# Patient Record
Sex: FEMALE | Race: OTHER | HISPANIC OR LATINO | Employment: OTHER | URBAN - METROPOLITAN AREA
[De-identification: names, ages, dates, MRNs, and addresses within clinical notes are randomized per-mention and may not be internally consistent; named-entity substitution may affect disease eponyms.]

---

## 2017-02-07 ENCOUNTER — ALLSCRIPTS OFFICE VISIT (OUTPATIENT)
Dept: OTHER | Facility: OTHER | Age: 47
End: 2017-02-07

## 2017-04-17 ENCOUNTER — ALLSCRIPTS OFFICE VISIT (OUTPATIENT)
Dept: OTHER | Facility: OTHER | Age: 47
End: 2017-04-17

## 2017-04-18 ENCOUNTER — GENERIC CONVERSION - ENCOUNTER (OUTPATIENT)
Dept: OTHER | Facility: OTHER | Age: 47
End: 2017-04-18

## 2017-04-18 LAB
AMBIG ABBREV CMP14 DEFAULT (HISTORICAL): NORMAL
AMBIG ABBREV LP DEFAULT (HISTORICAL): NORMAL
DEPRECATED RDW RBC AUTO: 13.8 % (ref 12.3–15.4)
ERYTHROCYTE SEDIMENTATION RATE (HISTORICAL): 25 MM/HR (ref 0–32)
HCT VFR BLD AUTO: 36.8 % (ref 34–46.6)
HGB BLD-MCNC: 12.4 G/DL (ref 11.1–15.9)
MCH RBC QN AUTO: 30.8 PG (ref 26.6–33)
MCHC RBC AUTO-ENTMCNC: 33.7 G/DL (ref 31.5–35.7)
MCV RBC AUTO: 91 FL (ref 79–97)
PLATELET # BLD AUTO: 294 X10E3/UL (ref 150–379)
RBC (HISTORICAL): 4.03 X10E6/UL (ref 3.77–5.28)
WBC # BLD AUTO: 5.5 X10E3/UL (ref 3.4–10.8)

## 2017-04-19 LAB
A/G RATIO (HISTORICAL): 1.7 (ref 1.2–2.2)
ALBUMIN SERPL BCP-MCNC: 4.4 G/DL (ref 3.5–5.5)
ALP SERPL-CCNC: 63 IU/L (ref 39–117)
ALT SERPL W P-5'-P-CCNC: 10 IU/L (ref 0–32)
AST SERPL W P-5'-P-CCNC: 19 IU/L (ref 0–40)
BACTERIA UR QL AUTO: NORMAL
BILIRUB SERPL-MCNC: 0.5 MG/DL (ref 0–1.2)
BILIRUB UR QL STRIP: NEGATIVE
BUN SERPL-MCNC: 13 MG/DL (ref 6–24)
BUN/CREA RATIO (HISTORICAL): 18 (ref 9–23)
CALCIUM SERPL-MCNC: 9.4 MG/DL (ref 8.7–10.2)
CHLORIDE SERPL-SCNC: 101 MMOL/L (ref 96–106)
CHOLEST SERPL-MCNC: 171 MG/DL (ref 100–199)
CO2 SERPL-SCNC: 22 MMOL/L (ref 18–29)
COLOR UR: YELLOW
COMMENT (HISTORICAL): CLEAR
CREAT SERPL-MCNC: 0.73 MG/DL (ref 0.57–1)
EGFR AFRICAN AMERICAN (HISTORICAL): 115 ML/MIN/1.73
EGFR-AMERICAN CALC (HISTORICAL): 100 ML/MIN/1.73
FECAL OCCULT BLOOD DIAGNOSTIC (HISTORICAL): NEGATIVE
GLUCOSE (HISTORICAL): NEGATIVE
GLUCOSE SERPL-MCNC: 97 MG/DL (ref 65–99)
HBA1C MFR BLD HPLC: 5.7 % (ref 4.8–5.6)
HDLC SERPL-MCNC: 65 MG/DL
KETONES UR STRIP-MCNC: NEGATIVE MG/DL
LDLC SERPL CALC-MCNC: 89 MG/DL (ref 0–99)
LEUKOCYTE ESTERASE UR QL STRIP: NEGATIVE
MICROSCOPIC EXAMINATION (HISTORICAL): NORMAL
MICROSCOPIC EXAMINATION (HISTORICAL): NORMAL
MUCUS THREADS (HISTORICAL): PRESENT
NITRITE UR QL STRIP: NEGATIVE
NON-SQ EPI CELLS URNS QL MICRO: NORMAL /HPF
PH UR STRIP.AUTO: 6 [PH] (ref 5–7.5)
POTASSIUM SERPL-SCNC: 4.5 MMOL/L (ref 3.5–5.2)
PROT UR STRIP-MCNC: NEGATIVE MG/DL
RBC (HISTORICAL): NORMAL /HPF
SODIUM SERPL-SCNC: 140 MMOL/L (ref 134–144)
SP GR UR STRIP.AUTO: 1.02 (ref 1–1.03)
TOT. GLOBULIN, SERUM (HISTORICAL): 2.6 G/DL (ref 1.5–4.5)
TOTAL PROTEIN (HISTORICAL): 7 G/DL (ref 6–8.5)
TRIGL SERPL-MCNC: 87 MG/DL (ref 0–149)
TSH SERPL DL<=0.05 MIU/L-ACNC: 2.37 UIU/ML (ref 0.45–4.5)
URINALYSIS (UA) (HISTORICAL): NORMAL
UROBILINOGEN UR QL STRIP.AUTO: 0.2 EU/DL (ref 0.2–1)
VLDLC SERPL CALC-MCNC: 17 MG/DL (ref 5–40)
WBC # BLD AUTO: NORMAL /HPF

## 2017-04-24 ENCOUNTER — GENERIC CONVERSION - ENCOUNTER (OUTPATIENT)
Dept: OTHER | Facility: OTHER | Age: 47
End: 2017-04-24

## 2017-04-26 LAB
C DIFFICILE TOXIN A AND B (HISTORICAL): NEGATIVE
FECAL OCCULT BLOOD DIAGNOSTIC (HISTORICAL): NEGATIVE

## 2017-04-28 LAB
RESULT 1 (HISTORICAL): NORMAL
RESULT 1 (HISTORICAL): NORMAL
SHIGA TOXIN TEST (HISTORICAL): NEGATIVE
STOOL FOR CAMPYLOBACTER (HISTORICAL): NORMAL
STOOL FOR SALMONELLA OR SHIGELLA (HISTORICAL): NORMAL

## 2017-05-01 LAB
GIARDIA ANTIGEN STOOL (HISTORICAL): NEGATIVE
RESULT 1 (HISTORICAL): NORMAL
RESULT 1 (HISTORICAL): NORMAL
STOOL COMPLETE OVA AND PARASITES (HISTORICAL): NORMAL
WHITE BLOOD CELLS, STOOL (HISTORICAL): NORMAL

## 2017-07-21 ENCOUNTER — GENERIC CONVERSION - ENCOUNTER (OUTPATIENT)
Dept: OTHER | Facility: OTHER | Age: 47
End: 2017-07-21

## 2017-09-21 ENCOUNTER — TRANSCRIBE ORDERS (OUTPATIENT)
Dept: LAB | Facility: CLINIC | Age: 47
End: 2017-09-21

## 2017-09-21 ENCOUNTER — ALLSCRIPTS OFFICE VISIT (OUTPATIENT)
Dept: OTHER | Facility: OTHER | Age: 47
End: 2017-09-21

## 2017-09-21 DIAGNOSIS — R73.01 IMPAIRED FASTING GLUCOSE: ICD-10-CM

## 2017-09-21 DIAGNOSIS — M25.50 PAIN IN JOINT: ICD-10-CM

## 2017-09-21 DIAGNOSIS — E78.5 HYPERLIPIDEMIA: ICD-10-CM

## 2017-09-22 ENCOUNTER — GENERIC CONVERSION - ENCOUNTER (OUTPATIENT)
Dept: OTHER | Facility: OTHER | Age: 47
End: 2017-09-22

## 2017-09-22 LAB
AMBIG ABBREV LP DEFAULT (HISTORICAL): NORMAL
DEPRECATED RDW RBC AUTO: 13.5 % (ref 12.3–15.4)
ERYTHROCYTE SEDIMENTATION RATE (HISTORICAL): 7 MM/HR (ref 0–32)
HCT VFR BLD AUTO: 38.2 % (ref 34–46.6)
HGB BLD-MCNC: 12.8 G/DL (ref 11.1–15.9)
MCH RBC QN AUTO: 30.5 PG (ref 26.6–33)
MCHC RBC AUTO-ENTMCNC: 33.5 G/DL (ref 31.5–35.7)
MCV RBC AUTO: 91 FL (ref 79–97)
PLATELET # BLD AUTO: 310 X10E3/UL (ref 150–379)
RBC (HISTORICAL): 4.19 X10E6/UL (ref 3.77–5.28)
WBC # BLD AUTO: 5.6 X10E3/UL (ref 3.4–10.8)

## 2017-09-23 LAB
A/G RATIO (HISTORICAL): 1.8 (ref 1.2–2.2)
ALBUMIN SERPL BCP-MCNC: 4.8 G/DL (ref 3.5–5.5)
ALP SERPL-CCNC: 72 IU/L (ref 39–117)
ALT SERPL W P-5'-P-CCNC: 14 IU/L (ref 0–32)
ANTI DNA DOUBLE STRANDED (HISTORICAL): <1 IU/ML (ref 0–9)
ANTI JO-1 IGG (HISTORICAL): <0.2 AI (ref 0–0.9)
ANTI-CENTROMERE B ANTIBODIES (HISTORICAL): <0.2 AI (ref 0–0.9)
ANTI-NUCLEAR ANTIBODY (ANA) (HISTORICAL): POSITIVE
ANTICHROMATIN ABS (HISTORICAL): <0.2 AI (ref 0–0.9)
AST SERPL W P-5'-P-CCNC: 23 IU/L (ref 0–40)
BILIRUB SERPL-MCNC: 0.6 MG/DL (ref 0–1.2)
BUN SERPL-MCNC: 12 MG/DL (ref 6–24)
BUN/CREA RATIO (HISTORICAL): 14 (ref 9–23)
C-REACT.PROTEIN,QUANT (HISTORICAL): 2.7 MG/L (ref 0–4.9)
CALCIUM SERPL-MCNC: 10 MG/DL (ref 8.7–10.2)
CHLORIDE SERPL-SCNC: 99 MMOL/L (ref 96–106)
CHOLEST SERPL-MCNC: 178 MG/DL (ref 100–199)
CK SERPL-CCNC: 99 U/L (ref 24–173)
CO2 SERPL-SCNC: 23 MMOL/L (ref 18–29)
CREAT SERPL-MCNC: 0.85 MG/DL (ref 0.57–1)
EGFR AFRICAN AMERICAN (HISTORICAL): 96 ML/MIN/1.73
EGFR-AMERICAN CALC (HISTORICAL): 83 ML/MIN/1.73
ENA TO RNP ANTIBODY (HISTORICAL): 0.2 AI (ref 0–0.9)
ENA TO SMITH (SM) ANTIBODY (HISTORICAL): <0.2 AI (ref 0–0.9)
GLUCOSE SERPL-MCNC: 89 MG/DL (ref 65–99)
HBA1C MFR BLD HPLC: 5.7 % (ref 4.8–5.6)
HDLC SERPL-MCNC: 63 MG/DL
LDLC SERPL CALC-MCNC: 97 MG/DL (ref 0–99)
LYME IGG/IGM AB (HISTORICAL): <0.91 ISR (ref 0–0.9)
LYME IGM (HISTORICAL): <0.8 INDEX (ref 0–0.79)
POTASSIUM SERPL-SCNC: 4.6 MMOL/L (ref 3.5–5.2)
RA LATEX TURBID (HISTORICAL): <10 IU/ML (ref 0–13.9)
SCLERODERMA (SCL-70) AB (HISTORICAL): 1.2 AI (ref 0–0.9)
SEE BELOW/SEE TEXT (HISTORICAL): ABNORMAL
SODIUM SERPL-SCNC: 139 MMOL/L (ref 134–144)
SSA (RO) ANTIBODY (HISTORICAL): 1 AI (ref 0–0.9)
SSB (LA) ANTIBODY (HISTORICAL): <0.2 AI (ref 0–0.9)
TOT. GLOBULIN, SERUM (HISTORICAL): 2.7 G/DL (ref 1.5–4.5)
TOTAL PROTEIN (HISTORICAL): 7.5 G/DL (ref 6–8.5)
TRIGL SERPL-MCNC: 90 MG/DL (ref 0–149)
TSH SERPL DL<=0.05 MIU/L-ACNC: 2.55 UIU/ML (ref 0.45–4.5)
VLDLC SERPL CALC-MCNC: 18 MG/DL (ref 5–40)

## 2017-09-24 LAB — CYCLIC CITRULLINATED PEPTIDE ANTIBODY (HISTORICAL): 5 UNITS (ref 0–19)

## 2017-09-29 ENCOUNTER — GENERIC CONVERSION - ENCOUNTER (OUTPATIENT)
Dept: OTHER | Facility: OTHER | Age: 47
End: 2017-09-29

## 2017-10-06 ENCOUNTER — GENERIC CONVERSION - ENCOUNTER (OUTPATIENT)
Dept: OTHER | Facility: OTHER | Age: 47
End: 2017-10-06

## 2017-10-06 ENCOUNTER — GENERIC CONVERSION - ENCOUNTER (OUTPATIENT)
Dept: INTERNAL MEDICINE CLINIC | Facility: CLINIC | Age: 47
End: 2017-10-06

## 2017-11-30 ENCOUNTER — ALLSCRIPTS OFFICE VISIT (OUTPATIENT)
Dept: OTHER | Facility: OTHER | Age: 47
End: 2017-11-30

## 2017-12-05 NOTE — PROGRESS NOTES
Assessment    1  Other specified hypothyroidism (244 8) (E03 8)   2  Hashimoto's thyroiditis (245 2) (E06 3)   3  Sjogren's syndrome (710 2) (M35 00)   4  Hyperlipemia (272 4) (E78 5)   5  Depression with anxiety (300 4) (F41 8)    Plan  Depression with anxiety    · Follow-up visit in 2 months Evaluation and Treatment  Follow-up  Status: Hold For - Scheduling   Requested for: 12Ieu0051  Hashimoto's thyroiditis    · (1) TSH; Status:Active; Requested for:56Kdm7022; Hashimoto's thyroiditis, Other specified hypothyroidism    · Levothyroxine Sodium 25 MCG Oral Tablet; take 1 tablet by mouth every day    Discussion/Summary  Discussion Summary:   She will get an appointment to see her endocrinologist within the next couple of months meanwhile we will start her on Synthroid 25 follow-up TSH in 1 month  Follow up as her symptoms warrant  Medication SE Review and Pt Understands Tx: Possible side effects of new medications were reviewed with the patient/guardian today  The treatment plan was reviewed with the patient/guardian  The patient/guardian understands and agrees with the treatment plan      Chief Complaint  Chief Complaint Free Text Note Form: For follow-up      History of Present Illness  HPI: She has a long history of a thyroid nodule on the left it varies in size  Some hot and cold intolerance some changes in her skin some dry mouth and dry  The symptoms had been going on for 4 years on and  She has had thyroid scanning TSHs have been up and down she has been followed by Rheumatology and Endocrinology  Most recent testing by Rheumatology showed evidence of Hashimoto's S somewhat elevated TSH,4  No palpitations dizziness shortness of breath or weight gain  She has anxiety problems as well which seem to be stable and on psychotropics  No inflammatory joints or skin rashes   Anxiety Disorder (Follow-Up): The patient is being seen for follow-up of anxiety  The patient reports no change in the condition   She has no comorbid illnesses  She has had no significant interval events  The patient is currently asymptomatic  Disease management:  the patient is doing well with her goals  Depression (Follow-Up): The patient states her depression has been stable since the last visit  She has no comorbid illnesses  She has had no significant interval events  Interval Symptoms: The patient is currently asymptomatic  Associated symptoms include:  No associated symptoms are reported  Hyperlipidemia (Follow-Up): The patient states her hyperlipidemia has been under good control since the last visit  Comorbid Illnesses: hypertension  She has no significant interval events  Symptoms: The patient is currently asymptomatic  The patient is doing well with her hyperlipidemia goals  Review of Systems  Complete-Female:   Constitutional: feeling poorly and feeling tired, but as noted in HPI, no fever, no recent weight gain and no chills  Eyes: No complaints of eye pain, no red eyes, no eyesight problems, no discharge, no dry eyes, no itching of eyes  ENT: no complaints of earache, no loss of hearing, no nose bleeds, no nasal discharge, no sore throat, no hoarseness  Cardiovascular: No complaints of slow heart rate, no fast heart rate, no chest pain, no palpitations, no leg claudication, no lower extremity edema  Respiratory: No complaints of shortness of breath, no wheezing, no cough, no SOB on exertion, no orthopnea, no PND  Gastrointestinal: No complaints of abdominal pain, no constipation, no nausea or vomiting, no diarrhea, no bloody stools  Genitourinary: No complaints of dysuria, no incontinence, no pelvic pain, no dysmenorrhea, no vaginal discharge or bleeding  Musculoskeletal: No complaints of arthralgias, no myalgias, no joint swelling or stiffness, no limb pain or swelling  Integumentary: No complaints of skin rash or lesions, no itching, no skin wounds, no breast pain or lump     Neurological: No complaints of headache, no confusion, no convulsions, no numbness, no dizziness or fainting, no tingling, no limb weakness, no difficulty walking  Psychiatric: Not suicidal, no sleep disturbance, no anxiety or depression, no change in personality, no emotional problems  Endocrine: No complaints of proptosis, no hot flashes, no muscle weakness, no deepening of the voice, no feelings of weakness  Hematologic/Lymphatic: No complaints of swollen glands, no swollen glands in the neck, does not bleed easily, does not bruise easily  ROS Reviewed:   ROS reviewed  Active Problems    1  LALITA positive (795 79) (R76 8)   2  Anxiety (300 00) (F41 9)   3  Asthma (493 90) (J45 909)   4  Breast enlargement   5  Depression with anxiety (300 4) (F41 8)   6  Dermatitis (692 9) (L30 9)   7  Diarrhea (787 91) (R19 7)   8  Eczema (692 9) (L30 9)   9  Eustachian tube dysfunction (381 81) (H69 80)   10  Hashimoto's thyroiditis (245 2) (E06 3)   11  Headache (784 0) (R51)   12  Hyperlipemia (272 4) (E78 5)   13  Impaired fasting glucose (790 21) (R73 01)   14  Knee pain (719 46) (M25 569)   15  Migraine without aura, intractable (346 11) (G43 019)   16  Otalgia of both ears (388 70) (H92 03)   17  Other specified hypothyroidism (244 8) (E03 8)   18  Painful joint (719 40) (M25 50)   19  Sjogren's syndrome (710 2) (M35 00)   20  Swelling (782 3) (R60 9)   21  Thyroid disorder (246 9) (E07 9)   22  Venous insufficiency (459 81) (I87 2)   23  Yeast infection (112 9) (B37 9)    Past Medical History  Active Problems And Past Medical History Reviewed: The active problems and past medical history were reviewed and updated today  Surgical History    1  History of Hysterectomy   2  History of Sinus Surgery  Surgical History Reviewed: The surgical history was reviewed and updated today  Family History  Mother    1  Family history of discoid lupus erythematosus (V19 4) (Z84 0)   2   Family history of hyperlipidemia (V18 19) (Z83 49)  Father    3  Family history of depression (V17 0) (Z81 8)  Family History Reviewed: The family history was reviewed and updated today  Social History    · Alcohol use   · Never a smoker   · No drug use   · Tobacco non-user (V49 89) (Z78 9)  Social History Reviewed: The social history was reviewed and updated today  Current Meds   1  Atorvastatin Calcium 10 MG Oral Tablet; Take 1 tablet daily  Requested for: [id number]; Last   Rx:26Qsm2715 Ordered   2  ClonazePAM 0 5 MG Oral Tablet; TAKE 1 TABLET BY MOUTH 3 TIMES A DAY AS NEEDED; Therapy: 62SEI7760 to (Evaluate:14Xmm9024); Last Rx:09Rqa9835 Ordered   3  Desonide 0 05 % External Cream; APPLY SPARINGLY TO Left orbit 2 TO 3 TIMES DAILY as needed; Therapy: 14JMV8068 to (Last Rx:89Fmw0324)  Requested for: 14CDU6783 Ordered   4  Fluticasone Propionate 50 MCG/ACT Nasal Suspension; USE 2 SPRAYS IN EACH NOSTRIL EVERY   DAY  Requested for: 62DUP8696; Last Rx:17Xld1898 Ordered   5  Rizatriptan Benzoate 10 MG Oral Tablet Disintegrating; DISSOLVE 1 TABLET BY MOUTH AT ONSET   OF HEADACHE  MAY REPEAT EVERY 2 HOURS AS NEEDED  MAX 3 TABS/DAY; Therapy: 23Tyd1541 to (Evaluate:59Tra8164)  Requested for: 33THQ7969; Last Rx:50Hjc5291   Ordered   6  Triamcinolone Acetonide 0 1 % External Cream; Mix 1/4 part w/ 3/4 parts Eucerin and apply to arms   and chest 2-3 times daily as needed; Therapy: 85IVR3745 to (Last Rx:05Nov2015)  Requested for: 50JYH8410 Ordered   7  Venlafaxine HCl  MG Oral Tablet Extended Release 24 Hour; Take 1 tablet daily; Therapy: 68Mrk4500 to (Last Arletha Bernheim)  Requested for: 24Pek1008 Ordered   8  Ventolin  (90 Base) MCG/ACT Inhalation Aerosol Solution; INHALE 1 PUFF EVERY 4 HOURS   AS NEEDED; Therapy: 77JOR6216 to (Evaluate:87Vgl2359)  Requested for: 01EDH7475; Last Rx:53Vxc3643   Ordered  Medication List Reviewed: The medication list was reviewed and updated today  Allergies    1   No Known Drug Allergies    Vitals  Vital Signs    Recorded: 75YFT4717 11:42AM   Temperature 98 1 F, Rectal   Heart Rate 92   Systolic 529, LUE, Sitting   Diastolic 86, LUE, Sitting   Weight 149 lb 4 oz   BMI Calculated 26 95   BSA Calculated 1 7   O2 Saturation 98     Physical Exam    Constitutional   General appearance: Abnormal   obese  Eyes   Conjunctiva and lids: No swelling, erythema or discharge  Pupils and irises: Equal, round and reactive to light  Ears, Nose, Mouth, and Throat   External inspection of ears and nose: Normal     Otoscopic examination: Tympanic membranes translucent with normal light reflex  Canals patent without erythema  Nasal mucosa, septum, and turbinates: Normal without edema or erythema  Oropharynx: Abnormal   Visible thyroid nodule left neck about 2 centimeters  Pulmonary   Respiratory effort: No increased work of breathing or signs of respiratory distress  Auscultation of lungs: Clear to auscultation  Cardiovascular   Palpation of heart: Normal PMI, no thrills  Auscultation of heart: Normal rate and rhythm, normal S1 and S2, without murmurs  Examination of extremities for edema and/or varicosities: Normal     Carotid pulses: Normal     Abdomen   Abdomen: Non-tender, no masses  Liver and spleen: No hepatomegaly or splenomegaly  Lymphatic   Palpation of lymph nodes in neck: No lymphadenopathy  Musculoskeletal   Gait and station: Normal     Digits and nails: Normal without clubbing or cyanosis  Inspection/palpation of joints, bones, and muscles: Normal     Skin   Skin and subcutaneous tissue: Normal without rashes or lesions  Neurologic   Cranial nerves: Cranial nerves 2-12 intact  Reflexes: Abnormal   Bilateral delayed ankle jerk  Sensation: No sensory loss      Psychiatric   Orientation to person, place, and time: Normal     Mood and affect: Normal          Future Appointments    Date/Time Provider Specialty Site   01/17/2018 02:00 PM [facility] Sanford, JACOBY Fowler   Electronically signed by : Justina Mireles, [facility]; Nov 30 2017  5:55PM EST                       (Author)    Electronically signed by : JACOBY Sarah ; Nov 30 2017  6:34PM EST

## 2017-12-28 DIAGNOSIS — E06.3 AUTOIMMUNE THYROIDITIS: ICD-10-CM

## 2018-01-12 NOTE — MISCELLANEOUS
Dear Benita Barrera      We are writing to you because we have tried contacting you several times to set  up an appointment with Dr Kathie Pagan  It is important for you to schedule this appointment so that   Dr Kathie Pagan can better assess your health  Please give our office a call at [phone]  Thank you!        [facility] AT [facility]

## 2018-01-13 VITALS
HEIGHT: 63 IN | DIASTOLIC BLOOD PRESSURE: 90 MMHG | SYSTOLIC BLOOD PRESSURE: 120 MMHG | BODY MASS INDEX: 25.91 KG/M2 | WEIGHT: 146.25 LBS | RESPIRATION RATE: 14 BRPM | HEART RATE: 74 BPM

## 2018-01-13 VITALS
HEART RATE: 92 BPM | BODY MASS INDEX: 26.86 KG/M2 | TEMPERATURE: 98.1 F | WEIGHT: 149.25 LBS | OXYGEN SATURATION: 98 % | DIASTOLIC BLOOD PRESSURE: 86 MMHG | SYSTOLIC BLOOD PRESSURE: 116 MMHG

## 2018-01-14 VITALS
WEIGHT: 147.25 LBS | DIASTOLIC BLOOD PRESSURE: 84 MMHG | HEIGHT: 62 IN | BODY MASS INDEX: 27.1 KG/M2 | SYSTOLIC BLOOD PRESSURE: 118 MMHG | HEART RATE: 77 BPM

## 2018-01-15 VITALS
HEART RATE: 88 BPM | BODY MASS INDEX: 27.6 KG/M2 | DIASTOLIC BLOOD PRESSURE: 84 MMHG | WEIGHT: 150 LBS | OXYGEN SATURATION: 98 % | HEIGHT: 62 IN | SYSTOLIC BLOOD PRESSURE: 118 MMHG

## 2018-01-22 VITALS
HEIGHT: 62 IN | DIASTOLIC BLOOD PRESSURE: 92 MMHG | HEART RATE: 83 BPM | OXYGEN SATURATION: 98 % | WEIGHT: 149.13 LBS | BODY MASS INDEX: 27.44 KG/M2 | SYSTOLIC BLOOD PRESSURE: 132 MMHG

## 2018-01-22 VITALS — SYSTOLIC BLOOD PRESSURE: 118 MMHG | DIASTOLIC BLOOD PRESSURE: 78 MMHG

## 2018-02-22 DIAGNOSIS — E03.9 HYPOTHYROIDISM, UNSPECIFIED TYPE: Primary | ICD-10-CM

## 2018-02-22 RX ORDER — LEVOTHYROXINE SODIUM 0.03 MG/1
TABLET ORAL
Qty: 30 TABLET | Refills: 2 | Status: SHIPPED | OUTPATIENT
Start: 2018-02-22 | End: 2018-06-14 | Stop reason: SDUPTHER

## 2018-03-20 DIAGNOSIS — F32.A DEPRESSION, UNSPECIFIED DEPRESSION TYPE: Primary | ICD-10-CM

## 2018-03-20 RX ORDER — VENLAFAXINE HYDROCHLORIDE 225 MG/1
TABLET, EXTENDED RELEASE ORAL
Qty: 90 TABLET | Refills: 1 | Status: SHIPPED | OUTPATIENT
Start: 2018-03-20 | End: 2018-06-14 | Stop reason: SDUPTHER

## 2018-04-09 DIAGNOSIS — E78.5 HYPERLIPIDEMIA: ICD-10-CM

## 2018-04-09 DIAGNOSIS — R73.01 IMPAIRED FASTING GLUCOSE: ICD-10-CM

## 2018-05-08 ENCOUNTER — TELEPHONE (OUTPATIENT)
Dept: INTERNAL MEDICINE CLINIC | Facility: CLINIC | Age: 48
End: 2018-05-08

## 2018-05-08 ENCOUNTER — OFFICE VISIT (OUTPATIENT)
Dept: INTERNAL MEDICINE CLINIC | Facility: CLINIC | Age: 48
End: 2018-05-08
Payer: COMMERCIAL

## 2018-05-08 VITALS
SYSTOLIC BLOOD PRESSURE: 108 MMHG | WEIGHT: 157.6 LBS | OXYGEN SATURATION: 96 % | BODY MASS INDEX: 27.93 KG/M2 | HEART RATE: 97 BPM | DIASTOLIC BLOOD PRESSURE: 84 MMHG | HEIGHT: 63 IN

## 2018-05-08 DIAGNOSIS — M79.621 AXILLARY PAIN, RIGHT: ICD-10-CM

## 2018-05-08 DIAGNOSIS — R76.8 POSITIVE ANA (ANTINUCLEAR ANTIBODY): Primary | ICD-10-CM

## 2018-05-08 DIAGNOSIS — M79.622 AXILLARY PAIN, LEFT: Primary | ICD-10-CM

## 2018-05-08 DIAGNOSIS — N63.0 LUMP, BREAST: ICD-10-CM

## 2018-05-08 PROCEDURE — 99214 OFFICE O/P EST MOD 30 MIN: CPT | Performed by: PHYSICIAN ASSISTANT

## 2018-05-08 RX ORDER — FLUTICASONE PROPIONATE 50 MCG
2 SPRAY, SUSPENSION (ML) NASAL DAILY
COMMUNITY
End: 2018-09-26 | Stop reason: SDUPTHER

## 2018-05-08 RX ORDER — CLONAZEPAM 0.5 MG/1
1 TABLET ORAL 3 TIMES DAILY PRN
COMMUNITY
Start: 2016-02-11 | End: 2018-06-14 | Stop reason: SDUPTHER

## 2018-05-08 RX ORDER — ALBUTEROL SULFATE 90 UG/1
AEROSOL, METERED RESPIRATORY (INHALATION)
COMMUNITY
End: 2018-09-26 | Stop reason: SDUPTHER

## 2018-05-08 RX ORDER — ATORVASTATIN CALCIUM 10 MG/1
1 TABLET, FILM COATED ORAL DAILY
COMMUNITY
End: 2019-01-09 | Stop reason: SDUPTHER

## 2018-05-08 RX ORDER — RIZATRIPTAN BENZOATE 10 MG/1
TABLET, ORALLY DISINTEGRATING ORAL
COMMUNITY
Start: 2016-04-14 | End: 2018-09-14 | Stop reason: SDUPTHER

## 2018-05-08 RX ORDER — CYCLOSPORINE 0.5 MG/ML
EMULSION OPHTHALMIC
COMMUNITY
Start: 2017-11-10 | End: 2018-09-26

## 2018-05-08 NOTE — TELEPHONE ENCOUNTER
Pt was seen by [facility] today, says she was told to have dr Arnaldo Wyatt order blood work for her  She saw Nawaf Ruma today for a lump on the side of her breast but she also has a thyroid nodule that she has had for a while  unsure if the the lump on the side of her breast would be related to this? She was ordered a mammo and an ultrasound but would like to have the blood work done before she schedules these tests   Call pt when blood work ready     [phone]

## 2018-05-08 NOTE — TELEPHONE ENCOUNTER
I told the patient during the visit that her thyroid abnormality most likely has nothing to do with the lump and pain under her arms  She said there is blood work that Dave Torres or Dr Marly Vazquez was going to order to follow up her thyroid problem  She should contact them for this issue since they weren't here to ask

## 2018-05-08 NOTE — PROGRESS NOTES
Assessment/Plan:      Bilateral axillary pain, left axillary " lump" palpated by patient-   Left diagnostic mammogram, bilateral ultrasound of breasts and axilla    No problem-specific Assessment & Plan notes found for this encounter  Diagnoses and all orders for this visit:    Axillary pain, left  -     Mammo diagnostic left w 3d & cad; Future  -     US breast left limited (diagnostic); Future    Axillary pain, right  -     US breast right limited (diagnostic); Future    Lump, breast  -     Mammo diagnostic left w 3d & cad; Future  -     US breast left limited (diagnostic); Future    Other orders  -     atorvastatin (LIPITOR) 10 mg tablet; Take 1 tablet by mouth daily  -     albuterol (PROVENTIL HFA,VENTOLIN HFA) 90 mcg/act inhaler; 2 puff(s)  -     clonazePAM (KlonoPIN) 0 5 mg tablet; Take 1 tablet by mouth 3 (three) times a day as needed  -     fluticasone (FLONASE) 50 mcg/act nasal spray; 2 sprays into each nostril daily  -     cycloSPORINE (RESTASIS MULTIDOSE) 0 05 % ophthalmic emulsion; INSTILL 1 DROP INTO BOTH EYES TWICE A DAY  -     rizatriptan (MAXALT-MLT) 10 MG disintegrating tablet; Take by mouth          Subjective:      Patient ID: Benita Baker is a 56 y o  female  Patient comes in complaining of a pain sensation under both of her arms when she lowers her arms  She denies a rubbing or burning sensation such as an irritation  She says it has been going on for the last 3 weeks  Because of this discomfort she was feeling under both of her arms and thought she felt a raised area under the left arm  She describes this pain as a throbbing (1/10) pain under both of her arms but the left is worse than the right  Her last mammogram was in August of last year which was normal   She denies any discharge, redness, rashes  No fever, chills or sweats          The following portions of the patient's history were reviewed and updated as appropriate: allergies, current medications, past family history, past medical history, past social history, past surgical history and problem list     Review of Systems   Constitutional: Negative for chills, fatigue and fever  Respiratory: Negative for cough and shortness of breath  Cardiovascular: Negative for chest pain and palpitations  Gastrointestinal: Negative for abdominal pain, diarrhea and nausea  Skin:        Pain under both axilla, palpated "lump" under left axilla         Objective:      /84 (BP Location: Left arm, Patient Position: Sitting)   Pulse 97   Ht 5' 2 5" (1 588 m)   Wt 71 5 kg (157 lb 9 6 oz)   SpO2 96%   BMI 28 37 kg/m²          Physical Exam   Constitutional: She appears well-developed and well-nourished  HENT:   Head: Normocephalic and atraumatic  Mouth/Throat: Oropharynx is clear and moist    Cardiovascular: Normal rate, regular rhythm and normal heart sounds  Pulmonary/Chest: Effort normal and breath sounds normal  No respiratory distress  Abdominal: Soft  Bowel sounds are normal  There is no tenderness  Lymphadenopathy:     She has no cervical adenopathy  Skin:     Bilateral breast and axillary exam, no masses are palpated, no tenderness to palpation, no rashes  There are bilateral scars underneath both breasts secondary to breast reduction surgeries  Under the left axilla, in particular, there is no mass palpated, no sores or wounds are noted  No drainage or discharge, no rashes

## 2018-06-06 ENCOUNTER — TELEPHONE (OUTPATIENT)
Dept: INTERNAL MEDICINE CLINIC | Facility: CLINIC | Age: 48
End: 2018-06-06

## 2018-06-06 NOTE — TELEPHONE ENCOUNTER
Called [facility] in [place] [phone] and they said and alejandra was done and already sent out  They said I can call cusotomer service tomorrow to add tsh cmp and lipid on  Dr also ordered cbc and a1c and they said these are done in a different tube so pt would need to have blood redrawn    I spoke w/ pt and she will have it re done, I mailed slip home

## 2018-06-06 NOTE — TELEPHONE ENCOUNTER
Patient had labs done this morning at [facility] and needs more sent in   TSH, CMP and Lipid Panel    Patient said orders were not complete  Patient said to please call [facility] with these orders so she doesn't have to go back and get stuck again  [facility] told patient we would have there phone #  Please advise    Yony High

## 2018-06-07 LAB
CENTROMERE B AB SER-ACNC: <0.2 AI (ref 0–0.9)
CHROMATIN AB SERPL-ACNC: <0.2 AI (ref 0–0.9)
DSDNA AB SER-ACNC: <1 IU/ML (ref 0–9)
ENA JO1 AB SER-ACNC: <0.2 AI (ref 0–0.9)
ENA RNP AB SER-ACNC: <0.2 AI (ref 0–0.9)
ENA SCL70 AB SER-ACNC: 1.7 AI (ref 0–0.9)
ENA SM AB SER-ACNC: <0.2 AI (ref 0–0.9)
ENA SS-A AB SER-ACNC: 0.8 AI (ref 0–0.9)
ENA SS-B AB SER-ACNC: <0.2 AI (ref 0–0.9)
SL AMB SEE BELOW:: ABNORMAL

## 2018-06-08 ENCOUNTER — TELEPHONE (OUTPATIENT)
Dept: INTERNAL MEDICINE CLINIC | Facility: CLINIC | Age: 48
End: 2018-06-08

## 2018-06-08 NOTE — TELEPHONE ENCOUNTER
----- Message from Rosmery Rick, [address] sent at 6/8/2018 12:31 PM EDT -----  Pt should make follow up next week after we have all her labs

## 2018-06-12 LAB
ALBUMIN SERPL-MCNC: 4.5 G/DL (ref 3.5–5.5)
ALBUMIN/GLOB SERPL: 1.7 {RATIO} (ref 1.2–2.2)
ALP SERPL-CCNC: 60 IU/L (ref 39–117)
ALT SERPL-CCNC: 13 IU/L (ref 0–32)
AST SERPL-CCNC: 23 IU/L (ref 0–40)
BASOPHILS # BLD AUTO: 0 X10E3/UL (ref 0–0.2)
BASOPHILS NFR BLD AUTO: 0 %
BILIRUB SERPL-MCNC: 0.5 MG/DL (ref 0–1.2)
BUN SERPL-MCNC: 13 MG/DL (ref 6–24)
BUN/CREAT SERPL: 15 (ref 9–23)
CALCIUM SERPL-MCNC: 9.4 MG/DL (ref 8.7–10.2)
CHLORIDE SERPL-SCNC: 99 MMOL/L (ref 96–106)
CHOLEST SERPL-MCNC: 181 MG/DL (ref 100–199)
CO2 SERPL-SCNC: 23 MMOL/L (ref 20–29)
CREAT SERPL-MCNC: 0.86 MG/DL (ref 0.57–1)
EOSINOPHIL # BLD AUTO: 0.1 X10E3/UL (ref 0–0.4)
EOSINOPHIL NFR BLD AUTO: 2 %
ERYTHROCYTE [DISTWIDTH] IN BLOOD BY AUTOMATED COUNT: 13.6 % (ref 12.3–15.4)
GLOBULIN SER-MCNC: 2.6 G/DL (ref 1.5–4.5)
GLUCOSE SERPL-MCNC: 104 MG/DL (ref 65–99)
HBA1C MFR BLD: 5.6 % (ref 4.8–5.6)
HCT VFR BLD AUTO: 38.5 % (ref 34–46.6)
HDLC SERPL-MCNC: 74 MG/DL
HGB BLD-MCNC: 12.3 G/DL (ref 11.1–15.9)
IMM GRANULOCYTES # BLD: 0 X10E3/UL (ref 0–0.1)
IMM GRANULOCYTES NFR BLD: 0 %
LDLC SERPL CALC-MCNC: 94 MG/DL (ref 0–99)
LYMPHOCYTES # BLD AUTO: 1.8 X10E3/UL (ref 0.7–3.1)
LYMPHOCYTES NFR BLD AUTO: 30 %
MCH RBC QN AUTO: 29.9 PG (ref 26.6–33)
MCHC RBC AUTO-ENTMCNC: 31.9 G/DL (ref 31.5–35.7)
MCV RBC AUTO: 94 FL (ref 79–97)
MONOCYTES # BLD AUTO: 0.5 X10E3/UL (ref 0.1–0.9)
MONOCYTES NFR BLD AUTO: 8 %
NEUTROPHILS # BLD AUTO: 3.7 X10E3/UL (ref 1.4–7)
NEUTROPHILS NFR BLD AUTO: 60 %
PLATELET # BLD AUTO: 300 X10E3/UL (ref 150–379)
POTASSIUM SERPL-SCNC: 4.2 MMOL/L (ref 3.5–5.2)
PROT SERPL-MCNC: 7.1 G/DL (ref 6–8.5)
RBC # BLD AUTO: 4.11 X10E6/UL (ref 3.77–5.28)
SL AMB EGFR AFRICAN AMERICAN: 94 ML/MIN/1.73
SL AMB EGFR NON AFRICAN AMERICAN: 81 ML/MIN/1.73
SL AMB VLDL CHOLESTEROL CALC: 13 MG/DL (ref 5–40)
SODIUM SERPL-SCNC: 139 MMOL/L (ref 134–144)
TRIGL SERPL-MCNC: 67 MG/DL (ref 0–149)
TSH SERPL DL<=0.005 MIU/L-ACNC: 3.13 UIU/ML (ref 0.45–4.5)
WBC # BLD AUTO: 6.1 X10E3/UL (ref 3.4–10.8)

## 2018-06-14 ENCOUNTER — OFFICE VISIT (OUTPATIENT)
Dept: INTERNAL MEDICINE CLINIC | Facility: CLINIC | Age: 48
End: 2018-06-14
Payer: COMMERCIAL

## 2018-06-14 VITALS
DIASTOLIC BLOOD PRESSURE: 82 MMHG | BODY MASS INDEX: 28.07 KG/M2 | SYSTOLIC BLOOD PRESSURE: 140 MMHG | HEART RATE: 76 BPM | HEIGHT: 63 IN | WEIGHT: 158.4 LBS

## 2018-06-14 DIAGNOSIS — F41.8 DEPRESSION WITH ANXIETY: ICD-10-CM

## 2018-06-14 DIAGNOSIS — G47.00 INSOMNIA, UNSPECIFIED TYPE: ICD-10-CM

## 2018-06-14 DIAGNOSIS — R73.01 IMPAIRED FASTING BLOOD SUGAR: ICD-10-CM

## 2018-06-14 DIAGNOSIS — R76.8 ANA POSITIVE: ICD-10-CM

## 2018-06-14 DIAGNOSIS — E03.9 HYPOTHYROIDISM, UNSPECIFIED TYPE: ICD-10-CM

## 2018-06-14 DIAGNOSIS — E78.5 HYPERLIPIDEMIA, UNSPECIFIED HYPERLIPIDEMIA TYPE: Primary | ICD-10-CM

## 2018-06-14 DIAGNOSIS — J45.909 UNCOMPLICATED ASTHMA, UNSPECIFIED ASTHMA SEVERITY, UNSPECIFIED WHETHER PERSISTENT: ICD-10-CM

## 2018-06-14 DIAGNOSIS — E06.3 HASHIMOTO'S DISEASE: ICD-10-CM

## 2018-06-14 DIAGNOSIS — M34.9 SCLERODERMA (HCC): ICD-10-CM

## 2018-06-14 DIAGNOSIS — M35.00 SJOGREN'S SYNDROME WITHOUT EXTRAGLANDULAR INVOLVEMENT (HCC): ICD-10-CM

## 2018-06-14 DIAGNOSIS — F32.A DEPRESSION, UNSPECIFIED DEPRESSION TYPE: ICD-10-CM

## 2018-06-14 PROBLEM — R94.6 ABNORMAL THYROID FUNCTION TEST: Status: ACTIVE | Noted: 2017-12-05

## 2018-06-14 PROCEDURE — 99214 OFFICE O/P EST MOD 30 MIN: CPT | Performed by: NURSE PRACTITIONER

## 2018-06-14 RX ORDER — LEVOTHYROXINE SODIUM 0.03 MG/1
25 TABLET ORAL DAILY
Qty: 90 TABLET | Refills: 3 | Status: SHIPPED | OUTPATIENT
Start: 2018-06-14 | End: 2018-11-26 | Stop reason: SDUPTHER

## 2018-06-14 RX ORDER — CLONAZEPAM 0.5 MG/1
0.5 TABLET ORAL 3 TIMES DAILY PRN
Qty: 90 TABLET | Refills: 0 | Status: SHIPPED | OUTPATIENT
Start: 2018-06-14 | End: 2018-09-14 | Stop reason: SDUPTHER

## 2018-06-14 RX ORDER — VENLAFAXINE HYDROCHLORIDE 225 MG/1
225 TABLET, EXTENDED RELEASE ORAL DAILY
Qty: 90 TABLET | Refills: 0 | Status: SHIPPED | OUTPATIENT
Start: 2018-06-14 | End: 2018-09-14 | Stop reason: SDUPTHER

## 2018-06-14 NOTE — PATIENT INSTRUCTIONS
Hashimoto's thyroiditis/hypothyroidism TSH stable on current dose    Positive LALITA with positive scleroderma antibodies refer back to Rheumatology    Health maintenance encouraged mammogram other than that but up-to-date    Hyperlipidemia LDL at goal on statin    Depression anxiety stable on current medication    Asthma she will be establishing with the [facility] physician since her asthma occurred after her exposure

## 2018-06-14 NOTE — PROGRESS NOTES
Assessment/Plan:    Patient Instructions   Hashimoto's thyroiditis/hypothyroidism TSH stable on current dose    Positive LALITA with positive scleroderma antibodies refer back to Rheumatology    Health maintenance encouraged mammogram other than that but up-to-date    Hyperlipidemia LDL at goal on statin    Depression anxiety stable on current medication    Asthma she will be establishing with the [facility] since her asthma occurred after her exposure         Diagnoses and all orders for this visit:    Hyperlipidemia, unspecified hyperlipidemia type  -     Lipid panel; Future    Hypothyroidism, unspecified type  -     levothyroxine 25 mcg tablet; Take 1 tablet (25 mcg total) by mouth daily  -     TSH, 3rd generation with Free T4 reflex; Future    Depression, unspecified depression type  -     venlafaxine 225 MG TB24; Take 1 tablet (225 mg total) by mouth daily    Impaired fasting blood sugar  -     CBC and differential; Future  -     Comprehensive metabolic panel; Future  -     Hemoglobin A1C; Future    Insomnia, unspecified type  -     clonazePAM (KlonoPIN) 0 5 mg tablet;  Take 1 tablet (0 5 mg total) by mouth 3 (three) times a day as needed (as needed)         Subjective:      Patient ID: Porsche Flores is a 56 y o  female    Patient diagnosed with Hashimoto's and started on thyroid medication no significant improvement in her symptoms  Active but no formal exercise  Taking thyroid medication on an empty stomach  Still with generalized aches and pains feeling itchy all over dry eyes no difficulty swallowing no constipation  Taking cholesterol medicine daily and tolerating          Current Outpatient Prescriptions:     albuterol (PROVENTIL HFA,VENTOLIN HFA) 90 mcg/act inhaler, 2 puff(s), Disp: , Rfl:     atorvastatin (LIPITOR) 10 mg tablet, Take 1 tablet by mouth daily, Disp: , Rfl:     clonazePAM (KlonoPIN) 0 5 mg tablet, Take 1 tablet (0 5 mg total) by mouth 3 (three) times a day as needed (as needed), Disp: 90 tablet, Rfl: 0    cycloSPORINE (RESTASIS MULTIDOSE) 0 05 % ophthalmic emulsion, INSTILL 1 DROP INTO BOTH EYES TWICE A DAY, Disp: , Rfl:     fluticasone (FLONASE) 50 mcg/act nasal spray, 2 sprays into each nostril daily, Disp: , Rfl:     levothyroxine 25 mcg tablet, Take 1 tablet (25 mcg total) by mouth daily, Disp: 90 tablet, Rfl: 3    rizatriptan (MAXALT-MLT) 10 MG disintegrating tablet, Take by mouth, Disp: , Rfl:     venlafaxine 225 MG TB24, Take 1 tablet (225 mg total) by mouth daily, Disp: 90 tablet, Rfl: 0    Recent Results (from the past 1008 hour(s))   LALITA Comprehensive Panel    Collection Time: 06/06/18 10:02 AM   Result Value Ref Range    ds DNA Ab <1 0 - 9 IU/mL    DELORIS RNP Ab <0 2 0 0 - 0 9 AI    DELORIS Martinez (SM) Ab <0 2 0 0 - 0 9 AI    Scleroderma SCL-70 1 7 (H) 0 0 - 0 9 AI    SS-A (RO) Ab 0 8 0 0 - 0 9 AI    SS-B (LA) Ab <0 2 0 0 - 0 9 AI    Antichromatin Abs <0 2 0 0 - 0 9 AI    Anti BRYANNA-1 <0 2 0 0 - 0 9 AI    Anti-Centromere B Antibodies <0 2 0 0 - 0 9 AI    See below: Comment    CBC and differential    Collection Time: 06/11/18  9:24 AM   Result Value Ref Range     AMB LAB WHITE BLOOD CELL COUNT 6 1 3 4 - 10 8 x10E3/uL     AMB LAB RED BLOOD CELLS 4 11 3 77 - 5 28 x10E6/uL    Hemoglobin 12 3 11 1 - 15 9 g/dL    Hematocrit 38 5 34 0 - 46 6 %    MCV 94 79 - 97 fL    MCH 29 9 26 6 - 33 0 pg    MCHC 31 9 31 5 - 35 7 g/dL    RDW 13 6 12 3 - 15 4 %    Platelet Count 761 487 - 379 x10E3/uL    Neutrophils 60 Not Estab  %    Lymphocytes 30 Not Estab  %    Monocytes 8 Not Estab  %    Eosinophils 2 Not Estab  %    Basophils 0 Not Estab  %    Neutrophils (Absolute) 3 7 1 4 - 7 0 x10E3/uL    Lymphocytes (Absolute) 1 8 0 7 - 3 1 x10E3/uL    Monocytes (Absolute) 0 5 0 1 - 0 9 x10E3/uL    Eosinophils (Absolute) 0 1 0 0 - 0 4 x10E3/uL    Basophils (Absolute) 0 0 0 0 - 0 2 x10E3/uL    IMM  GRANULOCYTES (CD4/8) 0 Not Estab  %    IIMM  GRANS,ABS (CD4/8) 0 0 0 0 - 0 1 x10E3/uL   Comprehensive metabolic panel    Collection Time: 06/11/18  9:24 AM   Result Value Ref Range    SL AMB GLUCOSE 104 (H) 65 - 99 mg/dL    BUN 13 6 - 24 mg/dL    Creatinine, Serum 0 86 0 57 - 1 00 mg/dL    eGFR Non  81 >59 mL/min/1 73    SL AMB EGFR AFRICAN AMERICAN 94 >59 mL/min/1 73    SL AMB BUN/CREATININE RATIO 15 9 - 23    SL AMB SODIUM 139 134 - 144 mmol/L    SL AMB POTASSIUM 4 2 3 5 - 5 2 mmol/L    SL AMB CHLORIDE 99 96 - 106 mmol/L    SL AMB CARBON DIOXIDE 23 20 - 29 mmol/L    CALCIUM 9 4 8 7 - 10 2 mg/dL    SL AMB PROTEIN, TOTAL 7 1 6 0 - 8 5 g/dL    Serum Albumin 4 5 3 5 - 5 5 g/dL    Globulin, Total 2 6 1 5 - 4 5 g/dL    SL AMB ALBUMIN/GLOBULIN RATIO 1 7 1 2 - 2 2    SL AMB BILIRUBIN, TOTAL 0 5 0 0 - 1 2 mg/dL    Alk Phos Isoenzymes 60 39 - 117 IU/L    SL AMB AST 23 0 - 40 IU/L    SL AMB ALT 13 0 - 32 IU/L   Lipid panel    Collection Time: 06/11/18  9:24 AM   Result Value Ref Range    Cholesterol, Total 181 100 - 199 mg/dL    Triglycerides 67 0 - 149 mg/dL    SL AMB HDL CHOLESTEROL 74 >39 mg/dL    SL AMB VLDL CHOLESTEROL CALC 13 5 - 40 mg/dL    SL AMB LDL-CHOLESTEROL 94 0 - 99 mg/dL   Hemoglobin A1c (w/out EAG)    Collection Time: 06/11/18  9:24 AM   Result Value Ref Range    Hemoglobin A1C 5 6 4 8 - 5 6 %   TSH, 3rd generation    Collection Time: 06/11/18  9:24 AM   Result Value Ref Range    TSH 3 130 0 450 - 4 500 uIU/mL       The following portions of the patient's history were reviewed and updated as appropriate: allergies, current medications, past family history, past medical history, past social history, past surgical history and problem list      Review of Systems   Constitutional: Negative for appetite change, chills, diaphoresis, fatigue, fever and unexpected weight change  HENT: Negative for postnasal drip and sneezing  Eyes: Negative for visual disturbance  Respiratory: Negative for chest tightness and shortness of breath      Cardiovascular: Negative for chest pain, palpitations and leg swelling  Gastrointestinal: Negative for abdominal pain and blood in stool  Endocrine: Negative for cold intolerance, heat intolerance, polydipsia, polyphagia and polyuria  Genitourinary: Negative for difficulty urinating, dysuria, frequency and urgency  Musculoskeletal: Negative for arthralgias and myalgias  Skin: Negative for rash and wound  Neurological: Negative for dizziness, weakness, light-headedness and headaches  Hematological: Negative for adenopathy  Psychiatric/Behavioral: Negative for confusion, dysphoric mood and sleep disturbance  The patient is not nervous/anxious  Objective:      Vitals:    06/14/18 1402   BP: 140/82   Pulse: 76          Physical Exam   Constitutional: She is oriented to person, place, and time  She appears well-developed  No distress  HENT:   Head: Normocephalic and atraumatic  Nose: Nose normal    Mouth/Throat: Oropharynx is clear and moist    Eyes: Conjunctivae and EOM are normal  Pupils are equal, round, and reactive to light  Neck: Normal range of motion  Neck supple  No JVD present  No tracheal deviation present  No thyromegaly present  Cardiovascular: Normal rate, regular rhythm, normal heart sounds and intact distal pulses  Exam reveals no gallop and no friction rub  No murmur heard  Pulmonary/Chest: Effort normal and breath sounds normal  No respiratory distress  She has no wheezes  She has no rales  Abdominal: Soft  Bowel sounds are normal  She exhibits no distension  There is no tenderness  Musculoskeletal: Normal range of motion  She exhibits no edema  Lymphadenopathy:     She has no cervical adenopathy  Neurological: She is alert and oriented to person, place, and time  No cranial nerve deficit  Skin: Skin is warm and dry  No rash noted  She is not diaphoretic  Psychiatric: She has a normal mood and affect   Her behavior is normal  Judgment and thought content normal

## 2018-06-21 ENCOUNTER — TELEPHONE (OUTPATIENT)
Dept: INTERNAL MEDICINE CLINIC | Facility: CLINIC | Age: 48
End: 2018-06-21

## 2018-06-21 NOTE — TELEPHONE ENCOUNTER
This was sent on 6/14 for 90 day supply and received by pharm    I spoke w/ pt and she is going to check w/ pharm again

## 2018-09-14 DIAGNOSIS — G47.00 INSOMNIA, UNSPECIFIED TYPE: ICD-10-CM

## 2018-09-14 DIAGNOSIS — F32.A DEPRESSION, UNSPECIFIED DEPRESSION TYPE: ICD-10-CM

## 2018-09-14 RX ORDER — CLONAZEPAM 0.5 MG/1
0.5 TABLET ORAL 3 TIMES DAILY PRN
Qty: 90 TABLET | Refills: 0 | Status: SHIPPED | OUTPATIENT
Start: 2018-09-14 | End: 2018-12-19 | Stop reason: SDUPTHER

## 2018-09-14 RX ORDER — VENLAFAXINE HYDROCHLORIDE 225 MG/1
225 TABLET, EXTENDED RELEASE ORAL DAILY
Qty: 90 TABLET | Refills: 1 | Status: SHIPPED | OUTPATIENT
Start: 2018-09-14 | End: 2018-09-24

## 2018-09-14 RX ORDER — RIZATRIPTAN BENZOATE 10 MG/1
TABLET, ORALLY DISINTEGRATING ORAL
Qty: 9 TABLET | Refills: 1 | Status: SHIPPED | OUTPATIENT
Start: 2018-09-14 | End: 2018-09-26 | Stop reason: SDUPTHER

## 2018-09-18 ENCOUNTER — TELEPHONE (OUTPATIENT)
Dept: INTERNAL MEDICINE CLINIC | Facility: CLINIC | Age: 48
End: 2018-09-18

## 2018-09-18 NOTE — TELEPHONE ENCOUNTER
Rx for venlaflaxine 225mg tb24 was $1,000  Pt can't afford, would like a generic rx sent to [facility]     Please advise, call back upon completion  Pt is willing to schedule an appt to discuss if necessary

## 2018-09-20 NOTE — TELEPHONE ENCOUNTER
That is generic    Venlafaxine is generic for effexor   She can call insurance to see what they will cover as an alternative

## 2018-09-24 ENCOUNTER — TELEPHONE (OUTPATIENT)
Dept: INTERNAL MEDICINE CLINIC | Facility: CLINIC | Age: 48
End: 2018-09-24

## 2018-09-24 DIAGNOSIS — F32.A DEPRESSION, UNSPECIFIED DEPRESSION TYPE: Primary | ICD-10-CM

## 2018-09-24 RX ORDER — VENLAFAXINE HYDROCHLORIDE 150 MG/1
150 CAPSULE, EXTENDED RELEASE ORAL DAILY
Qty: 30 CAPSULE | Refills: 3 | Status: SHIPPED | OUTPATIENT
Start: 2018-09-24 | End: 2018-12-21 | Stop reason: SDUPTHER

## 2018-09-24 RX ORDER — VENLAFAXINE HYDROCHLORIDE 75 MG/1
75 CAPSULE, EXTENDED RELEASE ORAL DAILY
Qty: 30 CAPSULE | Refills: 3 | Status: SHIPPED | OUTPATIENT
Start: 2018-09-24 | End: 2018-12-21 | Stop reason: SDUPTHER

## 2018-09-24 NOTE — TELEPHONE ENCOUNTER
PATIENT RECENTLY SWITCHED INSURANCES NOW HAS MEDICAID ACCESS  SHE NEEDS A PRESCRIPTION REFILL OF VENALFAXINE 225MG TB24  SHE NEEDS A PRIOR AUTHORIZATION FOR IT  HER PHARMACY IS [facility] IN [place]  SHE WASN'T SURE IF SHE IS SUPPOSED TO CONTACT ACCESS OR IF WE DO THAT   CAN YOU PLEAESE CONTACT THE PATIENT TODAY IF POSSIBLE TO GET THIS SITUATION CLEARED UP [phone]

## 2018-09-24 NOTE — TELEPHONE ENCOUNTER
Spoke with the pharmacy: he med Venlafaxine needs to be 2 different rx's   Venlafaxine xr 75 cap  And xr 150 cap

## 2018-09-26 ENCOUNTER — OFFICE VISIT (OUTPATIENT)
Dept: INTERNAL MEDICINE CLINIC | Facility: CLINIC | Age: 48
End: 2018-09-26
Payer: COMMERCIAL

## 2018-09-26 ENCOUNTER — TELEPHONE (OUTPATIENT)
Dept: INTERNAL MEDICINE CLINIC | Facility: CLINIC | Age: 48
End: 2018-09-26

## 2018-09-26 VITALS
HEIGHT: 63 IN | BODY MASS INDEX: 28.67 KG/M2 | OXYGEN SATURATION: 97 % | WEIGHT: 161.8 LBS | HEART RATE: 90 BPM | SYSTOLIC BLOOD PRESSURE: 128 MMHG | DIASTOLIC BLOOD PRESSURE: 90 MMHG

## 2018-09-26 DIAGNOSIS — M35.00 SJOGREN'S SYNDROME WITHOUT EXTRAGLANDULAR INVOLVEMENT (HCC): Primary | ICD-10-CM

## 2018-09-26 DIAGNOSIS — Z12.11 SCREENING FOR COLON CANCER: ICD-10-CM

## 2018-09-26 DIAGNOSIS — G47.00 INSOMNIA, UNSPECIFIED TYPE: ICD-10-CM

## 2018-09-26 DIAGNOSIS — R73.01 IMPAIRED FASTING GLUCOSE: ICD-10-CM

## 2018-09-26 DIAGNOSIS — E55.9 VITAMIN D DEFICIENCY: ICD-10-CM

## 2018-09-26 DIAGNOSIS — E04.1 THYROID NODULE: ICD-10-CM

## 2018-09-26 DIAGNOSIS — E78.49 OTHER HYPERLIPIDEMIA: ICD-10-CM

## 2018-09-26 DIAGNOSIS — E06.3 HASHIMOTO'S DISEASE: ICD-10-CM

## 2018-09-26 DIAGNOSIS — J45.909 UNCOMPLICATED ASTHMA, UNSPECIFIED ASTHMA SEVERITY, UNSPECIFIED WHETHER PERSISTENT: ICD-10-CM

## 2018-09-26 DIAGNOSIS — F32.A DEPRESSION, UNSPECIFIED DEPRESSION TYPE: ICD-10-CM

## 2018-09-26 DIAGNOSIS — R53.83 OTHER FATIGUE: ICD-10-CM

## 2018-09-26 DIAGNOSIS — F41.8 DEPRESSION WITH ANXIETY: ICD-10-CM

## 2018-09-26 PROCEDURE — 99214 OFFICE O/P EST MOD 30 MIN: CPT | Performed by: NURSE PRACTITIONER

## 2018-09-26 RX ORDER — ALBUTEROL SULFATE 90 UG/1
2 AEROSOL, METERED RESPIRATORY (INHALATION) EVERY 4 HOURS PRN
Qty: 1 INHALER | Refills: 0 | Status: SHIPPED | OUTPATIENT
Start: 2018-09-26 | End: 2019-01-02 | Stop reason: SDUPTHER

## 2018-09-26 RX ORDER — RIZATRIPTAN BENZOATE 10 MG/1
10 TABLET, ORALLY DISINTEGRATING ORAL ONCE AS NEEDED
Qty: 9 TABLET | Refills: 0 | Status: SHIPPED | OUTPATIENT
Start: 2018-09-26 | End: 2019-09-03 | Stop reason: SDUPTHER

## 2018-09-26 RX ORDER — FLUTICASONE PROPIONATE 50 MCG
2 SPRAY, SUSPENSION (ML) NASAL DAILY
Qty: 16 G | Refills: 0 | Status: SHIPPED | OUTPATIENT
Start: 2018-09-26 | End: 2021-02-19 | Stop reason: SDUPTHER

## 2018-09-26 NOTE — PROGRESS NOTES
Assessment/Plan:    Patient Instructions   Patient with a constellation of symptoms including profound fatigue arthralgias skin changes including rash and tightening of the skin  She does have history of Hashimoto's additionally she has tested positive for both Sjogren's and scleroderma antibodies in the past   She has seen Rheumatology 1 time and they felt that it was her Hashimoto's versus any other systemic disease process  She is pursuing disability secondary to her profound symptoms  She is scheduled to see a no other rheumatologist next month but would like some testing prior to that visit  We will check labs as noted above we will call with results and then for them to [facility] Rheumatology    Hyperlipidemia she is due for lipid panel in November    Health maintenance she is up-to-date refer to GI for colonoscopy and possible endoscopy secondary to GI symptoms    Declines flu shot    Asthma stable on p r n  Inhaler    Depression anxiety stable on current medications as well    Thyroid nodule check ultrasound         Diagnoses and all orders for this visit:    Sjogren's syndrome without extraglandular involvement ([facility] [address] )  -     LALITA Screen w/ Reflex to Titer/Pattern; Future  -     C-reactive protein; Future  -     Cyclic citrul peptide antibody, IgG; Future  -     LD,Blood; Future  -     Sedimentation rate, automated; Future  -     RF Screen w/ Reflex to Titer; Future  -     Comprehensive metabolic panel; Future  -     CBC and differential; Future    Hashimoto's disease  -     TSH, 3rd generation with Free T4 reflex; Future    Impaired fasting glucose    Depression with anxiety    Other hyperlipidemia    Screening for colon cancer  -     Ambulatory referral to Gastroenterology; Future    Depression, unspecified depression type    Insomnia, unspecified type  -     rizatriptan (MAXALT-MLT) 10 MG disintegrating tablet;  Take 1 tablet (10 mg total) by mouth once as needed for migraine for up to 1 dose May repeat in 2 hours if needed    Uncomplicated asthma, unspecified asthma severity, unspecified whether persistent  -     fluticasone (FLONASE) 50 mcg/act nasal spray; 2 sprays into each nostril daily  -     albuterol (PROVENTIL HFA,VENTOLIN HFA) 90 mcg/act inhaler; Inhale 2 puffs every 4 (four) hours as needed for wheezing    Thyroid nodule  -     US thyroid; Future    Other fatigue  -     Magnesium; Future    Vitamin D deficiency  -     Vitamin D 25 hydroxy;  Future         Subjective:      Patient ID: Beckie James is a 56 y o  female    Patient has not been feeling well she is complaining of generalized aches and pains predominantly to the joints she does not complain of swelling or pain, she also notes changes in the skin including dry eyes dry mouth constipation intermittent rash discoloration of skin and she feels a tightening of the skin    About a year ago she saw a rheumatologist to indicated that her symptoms were related to her Hashimoto's and no other systemic rheumatologic condition    She continues to take thyroid medication on an empty stomach daily    She does not think that she is able to work secondary to her pain, she is pursuing disability    She is tolerating cholesterol medication          Current Outpatient Prescriptions:     albuterol (PROVENTIL HFA,VENTOLIN HFA) 90 mcg/act inhaler, Inhale 2 puffs every 4 (four) hours as needed for wheezing, Disp: 1 Inhaler, Rfl: 0    atorvastatin (LIPITOR) 10 mg tablet, Take 1 tablet by mouth daily, Disp: , Rfl:     clonazePAM (KlonoPIN) 0 5 mg tablet, TAKE 1 TABLET (0 5 MG TOTAL) BY MOUTH 3 (THREE) TIMES A DAY AS NEEDED (AS NEEDED), Disp: 90 tablet, Rfl: 0    fluticasone (FLONASE) 50 mcg/act nasal spray, 2 sprays into each nostril daily, Disp: 16 g, Rfl: 0    levothyroxine 25 mcg tablet, Take 1 tablet (25 mcg total) by mouth daily, Disp: 90 tablet, Rfl: 3    rizatriptan (MAXALT-MLT) 10 MG disintegrating tablet, Take 1 tablet (10 mg total) by mouth once as needed for migraine for up to 1 dose May repeat in 2 hours if needed, Disp: 9 tablet, Rfl: 0    venlafaxine (EFFEXOR-XR) 150 mg 24 hr capsule, Take 1 capsule (150 mg total) by mouth daily, Disp: 30 capsule, Rfl: 3    venlafaxine (EFFEXOR-XR) 75 mg 24 hr capsule, Take 1 capsule (75 mg total) by mouth daily, Disp: 30 capsule, Rfl: 3    No results found for this or any previous visit (from the past 1008 hour(s))  The following portions of the patient's history were reviewed and updated as appropriate: allergies, current medications, past family history, past medical history, past social history, past surgical history and problem list      Review of Systems   Constitutional: Positive for fatigue  Negative for appetite change, chills, diaphoresis, fever and unexpected weight change  HENT: Negative for postnasal drip and sneezing  Eyes: Negative for visual disturbance  Respiratory: Negative for chest tightness and shortness of breath  Cardiovascular: Negative for chest pain, palpitations and leg swelling  Gastrointestinal: Positive for abdominal distention  Negative for abdominal pain and blood in stool  Endocrine: Negative for cold intolerance, heat intolerance, polydipsia, polyphagia and polyuria  Genitourinary: Negative for difficulty urinating, dysuria, frequency and urgency  Musculoskeletal: Positive for arthralgias and myalgias  Skin: Positive for color change  Negative for rash and wound  Neurological: Negative for dizziness, weakness, light-headedness and headaches  Hematological: Negative for adenopathy  Psychiatric/Behavioral: Negative for confusion, dysphoric mood and sleep disturbance  The patient is not nervous/anxious  Objective:      /90 (BP Location: Left arm, Patient Position: Sitting)   Pulse 90   Ht 5' 2 5" (1 588 m)   Wt 73 4 kg (161 lb 12 8 oz)   SpO2 97%   BMI 29 12 kg/m²        Physical Exam   Constitutional: She is oriented to person, place, and time   She appears well-developed  No distress  HENT:   Head: Normocephalic and atraumatic  Nose: Nose normal    Mouth/Throat: Oropharynx is clear and moist    Eyes: Conjunctivae and EOM are normal  Pupils are equal, round, and reactive to light  Neck: Normal range of motion  Neck supple  No JVD present  No tracheal deviation present  No thyromegaly present  Cardiovascular: Normal rate, regular rhythm, normal heart sounds and intact distal pulses  Exam reveals no gallop and no friction rub  No murmur heard  Pulmonary/Chest: Effort normal and breath sounds normal  No respiratory distress  She has no wheezes  She has no rales  Abdominal: Soft  Bowel sounds are normal  She exhibits no distension  There is no tenderness  Musculoskeletal: Normal range of motion  She exhibits no edema  Lymphadenopathy:     She has no cervical adenopathy  Neurological: She is alert and oriented to person, place, and time  No cranial nerve deficit  Skin: Skin is warm and dry  No rash noted  She is not diaphoretic  Psychiatric: She has a normal mood and affect   Her behavior is normal  Judgment and thought content normal

## 2018-09-26 NOTE — PATIENT INSTRUCTIONS
Patient with a constellation of symptoms including profound fatigue arthralgias skin changes including rash and tightening of the skin  She does have history of Hashimoto's additionally she has tested positive for both Sjogren's and scleroderma antibodies in the past   She has seen Rheumatology 1 time and they felt that it was her Hashimoto's versus any other systemic disease process  She is pursuing disability secondary to her profound symptoms  She is scheduled to see a no other rheumatologist next month but would like some testing prior to that visit  We will check labs as noted above we will call with results and then for them to [facility] Rheumatology    Hyperlipidemia she is due for lipid panel in November    Health maintenance she is up-to-date refer to GI for colonoscopy and possible endoscopy secondary to GI symptoms    Declines flu shot    Asthma stable on p r n   Inhaler    Depression anxiety stable on current medications as well    Thyroid nodule check ultrasound

## 2018-09-28 ENCOUNTER — TELEPHONE (OUTPATIENT)
Dept: INTERNAL MEDICINE CLINIC | Facility: CLINIC | Age: 48
End: 2018-09-28

## 2018-10-01 ENCOUNTER — HOSPITAL ENCOUNTER (OUTPATIENT)
Dept: ULTRASOUND IMAGING | Facility: HOSPITAL | Age: 48
Discharge: HOME | End: 2018-10-01
Payer: COMMERCIAL

## 2018-10-01 ENCOUNTER — TELEPHONE (OUTPATIENT)
Dept: INTERNAL MEDICINE CLINIC | Facility: CLINIC | Age: 48
End: 2018-10-01

## 2018-10-01 DIAGNOSIS — R73.09 ABNORMAL GLUCOSE: Primary | ICD-10-CM

## 2018-10-01 DIAGNOSIS — E04.1 THYROID NODULE: ICD-10-CM

## 2018-10-01 LAB
25(OH)D3+25(OH)D2 SERPL-MCNC: 25.7 NG/ML (ref 30–100)
ALBUMIN SERPL-MCNC: 4.5 G/DL (ref 3.5–5.5)
ALBUMIN/GLOB SERPL: 1.7 {RATIO} (ref 1.2–2.2)
ALP SERPL-CCNC: 61 IU/L (ref 39–117)
ALT SERPL-CCNC: 12 IU/L (ref 0–32)
ANA SER QL: POSITIVE
AST SERPL-CCNC: 18 IU/L (ref 0–40)
BASOPHILS # BLD AUTO: 0 X10E3/UL (ref 0–0.2)
BASOPHILS NFR BLD AUTO: 0 %
BILIRUB SERPL-MCNC: 0.5 MG/DL (ref 0–1.2)
BUN SERPL-MCNC: 12 MG/DL (ref 6–24)
BUN/CREAT SERPL: 14 (ref 9–23)
CALCIUM SERPL-MCNC: 9.4 MG/DL (ref 8.7–10.2)
CCP IGA+IGG SERPL IA-ACNC: 8 UNITS (ref 0–19)
CHLORIDE SERPL-SCNC: 99 MMOL/L (ref 96–106)
CO2 SERPL-SCNC: 21 MMOL/L (ref 20–29)
CREAT SERPL-MCNC: 0.86 MG/DL (ref 0.57–1)
CRP SERPL-MCNC: 2.7 MG/L (ref 0–4.9)
DSDNA AB SER-ACNC: <1 IU/ML (ref 0–9)
ENA RNP AB SER-ACNC: 0.2 AI (ref 0–0.9)
ENA SM AB SER-ACNC: <0.2 AI (ref 0–0.9)
ENA SS-A AB SER-ACNC: 0.9 AI (ref 0–0.9)
ENA SS-B AB SER-ACNC: <0.2 AI (ref 0–0.9)
EOSINOPHIL # BLD AUTO: 0.2 X10E3/UL (ref 0–0.4)
EOSINOPHIL NFR BLD AUTO: 3 %
ERYTHROCYTE [DISTWIDTH] IN BLOOD BY AUTOMATED COUNT: 13.3 % (ref 12.3–15.4)
ERYTHROCYTE [SEDIMENTATION RATE] IN BLOOD BY WESTERGREN METHOD: 36 MM/HR (ref 0–32)
GLOBULIN SER-MCNC: 2.6 G/DL (ref 1.5–4.5)
GLUCOSE SERPL-MCNC: 141 MG/DL (ref 65–99)
HCT VFR BLD AUTO: 37.3 % (ref 34–46.6)
HGB BLD-MCNC: 12.2 G/DL (ref 11.1–15.9)
IMM GRANULOCYTES # BLD: 0 X10E3/UL (ref 0–0.1)
IMM GRANULOCYTES NFR BLD: 0 %
LDH SERPL-CCNC: 181 IU/L (ref 119–226)
LYMPHOCYTES # BLD AUTO: 2.3 X10E3/UL (ref 0.7–3.1)
LYMPHOCYTES NFR BLD AUTO: 35 %
MAGNESIUM SERPL-MCNC: 1.9 MG/DL (ref 1.6–2.3)
MCH RBC QN AUTO: 30 PG (ref 26.6–33)
MCHC RBC AUTO-ENTMCNC: 32.7 G/DL (ref 31.5–35.7)
MCV RBC AUTO: 92 FL (ref 79–97)
MONOCYTES # BLD AUTO: 0.3 X10E3/UL (ref 0.1–0.9)
MONOCYTES NFR BLD AUTO: 5 %
NEUTROPHILS # BLD AUTO: 3.8 X10E3/UL (ref 1.4–7)
NEUTROPHILS NFR BLD AUTO: 57 %
PLATELET # BLD AUTO: 289 X10E3/UL (ref 150–379)
POTASSIUM SERPL-SCNC: 4.3 MMOL/L (ref 3.5–5.2)
PROT SERPL-MCNC: 7.1 G/DL (ref 6–8.5)
RBC # BLD AUTO: 4.06 X10E6/UL (ref 3.77–5.28)
RHEUMATOID FACT SERPL-ACNC: <10 IU/ML (ref 0–13.9)
SL AMB EGFR AFRICAN AMERICAN: 94 ML/MIN/1.73
SL AMB EGFR NON AFRICAN AMERICAN: 81 ML/MIN/1.73
SL AMB SEE BELOW:: NORMAL
SODIUM SERPL-SCNC: 138 MMOL/L (ref 134–144)
TSH SERPL DL<=0.005 MIU/L-ACNC: 3.63 UIU/ML (ref 0.45–4.5)
WBC # BLD AUTO: 6.6 X10E3/UL (ref 3.4–10.8)

## 2018-10-01 PROCEDURE — 76536 US EXAM OF HEAD AND NECK: CPT

## 2018-10-01 NOTE — PROGRESS NOTES
Her alejandra was positive again but this time the break down was non specific (ie in the past it showed scleraderma) this time it doesn't   Her sugar was high    I gave her lab slip for mid oct ( this would be her routine ) I want to make sure she does this as well     Pllease forward the labs to the rheumatologist that she is going to see

## 2018-10-01 NOTE — TELEPHONE ENCOUNTER
Spoke to pt, Dawna's message/instructions relayed in full detail    Pt will call back when she gets home with name of rheumatologist

## 2018-10-01 NOTE — TELEPHONE ENCOUNTER
----- Message from Julia Medina, [address] sent at 10/1/2018  3:01 PM EDT -----  Her alejandra was positive again but this time the break down was non specific (ie in the past it showed scleraderma) this time it doesn't   Her sugar was high    I gave her lab slip for mid oct ( this would be her routine ) I want to make sure she does thi  s as well     Pllease forward the labs to the rheumatologist that she is going to see

## 2018-10-02 ENCOUNTER — TELEPHONE (OUTPATIENT)
Dept: INTERNAL MEDICINE CLINIC | Facility: CLINIC | Age: 48
End: 2018-10-02

## 2018-10-02 DIAGNOSIS — E04.1 THYROID NODULE: Primary | ICD-10-CM

## 2018-10-02 NOTE — TELEPHONE ENCOUNTER
----- Message from Gunnar Bowens sent at 10/2/2018  2:37 PM EDT -----  U/s shows tiny nodule on right, we will follow up 1 year

## 2018-10-09 ENCOUNTER — OFFICE VISIT (OUTPATIENT)
Dept: GASTROENTEROLOGY | Facility: CLINIC | Age: 48
End: 2018-10-09
Payer: COMMERCIAL

## 2018-10-09 VITALS
HEIGHT: 63 IN | DIASTOLIC BLOOD PRESSURE: 90 MMHG | HEART RATE: 80 BPM | BODY MASS INDEX: 29.41 KG/M2 | SYSTOLIC BLOOD PRESSURE: 130 MMHG | WEIGHT: 166 LBS

## 2018-10-09 DIAGNOSIS — R13.14 PHARYNGOESOPHAGEAL DYSPHAGIA: Primary | ICD-10-CM

## 2018-10-09 DIAGNOSIS — Z12.11 SCREENING FOR COLON CANCER: ICD-10-CM

## 2018-10-09 PROCEDURE — 99203 OFFICE O/P NEW LOW 30 MIN: CPT | Performed by: NURSE PRACTITIONER

## 2018-10-09 NOTE — PROGRESS NOTES
Assessment/Plan:    No problem-specific Assessment & Plan notes found for this encounter  Diagnoses and all orders for this visit:    Pharyngoesophageal dysphagia    Screening for colon cancer  -     Ambulatory referral to Gastroenterology    @[email or web address]  D@     Subjective:      Patient ID: Luca Lozano is a 56 y o  female  Patient also reports dysphagia and is currently going to see a rheumatologist due to abnormal blood work that may be related to Sjogren's, scleroderma and Hashimoto's  She reports a hoarse voice and sore throat  No unintentional weight loss or fever but she does have chills  Her appetite is good  She has nausea but no vomiting  She is currently not taking any medication for her upper GI symptoms and after discussing with patient we will hold off until she has an endoscopy  She is also here for initial screening colonoscopy  I did explain that between the age of 40 and 49 some insurance is not pay for screening colonoscopy  She understands and denies melena, hematochezia, constipation or diarrhea  She has no lower abdominal pain and no family history of colon cancer  If she is unable to obtain a colonoscopy we will do a fecal occult blood test      Abdominal Pain   This is a chronic problem  The current episode started more than 1 month ago  The onset quality is undetermined  The problem occurs every several days  The most recent episode lasted 2 days  The problem has been unchanged  The pain is located in the epigastric region  The pain is at a severity of 4/10  The quality of the pain is a sensation of fullness  The abdominal pain radiates to the chest  Associated symptoms include arthralgias, belching, constipation, headaches, melena, myalgias and nausea  Pertinent negatives include no anorexia, diarrhea, dysuria, fever, flatus, frequency, hematochezia, hematuria, vomiting or weight loss  The pain is aggravated by being still and eating   The pain is relieved by certain positions, movement and standing  Her past medical history is significant for GERD  The following portions of the patient's history were reviewed and updated as appropriate: She  has no past medical history on file  She   Patient Active Problem List    Diagnosis Date Noted    Pharyngoesophageal dysphagia 10/09/2018    Screening for colon cancer 10/09/2018    Scleroderma ([facility] ) 06/14/2018    Abnormal thyroid function test 12/05/2017    Hashimoto's disease 12/05/2017    Sjogren's syndrome ([facility] ) 10/06/2017    Asthma 02/11/2016    LALITA positive 10/22/2015    Depression with anxiety 10/22/2015    Anxiety 03/10/2015    Hyperlipemia 08/05/2014    Impaired fasting glucose 08/05/2014     She  has a past surgical history that includes Hysterectomy and Sinus surgery  Her family history includes Depression in her father; Hyperlipidemia in her mother; Lupus in her mother  She  reports that she has never smoked  She has never used smokeless tobacco  She reports that she drinks about 0 6 oz of alcohol per week   She reports that she does not use drugs    Current Outpatient Prescriptions   Medication Sig Dispense Refill    albuterol (PROVENTIL HFA,VENTOLIN HFA) 90 mcg/act inhaler Inhale 2 puffs every 4 (four) hours as needed for wheezing 1 Inhaler 0    atorvastatin (LIPITOR) 10 mg tablet Take 1 tablet by mouth daily      clonazePAM (KlonoPIN) 0 5 mg tablet TAKE 1 TABLET (0 5 MG TOTAL) BY MOUTH 3 (THREE) TIMES A DAY AS NEEDED (AS NEEDED) 90 tablet 0    fluticasone (FLONASE) 50 mcg/act nasal spray 2 sprays into each nostril daily 16 g 0    levothyroxine 25 mcg tablet Take 1 tablet (25 mcg total) by mouth daily 90 tablet 3    rizatriptan (MAXALT-MLT) 10 MG disintegrating tablet Take 1 tablet (10 mg total) by mouth once as needed for migraine for up to 1 dose May repeat in 2 hours if needed 9 tablet 0    venlafaxine (EFFEXOR-XR) 150 mg 24 hr capsule Take 1 capsule (150 mg total) by mouth daily 30 capsule 3    venlafaxine (EFFEXOR-XR) 75 mg 24 hr capsule Take 1 capsule (75 mg total) by mouth daily 30 capsule 3     No current facility-administered medications for this visit  Current Outpatient Prescriptions on File Prior to Visit   Medication Sig    albuterol (PROVENTIL HFA,VENTOLIN HFA) 90 mcg/act inhaler Inhale 2 puffs every 4 (four) hours as needed for wheezing    atorvastatin (LIPITOR) 10 mg tablet Take 1 tablet by mouth daily    clonazePAM (KlonoPIN) 0 5 mg tablet TAKE 1 TABLET (0 5 MG TOTAL) BY MOUTH 3 (THREE) TIMES A DAY AS NEEDED (AS NEEDED)    fluticasone (FLONASE) 50 mcg/act nasal spray 2 sprays into each nostril daily    levothyroxine 25 mcg tablet Take 1 tablet (25 mcg total) by mouth daily    rizatriptan (MAXALT-MLT) 10 MG disintegrating tablet Take 1 tablet (10 mg total) by mouth once as needed for migraine for up to 1 dose May repeat in 2 hours if needed    venlafaxine (EFFEXOR-XR) 150 mg 24 hr capsule Take 1 capsule (150 mg total) by mouth daily    venlafaxine (EFFEXOR-XR) 75 mg 24 hr capsule Take 1 capsule (75 mg total) by mouth daily     No current facility-administered medications on file prior to visit  She has No Known Allergies       Review of Systems   Constitutional: Negative for fever and weight loss  HENT: Positive for sore throat, trouble swallowing and voice change  Gastrointestinal: Positive for abdominal pain, constipation, melena and nausea  Negative for anorexia, diarrhea, flatus, hematochezia and vomiting  Genitourinary: Negative for dysuria, frequency and hematuria  Musculoskeletal: Positive for arthralgias and myalgias  Neurological: Positive for headaches  Objective:      /90   Pulse 80   Ht 5' 2 5" (1 588 m)   Wt 75 3 kg (166 lb)   BMI 29 88 kg/m²          Physical Exam   Constitutional: She is oriented to person, place, and time  She appears well-developed and well-nourished  Eyes: No scleral icterus     Cardiovascular: Normal rate and regular rhythm  Pulmonary/Chest: Effort normal and breath sounds normal    Abdominal: Soft  Bowel sounds are normal    Lymphadenopathy:     She has no cervical adenopathy  Neurological: She is alert and oriented to person, place, and time  Skin: Skin is warm and dry  Psychiatric: She has a normal mood and affect

## 2018-10-10 PROBLEM — R13.10 DYSPHAGIA: Status: ACTIVE | Noted: 2018-10-10

## 2018-10-10 PROBLEM — Z12.11 SCREEN FOR COLON CANCER: Status: ACTIVE | Noted: 2018-10-10

## 2018-10-15 ENCOUNTER — ANESTHESIA EVENT (OUTPATIENT)
Dept: PERIOP | Facility: HOSPITAL | Age: 48
End: 2018-10-15
Payer: COMMERCIAL

## 2018-10-16 ENCOUNTER — HOSPITAL ENCOUNTER (OUTPATIENT)
Facility: HOSPITAL | Age: 48
Setting detail: OUTPATIENT SURGERY
Discharge: HOME | End: 2018-10-16
Attending: INTERNAL MEDICINE | Admitting: INTERNAL MEDICINE
Payer: COMMERCIAL

## 2018-10-16 ENCOUNTER — ANESTHESIA (OUTPATIENT)
Dept: PERIOP | Facility: HOSPITAL | Age: 48
End: 2018-10-16
Payer: COMMERCIAL

## 2018-10-16 VITALS
BODY MASS INDEX: 27.97 KG/M2 | HEIGHT: 63 IN | OXYGEN SATURATION: 100 % | TEMPERATURE: 98.5 F | WEIGHT: 157.85 LBS | HEART RATE: 96 BPM | SYSTOLIC BLOOD PRESSURE: 154 MMHG | DIASTOLIC BLOOD PRESSURE: 88 MMHG | RESPIRATION RATE: 17 BRPM

## 2018-10-16 DIAGNOSIS — Z12.11 SCREEN FOR COLON CANCER: ICD-10-CM

## 2018-10-16 DIAGNOSIS — R13.10 DYSPHAGIA, UNSPECIFIED TYPE: ICD-10-CM

## 2018-10-16 PROCEDURE — 88305 TISSUE EXAM BY PATHOLOGIST: CPT | Performed by: PATHOLOGY

## 2018-10-16 PROCEDURE — 88342 IMHCHEM/IMCYTCHM 1ST ANTB: CPT | Performed by: PATHOLOGY

## 2018-10-16 PROCEDURE — 43239 EGD BIOPSY SINGLE/MULTIPLE: CPT | Performed by: INTERNAL MEDICINE

## 2018-10-16 PROCEDURE — 45378 DIAGNOSTIC COLONOSCOPY: CPT | Performed by: INTERNAL MEDICINE

## 2018-10-16 RX ORDER — SODIUM CHLORIDE, SODIUM LACTATE, POTASSIUM CHLORIDE, CALCIUM CHLORIDE 600; 310; 30; 20 MG/100ML; MG/100ML; MG/100ML; MG/100ML
125 INJECTION, SOLUTION INTRAVENOUS CONTINUOUS
Status: DISCONTINUED | OUTPATIENT
Start: 2018-10-16 | End: 2018-10-16 | Stop reason: HOSPADM

## 2018-10-16 RX ORDER — PROPOFOL 10 MG/ML
INJECTION, EMULSION INTRAVENOUS AS NEEDED
Status: DISCONTINUED | OUTPATIENT
Start: 2018-10-16 | End: 2018-10-16 | Stop reason: SURG

## 2018-10-16 RX ADMIN — PROPOFOL 100 MG: 10 INJECTION, EMULSION INTRAVENOUS at 10:38

## 2018-10-16 RX ADMIN — PROPOFOL 100 MG: 10 INJECTION, EMULSION INTRAVENOUS at 10:31

## 2018-10-16 RX ADMIN — PROPOFOL 100 MG: 10 INJECTION, EMULSION INTRAVENOUS at 10:35

## 2018-10-16 RX ADMIN — SODIUM CHLORIDE, SODIUM LACTATE, POTASSIUM CHLORIDE, AND CALCIUM CHLORIDE: .6; .31; .03; .02 INJECTION, SOLUTION INTRAVENOUS at 10:29

## 2018-10-16 RX ADMIN — PROPOFOL 100 MG: 10 INJECTION, EMULSION INTRAVENOUS at 10:32

## 2018-10-16 NOTE — OP NOTE
Postoperative Note  PATIENT NAME: Ale Khan  : 1970  MRN: [id number]  [facility] ROOM 01    Surgery Date: 10/16/2018    POST-OP DIAGNOSIS: See the impression below    SEDATION: Monitored anesthesia care, check anesthesia records    PHYSICAL EXAM:  Vitals:    10/16/18 1010   BP: 138/96   Pulse: 87   Resp: 19   Temp: 98 5 °F (36 9 °C)   SpO2: 98%     Body mass index is 28 41 kg/m²  General: NAD  Heart: S1 & S2 normal, RRR  Lungs: CTA, No rales or rhonchi  Abdomen: Soft, nontender, nondistended, good bowel sounds    CONSENT:  Informed consent was obtained for the procedure, including sedation after explaining the risks and benefits of the procedure  Risks including but not limited to bleeding, perforation, infection, aspiration were discussed in detail  Also explained about less than 100%$ sensitivity with the exam and other alternatives  DESCRIPTION:   Procedure:  Fiberoptic colonoscopy      Indications:  Initial average risk screening colonoscopy for 56-year-old female  No prior colonoscopy    ASA 2    Premedicated with mac anesthesia    Patient is identified by me  She is examined prior to the procedure found to be in stable condition  She is attached to the cardiac monitor and pulse oximeter and placed in the left lateral position  After adequate intravenous sedation the Olympus video colonoscope was inserted and passed easily  To the cecum  The ileocecal valve and the appendiceal orifice are identified  The scope was then slowly withdrawn with excellent circumferential is visualization of the mucosa  Preparation is excellent  The under colonic mucosa is completely unremarkable with no inflammatory neoplastic or vascular lesions noted  Retroversion is normal   She tolerated the procedure well and was stable throughout      My impression:  Normal colonoscopy    RECOMMENDATIONS:  Repeat colonoscopy in 10 years  Yearly fit test with primary care physician  Every 3 year colo guard test with primary care physician  COMPLICATIONS:  None; patient tolerated the procedure well  DISPOSITION: PACU  CONDITION: Stable    Swathi Stevens MD  10/16/2018,10:48 AM        Portions of the record may have been created with voice recognition software   Occasional wrong word or "sound a like" substitutions may have occurred due to the inherent limitations of voice recognition software   Read the chart carefully and recognize, using context, where substitutions have occurred

## 2018-10-16 NOTE — OP NOTE
Postoperative Note  PATIENT NAME: Cassandra Munson  : 1970  MRN: [id number]  [facility] RO[facility]    Surgery Date: 10/16/2018    POST-OP DIAGNOSIS: See the impression below    SEDATION: Monitored anesthesia care, check anesthesia records    PHYSICAL EXAM:  Vitals:    10/16/18 1010   BP: 138/96   Pulse: 87   Resp: 19   Temp: 98 5 °F (36 9 °C)   SpO2: 98%     Body mass index is 28 41 kg/m²  General: NAD  Heart: S1 & S2 normal, RRR  Lungs: CTA, No rales or rhonchi  Abdomen: Soft, nontender, nondistended, good bowel sounds    CONSENT:  Informed consent was obtained for the procedure, including sedation after explaining the risks and benefits of the procedure  Risks including but not limited to bleeding, perforation, infection, aspiration were discussed in detail  Also explained about less than 100%$ sensitivity with the exam and other alternatives  DESCRIPTION:   Procedure:  Esophagogastroduodenoscopy with biopsy  [id number]    Indications:  61-year-old female with dysphagia and nausea    ASA 2    Premedicated with mac anesthesia    Patient is identified by me  She is examined prior to the procedure and found to be in stable condition  She is attached to the cardiac monitor and pulse oximeter and placed in the left lateral position  After adequate intravenous sedation the Olympus video gastroscope was inserted under direct vision into the esophagus  Esophageal mucosa is completely unremarkable throughout its length with a normal Z-line at 40 cm  The stomach is entered  Body and antrum normal   The pylorus is normal   The duodenum was cannulated into the 3rd portion and is normal   Retroversion reveals a normal GE junction and fundus  Biopsies taken of the antrum body and fundus with excellent hemostasis  These will be sent to rule out H pylori  Biopsies then taken from the distal and proximal esophagus to rule out eosinophilic esophagitis  Again there is excellent hemostasis    She tolerated the procedure well and was stable throughout  My impression:  Normal upper GI endoscopy, status post biopsy    RECOMMENDATIONS:  Follow up biopsy results in 1 week  Continue current medical management    COMPLICATIONS:  None; patient tolerated the procedure well  DISPOSITION: PACU  CONDITION: Stable    Nancy Kimbrough MD  10/16/2018,10:35 AM        Portions of the record may have been created with voice recognition software   Occasional wrong word or "sound a like" substitutions may have occurred due to the inherent limitations of voice recognition software   Read the chart carefully and recognize, using context, where substitutions have occurred

## 2018-10-16 NOTE — DISCHARGE INSTR - AVS FIRST PAGE
Postoperative Note  PATIENT NAME: Joleen Red  : 1970  MRN: [id number]  [facility] 01    Surgery Date: 10/16/2018    POST-OP DIAGNOSIS: See the impression below    SEDATION: Monitored anesthesia care, check anesthesia records    PHYSICAL EXAM:  Vitals:    10/16/18 1010   BP: 138/96   Pulse: 87   Resp: 19   Temp: 98 5 °F (36 9 °C)   SpO2: 98%     Body mass index is 28 41 kg/m²  General: NAD  Heart: S1 & S2 normal, RRR  Lungs: CTA, No rales or rhonchi  Abdomen: Soft, nontender, nondistended, good bowel sounds    CONSENT:  Informed consent was obtained for the procedure, including sedation after explaining the risks and benefits of the procedure  Risks including but not limited to bleeding, perforation, infection, aspiration were discussed in detail  Also explained about less than 100%$ sensitivity with the exam and other alternatives  DESCRIPTION:   Procedure:  Esophagogastroduodenoscopy with biopsy  [id number]    Indications:  41-year-old female with dysphagia and nausea    ASA 2    Premedicated with mac anesthesia    Patient is identified by me  She is examined prior to the procedure and found to be in stable condition  She is attached to the cardiac monitor and pulse oximeter and placed in the left lateral position  After adequate intravenous sedation the Olympus video gastroscope was inserted under direct vision into the esophagus  Esophageal mucosa is completely unremarkable throughout its length with a normal Z-line at 40 cm  The stomach is entered  Body and antrum normal   The pylorus is normal   The duodenum was cannulated into the 3rd portion and is normal   Retroversion reveals a normal GE junction and fundus  Biopsies taken of the antrum body and fundus with excellent hemostasis  These will be sent to rule out H pylori  Biopsies then taken from the distal and proximal esophagus to rule out eosinophilic esophagitis  Again there is excellent hemostasis    She tolerated the procedure well and was stable throughout  My impression:  Normal upper GI endoscopy, status post biopsy    RECOMMENDATIONS:  Follow up biopsy results in 1 week  Continue current medical management    COMPLICATIONS:  None; patient tolerated the procedure well  DISPOSITION: PACU  CONDITION: Stable    Amarilis Pat MD  10/16/2018,10:35 AM        Portions of the record may have been created with voice recognition software   Occasional wrong word or "sound a like" substitutions may have occurred due to the inherent limitations of voice recognition software   Read the chart carefully and recognize, using context, where substitutions have occurred  Postoperative Note  PATIENT NAME: Ale Khan  : 1970  MRN: [id number]  [facility]    Surgery Date: 10/16/2018    POST-OP DIAGNOSIS: See the impression below    SEDATION: Monitored anesthesia care, check anesthesia records    PHYSICAL EXAM:  Vitals:    10/16/18 1010   BP: 138/96   Pulse: 87   Resp: 19   Temp: 98 5 °F (36 9 °C)   SpO2: 98%     Body mass index is 28 41 kg/m²  General: NAD  Heart: S1 & S2 normal, RRR  Lungs: CTA, No rales or rhonchi  Abdomen: Soft, nontender, nondistended, good bowel sounds    CONSENT:  Informed consent was obtained for the procedure, including sedation after explaining the risks and benefits of the procedure  Risks including but not limited to bleeding, perforation, infection, aspiration were discussed in detail  Also explained about less than 100%$ sensitivity with the exam and other alternatives  DESCRIPTION:   Procedure:  Esophagogastroduodenoscopy with biopsy  [id number]    Indications:  81-year-old female with dysphagia and nausea    ASA 2    Premedicated with mac anesthesia    Patient is identified by me  She is examined prior to the procedure and found to be in stable condition  She is attached to the cardiac monitor and pulse oximeter and placed in the left lateral position    After adequate intravenous sedation the Olympus video gastroscope was inserted under direct vision into the esophagus  Esophageal mucosa is completely unremarkable throughout its length with a normal Z-line at 40 cm  The stomach is entered  Body and antrum normal   The pylorus is normal   The duodenum was cannulated into the 3rd portion and is normal   Retroversion reveals a normal GE junction and fundus  Biopsies taken of the antrum body and fundus with excellent hemostasis  These will be sent to rule out H pylori  Biopsies then taken from the distal and proximal esophagus to rule out eosinophilic esophagitis  Again there is excellent hemostasis  She tolerated the procedure well and was stable throughout  My impression:  Normal upper GI endoscopy, status post biopsy    RECOMMENDATIONS:  Follow up biopsy results in 1 week  Continue current medical management    COMPLICATIONS:  None; patient tolerated the procedure well  DISPOSITION: PACU  CONDITION: Stable    Fidel Ceballos MD  10/16/2018,10:35 AM        Portions of the record may have been created with voice recognition software   Occasional wrong word or "sound a like" substitutions may have occurred due to the inherent limitations of voice recognition software   Read the chart carefully and recognize, using context, where substitutions have occurred  Postoperative Note  PATIENT NAME: Lien Arboleda  : 1970  MRN: [id number]  [facility]    Surgery Date: 10/16/2018    POST-OP DIAGNOSIS: See the impression below    SEDATION: Monitored anesthesia care, check anesthesia records    PHYSICAL EXAM:  Vitals:    10/16/18 1010   BP: 138/96   Pulse: 87   Resp: 19   Temp: 98 5 °F (36 9 °C)   SpO2: 98%     Body mass index is 28 41 kg/m²      General: NAD  Heart: S1 & S2 normal, RRR  Lungs: CTA, No rales or rhonchi  Abdomen: Soft, nontender, nondistended, good bowel sounds    CONSENT:  Informed consent was obtained for the procedure, including sedation after explaining the risks and benefits of the procedure  Risks including but not limited to bleeding, perforation, infection, aspiration were discussed in detail  Also explained about less than 100%$ sensitivity with the exam and other alternatives  DESCRIPTION:   Procedure:  Fiberoptic colonoscopy      Indications:  Initial average risk screening colonoscopy for 69-year-old female  No prior colonoscopy    ASA 2    Premedicated with mac anesthesia    Patient is identified by me  She is examined prior to the procedure found to be in stable condition  She is attached to the cardiac monitor and pulse oximeter and placed in the left lateral position  After adequate intravenous sedation the Olympus video colonoscope was inserted and passed easily  To the cecum  The ileocecal valve and the appendiceal orifice are identified  The scope was then slowly withdrawn with excellent circumferential is visualization of the mucosa  Preparation is excellent  The under colonic mucosa is completely unremarkable with no inflammatory neoplastic or vascular lesions noted  Retroversion is normal   She tolerated the procedure well and was stable throughout  My impression:  Normal colonoscopy    RECOMMENDATIONS:  Repeat colonoscopy in 10 years  Yearly fit test with primary care physician  Every 3 year colo guard test with primary care physician  COMPLICATIONS:  None; patient tolerated the procedure well  DISPOSITION: PACU  CONDITION: Stable    Jn Mitchell MD  10/16/2018,10:48 AM        Portions of the record may have been created with voice recognition software   Occasional wrong word or "sound a like" substitutions may have occurred due to the inherent limitations of voice recognition software   Read the chart carefully and recognize, using context, where substitutions have occurred

## 2018-10-16 NOTE — ANESTHESIA PREPROCEDURE EVALUATION
Review of Systems/Medical History  Patient summary reviewed        Cardiovascular  Hyperlipidemia,    Pulmonary  Asthma ,        GI/Hepatic  Dysphagia,          Negative  ROS        Endo/Other  History of thyroid disease , hypothyroidism,      GYN  Negative gynecology ROS          Hematology  Negative hematology ROS      Musculoskeletal  Negative musculoskeletal ROS   Comment: Sjogren's syndrome  Scleroderma           Neurology  Negative neurology ROS      Psychology   Anxiety,              Physical Exam    Airway    Mallampati score: II  TM Distance: >3 FB  Neck ROM: full     Dental       Cardiovascular  Cardiovascular exam normal    Pulmonary  Pulmonary exam normal     Other Findings        Anesthesia Plan  ASA Score- 2     Anesthesia Type- IV sedation with anesthesia with ASA Monitors  Additional Monitors:   Airway Plan:         Plan Factors-    Induction- intravenous  Postoperative Plan-     Informed Consent- Anesthetic plan and risks discussed with patient  I personally reviewed this patient with the CRNA  Discussed and agreed on the Anesthesia Plan with the CRNA  Marisa Veliz

## 2018-10-16 NOTE — DISCHARGE INSTRUCTIONS
Upper Endoscopy   WHAT YOU NEED TO KNOW:   An upper endoscopy is also called an upper gastrointestinal (GI) endoscopy, or an esophagogastroduodenoscopy (EGD)  You may feel bloated, gassy, or have some abdominal discomfort after your procedure  Your throat may be sore for 24 to 36 hours  You may burp or pass gas from air that is still inside your body  DISCHARGE INSTRUCTIONS:   Call 911 for any of the following:   · You have sudden chest pain or trouble breathing  Seek care immediately if:   · You feel dizzy or faint  · You have trouble swallowing  · Your bowel movements are very dark or black  · Your abdomen is hard and firm and you have severe pain  · You vomit blood  Contact your healthcare provider if:   · You feel full or bloated and cannot burp or pass gas  · You have not had a bowel movement for 3 days after your procedure  · You have neck pain  · You have a fever or chills  · You have nausea or are vomiting  · You have a rash or hives  · You have questions or concerns about your endoscopy  Relieve a sore throat:  Suck on throat lozenges or crushed ice  Gargle with a small amount of warm salt water  Mix 1 teaspoon of salt and 1 cup of warm water to make salt water  Relieve gas and discomfort from bloating:  Lie on your right side with a heating pad on your abdomen  Take short walks to help pass gas  Eat small meals until bloating is relieved  Rest after your procedure: You have been given medicine to relax you  Do not  drive or make important decisions until the day after your procedure  Return to your normal activity as directed  You can usually return to work the day after your procedure  Follow up with your healthcare provider as directed:  Write down your questions so you remember to ask them during your visits     © 2017 [address] is for End User's use only and may not be sold, redistributed or otherwise used for commercial purposes  All illustrations and images included in CareNotes® are the copyrighted property of [employer]  or [employer] Walker  The above information is an  only  It is not intended as medical advice for individual conditions or treatments  Talk to your doctor, nurse or pharmacist before following any medical regimen to see if it is safe and effective for you  Colonoscopy   WHAT YOU NEED TO KNOW:   A colonoscopy is a procedure to examine the inside of your colon (intestine) with a scope  Polyps or tissue growths may have been removed during your colonoscopy  It is normal to feel bloated and to have some abdominal discomfort  You should be passing gas  If you have hemorrhoids or you had polyps removed, you may have a small amount of bleeding  DISCHARGE INSTRUCTIONS:   Seek care immediately if:   · You have a large amount of bright red blood in your bowel movements  · Your abdomen is hard and firm and you have severe pain  · You have sudden trouble breathing  Contact your healthcare provider if:   · You develop a rash or hives  · You have a fever within 24 hours of your procedure       · You have not had a bowel movement for 3 days after your procedure  · You have questions or concerns about your condition or care  Activity:   · Do not lift, strain, or run  for 3 days after your procedure  · Rest after your procedure  You have been given medicine to relax you  Do not  drive or make important decisions until the day after your procedure  Return to your normal activity as directed  · Relieve gas and discomfort from bloating  by lying on your right side with a heating pad on your abdomen  You may need to take short walks to help the gas move out  Eat small meals until bloating is relieved  If you had polyps removed: For 7 days after your procedure:  · Do not  take aspirin  · Do not  go on long car rides    Follow up with your healthcare provider as directed: Write down your questions so you remember to ask them during your visits  © 2017 [address] is for End User's use only and may not be sold, redistributed or otherwise used for commercial purposes  All illustrations and images included in CareNotes® are the copyrighted property of [employer]  or Vince Walker  The above information is an  only  It is not intended as medical advice for individual conditions or treatments  Talk to your doctor, nurse or pharmacist before following any medical regimen to see if it is safe and effective for you

## 2018-10-18 RX ORDER — FLUCONAZOLE 150 MG/1
TABLET ORAL
Qty: 2 TABLET | Refills: 1 | OUTPATIENT
Start: 2018-10-18

## 2018-10-19 DIAGNOSIS — B37.9 YEAST INFECTION: Primary | ICD-10-CM

## 2018-10-19 RX ORDER — FLUCONAZOLE 150 MG/1
TABLET ORAL
Qty: 2 TABLET | Refills: 0 | Status: SHIPPED | OUTPATIENT
Start: 2018-10-19 | End: 2018-10-20

## 2018-10-20 ENCOUNTER — TELEPHONE (OUTPATIENT)
Dept: GASTROENTEROLOGY | Facility: CLINIC | Age: 48
End: 2018-10-20

## 2018-10-23 DIAGNOSIS — B96.81 HELICOBACTER POSITIVE GASTRITIS: Primary | ICD-10-CM

## 2018-10-23 DIAGNOSIS — K29.70 HELICOBACTER POSITIVE GASTRITIS: Primary | ICD-10-CM

## 2018-10-23 RX ORDER — ONDANSETRON 4 MG/1
4 TABLET, FILM COATED ORAL EVERY 8 HOURS PRN
Qty: 20 TABLET | Refills: 0 | Status: SHIPPED | OUTPATIENT
Start: 2018-10-23 | End: 2018-11-26

## 2018-10-23 RX ORDER — AMOXICILLIN 500 MG/1
1000 CAPSULE ORAL EVERY 12 HOURS SCHEDULED
Qty: 56 CAPSULE | Refills: 0 | Status: SHIPPED | OUTPATIENT
Start: 2018-10-23 | End: 2018-11-06

## 2018-10-23 RX ORDER — OMEPRAZOLE 20 MG/1
20 CAPSULE, DELAYED RELEASE ORAL 2 TIMES DAILY WITH MEALS
Qty: 28 CAPSULE | Refills: 0 | Status: SHIPPED | OUTPATIENT
Start: 2018-10-23 | End: 2019-02-28

## 2018-10-23 RX ORDER — CLARITHROMYCIN 500 MG/1
500 TABLET, COATED ORAL EVERY 12 HOURS SCHEDULED
Qty: 28 TABLET | Refills: 0 | Status: SHIPPED | OUTPATIENT
Start: 2018-10-23 | End: 2018-11-06

## 2018-11-26 ENCOUNTER — LAB (OUTPATIENT)
Dept: LAB | Facility: CLINIC | Age: 48
End: 2018-11-26
Payer: COMMERCIAL

## 2018-11-26 ENCOUNTER — OFFICE VISIT (OUTPATIENT)
Dept: INTERNAL MEDICINE CLINIC | Facility: CLINIC | Age: 48
End: 2018-11-26
Payer: COMMERCIAL

## 2018-11-26 VITALS
HEART RATE: 80 BPM | RESPIRATION RATE: 12 BRPM | BODY MASS INDEX: 29.52 KG/M2 | DIASTOLIC BLOOD PRESSURE: 90 MMHG | WEIGHT: 166.6 LBS | SYSTOLIC BLOOD PRESSURE: 144 MMHG | HEIGHT: 63 IN

## 2018-11-26 DIAGNOSIS — R73.01 IMPAIRED FASTING GLUCOSE: ICD-10-CM

## 2018-11-26 DIAGNOSIS — F41.8 DEPRESSION WITH ANXIETY: ICD-10-CM

## 2018-11-26 DIAGNOSIS — B96.81 HELICOBACTER POSITIVE GASTRITIS: Primary | ICD-10-CM

## 2018-11-26 DIAGNOSIS — I10 ESSENTIAL HYPERTENSION: ICD-10-CM

## 2018-11-26 DIAGNOSIS — E03.9 HYPOTHYROIDISM, UNSPECIFIED TYPE: ICD-10-CM

## 2018-11-26 DIAGNOSIS — R76.8 ANA POSITIVE: ICD-10-CM

## 2018-11-26 DIAGNOSIS — E78.5 HYPERLIPIDEMIA, UNSPECIFIED HYPERLIPIDEMIA TYPE: ICD-10-CM

## 2018-11-26 DIAGNOSIS — J45.909 UNCOMPLICATED ASTHMA, UNSPECIFIED ASTHMA SEVERITY, UNSPECIFIED WHETHER PERSISTENT: ICD-10-CM

## 2018-11-26 DIAGNOSIS — R73.01 IMPAIRED FASTING BLOOD SUGAR: ICD-10-CM

## 2018-11-26 DIAGNOSIS — E06.3 HASHIMOTO'S DISEASE: ICD-10-CM

## 2018-11-26 DIAGNOSIS — Z12.4 SCREENING FOR CERVICAL CANCER: ICD-10-CM

## 2018-11-26 DIAGNOSIS — K29.70 HELICOBACTER POSITIVE GASTRITIS: Primary | ICD-10-CM

## 2018-11-26 LAB
ALBUMIN SERPL BCP-MCNC: 3.8 G/DL (ref 3.5–5)
ALP SERPL-CCNC: 67 U/L (ref 46–116)
ALT SERPL W P-5'-P-CCNC: 21 U/L (ref 12–78)
ANION GAP SERPL CALCULATED.3IONS-SCNC: 4 MMOL/L (ref 4–13)
AST SERPL W P-5'-P-CCNC: 21 U/L (ref 5–45)
BASOPHILS # BLD AUTO: 0.02 THOUSANDS/ΜL (ref 0–0.1)
BASOPHILS NFR BLD AUTO: 0 % (ref 0–1)
BILIRUB SERPL-MCNC: 0.33 MG/DL (ref 0.2–1)
BUN SERPL-MCNC: 14 MG/DL (ref 5–25)
CALCIUM SERPL-MCNC: 8.9 MG/DL (ref 8.3–10.1)
CHLORIDE SERPL-SCNC: 105 MMOL/L (ref 100–108)
CHOLEST SERPL-MCNC: 176 MG/DL (ref 50–200)
CO2 SERPL-SCNC: 29 MMOL/L (ref 21–32)
CREAT SERPL-MCNC: 0.82 MG/DL (ref 0.6–1.3)
EOSINOPHIL # BLD AUTO: 0.34 THOUSAND/ΜL (ref 0–0.61)
EOSINOPHIL NFR BLD AUTO: 5 % (ref 0–6)
ERYTHROCYTE [DISTWIDTH] IN BLOOD BY AUTOMATED COUNT: 12.9 % (ref 11.6–15.1)
EST. AVERAGE GLUCOSE BLD GHB EST-MCNC: 117 MG/DL
GFR SERPL CREATININE-BSD FRML MDRD: 85 ML/MIN/1.73SQ M
GLUCOSE P FAST SERPL-MCNC: 88 MG/DL (ref 65–99)
HBA1C MFR BLD: 5.7 % (ref 4.2–6.3)
HCT VFR BLD AUTO: 38 % (ref 34.8–46.1)
HDLC SERPL-MCNC: 65 MG/DL (ref 40–60)
HGB BLD-MCNC: 12.1 G/DL (ref 11.5–15.4)
IMM GRANULOCYTES # BLD AUTO: 0.01 THOUSAND/UL (ref 0–0.2)
IMM GRANULOCYTES NFR BLD AUTO: 0 % (ref 0–2)
LDLC SERPL CALC-MCNC: 91 MG/DL (ref 0–100)
LYMPHOCYTES # BLD AUTO: 2.03 THOUSANDS/ΜL (ref 0.6–4.47)
LYMPHOCYTES NFR BLD AUTO: 31 % (ref 14–44)
MCH RBC QN AUTO: 30.3 PG (ref 26.8–34.3)
MCHC RBC AUTO-ENTMCNC: 31.8 G/DL (ref 31.4–37.4)
MCV RBC AUTO: 95 FL (ref 82–98)
MONOCYTES # BLD AUTO: 0.56 THOUSAND/ΜL (ref 0.17–1.22)
MONOCYTES NFR BLD AUTO: 9 % (ref 4–12)
NEUTROPHILS # BLD AUTO: 3.62 THOUSANDS/ΜL (ref 1.85–7.62)
NEUTS SEG NFR BLD AUTO: 55 % (ref 43–75)
NONHDLC SERPL-MCNC: 111 MG/DL
NRBC BLD AUTO-RTO: 0 /100 WBCS
PLATELET # BLD AUTO: 319 THOUSANDS/UL (ref 149–390)
PMV BLD AUTO: 10.2 FL (ref 8.9–12.7)
POTASSIUM SERPL-SCNC: 4 MMOL/L (ref 3.5–5.3)
PROT SERPL-MCNC: 7.8 G/DL (ref 6.4–8.2)
RBC # BLD AUTO: 4 MILLION/UL (ref 3.81–5.12)
SODIUM SERPL-SCNC: 138 MMOL/L (ref 136–145)
T4 FREE SERPL-MCNC: 0.8 NG/DL (ref 0.76–1.46)
TRIGL SERPL-MCNC: 100 MG/DL
TSH SERPL DL<=0.05 MIU/L-ACNC: 6.15 UIU/ML (ref 0.36–3.74)
WBC # BLD AUTO: 6.58 THOUSAND/UL (ref 4.31–10.16)

## 2018-11-26 PROCEDURE — 85025 COMPLETE CBC W/AUTO DIFF WBC: CPT

## 2018-11-26 PROCEDURE — 80053 COMPREHEN METABOLIC PANEL: CPT

## 2018-11-26 PROCEDURE — 80061 LIPID PANEL: CPT

## 2018-11-26 PROCEDURE — 84443 ASSAY THYROID STIM HORMONE: CPT

## 2018-11-26 PROCEDURE — 83036 HEMOGLOBIN GLYCOSYLATED A1C: CPT

## 2018-11-26 PROCEDURE — 36415 COLL VENOUS BLD VENIPUNCTURE: CPT

## 2018-11-26 PROCEDURE — 99214 OFFICE O/P EST MOD 30 MIN: CPT | Performed by: NURSE PRACTITIONER

## 2018-11-26 PROCEDURE — 84439 ASSAY OF FREE THYROXINE: CPT

## 2018-11-26 RX ORDER — LEVOTHYROXINE SODIUM 0.05 MG/1
TABLET ORAL
Qty: 30 TABLET | Refills: 2
Start: 2018-11-26 | End: 2019-03-21 | Stop reason: SDUPTHER

## 2018-11-26 NOTE — PATIENT INSTRUCTIONS
Hypothyroidism Hashimoto's, now following with Endocrinology dose was increased to 50 mcg    Depression/anxiety/posttraumatic stress she had several days is suicidal ideation she is establishing with [facility] for psychiatric care    Elbow pain medial epicondylitis discussed pathophysiology rest wrists, anti-inflammatories as needed    Abnormal sugar check A1c    Asthma stable with p r n  Inhalers    She is pursuing disability on a multitude of levels including from 09/11 exposure      Hypertension borderline monitor at home communicate results to me in 2 weeks    Thyroid nodule repeat check ultrasound 1 year    Status post H pylori
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
## 2018-11-27 ENCOUNTER — TELEPHONE (OUTPATIENT)
Dept: INTERNAL MEDICINE CLINIC | Facility: CLINIC | Age: 48
End: 2018-11-27

## 2018-11-27 ENCOUNTER — OFFICE VISIT (OUTPATIENT)
Dept: GASTROENTEROLOGY | Facility: CLINIC | Age: 48
End: 2018-11-27
Payer: COMMERCIAL

## 2018-11-27 VITALS
WEIGHT: 167 LBS | BODY MASS INDEX: 29.59 KG/M2 | RESPIRATION RATE: 14 BRPM | SYSTOLIC BLOOD PRESSURE: 130 MMHG | HEIGHT: 63 IN | DIASTOLIC BLOOD PRESSURE: 82 MMHG | HEART RATE: 92 BPM

## 2018-11-27 DIAGNOSIS — B96.81 HELICOBACTER POSITIVE GASTRITIS: Primary | ICD-10-CM

## 2018-11-27 DIAGNOSIS — K29.70 HELICOBACTER POSITIVE GASTRITIS: Primary | ICD-10-CM

## 2018-11-27 DIAGNOSIS — R19.5 PASSAGE OF LOOSE STOOLS: ICD-10-CM

## 2018-11-27 PROCEDURE — 99213 OFFICE O/P EST LOW 20 MIN: CPT | Performed by: NURSE PRACTITIONER

## 2018-11-27 RX ORDER — OCTISALATE, AVOBENZONE, HOMOSALATE, AND OCTOCRYLENE 29.4; 29.4; 49; 25.48 MG/ML; MG/ML; MG/ML; MG/ML
1 LOTION TOPICAL DAILY
Qty: 30 CAPSULE | Refills: 0 | Status: SHIPPED | OUTPATIENT
Start: 2018-11-27 | End: 2018-12-14

## 2018-11-27 NOTE — PATIENT INSTRUCTIONS
Repeat stool test for H pylori four weeks after completion of antibiotics  Stool for C diff because of loose stools    Begin probiotic and if that is too expensive may use Activia yogurt twice daily for 30 days

## 2018-11-27 NOTE — TELEPHONE ENCOUNTER
Informed patient of [facility] message  Pt said to forward the labs to the Endo doctor and she'll wait for that call

## 2018-11-27 NOTE — TELEPHONE ENCOUNTER
----- Message from Sandra Jones, [address] sent at 11/27/2018 12:41 PM EST -----  Her labs her stable except her thyroid is still off we can increase her to 75mcg or I can just forward the labs to her endo doctor to see what she wants to do

## 2018-11-27 NOTE — PROGRESS NOTES
Her labs her stable except her thyroid is still off we can increase her to 75mcg or I can just forward the labs to her endo doctor to see what she wants to do

## 2018-12-06 DIAGNOSIS — R20.0 NUMBNESS AND TINGLING: Primary | ICD-10-CM

## 2018-12-06 DIAGNOSIS — R20.2 NUMBNESS AND TINGLING: Primary | ICD-10-CM

## 2018-12-14 ENCOUNTER — OFFICE VISIT (OUTPATIENT)
Dept: OBGYN CLINIC | Age: 48
End: 2018-12-14
Payer: COMMERCIAL

## 2018-12-14 ENCOUNTER — OFFICE VISIT (OUTPATIENT)
Dept: NEUROLOGY | Facility: CLINIC | Age: 48
End: 2018-12-14
Payer: COMMERCIAL

## 2018-12-14 VITALS
SYSTOLIC BLOOD PRESSURE: 150 MMHG | BODY MASS INDEX: 29.59 KG/M2 | HEIGHT: 63 IN | HEART RATE: 95 BPM | DIASTOLIC BLOOD PRESSURE: 100 MMHG | WEIGHT: 167 LBS

## 2018-12-14 VITALS
BODY MASS INDEX: 29.59 KG/M2 | DIASTOLIC BLOOD PRESSURE: 64 MMHG | HEIGHT: 63 IN | WEIGHT: 167 LBS | SYSTOLIC BLOOD PRESSURE: 132 MMHG

## 2018-12-14 DIAGNOSIS — R25.1 SPELLS OF TREMBLING: Primary | ICD-10-CM

## 2018-12-14 DIAGNOSIS — R20.2 NUMBNESS AND TINGLING: ICD-10-CM

## 2018-12-14 DIAGNOSIS — B37.49 CANDIDA INFECTION OF GENITAL REGION: Primary | ICD-10-CM

## 2018-12-14 DIAGNOSIS — R20.0 NUMBNESS AND TINGLING: ICD-10-CM

## 2018-12-14 DIAGNOSIS — G47.52 REM SLEEP BEHAVIOR DISORDER: ICD-10-CM

## 2018-12-14 PROBLEM — B37.9 YEAST INFECTION: Status: ACTIVE | Noted: 2017-04-17

## 2018-12-14 PROCEDURE — 99244 OFF/OP CNSLTJ NEW/EST MOD 40: CPT | Performed by: PSYCHIATRY & NEUROLOGY

## 2018-12-14 PROCEDURE — 87220 TISSUE EXAM FOR FUNGI: CPT | Performed by: NURSE PRACTITIONER

## 2018-12-14 PROCEDURE — 99203 OFFICE O/P NEW LOW 30 MIN: CPT | Performed by: NURSE PRACTITIONER

## 2018-12-14 RX ORDER — FLUCONAZOLE 150 MG/1
150 TABLET ORAL ONCE
Qty: 3 TABLET | Refills: 0 | Status: SHIPPED | OUTPATIENT
Start: 2018-12-14 | End: 2018-12-14

## 2018-12-14 NOTE — PROGRESS NOTES
Assessment/Plan:    No problem-specific Assessment & Plan notes found for this encounter  Diagnoses and all orders for this visit:    Candida infection of genital region  -     fluconazole (DIFLUCAN) 150 mg tablet; Take 1 tablet (150 mg total) by mouth once for 1 dose Repeat on day 3 and day 6 for total of 3 doses      Call as needed, return for annual in 4 wks, all questions answered      Subjective:      Patient ID: Mahendra Hua is a 53 y o  female  Pleasant 53 y o  here for vaginal complaints  Adi Bee recently completed antibiotics for 14 days for [facility] and since then has been irritated vaginally  She was given only 1 diflucan without full resolution  She states the "creams" don't work  She denies any other treatments tried  Denies douching  Denies fever, pelvic pain or dysparunia  Denies new sexual partners  Not sexually active  G0        The following portions of the patient's history were reviewed and updated as appropriate:   She  has a past medical history of Anxiety; Asthma; Depression; Disease of thyroid gland; Hashimoto's disease; Hyperlipidemia; and Hypertension    She   Patient Active Problem List    Diagnosis Date Noted    Candida infection of genital region 12/14/2018    Passage of loose stools 11/27/2018    Essential hypertension 41/23/2701    Helicobacter positive gastritis 10/23/2018    Screen for colon cancer 10/10/2018    Dysphagia 10/10/2018    Pharyngoesophageal dysphagia 10/09/2018    Screening for colon cancer 10/09/2018    Scleroderma ([facility] [address] ) 06/14/2018    Abnormal thyroid function test 12/05/2017    Hashimoto's disease 12/05/2017    Sjogren's syndrome ([facility] [address] ) 10/06/2017    Yeast infection 04/17/2017    Asthma 02/11/2016    LALITA positive 10/22/2015    Depression with anxiety 10/22/2015    Anxiety 03/10/2015    Hyperlipemia 08/05/2014    Impaired fasting glucose 08/05/2014     She  has a past surgical history that includes Hysterectomy; Sinus surgery; Breast surgery (Bilateral, 2002); and pr colonoscopy flx dx w/collj spec when pfrmd (N/A, 10/16/2018)  Her family history includes Cervical cancer in her paternal grandmother; Depression in her father; Hyperlipidemia in her mother; Lupus in her mother  She  reports that she has never smoked  She has never used smokeless tobacco  She reports that she drinks about 0 6 oz of alcohol per week   She reports that she does not use drugs  Current Outpatient Prescriptions on File Prior to Visit   Medication Sig    albuterol (PROVENTIL HFA,VENTOLIN HFA) 90 mcg/act inhaler Inhale 2 puffs every 4 (four) hours as needed for wheezing    atorvastatin (LIPITOR) 10 mg tablet Take 1 tablet by mouth daily    clonazePAM (KlonoPIN) 0 5 mg tablet TAKE 1 TABLET (0 5 MG TOTAL) BY MOUTH 3 (THREE) TIMES A DAY AS NEEDED (AS NEEDED) (Patient taking differently: Take 0 5 mg by mouth 3 (three) times a day as needed (as needed)  )    fluticasone (FLONASE) 50 mcg/act nasal spray 2 sprays into each nostril daily    levothyroxine 50 mcg tablet Take 50mcg daily (Patient taking differently: 75 mcg Take 50mcg daily )    rizatriptan (MAXALT-MLT) 10 MG disintegrating tablet Take 1 tablet (10 mg total) by mouth once as needed for migraine for up to 1 dose May repeat in 2 hours if needed    venlafaxine (EFFEXOR-XR) 150 mg 24 hr capsule Take 1 capsule (150 mg total) by mouth daily    venlafaxine (EFFEXOR-XR) 75 mg 24 hr capsule Take 1 capsule (75 mg total) by mouth daily    [DISCONTINUED] Probiotic Product (ALIGN) 4 MG CAPS Take 1 capsule (4 mg total) by mouth daily for 30 days    omeprazole (PriLOSEC) 20 mg delayed release capsule Take 1 capsule (20 mg total) by mouth 2 (two) times a day with meals for 14 days     No current facility-administered medications on file prior to visit  She has No Known Allergies    OB History    Para Term  AB Living   0 0 0 0 0 0   SAB TAB Ectopic Multiple Live Births   0 0 0 0 0           Computer Graphics   Review of Systems   Constitutional: Negative for chills, fatigue, fever and unexpected weight change  HENT: Negative for mouth sores and trouble swallowing  Gastrointestinal: Negative for abdominal distention, blood in stool, constipation and diarrhea  Genitourinary: Positive for vaginal discharge  Negative for difficulty urinating, dyspareunia, dysuria, frequency, genital sores, hematuria, menstrual problem, pelvic pain and vaginal pain  Musculoskeletal: Negative for neck stiffness  Psychiatric/Behavioral: Negative for agitation, confusion, self-injury and suicidal ideas  The patient is not nervous/anxious  Objective:      /64 (BP Location: Right arm, Patient Position: Sitting)   Ht 5' 2 5" (1 588 m)   Wt 75 8 kg (167 lb)   Breastfeeding? No   BMI 30 06 kg/m²          Physical Exam   Constitutional: She appears well-developed and well-nourished  She is cooperative  No distress  Abdominal: Soft  Hernia confirmed negative in the right inguinal area and confirmed negative in the left inguinal area  Genitourinary: No labial fusion  There is no rash, tenderness, lesion or injury on the right labia  There is no rash, tenderness, lesion or injury on the left labia  No erythema, tenderness or bleeding in the vagina  No foreign body in the vagina  No signs of injury around the vagina  Vaginal discharge found  Genitourinary Comments: Uterus surgically absent  Wet mount showed +hyphae, ph 4 5, no wbcs or trich, whiff neg   Lymphadenopathy:        Right: No inguinal adenopathy present  Left: No inguinal adenopathy present  Skin: She is not diaphoretic

## 2018-12-14 NOTE — PROGRESS NOTES
Rut Abdalla is a 53 y o  female  No chief complaint on file  Assessment:  1  Spells of trembling    2  Numbness and tingling    3  REM sleep behavior disorder        Plan:    Discussion:  Differential diagnosis discussed with the patient, possible REM related disorder, other possibilities of her spells of syncope could be secondary to her panic attack, versus vasovagal, ihey are not clear for any seizures, would recommend an MRI scan of the brain EEG and a sleep study to evaluate for her symptoms, if the EEG is negative then she would need a sleep-deprived EEG, I am going to have her see an epilepsy specialist, to see if she would need any type of video monitoring for her spells, also would recommend an EMG nerve conduction study of bilateral upper extremity to evaluate for numbness and tingling, patient to call me after the above test to discuss the results, she was advised not to drive at least until the workup is complete, to go to the hospital if has any worsening symptoms and call me  to follow up with her other physicians, and see me back in 2 months      Subjective:    HPI   Patient is here for evaluation of multiple complaints including numbness and tingling in her hands and feet, sometimes bothering her when she wakes up from the sleep and she has to shake her hands, this has been going on for a few months, she denies any neck pain or back pain, she also has been having spells that she describes mostly as trembling in the nighttime for the last 2 years, it could happen once a month, and she also acts out in her dreams and would be moving her arms, she was told that this is secondary to her posttraumatic stress disorder and has been given clonazepam to manage these, she also tells me that she does get trembling in her sleep, but when woken up she knows her surroundings, and has not had any bowel or bladder incontinence, no tongue biting, she has had lightheadedness spells during the day and sometimes she would pass out that she attributes to her panic attacks, she has not had any bowel or bladder incontinence or any witnessed seizure, she is in follow up with her psychiatrist regarding her posttraumatic stress disorder as she was in 911, she has had the syncopal spells for the last 10 years, but has not had any recently, and she has had the sleep disorder spells since the last 2 years, at times she also feels that her brain is a little foggy, appetite is good weight has been good   No other complaints    Vitals:    12/14/18 1518   BP: 150/100   BP Location: Left arm   Patient Position: Sitting   Cuff Size: Standard   Pulse: 95   Weight: 75 8 kg (167 lb)   Height: 5' 2 5" (1 588 m)       Current Medications    Current Outpatient Prescriptions:     albuterol (PROVENTIL HFA,VENTOLIN HFA) 90 mcg/act inhaler, Inhale 2 puffs every 4 (four) hours as needed for wheezing, Disp: 1 Inhaler, Rfl: 0    atorvastatin (LIPITOR) 10 mg tablet, Take 1 tablet by mouth daily, Disp: , Rfl:     clonazePAM (KlonoPIN) 0 5 mg tablet, TAKE 1 TABLET (0 5 MG TOTAL) BY MOUTH 3 (THREE) TIMES A DAY AS NEEDED (AS NEEDED) (Patient taking differently: Take 0 5 mg by mouth 3 (three) times a day as needed (as needed)  ), Disp: 90 tablet, Rfl: 0    fluconazole (DIFLUCAN) 150 mg tablet, Take 1 tablet (150 mg total) by mouth once for 1 dose Repeat on day 3 and day 6 for total of 3 doses, Disp: 3 tablet, Rfl: 0    fluticasone (FLONASE) 50 mcg/act nasal spray, 2 sprays into each nostril daily, Disp: 16 g, Rfl: 0    levothyroxine 50 mcg tablet, Take 50mcg daily (Patient taking differently: 75 mcg Take 50mcg daily ), Disp: 30 tablet, Rfl: 2    omeprazole (PriLOSEC) 20 mg delayed release capsule, Take 1 capsule (20 mg total) by mouth 2 (two) times a day with meals for 14 days, Disp: 28 capsule, Rfl: 0    rizatriptan (MAXALT-MLT) 10 MG disintegrating tablet, Take 1 tablet (10 mg total) by mouth once as needed for migraine for up to 1 dose May repeat in 2 hours if needed, Disp: 9 tablet, Rfl: 0    venlafaxine (EFFEXOR-XR) 150 mg 24 hr capsule, Take 1 capsule (150 mg total) by mouth daily, Disp: 30 capsule, Rfl: 3    venlafaxine (EFFEXOR-XR) 75 mg 24 hr capsule, Take 1 capsule (75 mg total) by mouth daily, Disp: 30 capsule, Rfl: 3      Allergies  Patient has no known allergies  Past Medical History  Past Medical History:   Diagnosis Date    Anxiety     Asthma     Depression     Disease of thyroid gland     Hashimoto's disease     Hyperlipidemia     Hypertension          Past Surgical History:  Past Surgical History:   Procedure Laterality Date    BREAST SURGERY Bilateral 2002    reduction    HYSTERECTOMY      DC COLONOSCOPY FLX DX W/COLLJ SPEC WHEN PFRMD N/A 10/16/2018    Procedure: EGD AND COLONOSCOPY;  Surgeon: Ryan Rainey MD;  Location: [facility]; Service: Gastroenterology    SINUS SURGERY           Family History:  Family History   Problem Relation Age of Onset    Hyperlipidemia Mother    [facility] Lupus Mother     Depression Father     Cervical cancer Paternal Grandmother     Breast cancer Neg Hx     Colon cancer Neg Hx     Ovarian cancer Neg Hx     Uterine cancer Neg Hx        Social History:   reports that she has never smoked  She has never used smokeless tobacco  She reports that she drinks about 0 6 oz of alcohol per week   She reports that she does not use drugs  I have reviewed the past medical history, surgical history, social and family history, current medications, allergies vitals, review of systems, and updated this information as appropriate today  Objective:    Physical Exam    Neurological Exam    GENERAL:  Cooperative in no acute distress  Well-developed and well-nourished    HEAD and NECK   Head is atraumatic normocephalic with no lesions or masses  Neck is supple with full range of motion    CARDIOVASCULAR  Carotid Arteries-no carotid bruits      NEUROLOGIC:  Mental Status-the patient is awake alert and oriented without aphasia or apraxia  Cranial Nerves: Visual fields are full to confrontation  Discs are flat, limited exam secondary to not able to dilate  Extraocular movements are full without nystagmus  Pupils are 2-1/2 mm and reactive  Face is symmetrical to light touch  Movements of facial expression move symmetrically  Hearing is normal to finger rub bilaterally  Soft palate lifts symmetrically  Shoulder shrug is symmetrical  Tongue is midline without atrophy  Motor: No drift is noted on arm extension  Strength is full in the upper and lower extremities with normal bulk and tone  Sensory: Intact to temperature and vibratory sensation in the upper and lower extremities bilaterally  Cortical function is intact  Coordination: Finger to nose testing is performed accurately  Romberg is negative  Gait reveals a normal base with symmetrical arm swing  Tandem walk is normal   Reflexes:        1+ and symmetrical Toes are downgoing          ROS:  Review of Systems   Constitutional: Positive for fatigue  HENT: Positive for ear pain, postnasal drip and tinnitus  Humming in ears, jaw pain   Eyes: Positive for photophobia and itching  Dry eye   Respiratory: Positive for wheezing  Cardiovascular: Negative  Gastrointestinal: Negative  Endocrine: Positive for cold intolerance and heat intolerance  Genitourinary: Positive for frequency  Musculoskeletal: Positive for joint swelling and neck pain  Fingers swell sometimes   Skin: Positive for rash  Allergic/Immunologic:        Asbestos contact from 9/11/2001-   Neurological: Positive for dizziness, tremors (while sleeping and dreaming has tremors), weakness, numbness and headaches  Feet cramp, toes will spread open   Hematological: Bruises/bleeds easily  Psychiatric/Behavioral: Positive for agitation, behavioral problems, confusion, decreased concentration, sleep disturbance and suicidal ideas (getting help)   Negative for hallucinations and self-injury  The patient is nervous/anxious           Vivid dreams

## 2018-12-14 NOTE — PATIENT INSTRUCTIONS
Vulvovaginal Candidiasis   AMBULATORY CARE:   Vulvovaginal candidiasis,  or yeast infection, is a common vaginal infection  Vulvovaginal candidiasis is caused by a fungus, or yeast-like germ  Fungi are normally found in your vagina  When there are too many fungi, it can cause an infection  Seek care immediately if:   · You have fever and chills  · You are bleeding from your vagina and it is not your monthly period  · You develop abdominal or pelvic pain  Contact your healthcare provider if:   · Your signs and symptoms get worse, even after treatment  · You have questions or concerns about your condition or care  Signs and symptoms:   · Thick, white, cheese-like discharge from your vagina    · Itching, swelling, or redness in your vagina    · Burning when you urinate  Treatment for vulvovaginal candidiasis  includes medicines to treat the fungal infection and decrease inflammation  The medicine may be a pill, topical cream, or vaginal suppository  Manage your symptoms:   · Wear cotton underwear  · Keep the vaginal area clean and dry  · Do not have sex until your symptoms are gone  · Do not douche  · Do not use feminine hygiene sprays, powders, or bubble bath  Prevent another infection:   · Take showers instead of baths  · Eat yogurt  · Limit the amount of alcohol you drink  · Control your blood sugar if you are diabetic  · Limit your time in hot tubs  Follow up with your healthcare provider as directed:  Write down your questions so you remember to ask them during your visits  © 2017 [facility] Information is for End User's use only and may not be sold, redistributed or otherwise used for commercial purposes  All illustrations and images included in CareNotes® are the copyrighted property of [employer]  or Vince Walker  The above information is an  only   It is not intended as medical advice for individual conditions or treatments  Talk to your doctor, nurse or pharmacist before following any medical regimen to see if it is safe and effective for you

## 2018-12-19 DIAGNOSIS — G47.00 INSOMNIA, UNSPECIFIED TYPE: ICD-10-CM

## 2018-12-19 DIAGNOSIS — N89.8 VAGINAL IRRITATION: Primary | ICD-10-CM

## 2018-12-19 RX ORDER — FLUCONAZOLE 150 MG/1
150 TABLET ORAL ONCE
Qty: 1 TABLET | Refills: 0 | Status: SHIPPED | OUTPATIENT
Start: 2018-12-19 | End: 2018-12-19

## 2018-12-19 NOTE — TELEPHONE ENCOUNTER
Patient aware Rx is being sent to her pharmacy  rx in [facility]-  Please sign  Thanks!

## 2018-12-19 NOTE — TELEPHONE ENCOUNTER
Spoke wilfrido pt - she is still having symptoms  She still has symptoms  She was given an Rx to have dispensed 3 tablets  Pharmacy only gave her 2  She would like to have another RX  Please advise  Thanks!

## 2018-12-20 ENCOUNTER — HOSPITAL ENCOUNTER (OUTPATIENT)
Dept: SLEEP CENTER | Facility: CLINIC | Age: 48
Discharge: HOME | End: 2018-12-20
Payer: COMMERCIAL

## 2018-12-20 ENCOUNTER — APPOINTMENT (OUTPATIENT)
Dept: LAB | Facility: CLINIC | Age: 48
End: 2018-12-20
Payer: COMMERCIAL

## 2018-12-20 DIAGNOSIS — B96.81 HELICOBACTER POSITIVE GASTRITIS: ICD-10-CM

## 2018-12-20 DIAGNOSIS — F32.A DEPRESSION, UNSPECIFIED DEPRESSION TYPE: ICD-10-CM

## 2018-12-20 DIAGNOSIS — G47.52 REM SLEEP BEHAVIOR DISORDER: ICD-10-CM

## 2018-12-20 DIAGNOSIS — K29.70 HELICOBACTER POSITIVE GASTRITIS: ICD-10-CM

## 2018-12-20 DIAGNOSIS — R19.5 PASSAGE OF LOOSE STOOLS: ICD-10-CM

## 2018-12-20 PROCEDURE — 87493 C DIFF AMPLIFIED PROBE: CPT

## 2018-12-20 PROCEDURE — 95810 POLYSOM 6/> YRS 4/> PARAM: CPT

## 2018-12-20 PROCEDURE — 95810 POLYSOM 6/> YRS 4/> PARAM: CPT | Performed by: INTERNAL MEDICINE

## 2018-12-20 PROCEDURE — 87338 HPYLORI STOOL AG IA: CPT

## 2018-12-20 RX ORDER — VENLAFAXINE HYDROCHLORIDE 225 MG/1
225 TABLET, EXTENDED RELEASE ORAL
Refills: 0 | Status: CANCELLED | OUTPATIENT
Start: 2018-12-20 | End: 2019-01-19

## 2018-12-20 RX ORDER — VENLAFAXINE HYDROCHLORIDE 75 MG/1
75 CAPSULE, EXTENDED RELEASE ORAL DAILY
Qty: 30 CAPSULE | Refills: 3 | Status: CANCELLED | OUTPATIENT
Start: 2018-12-20 | End: 2019-01-19

## 2018-12-20 RX ORDER — CLONAZEPAM 0.5 MG/1
0.5 TABLET ORAL 3 TIMES DAILY PRN
Qty: 90 TABLET | Refills: 0 | Status: SHIPPED | OUTPATIENT
Start: 2018-12-20 | End: 2019-03-07 | Stop reason: SDUPTHER

## 2018-12-20 RX ORDER — VENLAFAXINE HYDROCHLORIDE 150 MG/1
150 CAPSULE, EXTENDED RELEASE ORAL DAILY
Qty: 30 CAPSULE | Refills: 3 | Status: CANCELLED | OUTPATIENT
Start: 2018-12-20 | End: 2019-01-19

## 2018-12-20 NOTE — TELEPHONE ENCOUNTER
NEEDS  VENLAFAXINE 225MG  [facility]   [phone]        PATIENT DOES NOT WANT THE TWO SEPARATE STRENGTHS OF THE MEDICATION

## 2018-12-21 LAB
C DIFF TOX GENS STL QL NAA+PROBE: NORMAL
H PYLORI AG STL QL IA: NEGATIVE

## 2018-12-21 RX ORDER — VENLAFAXINE HYDROCHLORIDE 75 MG/1
75 CAPSULE, EXTENDED RELEASE ORAL DAILY
Qty: 30 CAPSULE | Refills: 2 | Status: SHIPPED | OUTPATIENT
Start: 2018-12-21 | End: 2019-04-19 | Stop reason: SDUPTHER

## 2018-12-21 RX ORDER — VENLAFAXINE HYDROCHLORIDE 150 MG/1
150 CAPSULE, EXTENDED RELEASE ORAL DAILY
Qty: 30 CAPSULE | Refills: 2 | Status: SHIPPED | OUTPATIENT
Start: 2018-12-21 | End: 2019-04-21 | Stop reason: SDUPTHER

## 2018-12-21 NOTE — PROGRESS NOTES
Sleep Study Documentation    Pre-Sleep Study       Sleep testing procedure explained to patient:YES    Patient napped prior to study:NO    Caffeine:Dayshift worker after 12PM   Caffeine use:NO    Alcohol:Dayshift workers after 5PM: Alcohol use:NO    Typical day for patient:YES       Study Documentation  Diagnostic   Snore: Moderate  Supplemental O2: no    O2 flow rate (L/min) range N/A   O2 flow rate (L/min) final N/A  Minimum SaO2 93%  Baseline SaO2 99%     and Extended Montage    Mode of Therapy:N/A    EKG abnormalities: no     EEG abnormalities: no    Study Terminated:no    Patient classification: unemployed       Post-Sleep Study    Medication used at bedtime or during sleep study:YES other prescription medications    Patient reports time it took to fall asleep:less than 20 minutes    Patient reports waking up during study:3 or more times  Patient reports returning to sleep in greater than 30 minutes  Patient reports sleeping 2 to 4 hours without dreaming  Patient reports sleep during study:better than usual    Patient rated sleepiness: Very sleepy or tired    PAP treatment:no

## 2018-12-26 ENCOUNTER — TELEPHONE (OUTPATIENT)
Dept: NEUROLOGY | Facility: CLINIC | Age: 48
End: 2018-12-26

## 2018-12-26 NOTE — TELEPHONE ENCOUNTER
It look like exam has been finished but report is not yet available   Please let clinical team know once results are available and reviewed

## 2018-12-27 ENCOUNTER — TELEPHONE (OUTPATIENT)
Dept: GASTROENTEROLOGY | Facility: CLINIC | Age: 48
End: 2018-12-27

## 2018-12-27 NOTE — TELEPHONE ENCOUNTER
Discussed with patient sleep study results, offered her to see an ENT surgeon for upper airway resistance syndrome, she has an ENT surgeon she will follow-up with him,

## 2018-12-27 NOTE — TELEPHONE ENCOUNTER
----- Message from Benita Scheuermann, [address] sent at 12/26/2018  6:23 PM EST -----  Please let pt know her stool is negative for h-pylori   Good news

## 2018-12-28 ENCOUNTER — TELEPHONE (OUTPATIENT)
Dept: SLEEP CENTER | Facility: CLINIC | Age: 48
End: 2018-12-28

## 2018-12-28 ENCOUNTER — TELEPHONE (OUTPATIENT)
Dept: PULMONOLOGY | Facility: CLINIC | Age: 48
End: 2018-12-28

## 2018-12-28 NOTE — TELEPHONE ENCOUNTER
Left message for the  patient that sleep study resulted   NP consult for f/u with Dr Gina Leroy needed

## 2018-12-28 NOTE — TELEPHONE ENCOUNTER
I spoke with pt, advised that she does not have MIRANDA  Pt wishing to schedule appt with Dr Lynn Davis in the [address] [facility] office  Will call to schedule, phone # provided

## 2018-12-28 NOTE — TELEPHONE ENCOUNTER
----- Message from Oumou Sanders RN sent at 12/28/2018 11:52 AM EST -----  Pt will be calling to schedule CON with Dr Mary Nair to discuss sleep study    Thanks Michael Armstrong

## 2019-01-02 DIAGNOSIS — J45.909 UNCOMPLICATED ASTHMA, UNSPECIFIED ASTHMA SEVERITY, UNSPECIFIED WHETHER PERSISTENT: ICD-10-CM

## 2019-01-03 RX ORDER — ALBUTEROL SULFATE 90 UG/1
2 AEROSOL, METERED RESPIRATORY (INHALATION) EVERY 4 HOURS PRN
Qty: 1 INHALER | Refills: 2 | Status: SHIPPED | OUTPATIENT
Start: 2019-01-03 | End: 2021-02-19 | Stop reason: SDUPTHER

## 2019-01-09 DIAGNOSIS — E78.5 HYPERLIPIDEMIA, UNSPECIFIED HYPERLIPIDEMIA TYPE: Primary | ICD-10-CM

## 2019-01-09 RX ORDER — ATORVASTATIN CALCIUM 10 MG/1
10 TABLET, FILM COATED ORAL DAILY
Qty: 90 TABLET | Refills: 1 | Status: SHIPPED | OUTPATIENT
Start: 2019-01-09 | End: 2019-06-29 | Stop reason: SDUPTHER

## 2019-01-16 ENCOUNTER — PROCEDURE VISIT (OUTPATIENT)
Dept: NEUROLOGY | Facility: CLINIC | Age: 49
End: 2019-01-16
Payer: COMMERCIAL

## 2019-01-16 DIAGNOSIS — R20.2 NUMBNESS AND TINGLING: ICD-10-CM

## 2019-01-16 DIAGNOSIS — R20.0 NUMBNESS AND TINGLING: ICD-10-CM

## 2019-01-16 PROCEDURE — 95886 MUSC TEST DONE W/N TEST COMP: CPT | Performed by: PHYSICAL MEDICINE & REHABILITATION

## 2019-01-16 PROCEDURE — 95910 NRV CNDJ TEST 7-8 STUDIES: CPT | Performed by: PHYSICAL MEDICINE & REHABILITATION

## 2019-01-16 NOTE — PROGRESS NOTES
The procedure was discussed with the patient  Verbal consent was obtained after discussing risks and benefits  A sterile concentric needle electrode was used  The patient tolerated the procedure well  There were no complications  EMG REPORT    Test: Bilateral Upper Extremities     Performing Dr: JACOBY Perez  The left and right median and ulnar motor conduction velocities and compound muscle action potentials were normal with normal distal latencies across the wrists  The left and right median and ulnar sensory conduction velocity and sensory action potentials were also normal with normal distal latencies across the wrists  The left and right median and ulnar F wave latencies were within normal limits  Concentric needle EMG of the upper extremities bilaterally and cervical paraspinal muscles revealed no spontaneous activities  Early recruited motor units appear normal   Recruitment patterns were full or full for effort  INTERPRETATION: This is a normal ENMG of the bilateral upper extremities        JACOBY Perez

## 2019-01-21 ENCOUNTER — TELEPHONE (OUTPATIENT)
Dept: NEUROLOGY | Facility: CLINIC | Age: 49
End: 2019-01-21

## 2019-01-24 ENCOUNTER — HOSPITAL ENCOUNTER (OUTPATIENT)
Dept: NEUROLOGY | Facility: HOSPITAL | Age: 49
Discharge: HOME | End: 2019-01-24
Attending: PSYCHIATRY & NEUROLOGY
Payer: COMMERCIAL

## 2019-01-24 DIAGNOSIS — R25.1 SPELLS OF TREMBLING: ICD-10-CM

## 2019-01-24 PROCEDURE — 95816 EEG AWAKE AND DROWSY: CPT

## 2019-01-24 PROCEDURE — 95819 EEG AWAKE AND ASLEEP: CPT | Performed by: PSYCHIATRY & NEUROLOGY

## 2019-01-28 ENCOUNTER — TELEPHONE (OUTPATIENT)
Dept: NEUROLOGY | Facility: CLINIC | Age: 49
End: 2019-01-28

## 2019-01-28 NOTE — TELEPHONE ENCOUNTER
Pt called for EMG results, states she saw they are available in My Chart but she does not know how to interpret these

## 2019-02-04 ENCOUNTER — TELEPHONE (OUTPATIENT)
Dept: NEUROLOGY | Facility: CLINIC | Age: 49
End: 2019-02-04

## 2019-02-04 NOTE — TELEPHONE ENCOUNTER
Patient states she saw on her MyChart results that the EEG was abnormal, but was told it was normal  She is requesting clarification  She thinks the doctor gave her the EMG results (which were normal) by accident  She is looking for more detailed explanation of her abnormal EEG results      [phone]    Okay to leave a detailed message

## 2019-02-13 ENCOUNTER — TELEPHONE (OUTPATIENT)
Dept: INTERNAL MEDICINE CLINIC | Facility: CLINIC | Age: 49
End: 2019-02-13

## 2019-02-13 NOTE — TELEPHONE ENCOUNTER
Requested medical records   Jeevan Gorman Records printed  Daniel Lynne faxed & confirmed rec'ed  Awaiting payment
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
## 2019-02-27 ENCOUNTER — CONSULT (OUTPATIENT)
Dept: PULMONOLOGY | Facility: CLINIC | Age: 49
End: 2019-02-27
Payer: COMMERCIAL

## 2019-02-27 VITALS
SYSTOLIC BLOOD PRESSURE: 130 MMHG | HEART RATE: 96 BPM | BODY MASS INDEX: 30.12 KG/M2 | WEIGHT: 170 LBS | OXYGEN SATURATION: 98 % | HEIGHT: 63 IN | DIASTOLIC BLOOD PRESSURE: 80 MMHG

## 2019-02-27 DIAGNOSIS — G47.8 SLEEP PARALYSIS: ICD-10-CM

## 2019-02-27 DIAGNOSIS — G47.00 INSOMNIA, UNSPECIFIED TYPE: Primary | ICD-10-CM

## 2019-02-27 DIAGNOSIS — Z86.59 HISTORY OF POSTTRAUMATIC STRESS DISORDER (PTSD): ICD-10-CM

## 2019-02-27 DIAGNOSIS — E66.9 OBESITY (BMI 30-39.9): ICD-10-CM

## 2019-02-27 DIAGNOSIS — G47.8 UARS (UPPER AIRWAY RESISTANCE SYNDROME): ICD-10-CM

## 2019-02-27 DIAGNOSIS — G47.26 SHIFT WORK SLEEP DISORDER: ICD-10-CM

## 2019-02-27 DIAGNOSIS — G47.52 REM SLEEP BEHAVIOR DISORDER: ICD-10-CM

## 2019-02-27 PROCEDURE — 99204 OFFICE O/P NEW MOD 45 MIN: CPT | Performed by: INTERNAL MEDICINE

## 2019-02-27 NOTE — PROGRESS NOTES
Assessment/Plan:     Diagnoses and all orders for this visit:    Insomnia, unspecified type  -     Ambulatory referral to Psychiatry; Future    UARS (upper airway resistance syndrome)    REM sleep behavior disorder    Sleep paralysis    History of posttraumatic stress disorder (PTSD)    Obesity (BMI 30-39  9)    Shift work sleep disorder        Insomnia mainly sleep onset insomnia, likely secondary to her generalized anxiety, and depression  She does take clonazepam on a daily basis which she will continue to take  Also her sleep pattern likely contributed by the shift work disorder  Her recent sleep study Demonstrated only a few minutes of the REM stage, no behavioral disorder noted during the study time  Also with history of for sleep paralysis for several years, happens once in 2-3 weeks she states, likely precipitated by lack of sufficient sleep,  I have asked her to document a sleep diary, Given to the patient, for 4 weeks  Discussed the sleep study results in detail with no evidence of any obstructive sleep apnea there was evidence of moderate snoring associated with arousals consistent with upper airway resistance for which she will work on cutting down her weight, and I have asked her to use over-the-counter mouthguard appliances for the snoring  Also consult has been placed for psychiatric for cognitive behavioral therapy for the insomnia given her significant history of PTSD along with anxiety depression  She will follow-up with the neurologist for likely Nocturnal seizures and I will see her back in 4 weeks  When necessary earlier as needed  Cautioned against driving when sleepy  Recommend weight loss  Return in about 1 month (around 3/27/2019)  All questions are answered to the patient's satisfaction and understanding  She verbalizes understanding  She is encouraged to call with any further questions or concerns  Portions of the record may have been created with voice recognition software  Occasional wrong word or "sound a like" substitutions may have occurred due to the inherent limitations of voice recognition software  Read the chart carefully and recognize, using context, where substitutions have occurred  a    Electronically Signed by Sugey Watts MD    ______________________________________________________________________    Chief Complaint: No chief complaint on file  Patient ID: Dayanara Bah is a 53 y o  y o  female has a past medical history of Anxiety, Asthma, Depression, Disease of thyroid gland, Dizziness, Forgetfulness, Hashimoto's disease, History of fainting as a child, Hyperlipidemia, Hypertension, Numbness and tingling, and MIRANDA (obstructive sleep apnea)  2/27/2019  Patient presents today for initial visit  Maribel Westfall is a 53 y o  female referred by Dr Juan Carlos Kyle for a sleep evaluation  She has a PMH of PTSD since 9/11, asthma since 9/11, hashimoto's thyroiditis with thyroid nodules  She complains of sleep onset insomnia some nights and inability to fall back asleep once waking due to her mind racing  She also notes periods of sleep paralysis, has nightmares and talks in her sleep  She has night time awakenings due to her nightmares as well as pain in her hands  For 20 yrs went to sleep at 2:30 AM as she worked second shift, for the last 2 years has been going to sleep earlier  She takes Clonazepam anxiety which also helps her sleep, she has been taking this since 2016  She is also following with Dr Juan Carlos Kyle, has been referred to an epilepsy specialist whom she is seeing tomorrow  She does also follow with ENT for chronic sinus issues and migraines,       Occupational/Exposure history:  Pets/Enviroment:  Travel history:  Review of Systems   Constitutional: Positive for fatigue  Negative for appetite change, chills, diaphoresis, fever and unexpected weight change  HENT: Positive for postnasal drip   Negative for congestion, ear discharge, ear pain, nosebleeds, rhinorrhea, sinus pain, sore throat and voice change  Eyes: Negative for pain, discharge and visual disturbance  Respiratory: Positive for shortness of breath  Negative for apnea, cough, choking, chest tightness, wheezing and stridor  Snoring, no witnessed apneic spells   Cardiovascular: Negative  Negative for chest pain, palpitations and leg swelling  Gastrointestinal: Negative  Negative for abdominal pain, blood in stool, constipation, diarrhea and vomiting  Endocrine: Negative for cold intolerance, heat intolerance, polydipsia, polyphagia and polyuria  Genitourinary: Negative for difficulty urinating and dysuria  Musculoskeletal: Negative for arthralgias and neck pain  Skin: Negative for pallor and rash  Allergic/Immunologic: Negative for environmental allergies and food allergies  Neurological: Negative for dizziness, speech difficulty, weakness and light-headedness  Hematological: Negative  Negative for adenopathy  Does not bruise/bleed easily  Psychiatric/Behavioral: Positive for sleep disturbance  Negative for agitation and confusion  The patient is nervous/anxious  Does have sleep onset insomnia and sleep maintenance insomnia,         Social history: She reports that she has never smoked  She has never used smokeless tobacco  She reports that she drinks about 0 6 oz of alcohol per week  She reports that she does not use drugs  Past surgical history:   Past Surgical History:   Procedure Laterality Date    BREAST SURGERY Bilateral 2002    reduction    HYSTERECTOMY      MD COLONOSCOPY FLX DX W/COLLJ SPEC WHEN PFRMD N/A 10/16/2018    Procedure: EGD AND COLONOSCOPY;  Surgeon: Jn Mitchell MD;  Location: [facility];   Service: Gastroenterology    SINUS SURGERY       Family history:   Family History   Problem Relation Age of Onset    Hyperlipidemia Mother    [facility] Lupus Mother     Depression Father     Cervical cancer Paternal Grandmother     No Known Problems Half-Sister  No Known Problems Half-Brother     No Known Problems Half-Brother     No Known Problems Half-Sister     No Known Problems Half-Sister     Breast cancer Neg Hx     Colon cancer Neg Hx     Ovarian cancer Neg Hx     Uterine cancer Neg Hx        Immunization History   Administered Date(s) Administered    Influenza TIV (IM) 1971    Pneumococcal Polysaccharide PPV23 07/02/1995    Tdap 1971    Zoster 1971     Current Outpatient Medications   Medication Sig Dispense Refill    albuterol (PROVENTIL HFA,VENTOLIN HFA) 90 mcg/act inhaler Inhale 2 puffs every 4 (four) hours as needed for wheezing 1 Inhaler 2    atorvastatin (LIPITOR) 10 mg tablet Take 1 tablet (10 mg total) by mouth daily 90 tablet 1    clonazePAM (KlonoPIN) 0 5 mg tablet TAKE 1 TABLET (0 5 MG TOTAL) BY MOUTH 3 (THREE) TIMES A DAY AS NEEDED (AS NEEDED) 90 tablet 0    fluticasone (FLONASE) 50 mcg/act nasal spray 2 sprays into each nostril daily 16 g 0    levothyroxine 50 mcg tablet Take 50mcg daily (Patient taking differently: 75 mcg Take 50mcg daily ) 30 tablet 2    rizatriptan (MAXALT-MLT) 10 MG disintegrating tablet Take 1 tablet (10 mg total) by mouth once as needed for migraine for up to 1 dose May repeat in 2 hours if needed 9 tablet 0    venlafaxine (EFFEXOR-XR) 150 mg 24 hr capsule Take 1 capsule (150 mg total) by mouth daily 30 capsule 2    venlafaxine (EFFEXOR-XR) 75 mg 24 hr capsule Take 1 capsule (75 mg total) by mouth daily 30 capsule 2    omeprazole (PriLOSEC) 20 mg delayed release capsule Take 1 capsule (20 mg total) by mouth 2 (two) times a day with meals for 14 days (Patient not taking: Reported on 2/27/2019) 28 capsule 0     No current facility-administered medications for this visit  Allergies: Patient has no known allergies  Objective:  Vitals:    02/27/19 1325   BP: 130/80   Pulse: 96   SpO2: 98%   Weight: 77 1 kg (170 lb)   Height: 5' 2 5" (1 588 m)   Oxygen Therapy  SpO2: 98 %      Wt Readings from Last 3 Encounters:   02/27/19 77 1 kg (170 lb)   12/14/18 75 8 kg (167 lb)   12/14/18 75 8 kg (167 lb)     Body mass index is 30 6 kg/m²  Physical Exam   Constitutional: She is oriented to person, place, and time  She appears well-developed and well-nourished  HENT:   Head: Normocephalic and atraumatic  Crowded oropharyngeal airways, Mallampati score of 3   Eyes: Pupils are equal, round, and reactive to light  Conjunctivae are normal    Neck: Normal range of motion  Neck supple  No JVD present  No thyromegaly present  Cardiovascular: Normal rate, regular rhythm and normal heart sounds  Exam reveals no gallop and no friction rub  No murmur heard  Pulmonary/Chest: Effort normal and breath sounds normal  No respiratory distress  She has no wheezes  She has no rales  She exhibits no tenderness  Abdominal: Soft  Bowel sounds are normal    Musculoskeletal: Normal range of motion  She exhibits no edema, tenderness or deformity  Lymphadenopathy:     She has no cervical adenopathy  Neurological: She is alert and oriented to person, place, and time  Skin: Skin is warm and dry  Psychiatric: She has a normal mood and affect  Nursing note and vitals reviewed         ESS: Total score: 10

## 2019-02-28 ENCOUNTER — OFFICE VISIT (OUTPATIENT)
Dept: NEUROLOGY | Facility: CLINIC | Age: 49
End: 2019-02-28
Payer: COMMERCIAL

## 2019-02-28 VITALS
HEART RATE: 87 BPM | BODY MASS INDEX: 30.12 KG/M2 | SYSTOLIC BLOOD PRESSURE: 132 MMHG | HEIGHT: 63 IN | DIASTOLIC BLOOD PRESSURE: 90 MMHG | WEIGHT: 170 LBS

## 2019-02-28 DIAGNOSIS — G47.52 REM SLEEP BEHAVIOR DISORDER: ICD-10-CM

## 2019-02-28 DIAGNOSIS — F41.9 ANXIETY: Primary | ICD-10-CM

## 2019-02-28 DIAGNOSIS — R25.1 SPELLS OF TREMBLING: ICD-10-CM

## 2019-02-28 DIAGNOSIS — F43.10 POST TRAUMATIC STRESS DISORDER: ICD-10-CM

## 2019-02-28 PROCEDURE — 99215 OFFICE O/P EST HI 40 MIN: CPT | Performed by: PSYCHIATRY & NEUROLOGY

## 2019-02-28 NOTE — PATIENT INSTRUCTIONS
-- Please get an ambulatory EEG and an MRI before your next appointment  --Continue to work with your Psychiatrist and Psychologist to improve your PTSD and anxiety

## 2019-03-03 PROBLEM — F43.10 POST TRAUMATIC STRESS DISORDER: Status: RESOLVED | Noted: 2019-02-28 | Resolved: 2019-03-03

## 2019-03-03 NOTE — ASSESSMENT & PLAN NOTE
She is having occasional episodes of nocturnal trembling, which are often associated with a nightmare  Although it is possible these episodes may represent epileptic seizures, I am concerned that they are non-epileptic events associated with her nightmares and PTSD  That being said, she does have some difficulty with expressive language following her events and some trouble with repetition on examination today, which would be concerning for focal neurologic dysfunction  -- I will arrange for her to get an MRI of her brain in order to assess for focal neurologic dysfunction, especially given her evidence of some mild language dysfunction  --I will have her get an ambulatory EEG to assess for any epileptiform discharges and look for evidence of seizures while she is sleeping  Currently, her spells are not frequent enough to be captured in the EMU, but if they would become more frequent, she may benefit from being monitored on continuous video EEG

## 2019-03-03 NOTE — ASSESSMENT & PLAN NOTE
She does have ongoing issues with PTSD  She also has difficulty with anxiety, frequently avoiding crowds and having panic attacks when hearing sirens   She will need to conitnue to follow with her psychiatrist

## 2019-03-07 DIAGNOSIS — G47.00 INSOMNIA, UNSPECIFIED TYPE: ICD-10-CM

## 2019-03-07 RX ORDER — CLONAZEPAM 0.5 MG/1
0.5 TABLET ORAL 2 TIMES DAILY PRN
Qty: 60 TABLET | Refills: 0 | Status: SHIPPED | OUTPATIENT
Start: 2019-03-07 | End: 2019-04-11 | Stop reason: SDUPTHER

## 2019-03-12 ENCOUNTER — PATIENT MESSAGE (OUTPATIENT)
Dept: NEUROLOGY | Facility: CLINIC | Age: 49
End: 2019-03-12

## 2019-03-13 ENCOUNTER — TELEPHONE (OUTPATIENT)
Dept: NEUROLOGY | Facility: CLINIC | Age: 49
End: 2019-03-13

## 2019-03-13 ENCOUNTER — HOSPITAL ENCOUNTER (OUTPATIENT)
Dept: NEUROLOGY | Facility: AMBULATORY SURGERY CENTER | Age: 49
Discharge: HOME | End: 2019-03-13

## 2019-03-13 DIAGNOSIS — G47.52 REM SLEEP BEHAVIOR DISORDER: ICD-10-CM

## 2019-03-13 DIAGNOSIS — F43.10 POST TRAUMATIC STRESS DISORDER: ICD-10-CM

## 2019-03-13 DIAGNOSIS — R25.1 SPELLS OF TREMBLING: ICD-10-CM

## 2019-03-13 NOTE — TELEPHONE ENCOUNTER
Regarding: Referral Request  Contact: [phone]  ----- Message from [address] [place] [address] [address], Generic sent at 3/12/2019  8:09 PM EDT -----    Hello, my insurance informed me that the MRI was denied that you will need to call peer to peer in order for it to be approve

## 2019-03-13 NOTE — TELEPHONE ENCOUNTER
From: Leif Chandler  To: Lorraine Montanez MD  Sent: 3/12/2019 8:09 PM EDT  Subject: Referral Request    Hello, my insurance informed me that the MRI was denied that you will need to call peer to peer in order for it to be approve

## 2019-03-14 ENCOUNTER — HOSPITAL ENCOUNTER (OUTPATIENT)
Dept: NEUROLOGY | Facility: AMBULATORY SURGERY CENTER | Age: 49
Discharge: HOME | End: 2019-03-14
Payer: COMMERCIAL

## 2019-03-14 DIAGNOSIS — F43.10 POST TRAUMATIC STRESS DISORDER: ICD-10-CM

## 2019-03-14 DIAGNOSIS — G47.52 REM SLEEP BEHAVIOR DISORDER: ICD-10-CM

## 2019-03-14 DIAGNOSIS — R25.1 SPELLS OF TREMBLING: ICD-10-CM

## 2019-03-14 PROCEDURE — 95953 HB EEG MONITORING/COMPUTER: CPT

## 2019-03-14 PROCEDURE — 95953 PR EEG MONITORING/COMPUTER, EA 24 HOURS, UNATTENDED: CPT | Performed by: PSYCHIATRY & NEUROLOGY

## 2019-03-19 ENCOUNTER — HOSPITAL ENCOUNTER (OUTPATIENT)
Dept: MRI IMAGING | Facility: HOSPITAL | Age: 49
Discharge: HOME | End: 2019-03-19
Attending: PSYCHIATRY & NEUROLOGY
Payer: COMMERCIAL

## 2019-03-19 DIAGNOSIS — R25.1 SPELLS OF TREMBLING: ICD-10-CM

## 2019-03-19 PROCEDURE — 70553 MRI BRAIN STEM W/O & W/DYE: CPT

## 2019-03-19 PROCEDURE — A9585 GADOBUTROL INJECTION: HCPCS | Performed by: PSYCHIATRY & NEUROLOGY

## 2019-03-19 RX ADMIN — GADOBUTROL 7 ML: 604.72 INJECTION INTRAVENOUS at 14:18

## 2019-03-20 ENCOUNTER — TELEPHONE (OUTPATIENT)
Dept: PULMONOLOGY | Facility: CLINIC | Age: 49
End: 2019-03-20

## 2019-03-21 ENCOUNTER — OFFICE VISIT (OUTPATIENT)
Dept: INTERNAL MEDICINE CLINIC | Facility: CLINIC | Age: 49
End: 2019-03-21
Payer: COMMERCIAL

## 2019-03-21 VITALS
DIASTOLIC BLOOD PRESSURE: 70 MMHG | RESPIRATION RATE: 16 BRPM | HEART RATE: 88 BPM | WEIGHT: 168.8 LBS | SYSTOLIC BLOOD PRESSURE: 108 MMHG | HEIGHT: 63 IN | BODY MASS INDEX: 29.91 KG/M2

## 2019-03-21 DIAGNOSIS — F43.10 POST TRAUMATIC STRESS DISORDER: ICD-10-CM

## 2019-03-21 DIAGNOSIS — G47.52 REM SLEEP BEHAVIOR DISORDER: ICD-10-CM

## 2019-03-21 DIAGNOSIS — G47.09 OTHER INSOMNIA: ICD-10-CM

## 2019-03-21 DIAGNOSIS — F43.10 POST-TRAUMA SYNDROME: Primary | ICD-10-CM

## 2019-03-21 DIAGNOSIS — E03.9 HYPOTHYROIDISM, UNSPECIFIED TYPE: ICD-10-CM

## 2019-03-21 DIAGNOSIS — R21 RASH: ICD-10-CM

## 2019-03-21 DIAGNOSIS — R73.01 IMPAIRED FASTING GLUCOSE: ICD-10-CM

## 2019-03-21 DIAGNOSIS — E78.5 HYPERLIPIDEMIA, UNSPECIFIED HYPERLIPIDEMIA TYPE: ICD-10-CM

## 2019-03-21 DIAGNOSIS — E06.3 HASHIMOTO'S DISEASE: ICD-10-CM

## 2019-03-21 DIAGNOSIS — E55.9 VITAMIN D DEFICIENCY: ICD-10-CM

## 2019-03-21 PROCEDURE — 3008F BODY MASS INDEX DOCD: CPT | Performed by: NURSE PRACTITIONER

## 2019-03-21 PROCEDURE — 1036F TOBACCO NON-USER: CPT | Performed by: NURSE PRACTITIONER

## 2019-03-21 PROCEDURE — 99214 OFFICE O/P EST MOD 30 MIN: CPT | Performed by: NURSE PRACTITIONER

## 2019-03-21 RX ORDER — LEVOTHYROXINE SODIUM 0.07 MG/1
75 TABLET ORAL DAILY
Start: 2019-03-21 | End: 2022-08-03 | Stop reason: SDUPTHER
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
## 2019-03-21 NOTE — ASSESSMENT & PLAN NOTE
I think she needs adjustment of medication  She had a bad experience w/ psych [facility]- names of other local psych given   Continue effexor for now- I also feel that she is manifesting some OCD manifestations such as skin picking

## 2019-04-08 DIAGNOSIS — E03.9 HYPOTHYROIDISM, UNSPECIFIED TYPE: ICD-10-CM

## 2019-04-08 DIAGNOSIS — G47.00 INSOMNIA, UNSPECIFIED TYPE: ICD-10-CM

## 2019-04-11 DIAGNOSIS — F43.10 POST-TRAUMA SYNDROME: Primary | ICD-10-CM

## 2019-04-11 DIAGNOSIS — G47.00 INSOMNIA, UNSPECIFIED TYPE: ICD-10-CM

## 2019-04-11 RX ORDER — LEVOTHYROXINE SODIUM 0.03 MG/1
TABLET ORAL
Qty: 90 TABLET | Refills: 2 | OUTPATIENT
Start: 2019-04-11

## 2019-04-11 RX ORDER — CLONAZEPAM 0.5 MG/1
0.5 TABLET ORAL 2 TIMES DAILY PRN
Qty: 90 TABLET | Refills: 0 | Status: SHIPPED | OUTPATIENT
Start: 2019-04-11 | End: 2019-06-02 | Stop reason: SDUPTHER

## 2019-04-11 RX ORDER — CLONAZEPAM 0.5 MG/1
0.5 TABLET ORAL 2 TIMES DAILY PRN
Qty: 60 TABLET | Refills: 0 | OUTPATIENT
Start: 2019-04-11

## 2019-04-12 ENCOUNTER — TELEPHONE (OUTPATIENT)
Dept: INTERNAL MEDICINE CLINIC | Facility: CLINIC | Age: 49
End: 2019-04-12

## 2019-04-13 ENCOUNTER — TELEPHONE (OUTPATIENT)
Dept: INTERNAL MEDICINE CLINIC | Facility: CLINIC | Age: 49
End: 2019-04-13

## 2019-04-19 DIAGNOSIS — F32.A DEPRESSION, UNSPECIFIED DEPRESSION TYPE: ICD-10-CM

## 2019-04-19 RX ORDER — VENLAFAXINE HYDROCHLORIDE 75 MG/1
CAPSULE, EXTENDED RELEASE ORAL
Qty: 30 CAPSULE | Refills: 2 | Status: SHIPPED | OUTPATIENT
Start: 2019-04-19 | End: 2019-07-17 | Stop reason: SDUPTHER

## 2019-04-21 DIAGNOSIS — F32.A DEPRESSION, UNSPECIFIED DEPRESSION TYPE: ICD-10-CM

## 2019-04-22 RX ORDER — VENLAFAXINE HYDROCHLORIDE 150 MG/1
CAPSULE, EXTENDED RELEASE ORAL
Qty: 30 CAPSULE | Refills: 2 | Status: SHIPPED | OUTPATIENT
Start: 2019-04-22 | End: 2019-07-18 | Stop reason: SDUPTHER

## 2019-04-23 ENCOUNTER — HOSPITAL ENCOUNTER (EMERGENCY)
Facility: HOSPITAL | Age: 49
Discharge: HOME | End: 2019-04-23
Attending: EMERGENCY MEDICINE
Payer: COMMERCIAL

## 2019-04-23 ENCOUNTER — APPOINTMENT (EMERGENCY)
Dept: RADIOLOGY | Facility: HOSPITAL | Age: 49
End: 2019-04-23
Payer: COMMERCIAL

## 2019-04-23 VITALS
OXYGEN SATURATION: 100 % | TEMPERATURE: 98.1 F | RESPIRATION RATE: 16 BRPM | SYSTOLIC BLOOD PRESSURE: 152 MMHG | DIASTOLIC BLOOD PRESSURE: 86 MMHG | BODY MASS INDEX: 29.92 KG/M2 | HEART RATE: 79 BPM | HEIGHT: 63 IN | WEIGHT: 168.87 LBS

## 2019-04-23 DIAGNOSIS — M25.522 LEFT ELBOW PAIN: ICD-10-CM

## 2019-04-23 DIAGNOSIS — M25.512 ACUTE PAIN OF LEFT SHOULDER: Primary | ICD-10-CM

## 2019-04-23 PROCEDURE — 99283 EMERGENCY DEPT VISIT LOW MDM: CPT | Performed by: EMERGENCY MEDICINE

## 2019-04-23 PROCEDURE — 73080 X-RAY EXAM OF ELBOW: CPT

## 2019-04-23 PROCEDURE — 99283 EMERGENCY DEPT VISIT LOW MDM: CPT

## 2019-04-23 PROCEDURE — 96372 THER/PROPH/DIAG INJ SC/IM: CPT

## 2019-04-23 PROCEDURE — 73030 X-RAY EXAM OF SHOULDER: CPT

## 2019-04-23 RX ORDER — KETOROLAC TROMETHAMINE 30 MG/ML
30 INJECTION, SOLUTION INTRAMUSCULAR; INTRAVENOUS ONCE
Status: COMPLETED | OUTPATIENT
Start: 2019-04-23 | End: 2019-04-23

## 2019-04-23 RX ORDER — NAPROXEN 500 MG/1
500 TABLET ORAL 2 TIMES DAILY WITH MEALS
Qty: 20 TABLET | Refills: 0 | Status: SHIPPED | OUTPATIENT
Start: 2019-04-23 | End: 2020-12-15

## 2019-04-23 RX ADMIN — KETOROLAC TROMETHAMINE 30 MG: 30 INJECTION, SOLUTION INTRAMUSCULAR at 11:39

## 2019-04-26 ENCOUNTER — OFFICE VISIT (OUTPATIENT)
Dept: INTERNAL MEDICINE CLINIC | Facility: CLINIC | Age: 49
End: 2019-04-26
Payer: COMMERCIAL

## 2019-04-26 VITALS
DIASTOLIC BLOOD PRESSURE: 78 MMHG | HEIGHT: 63 IN | WEIGHT: 168.2 LBS | HEART RATE: 72 BPM | RESPIRATION RATE: 16 BRPM | BODY MASS INDEX: 29.8 KG/M2 | SYSTOLIC BLOOD PRESSURE: 124 MMHG

## 2019-04-26 DIAGNOSIS — M77.02 MEDIAL EPICONDYLITIS OF LEFT ELBOW: Primary | ICD-10-CM

## 2019-04-26 PROCEDURE — 3008F BODY MASS INDEX DOCD: CPT | Performed by: NURSE PRACTITIONER

## 2019-04-26 PROCEDURE — 99213 OFFICE O/P EST LOW 20 MIN: CPT | Performed by: NURSE PRACTITIONER

## 2019-04-26 PROCEDURE — 1036F TOBACCO NON-USER: CPT | Performed by: NURSE PRACTITIONER

## 2019-04-30 ENCOUNTER — TELEPHONE (OUTPATIENT)
Dept: INTERNAL MEDICINE CLINIC | Facility: CLINIC | Age: 49
End: 2019-04-30

## 2019-05-13 ENCOUNTER — TELEPHONE (OUTPATIENT)
Dept: OBGYN CLINIC | Age: 49
End: 2019-05-13

## 2019-06-02 DIAGNOSIS — G47.00 INSOMNIA, UNSPECIFIED TYPE: ICD-10-CM

## 2019-06-03 RX ORDER — CLONAZEPAM 0.5 MG/1
0.5 TABLET ORAL 2 TIMES DAILY PRN
Qty: 90 TABLET | Refills: 0 | Status: SHIPPED | OUTPATIENT
Start: 2019-06-03 | End: 2019-08-08 | Stop reason: SDUPTHER

## 2019-06-29 DIAGNOSIS — E78.5 HYPERLIPIDEMIA, UNSPECIFIED HYPERLIPIDEMIA TYPE: ICD-10-CM

## 2019-07-01 RX ORDER — ATORVASTATIN CALCIUM 10 MG/1
TABLET, FILM COATED ORAL
Qty: 90 TABLET | Refills: 1 | Status: SHIPPED | OUTPATIENT
Start: 2019-07-01 | End: 2020-07-11 | Stop reason: SDUPTHER

## 2019-07-17 DIAGNOSIS — F32.A DEPRESSION, UNSPECIFIED DEPRESSION TYPE: ICD-10-CM

## 2019-07-17 RX ORDER — VENLAFAXINE HYDROCHLORIDE 75 MG/1
CAPSULE, EXTENDED RELEASE ORAL
Qty: 90 CAPSULE | Refills: 0 | Status: SHIPPED | OUTPATIENT
Start: 2019-07-17 | End: 2019-10-01 | Stop reason: SDUPTHER

## 2019-07-18 DIAGNOSIS — F32.A DEPRESSION, UNSPECIFIED DEPRESSION TYPE: ICD-10-CM

## 2019-07-18 RX ORDER — VENLAFAXINE HYDROCHLORIDE 150 MG/1
CAPSULE, EXTENDED RELEASE ORAL
Qty: 30 CAPSULE | Refills: 2 | Status: SHIPPED | OUTPATIENT
Start: 2019-07-18 | End: 2019-10-01 | Stop reason: SDUPTHER

## 2019-08-08 DIAGNOSIS — G47.00 INSOMNIA, UNSPECIFIED TYPE: ICD-10-CM

## 2019-08-09 RX ORDER — CLONAZEPAM 0.5 MG/1
0.5 TABLET ORAL 2 TIMES DAILY PRN
Qty: 90 TABLET | Refills: 0 | Status: SHIPPED | OUTPATIENT
Start: 2019-08-09 | End: 2019-10-01 | Stop reason: SDUPTHER

## 2019-09-03 DIAGNOSIS — G47.00 INSOMNIA, UNSPECIFIED TYPE: ICD-10-CM

## 2019-09-03 RX ORDER — RIZATRIPTAN BENZOATE 10 MG/1
10 TABLET, ORALLY DISINTEGRATING ORAL ONCE AS NEEDED
Qty: 9 TABLET | Refills: 0 | Status: SHIPPED | OUTPATIENT
Start: 2019-09-03 | End: 2020-06-08 | Stop reason: SDUPTHER

## 2019-09-28 ENCOUNTER — APPOINTMENT (OUTPATIENT)
Dept: LAB | Facility: CLINIC | Age: 49
End: 2019-09-28
Payer: COMMERCIAL

## 2019-09-28 DIAGNOSIS — E03.9 HYPOTHYROIDISM, UNSPECIFIED TYPE: ICD-10-CM

## 2019-09-28 DIAGNOSIS — R73.01 IMPAIRED FASTING GLUCOSE: ICD-10-CM

## 2019-09-28 DIAGNOSIS — E55.9 VITAMIN D DEFICIENCY: ICD-10-CM

## 2019-09-28 DIAGNOSIS — E78.5 HYPERLIPIDEMIA, UNSPECIFIED HYPERLIPIDEMIA TYPE: ICD-10-CM

## 2019-09-28 LAB
25(OH)D3 SERPL-MCNC: 18.4 NG/ML (ref 30–100)
ALBUMIN SERPL BCP-MCNC: 4.1 G/DL (ref 3.5–5)
ALP SERPL-CCNC: 76 U/L (ref 46–116)
ALT SERPL W P-5'-P-CCNC: 16 U/L (ref 12–78)
ANION GAP SERPL CALCULATED.3IONS-SCNC: 7 MMOL/L (ref 4–13)
AST SERPL W P-5'-P-CCNC: 15 U/L (ref 5–45)
BASOPHILS # BLD AUTO: 0.01 THOUSANDS/ΜL (ref 0–0.1)
BASOPHILS NFR BLD AUTO: 0 % (ref 0–1)
BILIRUB SERPL-MCNC: 0.55 MG/DL (ref 0.2–1)
BUN SERPL-MCNC: 11 MG/DL (ref 5–25)
CALCIUM SERPL-MCNC: 9.2 MG/DL (ref 8.3–10.1)
CHLORIDE SERPL-SCNC: 104 MMOL/L (ref 100–108)
CHOLEST SERPL-MCNC: 271 MG/DL (ref 50–200)
CO2 SERPL-SCNC: 28 MMOL/L (ref 21–32)
CREAT SERPL-MCNC: 0.87 MG/DL (ref 0.6–1.3)
EOSINOPHIL # BLD AUTO: 0.17 THOUSAND/ΜL (ref 0–0.61)
EOSINOPHIL NFR BLD AUTO: 3 % (ref 0–6)
ERYTHROCYTE [DISTWIDTH] IN BLOOD BY AUTOMATED COUNT: 13 % (ref 11.6–15.1)
EST. AVERAGE GLUCOSE BLD GHB EST-MCNC: 123 MG/DL
GFR SERPL CREATININE-BSD FRML MDRD: 80 ML/MIN/1.73SQ M
GLUCOSE P FAST SERPL-MCNC: 101 MG/DL (ref 65–99)
HBA1C MFR BLD: 5.9 % (ref 4.2–6.3)
HCT VFR BLD AUTO: 39.2 % (ref 34.8–46.1)
HDLC SERPL-MCNC: 61 MG/DL (ref 40–60)
HGB BLD-MCNC: 12.6 G/DL (ref 11.5–15.4)
IMM GRANULOCYTES # BLD AUTO: 0.02 THOUSAND/UL (ref 0–0.2)
IMM GRANULOCYTES NFR BLD AUTO: 0 % (ref 0–2)
LDLC SERPL CALC-MCNC: 175 MG/DL (ref 0–100)
LYMPHOCYTES # BLD AUTO: 1.54 THOUSANDS/ΜL (ref 0.6–4.47)
LYMPHOCYTES NFR BLD AUTO: 25 % (ref 14–44)
MCH RBC QN AUTO: 30.3 PG (ref 26.8–34.3)
MCHC RBC AUTO-ENTMCNC: 32.1 G/DL (ref 31.4–37.4)
MCV RBC AUTO: 94 FL (ref 82–98)
MONOCYTES # BLD AUTO: 0.38 THOUSAND/ΜL (ref 0.17–1.22)
MONOCYTES NFR BLD AUTO: 6 % (ref 4–12)
NEUTROPHILS # BLD AUTO: 4 THOUSANDS/ΜL (ref 1.85–7.62)
NEUTS SEG NFR BLD AUTO: 66 % (ref 43–75)
NONHDLC SERPL-MCNC: 210 MG/DL
NRBC BLD AUTO-RTO: 0 /100 WBCS
PLATELET # BLD AUTO: 343 THOUSANDS/UL (ref 149–390)
PMV BLD AUTO: 10.2 FL (ref 8.9–12.7)
POTASSIUM SERPL-SCNC: 4 MMOL/L (ref 3.5–5.3)
PROT SERPL-MCNC: 7.8 G/DL (ref 6.4–8.2)
RBC # BLD AUTO: 4.16 MILLION/UL (ref 3.81–5.12)
SODIUM SERPL-SCNC: 139 MMOL/L (ref 136–145)
TRIGL SERPL-MCNC: 174 MG/DL
TSH SERPL DL<=0.05 MIU/L-ACNC: 1.27 UIU/ML (ref 0.36–3.74)
WBC # BLD AUTO: 6.12 THOUSAND/UL (ref 4.31–10.16)

## 2019-09-28 PROCEDURE — 83036 HEMOGLOBIN GLYCOSYLATED A1C: CPT

## 2019-09-28 PROCEDURE — 82306 VITAMIN D 25 HYDROXY: CPT

## 2019-09-28 PROCEDURE — 80061 LIPID PANEL: CPT

## 2019-09-28 PROCEDURE — 36415 COLL VENOUS BLD VENIPUNCTURE: CPT

## 2019-09-28 PROCEDURE — 84443 ASSAY THYROID STIM HORMONE: CPT

## 2019-09-28 PROCEDURE — 85025 COMPLETE CBC W/AUTO DIFF WBC: CPT

## 2019-09-28 PROCEDURE — 80053 COMPREHEN METABOLIC PANEL: CPT

## 2019-10-01 ENCOUNTER — TELEPHONE (OUTPATIENT)
Dept: INTERNAL MEDICINE CLINIC | Facility: CLINIC | Age: 49
End: 2019-10-01

## 2019-10-01 ENCOUNTER — OFFICE VISIT (OUTPATIENT)
Dept: INTERNAL MEDICINE CLINIC | Facility: CLINIC | Age: 49
End: 2019-10-01
Payer: COMMERCIAL

## 2019-10-01 VITALS
BODY MASS INDEX: 28.7 KG/M2 | HEART RATE: 80 BPM | SYSTOLIC BLOOD PRESSURE: 122 MMHG | DIASTOLIC BLOOD PRESSURE: 88 MMHG | HEIGHT: 63 IN | RESPIRATION RATE: 16 BRPM | WEIGHT: 162 LBS

## 2019-10-01 DIAGNOSIS — E78.49 OTHER HYPERLIPIDEMIA: ICD-10-CM

## 2019-10-01 DIAGNOSIS — E55.9 VITAMIN D DEFICIENCY: ICD-10-CM

## 2019-10-01 DIAGNOSIS — F32.A DEPRESSION, UNSPECIFIED DEPRESSION TYPE: ICD-10-CM

## 2019-10-01 DIAGNOSIS — E04.1 THYROID NODULE: ICD-10-CM

## 2019-10-01 DIAGNOSIS — R73.01 IMPAIRED FASTING GLUCOSE: ICD-10-CM

## 2019-10-01 DIAGNOSIS — F41.8 DEPRESSION WITH ANXIETY: ICD-10-CM

## 2019-10-01 DIAGNOSIS — L25.8 CONTACT DERMATITIS DUE TO OTHER AGENT, UNSPECIFIED CONTACT DERMATITIS TYPE: ICD-10-CM

## 2019-10-01 DIAGNOSIS — Z12.39 SCREENING FOR BREAST CANCER: Primary | ICD-10-CM

## 2019-10-01 DIAGNOSIS — F43.10 POST TRAUMATIC STRESS DISORDER: ICD-10-CM

## 2019-10-01 DIAGNOSIS — I10 ESSENTIAL HYPERTENSION: ICD-10-CM

## 2019-10-01 DIAGNOSIS — G47.00 INSOMNIA, UNSPECIFIED TYPE: ICD-10-CM

## 2019-10-01 PROCEDURE — 3074F SYST BP LT 130 MM HG: CPT | Performed by: NURSE PRACTITIONER

## 2019-10-01 PROCEDURE — 3079F DIAST BP 80-89 MM HG: CPT | Performed by: NURSE PRACTITIONER

## 2019-10-01 PROCEDURE — 3008F BODY MASS INDEX DOCD: CPT | Performed by: NURSE PRACTITIONER

## 2019-10-01 PROCEDURE — 99214 OFFICE O/P EST MOD 30 MIN: CPT | Performed by: NURSE PRACTITIONER

## 2019-10-01 RX ORDER — VENLAFAXINE HYDROCHLORIDE 75 MG/1
75 CAPSULE, EXTENDED RELEASE ORAL DAILY
Qty: 90 CAPSULE | Refills: 0 | Status: SHIPPED | OUTPATIENT
Start: 2019-10-01 | End: 2019-10-01

## 2019-10-01 RX ORDER — VENLAFAXINE HYDROCHLORIDE 150 MG/1
150 CAPSULE, EXTENDED RELEASE ORAL DAILY
Qty: 90 CAPSULE | Refills: 2 | Status: SHIPPED | OUTPATIENT
Start: 2019-10-01 | End: 2019-10-01

## 2019-10-01 RX ORDER — CLONAZEPAM 0.5 MG/1
0.5 TABLET ORAL 2 TIMES DAILY PRN
Qty: 60 TABLET | Refills: 0 | Status: SHIPPED | OUTPATIENT
Start: 2019-10-01 | End: 2019-11-18 | Stop reason: SDUPTHER

## 2019-10-01 RX ORDER — VENLAFAXINE HYDROCHLORIDE 225 MG/1
225 TABLET, EXTENDED RELEASE ORAL
Qty: 90 TABLET | Refills: 3 | Status: SHIPPED | OUTPATIENT
Start: 2019-10-01 | End: 2020-07-31

## 2019-10-01 NOTE — TELEPHONE ENCOUNTER
Spoke with sheryl at [facility] and cancelled the Effexor 150 mg and 75 mg  Informed her that she is only getting the Effexor 225 mg

## 2019-10-01 NOTE — PROGRESS NOTES
Assessment/Plan:    Patient Instructions    Hyperlipidemia  Patient admits that she has not been taking her medication for the last 6 months  Recommend she restart recheck labs 6 months     depression/anxiety / posttraumatic stress  Refill Effexor and clonazepam narcotic agreement signed     hypothyroidism  TSH stable on current dose following with endocrinology     impaired fasting glucose  Pre diabetes discussed the importance of lifestyle modifications     health maintenance  Prescription for mammogram given declines flu shot      Contact dermatitis to legs recommend antihistamine         Diagnoses and all orders for this visit:    Screening for breast cancer  -     Mammo screening bilateral w 3d & cad; Future    Insomnia, unspecified type  -     clonazePAM (KlonoPIN) 0 5 mg tablet; Take 1 tablet (0 5 mg total) by mouth 2 (two) times a day as needed (as needed)    Depression, unspecified depression type  -     Discontinue: venlafaxine (EFFEXOR-XR) 75 mg 24 hr capsule; Take 1 capsule (75 mg total) by mouth daily  -     Discontinue: venlafaxine (EFFEXOR-XR) 150 mg 24 hr capsule; Take 1 capsule (150 mg total) by mouth daily  -     venlafaxine 225 MG TB24; Take 1 tablet (225 mg total) by mouth daily with breakfast    Essential hypertension  -     CBC and differential; Future  -     Comprehensive metabolic panel; Future    Other hyperlipidemia  -     Lipid panel; Future  -     TSH, 3rd generation with Free T4 reflex; Future    Impaired fasting glucose  -     Hemoglobin A1C; Future    Vitamin D deficiency  -     Vitamin D 25 hydroxy; Future    Thyroid nodule  -     US thyroid;  Future    Depression with anxiety    Post traumatic stress disorder    Contact dermatitis due to other agent, unspecified contact dermatitis type         Subjective:      Patient ID: Rica Morrell is a 53 y o  female     Patient is here for just a routine follow-up   she has been following with endocrinology but she has a hard time getting appointments she is overdue for thyroid ultrasound   stable on Effexor and clonazepam   admits not taking Lipitor for over 6 months   needs mammogram   rash to both legs only in the back aspect has been there since Saturday cannot recall coming in contact with anything  Has not changed any products          Current Outpatient Medications:     albuterol (PROVENTIL HFA,VENTOLIN HFA) 90 mcg/act inhaler, Inhale 2 puffs every 4 (four) hours as needed for wheezing, Disp: 1 Inhaler, Rfl: 2    atorvastatin (LIPITOR) 10 mg tablet, TAKE 1 TABLET BY MOUTH EVERY DAY, Disp: 90 tablet, Rfl: 1    clonazePAM (KlonoPIN) 0 5 mg tablet, Take 1 tablet (0 5 mg total) by mouth 2 (two) times a day as needed (as needed), Disp: 60 tablet, Rfl: 0    fluticasone (FLONASE) 50 mcg/act nasal spray, 2 sprays into each nostril daily (Patient taking differently: 2 sprays into each nostril as needed ), Disp: 16 g, Rfl: 0    levothyroxine 75 mcg tablet, Take 1 tablet (75 mcg total) by mouth daily Take 50mcg daily, Disp: , Rfl:     naproxen (NAPROSYN) 500 mg tablet, Take 1 tablet (500 mg total) by mouth 2 (two) times a day with meals (Patient taking differently: Take 500 mg by mouth as needed ), Disp: 20 tablet, Rfl: 0    rizatriptan (MAXALT-MLT) 10 MG disintegrating tablet, Take 1 tablet (10 mg total) by mouth once as needed for migraine for up to 1 dose May repeat in 2 hours if needed, Disp: 9 tablet, Rfl: 0    venlafaxine 225 MG TB24, Take 1 tablet (225 mg total) by mouth daily with breakfast, Disp: 90 tablet, Rfl: 3    Recent Results (from the past 1008 hour(s))   CBC and differential    Collection Time: 09/28/19 11:42 AM   Result Value Ref Range    WBC 6 12 4 31 - 10 16 Thousand/uL    RBC 4 16 3 81 - 5 12 Million/uL    Hemoglobin 12 6 11 5 - 15 4 g/dL    Hematocrit 39 2 34 8 - 46 1 %    MCV 94 82 - 98 fL    MCH 30 3 26 8 - 34 3 pg    MCHC 32 1 31 4 - 37 4 g/dL    RDW 13 0 11 6 - 15 1 %    MPV 10 2 8 9 - 12 7 fL    Platelets 298 994 - 290 Thousands/uL    nRBC 0 /100 WBCs    Neutrophils Relative 66 43 - 75 %    Immat GRANS % 0 0 - 2 %    Lymphocytes Relative 25 14 - 44 %    Monocytes Relative 6 4 - 12 %    Eosinophils Relative 3 0 - 6 %    Basophils Relative 0 0 - 1 %    Neutrophils Absolute 4 00 1 85 - 7 62 Thousands/µL    Immature Grans Absolute 0 02 0 00 - 0 20 Thousand/uL    Lymphocytes Absolute 1 54 0 60 - 4 47 Thousands/µL    Monocytes Absolute 0 38 0 17 - 1 22 Thousand/µL    Eosinophils Absolute 0 17 0 00 - 0 61 Thousand/µL    Basophils Absolute 0 01 0 00 - 0 10 Thousands/µL   Comprehensive metabolic panel    Collection Time: 09/28/19 11:42 AM   Result Value Ref Range    Sodium 139 136 - 145 mmol/L    Potassium 4 0 3 5 - 5 3 mmol/L    Chloride 104 100 - 108 mmol/L    CO2 28 21 - 32 mmol/L    ANION GAP 7 4 - 13 mmol/L    BUN 11 5 - 25 mg/dL    Creatinine 0 87 0 60 - 1 30 mg/dL    Glucose, Fasting 101 (H) 65 - 99 mg/dL    Calcium 9 2 8 3 - 10 1 mg/dL    AST 15 5 - 45 U/L    ALT 16 12 - 78 U/L    Alkaline Phosphatase 76 46 - 116 U/L    Total Protein 7 8 6 4 - 8 2 g/dL    Albumin 4 1 3 5 - 5 0 g/dL    Total Bilirubin 0 55 0 20 - 1 00 mg/dL    eGFR 80 ml/min/1 73sq m   Lipid panel    Collection Time: 09/28/19 11:42 AM   Result Value Ref Range    Cholesterol 271 (H) 50 - 200 mg/dL    Triglycerides 174 (H) <=150 mg/dL    HDL, Direct 61 (H) 40 - 60 mg/dL    LDL Calculated 175 (H) 0 - 100 mg/dL    Non-HDL-Chol (CHOL-HDL) 210 mg/dl   Hemoglobin A1C    Collection Time: 09/28/19 11:42 AM   Result Value Ref Range    Hemoglobin A1C 5 9 4 2 - 6 3 %     mg/dl   TSH, 3rd generation with Free T4 reflex    Collection Time: 09/28/19 11:42 AM   Result Value Ref Range    TSH 3RD GENERATON 1 270 0 358 - 3 740 uIU/mL   Vitamin D 25 hydroxy    Collection Time: 09/28/19 11:42 AM   Result Value Ref Range    Vit D, 25-Hydroxy 18 4 (L) 30 0 - 100 0 ng/mL       The following portions of the patient's history were reviewed and updated as appropriate: allergies, current medications, past family history, past medical history, past social history, past surgical history and problem list      Review of Systems   Constitutional: Negative for appetite change, chills, diaphoresis, fatigue, fever and unexpected weight change  HENT: Negative for postnasal drip and sneezing  Eyes: Negative for visual disturbance  Respiratory: Negative for chest tightness and shortness of breath  Cardiovascular: Negative for chest pain, palpitations and leg swelling  Gastrointestinal: Negative for abdominal pain and blood in stool  Endocrine: Negative for cold intolerance, heat intolerance, polydipsia, polyphagia and polyuria  Genitourinary: Negative for difficulty urinating, dysuria, frequency and urgency  Musculoskeletal: Negative for arthralgias and myalgias  Skin: Negative for rash and wound  Neurological: Negative for dizziness, weakness, light-headedness and headaches  Hematological: Negative for adenopathy  Psychiatric/Behavioral: Negative for confusion, dysphoric mood and sleep disturbance  The patient is not nervous/anxious  Objective:      /88 (BP Location: Left arm, Patient Position: Sitting, Cuff Size: Standard)   Pulse 80   Resp 16   Ht 5' 3" (1 6 m)   Wt 73 5 kg (162 lb)   BMI 28 70 kg/m²        Physical Exam   Constitutional: She is oriented to person, place, and time  She appears well-developed  No distress  HENT:   Head: Normocephalic and atraumatic  Nose: Nose normal    Mouth/Throat: Oropharynx is clear and moist    Eyes: Pupils are equal, round, and reactive to light  Conjunctivae and EOM are normal    Neck: Normal range of motion  Neck supple  No JVD present  No tracheal deviation present  No thyromegaly present  Cardiovascular: Normal rate, regular rhythm, normal heart sounds and intact distal pulses  Exam reveals no gallop and no friction rub  No murmur heard    Pulmonary/Chest: Effort normal and breath sounds normal  No respiratory distress  She has no wheezes  She has no rales  Abdominal: Soft  Bowel sounds are normal  She exhibits no distension  There is no tenderness  Musculoskeletal: Normal range of motion  She exhibits no edema  Lymphadenopathy:     She has no cervical adenopathy  Neurological: She is alert and oriented to person, place, and time  No cranial nerve deficit  Skin: Skin is warm and dry  No rash noted  She is not diaphoretic  Psychiatric: She has a normal mood and affect  Her behavior is normal  Judgment and thought content normal    BMI Counseling: Body mass index is 28 7 kg/m²  The BMI is above normal  Nutrition recommendations include decreasing portion sizes and increasing intake of lean protein  Exercise recommendations include exercising 3-5 times per week  No pharmacotherapy was ordered

## 2019-10-01 NOTE — PATIENT INSTRUCTIONS
Hyperlipidemia  Patient admits that she has not been taking her medication for the last 6 months    Recommend she restart recheck labs 6 months     depression/anxiety / posttraumatic stress  Refill Effexor and clonazepam narcotic agreement signed     hypothyroidism  TSH stable on current dose following with endocrinology     impaired fasting glucose  Pre diabetes discussed the importance of lifestyle modifications     health maintenance  Prescription for mammogram given declines flu shot      Contact dermatitis to legs recommend antihistamine

## 2019-10-11 ENCOUNTER — HOSPITAL ENCOUNTER (OUTPATIENT)
Dept: MAMMOGRAPHY | Facility: CLINIC | Age: 49
Discharge: HOME | End: 2019-10-11
Payer: COMMERCIAL

## 2019-10-11 VITALS — BODY MASS INDEX: 28.7 KG/M2 | HEIGHT: 63 IN | WEIGHT: 162 LBS

## 2019-10-11 DIAGNOSIS — Z12.39 SCREENING FOR BREAST CANCER: ICD-10-CM

## 2019-10-11 PROCEDURE — 77063 BREAST TOMOSYNTHESIS BI: CPT

## 2019-10-11 PROCEDURE — 77067 SCR MAMMO BI INCL CAD: CPT

## 2019-10-23 ENCOUNTER — HOSPITAL ENCOUNTER (OUTPATIENT)
Dept: ULTRASOUND IMAGING | Facility: HOSPITAL | Age: 49
Discharge: HOME | End: 2019-10-23
Payer: COMMERCIAL

## 2019-10-23 DIAGNOSIS — E04.1 THYROID NODULE: ICD-10-CM

## 2019-10-23 PROCEDURE — 76536 US EXAM OF HEAD AND NECK: CPT

## 2019-10-25 ENCOUNTER — TELEPHONE (OUTPATIENT)
Dept: INTERNAL MEDICINE CLINIC | Facility: CLINIC | Age: 49
End: 2019-10-25

## 2019-10-25 NOTE — TELEPHONE ENCOUNTER
----- Message from Manuel Hernandez [address] Garcia sent at 10/25/2019 11:54 AM EDT -----  Her thyroid us is stable according to new guidelines no follow up is needed

## 2019-11-18 DIAGNOSIS — G47.00 INSOMNIA, UNSPECIFIED TYPE: ICD-10-CM

## 2019-11-18 RX ORDER — CLONAZEPAM 0.5 MG/1
TABLET ORAL
Qty: 60 TABLET | Refills: 2 | Status: SHIPPED | OUTPATIENT
Start: 2019-11-18 | End: 2020-04-24 | Stop reason: SDUPTHER

## 2020-04-24 DIAGNOSIS — G47.00 INSOMNIA, UNSPECIFIED TYPE: ICD-10-CM

## 2020-04-27 RX ORDER — CLONAZEPAM 0.5 MG/1
0.5 TABLET ORAL 2 TIMES DAILY PRN
Qty: 60 TABLET | Refills: 2 | Status: SHIPPED | OUTPATIENT
Start: 2020-04-27 | End: 2020-08-11

## 2020-06-08 DIAGNOSIS — G47.00 INSOMNIA, UNSPECIFIED TYPE: ICD-10-CM

## 2020-06-08 RX ORDER — RIZATRIPTAN BENZOATE 10 MG/1
10 TABLET, ORALLY DISINTEGRATING ORAL ONCE AS NEEDED
Qty: 9 TABLET | Refills: 0 | Status: SHIPPED | OUTPATIENT
Start: 2020-06-08

## 2020-06-15 ENCOUNTER — TRANSCRIBE ORDERS (OUTPATIENT)
Dept: LAB | Facility: CLINIC | Age: 50
End: 2020-06-15

## 2020-06-15 ENCOUNTER — APPOINTMENT (OUTPATIENT)
Dept: LAB | Facility: CLINIC | Age: 50
End: 2020-06-15
Payer: MEDICARE

## 2020-06-15 DIAGNOSIS — E03.9 MYXEDEMA HEART DISEASE: Primary | ICD-10-CM

## 2020-06-15 DIAGNOSIS — E78.49 OTHER HYPERLIPIDEMIA: ICD-10-CM

## 2020-06-15 DIAGNOSIS — E03.9 MYXEDEMA HEART DISEASE: ICD-10-CM

## 2020-06-15 DIAGNOSIS — I51.9 MYXEDEMA HEART DISEASE: Primary | ICD-10-CM

## 2020-06-15 DIAGNOSIS — I10 ESSENTIAL HYPERTENSION: ICD-10-CM

## 2020-06-15 DIAGNOSIS — R73.01 IMPAIRED FASTING GLUCOSE: ICD-10-CM

## 2020-06-15 DIAGNOSIS — I51.9 MYXEDEMA HEART DISEASE: ICD-10-CM

## 2020-06-15 DIAGNOSIS — E55.9 VITAMIN D DEFICIENCY: ICD-10-CM

## 2020-06-15 LAB
25(OH)D3 SERPL-MCNC: 18.4 NG/ML (ref 30–100)
ALBUMIN SERPL BCP-MCNC: 4 G/DL (ref 3.5–5)
ALP SERPL-CCNC: 78 U/L (ref 46–116)
ALT SERPL W P-5'-P-CCNC: 21 U/L (ref 12–78)
ANION GAP SERPL CALCULATED.3IONS-SCNC: 5 MMOL/L (ref 4–13)
AST SERPL W P-5'-P-CCNC: 22 U/L (ref 5–45)
BASOPHILS # BLD AUTO: 0.01 THOUSANDS/ΜL (ref 0–0.1)
BASOPHILS NFR BLD AUTO: 0 % (ref 0–1)
BILIRUB SERPL-MCNC: 0.51 MG/DL (ref 0.2–1)
BUN SERPL-MCNC: 11 MG/DL (ref 5–25)
CALCIUM SERPL-MCNC: 8.8 MG/DL (ref 8.3–10.1)
CHLORIDE SERPL-SCNC: 103 MMOL/L (ref 100–108)
CHOLEST SERPL-MCNC: 174 MG/DL (ref 50–200)
CO2 SERPL-SCNC: 27 MMOL/L (ref 21–32)
CREAT SERPL-MCNC: 0.83 MG/DL (ref 0.6–1.3)
EOSINOPHIL # BLD AUTO: 0.34 THOUSAND/ΜL (ref 0–0.61)
EOSINOPHIL NFR BLD AUTO: 6 % (ref 0–6)
ERYTHROCYTE [DISTWIDTH] IN BLOOD BY AUTOMATED COUNT: 13.1 % (ref 11.6–15.1)
EST. AVERAGE GLUCOSE BLD GHB EST-MCNC: 120 MG/DL
GFR SERPL CREATININE-BSD FRML MDRD: 84 ML/MIN/1.73SQ M
GLUCOSE P FAST SERPL-MCNC: 94 MG/DL (ref 65–99)
HBA1C MFR BLD: 5.8 %
HCT VFR BLD AUTO: 38.3 % (ref 34.8–46.1)
HDLC SERPL-MCNC: 71 MG/DL
HGB BLD-MCNC: 12.5 G/DL (ref 11.5–15.4)
IMM GRANULOCYTES # BLD AUTO: 0.01 THOUSAND/UL (ref 0–0.2)
IMM GRANULOCYTES NFR BLD AUTO: 0 % (ref 0–2)
LDLC SERPL CALC-MCNC: 75 MG/DL (ref 0–100)
LYMPHOCYTES # BLD AUTO: 1.5 THOUSANDS/ΜL (ref 0.6–4.47)
LYMPHOCYTES NFR BLD AUTO: 27 % (ref 14–44)
MCH RBC QN AUTO: 30.2 PG (ref 26.8–34.3)
MCHC RBC AUTO-ENTMCNC: 32.6 G/DL (ref 31.4–37.4)
MCV RBC AUTO: 93 FL (ref 82–98)
MONOCYTES # BLD AUTO: 0.34 THOUSAND/ΜL (ref 0.17–1.22)
MONOCYTES NFR BLD AUTO: 6 % (ref 4–12)
NEUTROPHILS # BLD AUTO: 3.42 THOUSANDS/ΜL (ref 1.85–7.62)
NEUTS SEG NFR BLD AUTO: 61 % (ref 43–75)
NONHDLC SERPL-MCNC: 103 MG/DL
NRBC BLD AUTO-RTO: 0 /100 WBCS
PLATELET # BLD AUTO: 298 THOUSANDS/UL (ref 149–390)
PMV BLD AUTO: 10.4 FL (ref 8.9–12.7)
POTASSIUM SERPL-SCNC: 3.7 MMOL/L (ref 3.5–5.3)
PROT SERPL-MCNC: 7.9 G/DL (ref 6.4–8.2)
RBC # BLD AUTO: 4.14 MILLION/UL (ref 3.81–5.12)
SODIUM SERPL-SCNC: 135 MMOL/L (ref 136–145)
T3 SERPL-MCNC: 0.9 NG/ML (ref 0.6–1.8)
T4 FREE SERPL-MCNC: 0.91 NG/DL (ref 0.76–1.46)
TRIGL SERPL-MCNC: 140 MG/DL
TSH SERPL DL<=0.05 MIU/L-ACNC: 0.93 UIU/ML (ref 0.36–3.74)
WBC # BLD AUTO: 5.62 THOUSAND/UL (ref 4.31–10.16)

## 2020-06-15 PROCEDURE — 84443 ASSAY THYROID STIM HORMONE: CPT

## 2020-06-15 PROCEDURE — 82306 VITAMIN D 25 HYDROXY: CPT

## 2020-06-15 PROCEDURE — 85025 COMPLETE CBC W/AUTO DIFF WBC: CPT

## 2020-06-15 PROCEDURE — 83036 HEMOGLOBIN GLYCOSYLATED A1C: CPT

## 2020-06-15 PROCEDURE — 80061 LIPID PANEL: CPT

## 2020-06-15 PROCEDURE — 36415 COLL VENOUS BLD VENIPUNCTURE: CPT

## 2020-06-15 PROCEDURE — 84439 ASSAY OF FREE THYROXINE: CPT

## 2020-06-15 PROCEDURE — 80053 COMPREHEN METABOLIC PANEL: CPT

## 2020-06-15 PROCEDURE — 84480 ASSAY TRIIODOTHYRONINE (T3): CPT

## 2020-06-16 ENCOUNTER — OFFICE VISIT (OUTPATIENT)
Dept: INTERNAL MEDICINE CLINIC | Facility: CLINIC | Age: 50
End: 2020-06-16
Payer: COMMERCIAL

## 2020-06-16 VITALS
WEIGHT: 165.2 LBS | HEIGHT: 63 IN | RESPIRATION RATE: 12 BRPM | HEART RATE: 60 BPM | DIASTOLIC BLOOD PRESSURE: 100 MMHG | BODY MASS INDEX: 29.27 KG/M2 | TEMPERATURE: 98 F | SYSTOLIC BLOOD PRESSURE: 162 MMHG

## 2020-06-16 DIAGNOSIS — E78.49 OTHER HYPERLIPIDEMIA: ICD-10-CM

## 2020-06-16 DIAGNOSIS — Z11.59 NEED FOR HEPATITIS C SCREENING TEST: ICD-10-CM

## 2020-06-16 DIAGNOSIS — E55.9 VITAMIN D DEFICIENCY: ICD-10-CM

## 2020-06-16 DIAGNOSIS — Z11.4 SCREENING FOR HIV (HUMAN IMMUNODEFICIENCY VIRUS): ICD-10-CM

## 2020-06-16 DIAGNOSIS — E06.3 HASHIMOTO'S DISEASE: Primary | ICD-10-CM

## 2020-06-16 DIAGNOSIS — F43.10 POST TRAUMATIC STRESS DISORDER: ICD-10-CM

## 2020-06-16 DIAGNOSIS — J45.909 UNCOMPLICATED ASTHMA, UNSPECIFIED ASTHMA SEVERITY, UNSPECIFIED WHETHER PERSISTENT: ICD-10-CM

## 2020-06-16 DIAGNOSIS — F41.8 DEPRESSION WITH ANXIETY: ICD-10-CM

## 2020-06-16 DIAGNOSIS — I10 ESSENTIAL HYPERTENSION: ICD-10-CM

## 2020-06-16 DIAGNOSIS — J30.9 ALLERGIC RHINITIS, UNSPECIFIED SEASONALITY, UNSPECIFIED TRIGGER: ICD-10-CM

## 2020-06-16 DIAGNOSIS — R73.01 IMPAIRED FASTING GLUCOSE: ICD-10-CM

## 2020-06-16 PROBLEM — R94.6 ABNORMAL THYROID FUNCTION TEST: Status: RESOLVED | Noted: 2017-12-05 | Resolved: 2020-06-16

## 2020-06-16 PROCEDURE — 99215 OFFICE O/P EST HI 40 MIN: CPT | Performed by: INTERNAL MEDICINE

## 2020-06-16 PROCEDURE — 1036F TOBACCO NON-USER: CPT | Performed by: INTERNAL MEDICINE

## 2020-06-16 PROCEDURE — 3080F DIAST BP >= 90 MM HG: CPT | Performed by: INTERNAL MEDICINE

## 2020-06-16 PROCEDURE — 3008F BODY MASS INDEX DOCD: CPT | Performed by: INTERNAL MEDICINE

## 2020-06-16 PROCEDURE — 3077F SYST BP >= 140 MM HG: CPT | Performed by: INTERNAL MEDICINE

## 2020-06-16 RX ORDER — LISINOPRIL AND HYDROCHLOROTHIAZIDE 12.5; 1 MG/1; MG/1
1 TABLET ORAL DAILY
Qty: 30 TABLET | Refills: 5 | Status: SHIPPED | OUTPATIENT
Start: 2020-06-16 | End: 2020-07-07 | Stop reason: SDUPTHER

## 2020-06-16 RX ORDER — ERGOCALCIFEROL 1.25 MG/1
50000 CAPSULE ORAL WEEKLY
Qty: 12 CAPSULE | Refills: 0 | Status: SHIPPED | OUTPATIENT
Start: 2020-06-16 | End: 2020-07-11 | Stop reason: SDUPTHER

## 2020-07-06 ENCOUNTER — APPOINTMENT (OUTPATIENT)
Dept: LAB | Facility: CLINIC | Age: 50
End: 2020-07-06
Payer: COMMERCIAL

## 2020-07-06 ENCOUNTER — TELEPHONE (OUTPATIENT)
Dept: INTERNAL MEDICINE CLINIC | Facility: CLINIC | Age: 50
End: 2020-07-06

## 2020-07-06 DIAGNOSIS — J45.909 UNCOMPLICATED ASTHMA, UNSPECIFIED ASTHMA SEVERITY, UNSPECIFIED WHETHER PERSISTENT: ICD-10-CM

## 2020-07-06 DIAGNOSIS — J30.9 ALLERGIC RHINITIS, UNSPECIFIED SEASONALITY, UNSPECIFIED TRIGGER: ICD-10-CM

## 2020-07-06 DIAGNOSIS — I10 ESSENTIAL HYPERTENSION: ICD-10-CM

## 2020-07-06 LAB
ANION GAP SERPL CALCULATED.3IONS-SCNC: 3 MMOL/L (ref 4–13)
BUN SERPL-MCNC: 21 MG/DL (ref 5–25)
CALCIUM SERPL-MCNC: 9.4 MG/DL (ref 8.3–10.1)
CHLORIDE SERPL-SCNC: 105 MMOL/L (ref 100–108)
CO2 SERPL-SCNC: 28 MMOL/L (ref 21–32)
CREAT SERPL-MCNC: 1.03 MG/DL (ref 0.6–1.3)
GFR SERPL CREATININE-BSD FRML MDRD: 64 ML/MIN/1.73SQ M
GLUCOSE P FAST SERPL-MCNC: 98 MG/DL (ref 65–99)
POTASSIUM SERPL-SCNC: 4.2 MMOL/L (ref 3.5–5.3)
SODIUM SERPL-SCNC: 136 MMOL/L (ref 136–145)

## 2020-07-06 PROCEDURE — 36415 COLL VENOUS BLD VENIPUNCTURE: CPT

## 2020-07-06 PROCEDURE — 82785 ASSAY OF IGE: CPT

## 2020-07-06 PROCEDURE — 80048 BASIC METABOLIC PNL TOTAL CA: CPT

## 2020-07-06 PROCEDURE — 86003 ALLG SPEC IGE CRUDE XTRC EA: CPT

## 2020-07-06 NOTE — TELEPHONE ENCOUNTER
Patient has an appt with Germaine Awan @ 2 and said she just rec'ed a call about 5 min's ago, VM said to call the office    She already answered COVID questions    So not sure what the reason is    Not able to reach MA     She did have her labs done too

## 2020-07-07 ENCOUNTER — OFFICE VISIT (OUTPATIENT)
Dept: INTERNAL MEDICINE CLINIC | Facility: CLINIC | Age: 50
End: 2020-07-07
Payer: COMMERCIAL

## 2020-07-07 ENCOUNTER — TELEPHONE (OUTPATIENT)
Dept: INTERNAL MEDICINE CLINIC | Facility: CLINIC | Age: 50
End: 2020-07-07

## 2020-07-07 VITALS
TEMPERATURE: 98.3 F | HEART RATE: 74 BPM | BODY MASS INDEX: 29.34 KG/M2 | WEIGHT: 165.6 LBS | DIASTOLIC BLOOD PRESSURE: 72 MMHG | SYSTOLIC BLOOD PRESSURE: 122 MMHG | HEIGHT: 63 IN | RESPIRATION RATE: 14 BRPM

## 2020-07-07 DIAGNOSIS — I10 ESSENTIAL HYPERTENSION: Primary | ICD-10-CM

## 2020-07-07 DIAGNOSIS — I10 ESSENTIAL HYPERTENSION: ICD-10-CM

## 2020-07-07 LAB
A ALTERNATA IGE QN: <0.1 KUA/I
A FUMIGATUS IGE QN: <0.1 KUA/I
ALLERGEN COMMENT: ABNORMAL
BERMUDA GRASS IGE QN: <0.1 KUA/I
BOXELDER IGE QN: <0.1 KUA/I
C HERBARUM IGE QN: <0.1 KUA/I
CAT DANDER IGE QN: 3.58 KUA/I
CMN PIGWEED IGE QN: <0.1 KUA/I
COMMON RAGWEED IGE QN: <0.1 KUA/I
COTTONWOOD IGE QN: <0.1 KUA/I
D FARINAE IGE QN: <0.1 KUA/I
D PTERONYSS IGE QN: <0.1 KUA/I
DOG DANDER IGE QN: 7.18 KUA/I
LONDON PLANE IGE QN: <0.1 KUA/I
MOUSE URINE PROT IGE QN: <0.1 KUA/I
MT JUNIPER IGE QN: 0.14 KUA/I
MUGWORT IGE QN: <0.1 KUA/I
P NOTATUM IGE QN: <0.1 KUA/I
ROACH IGE QN: <0.1 KUA/I
SHEEP SORREL IGE QN: <0.1 KUA/I
SILVER BIRCH IGE QN: <0.1 KUA/I
TIMOTHY IGE QN: <0.1 KUA/I
TOTAL IGE SMQN RAST: 59.6 KU/L (ref 0–113)
WALNUT IGE QN: <0.1 KUA/I
WHITE ASH IGE QN: <0.1 KUA/I
WHITE ELM IGE QN: <0.1 KUA/I
WHITE MULBERRY IGE QN: <0.1 KUA/I
WHITE OAK IGE QN: <0.1 KUA/I

## 2020-07-07 PROCEDURE — 1036F TOBACCO NON-USER: CPT | Performed by: NURSE PRACTITIONER

## 2020-07-07 PROCEDURE — 99213 OFFICE O/P EST LOW 20 MIN: CPT | Performed by: NURSE PRACTITIONER

## 2020-07-07 PROCEDURE — 3074F SYST BP LT 130 MM HG: CPT | Performed by: NURSE PRACTITIONER

## 2020-07-07 PROCEDURE — 3078F DIAST BP <80 MM HG: CPT | Performed by: NURSE PRACTITIONER

## 2020-07-07 PROCEDURE — 3008F BODY MASS INDEX DOCD: CPT | Performed by: NURSE PRACTITIONER

## 2020-07-07 RX ORDER — LISINOPRIL AND HYDROCHLOROTHIAZIDE 12.5; 1 MG/1; MG/1
1 TABLET ORAL DAILY
Qty: 90 TABLET | Refills: 3 | Status: SHIPPED | OUTPATIENT
Start: 2020-07-07 | End: 2020-07-13

## 2020-07-07 NOTE — PATIENT INSTRUCTIONS
Hypertension at goal on 1 tablet of lisinopril/hctz-  Discussed the importance of hydration and high potassium diet  Diet and exercise as well    Follow back in December with routine labs

## 2020-07-07 NOTE — PROGRESS NOTES
Assessment/Plan:    Patient Instructions   Hypertension at goal on 1 tablet of lisinopril/hctz-  Discussed the importance of hydration and high potassium diet  Diet and exercise as well    Follow back in December with routine labs         Diagnoses and all orders for this visit:    Essential hypertension         Subjective:      Patient ID: Alana Sanders is a 51 y o  female     Patient is here follow blood pressure, she has been taking half of a lisinopril for about a week, then she noticed that her blood pressures were not at goal there are consistently greater than 140/90, she has been taking the whole tablet for about 3 days now, home blood pressures 120s to 130s over high 80s        Current Outpatient Medications:     albuterol (PROVENTIL HFA,VENTOLIN HFA) 90 mcg/act inhaler, Inhale 2 puffs every 4 (four) hours as needed for wheezing, Disp: 1 Inhaler, Rfl: 2    atorvastatin (LIPITOR) 10 mg tablet, TAKE 1 TABLET BY MOUTH EVERY DAY, Disp: 90 tablet, Rfl: 1    clonazePAM (KlonoPIN) 0 5 mg tablet, Take 1 tablet (0 5 mg total) by mouth 2 (two) times a day as needed for anxiety, Disp: 60 tablet, Rfl: 2    ergocalciferol (VITAMIN D2) 50,000 units, Take 1 capsule (50,000 Units total) by mouth once a week, Disp: 12 capsule, Rfl: 0    fluticasone (FLONASE) 50 mcg/act nasal spray, 2 sprays into each nostril daily (Patient taking differently: 2 sprays into each nostril as needed ), Disp: 16 g, Rfl: 0    levothyroxine 75 mcg tablet, Take 1 tablet (75 mcg total) by mouth daily Take 50mcg daily, Disp: , Rfl:     naproxen (NAPROSYN) 500 mg tablet, Take 1 tablet (500 mg total) by mouth 2 (two) times a day with meals (Patient taking differently: Take 500 mg by mouth as needed ), Disp: 20 tablet, Rfl: 0    rizatriptan (MAXALT-MLT) 10 MG disintegrating tablet, Take 1 tablet (10 mg total) by mouth once as needed for migraine for up to 1 dose May repeat in 2 hours if needed, Disp: 9 tablet, Rfl: 0    venlafaxine 225 MG TB24, Take 1 tablet (225 mg total) by mouth daily with breakfast, Disp: 90 tablet, Rfl: 3    lisinopril-hydrochlorothiazide (PRINZIDE,ZESTORETIC) 10-12 5 MG per tablet, Take 1 tablet by mouth daily, Disp: 90 tablet, Rfl: 3    Recent Results (from the past 1008 hour(s))   CBC and differential    Collection Time: 06/15/20 12:12 PM   Result Value Ref Range    WBC 5 62 4 31 - 10 16 Thousand/uL    RBC 4 14 3 81 - 5 12 Million/uL    Hemoglobin 12 5 11 5 - 15 4 g/dL    Hematocrit 38 3 34 8 - 46 1 %    MCV 93 82 - 98 fL    MCH 30 2 26 8 - 34 3 pg    MCHC 32 6 31 4 - 37 4 g/dL    RDW 13 1 11 6 - 15 1 %    MPV 10 4 8 9 - 12 7 fL    Platelets 398 148 - 350 Thousands/uL    nRBC 0 /100 WBCs    Neutrophils Relative 61 43 - 75 %    Immat GRANS % 0 0 - 2 %    Lymphocytes Relative 27 14 - 44 %    Monocytes Relative 6 4 - 12 %    Eosinophils Relative 6 0 - 6 %    Basophils Relative 0 0 - 1 %    Neutrophils Absolute 3 42 1 85 - 7 62 Thousands/µL    Immature Grans Absolute 0 01 0 00 - 0 20 Thousand/uL    Lymphocytes Absolute 1 50 0 60 - 4 47 Thousands/µL    Monocytes Absolute 0 34 0 17 - 1 22 Thousand/µL    Eosinophils Absolute 0 34 0 00 - 0 61 Thousand/µL    Basophils Absolute 0 01 0 00 - 0 10 Thousands/µL   Comprehensive metabolic panel    Collection Time: 06/15/20 12:12 PM   Result Value Ref Range    Sodium 135 (L) 136 - 145 mmol/L    Potassium 3 7 3 5 - 5 3 mmol/L    Chloride 103 100 - 108 mmol/L    CO2 27 21 - 32 mmol/L    ANION GAP 5 4 - 13 mmol/L    BUN 11 5 - 25 mg/dL    Creatinine 0 83 0 60 - 1 30 mg/dL    Glucose, Fasting 94 65 - 99 mg/dL    Calcium 8 8 8 3 - 10 1 mg/dL    AST 22 5 - 45 U/L    ALT 21 12 - 78 U/L    Alkaline Phosphatase 78 46 - 116 U/L    Total Protein 7 9 6 4 - 8 2 g/dL    Albumin 4 0 3 5 - 5 0 g/dL    Total Bilirubin 0 51 0 20 - 1 00 mg/dL    eGFR 84 ml/min/1 73sq m   Lipid panel    Collection Time: 06/15/20 12:12 PM   Result Value Ref Range    Cholesterol 174 50 - 200 mg/dL    Triglycerides 140 <=150 mg/dL HDL, Direct 71 >=40 mg/dL    LDL Calculated 75 0 - 100 mg/dL    Non-HDL-Chol (CHOL-HDL) 103 mg/dl   Hemoglobin A1C    Collection Time: 06/15/20 12:12 PM   Result Value Ref Range    Hemoglobin A1C 5 8 (H) Normal 3 8-5 6%; PreDiabetic 5 7-6 4%; Diabetic >=6 5%; Glycemic control for adults with diabetes <7 0% %     mg/dl   TSH, 3rd generation with Free T4 reflex    Collection Time: 06/15/20 12:12 PM   Result Value Ref Range    TSH 3RD GENERATON 0 932 0 358 - 3 740 uIU/mL   Vitamin D 25 hydroxy    Collection Time: 06/15/20 12:12 PM   Result Value Ref Range    Vit D, 25-Hydroxy 18 4 (L) 30 0 - 100 0 ng/mL   T4, free    Collection Time: 06/15/20 12:12 PM   Result Value Ref Range    Free T4 0 91 0 76 - 1 46 ng/dL   T3    Collection Time: 06/15/20 12:12 PM   Result Value Ref Range    T3, Total 0 90 0 60 - 1 80 ng/mL   Basic metabolic panel    Collection Time: 07/06/20 10:05 AM   Result Value Ref Range    Sodium 136 136 - 145 mmol/L    Potassium 4 2 3 5 - 5 3 mmol/L    Chloride 105 100 - 108 mmol/L    CO2 28 21 - 32 mmol/L    ANION GAP 3 (L) 4 - 13 mmol/L    BUN 21 5 - 25 mg/dL    Creatinine 1 03 0 60 - 1 30 mg/dL    Glucose, Fasting 98 65 - 99 mg/dL    Calcium 9 4 8 3 - 10 1 mg/dL    eGFR 64 ml/min/1 73sq m       The following portions of the patient's history were reviewed and updated as appropriate: allergies, current medications, past family history, past medical history, past social history, past surgical history and problem list      Review of Systems   Constitutional: Negative for appetite change, chills, diaphoresis, fatigue, fever and unexpected weight change  HENT: Negative for postnasal drip and sneezing  Eyes: Negative for visual disturbance  Respiratory: Negative for chest tightness and shortness of breath  Cardiovascular: Negative for chest pain, palpitations and leg swelling  Gastrointestinal: Negative for abdominal pain and blood in stool     Endocrine: Negative for cold intolerance, heat intolerance, polydipsia, polyphagia and polyuria  Genitourinary: Negative for difficulty urinating, dysuria, frequency and urgency  Musculoskeletal: Negative for arthralgias and myalgias  Skin: Negative for rash and wound  Neurological: Negative for dizziness, weakness, light-headedness and headaches  Hematological: Negative for adenopathy  Psychiatric/Behavioral: Negative for confusion, dysphoric mood and sleep disturbance  The patient is not nervous/anxious  Objective:      /72 (BP Location: Left arm, Patient Position: Sitting, Cuff Size: Standard)   Pulse 74   Temp 98 3 °F (36 8 °C) (Tympanic) Comment: no nsaids  Resp 14   Ht 5' 2 5" (1 588 m)   Wt 75 1 kg (165 lb 9 6 oz)   BMI 29 81 kg/m²        Physical Exam   Constitutional: She is oriented to person, place, and time  She appears well-developed and well-nourished  She is active and cooperative  No distress  HENT:   Head: Normocephalic and atraumatic  Nose: Nose normal    Mouth/Throat: Oropharynx is clear and moist    Eyes: Pupils are equal, round, and reactive to light  Conjunctivae and EOM are normal    Neck: Normal range of motion  Neck supple  No JVD present  No tracheal deviation present  No thyromegaly present  Cardiovascular: Normal rate, regular rhythm, normal heart sounds and intact distal pulses  Exam reveals no gallop and no friction rub  No murmur heard  Pulmonary/Chest: Effort normal and breath sounds normal  No respiratory distress  She has no wheezes  She has no rales  Abdominal: Soft  Bowel sounds are normal  She exhibits no distension  There is no tenderness  Musculoskeletal: Normal range of motion  She exhibits no edema  Lymphadenopathy:     She has no cervical adenopathy  Neurological: She is alert and oriented to person, place, and time  No cranial nerve deficit  Skin: Skin is warm and dry  No rash noted  She is not diaphoretic  Psychiatric: She has a normal mood and affect   Her behavior is normal  Judgment and thought content normal    BMI Counseling: Body mass index is 29 81 kg/m²  The BMI is above normal  Nutrition recommendations include decreasing portion sizes and limiting drinks that contain sugar  Exercise recommendations include exercising 3-5 times per week  No pharmacotherapy was ordered

## 2020-07-11 DIAGNOSIS — E78.5 HYPERLIPIDEMIA, UNSPECIFIED HYPERLIPIDEMIA TYPE: ICD-10-CM

## 2020-07-11 DIAGNOSIS — E55.9 VITAMIN D DEFICIENCY: ICD-10-CM

## 2020-07-11 RX ORDER — ERGOCALCIFEROL 1.25 MG/1
50000 CAPSULE ORAL WEEKLY
Qty: 12 CAPSULE | Refills: 0 | Status: CANCELLED | OUTPATIENT
Start: 2020-07-11

## 2020-07-12 RX ORDER — ERGOCALCIFEROL 1.25 MG/1
50000 CAPSULE ORAL WEEKLY
Qty: 12 CAPSULE | Refills: 0 | Status: ON HOLD | OUTPATIENT
Start: 2020-07-12 | End: 2020-10-09 | Stop reason: ALTCHOICE

## 2020-07-12 RX ORDER — ATORVASTATIN CALCIUM 10 MG/1
10 TABLET, FILM COATED ORAL DAILY
Qty: 90 TABLET | Refills: 1 | Status: SHIPPED | OUTPATIENT
Start: 2020-07-12 | End: 2020-07-13

## 2020-07-13 DIAGNOSIS — I10 ESSENTIAL HYPERTENSION: Primary | ICD-10-CM

## 2020-07-13 DIAGNOSIS — E78.5 HYPERLIPIDEMIA, UNSPECIFIED HYPERLIPIDEMIA TYPE: ICD-10-CM

## 2020-07-13 RX ORDER — ATORVASTATIN CALCIUM 10 MG/1
TABLET, FILM COATED ORAL
Qty: 90 TABLET | Refills: 1 | Status: SHIPPED | OUTPATIENT
Start: 2020-07-13 | End: 2020-12-16

## 2020-07-13 RX ORDER — AMLODIPINE BESYLATE 2.5 MG/1
2.5 TABLET ORAL DAILY
Qty: 30 TABLET | Refills: 5 | Status: SHIPPED | OUTPATIENT
Start: 2020-07-13 | End: 2020-08-25 | Stop reason: SDUPTHER

## 2020-07-13 NOTE — PROGRESS NOTES
Patient contact me through my chart with urticaria involving her arms, legs, neck and trunk since starting on lisinopril hydrochlorothiazide    I advised her to stop and start amlodipine and follow-up in a few weeks

## 2020-07-30 DIAGNOSIS — F32.A DEPRESSION, UNSPECIFIED DEPRESSION TYPE: ICD-10-CM

## 2020-07-31 RX ORDER — VENLAFAXINE HYDROCHLORIDE 225 MG/1
TABLET, EXTENDED RELEASE ORAL
Qty: 90 TABLET | Refills: 3 | Status: SHIPPED | OUTPATIENT
Start: 2020-07-31 | End: 2021-06-21

## 2020-08-10 DIAGNOSIS — G47.00 INSOMNIA, UNSPECIFIED TYPE: ICD-10-CM

## 2020-08-11 RX ORDER — CLONAZEPAM 0.5 MG/1
TABLET ORAL
Qty: 60 TABLET | Refills: 1 | Status: SHIPPED | OUTPATIENT
Start: 2020-08-11 | End: 2020-10-13

## 2020-08-25 DIAGNOSIS — I10 ESSENTIAL HYPERTENSION: ICD-10-CM

## 2020-08-25 RX ORDER — AMLODIPINE BESYLATE 2.5 MG/1
2.5 TABLET ORAL DAILY
Qty: 30 TABLET | Refills: 0 | Status: SHIPPED | OUTPATIENT
Start: 2020-08-25 | End: 2020-09-29 | Stop reason: SDUPTHER

## 2020-08-31 ENCOUNTER — TELEPHONE (OUTPATIENT)
Dept: INTERNAL MEDICINE CLINIC | Facility: CLINIC | Age: 50
End: 2020-08-31

## 2020-08-31 NOTE — TELEPHONE ENCOUNTER
----- Message from Yuko Carbone MD sent at 8/31/2020 12:37 PM EDT -----  Regarding: FW: Non-Urgent Medical Question  Contact: [phone]    Please schedule a follow-up    ----- Message -----  From: Esthela Fitzgerald MA  Sent: 8/31/2020  11:27 AM EDT  To: Yuko Carbone MD  Subject: FW: Non-Urgent Medical Question                    ----- Message -----  From: Yony Fowler  Sent: 8/31/2020  11:04 AM EDT  To: [facility]  Subject: Non-Urgent Medical Question                      Sdy Araujo, I've noticed for more than a week that my blood pressure is high despite taking the medication and almost no salt consumption, caffeine once a day, low carbs  I am on amlopidine 2 5mg for over a month  I am also experiencing light headiness and headaches    these are the readings for the last week:  137/92 93; 142/96 91; 129/93 90; 119/84 74; 135/93; 101 135/93/ 101; 133/97 83; 154/105 94; 119/91 117; 136/88 84; 122/86 121; 114/95 95; 120/98 84; 116/97 92; 123/96 95; 117/100 73; 124/90 81; 144/104 112; 127/99 100

## 2020-09-01 ENCOUNTER — OFFICE VISIT (OUTPATIENT)
Dept: INTERNAL MEDICINE CLINIC | Facility: CLINIC | Age: 50
End: 2020-09-01
Payer: COMMERCIAL

## 2020-09-01 VITALS
SYSTOLIC BLOOD PRESSURE: 112 MMHG | RESPIRATION RATE: 14 BRPM | BODY MASS INDEX: 29.09 KG/M2 | HEART RATE: 72 BPM | WEIGHT: 164.2 LBS | HEIGHT: 63 IN | TEMPERATURE: 97 F | DIASTOLIC BLOOD PRESSURE: 74 MMHG

## 2020-09-01 DIAGNOSIS — I10 ESSENTIAL HYPERTENSION: ICD-10-CM

## 2020-09-01 DIAGNOSIS — F41.9 ANXIETY: ICD-10-CM

## 2020-09-01 DIAGNOSIS — Z12.31 BREAST CANCER SCREENING BY MAMMOGRAM: Primary | ICD-10-CM

## 2020-09-01 DIAGNOSIS — Z78.0 ASYMPTOMATIC POSTMENOPAUSAL STATE: ICD-10-CM

## 2020-09-01 DIAGNOSIS — M35.00 SJOGREN'S SYNDROME WITHOUT EXTRAGLANDULAR INVOLVEMENT (HCC): ICD-10-CM

## 2020-09-01 DIAGNOSIS — J45.909 UNCOMPLICATED ASTHMA, UNSPECIFIED ASTHMA SEVERITY, UNSPECIFIED WHETHER PERSISTENT: ICD-10-CM

## 2020-09-01 PROBLEM — R56.9 CONVULSIONS (HCC): Status: ACTIVE | Noted: 2020-09-01

## 2020-09-01 PROCEDURE — 99214 OFFICE O/P EST MOD 30 MIN: CPT | Performed by: PHYSICIAN ASSISTANT

## 2020-09-01 NOTE — PROGRESS NOTES
Assessment/Plan: her blood pressure is fine now and she is asymptomatic  Will leave her medications the same  She should bring in her home monitoring device to be checked in a couple of weeks       Diagnoses and all orders for this visit:    Breast cancer screening by mammogram  -     Mammo screening bilateral w 3d & cad; Future    Sjogren's syndrome without extraglandular involvement ([facility] [address] )    Asymptomatic postmenopausal state  -     Ambulatory referral to Obstetrics / Gynecology; Future    Essential hypertension    Uncomplicated asthma, unspecified asthma severity, unspecified whether persistent    Anxiety        No problem-specific Assessment & Plan notes found for this encounter  Subjective:      Patient ID: Dominique Pickens is a 51 y o  female  She has been monitoring her blood pressure every day and her diastolics vary greatly from the 70s to 100s  Occasionally she feels slightly dizzy and headache  Today she feels fine  Recently started on lisinopril hydrochlorothiazide which caused a rash  She has been on amlodipine 2 5 for a month  History of sleep apnea well controlled  History of anxiety well controlled with meds  Hypothyroid hyperlipidemia well controlled with meds      The following portions of the patient's history were reviewed and updated as appropriate:   She has a past medical history of Anxiety, Asthma, Depression, Disease of thyroid gland, Dizziness, Forgetfulness, Hashimoto's disease, Hashimoto's disease, History of fainting as a child, Hyperlipidemia, Hypertension, Numbness and tingling, MIRANDA (obstructive sleep apnea), and Post traumatic stress disorder (2/28/2019)  ,  does not have any pertinent problems on file  ,   has a past surgical history that includes Sinus surgery; Breast surgery (Bilateral, 2002); pr colonoscopy flx dx w/collj spec when pfrmd (N/A, 10/16/2018); Hysterectomy (2005);  Reduction mammaplasty (Bilateral, 1997); and Reduction mammaplasty (Bilateral, 2001) ,  family history includes Cervical cancer in her paternal grandmother; Depression in her father; Hyperlipidemia in her mother; Lupus in her mother; No Known Problems in her half-brother, half-brother, half-sister, half-sister, half-sister, maternal aunt, maternal aunt, maternal aunt, maternal aunt, paternal aunt, and paternal aunt; Ovarian cancer in her paternal grandmother  ,   reports that she has never smoked  She has never used smokeless tobacco  She reports current alcohol use  She reports that she does not use drugs  ,  is allergic to lisinopril-hydrochlorothiazide and other     Current Outpatient Medications   Medication Sig Dispense Refill    albuterol (PROVENTIL HFA,VENTOLIN HFA) 90 mcg/act inhaler Inhale 2 puffs every 4 (four) hours as needed for wheezing 1 Inhaler 2    amLODIPine (NORVASC) 2 5 mg tablet Take 1 tablet (2 5 mg total) by mouth daily 30 tablet 0    atorvastatin (LIPITOR) 10 mg tablet TAKE 1 TABLET BY MOUTH EVERY DAY 90 tablet 1    clonazePAM (KlonoPIN) 0 5 mg tablet TAKE 1 TABLET BY MOUTH  TWICE DAILY AS NEEDED FOR  ANXIETY 60 tablet 1    ergocalciferol (VITAMIN D2) 50,000 units Take 1 capsule (50,000 Units total) by mouth once a week 12 capsule 0    fluticasone (FLONASE) 50 mcg/act nasal spray 2 sprays into each nostril daily (Patient taking differently: 2 sprays into each nostril as needed ) 16 g 0    levothyroxine 75 mcg tablet Take 1 tablet (75 mcg total) by mouth daily Take 50mcg daily      naproxen (NAPROSYN) 500 mg tablet Take 1 tablet (500 mg total) by mouth 2 (two) times a day with meals (Patient taking differently: Take 500 mg by mouth as needed ) 20 tablet 0    rizatriptan (MAXALT-MLT) 10 MG disintegrating tablet Take 1 tablet (10 mg total) by mouth once as needed for migraine for up to 1 dose May repeat in 2 hours if needed 9 tablet 0    venlafaxine 225 MG TB24 TAKE 1 TABLET BY MOUTH  DAILY WITH BREAKFAST 90 tablet 3     No current facility-administered medications for this visit  Review of Systems   Constitutional: Negative for activity change, appetite change, chills, diaphoresis, fatigue, fever and unexpected weight change  HENT: Negative for congestion, dental problem, drooling, ear discharge, ear pain, facial swelling, hearing loss, nosebleeds, postnasal drip, rhinorrhea, sinus pressure, sinus pain, sneezing, sore throat, tinnitus, trouble swallowing and voice change  Eyes: Negative for photophobia, pain, discharge, redness, itching and visual disturbance  Respiratory: Negative for apnea, cough, choking, chest tightness, shortness of breath, wheezing and stridor  Cardiovascular: Negative for chest pain, palpitations and leg swelling  Gastrointestinal: Negative for abdominal distention, abdominal pain, anal bleeding, blood in stool, constipation, diarrhea, nausea, rectal pain and vomiting  Endocrine: Negative for cold intolerance, heat intolerance, polydipsia, polyphagia and polyuria  Genitourinary: Negative for decreased urine volume, difficulty urinating, dysuria, enuresis, flank pain, frequency, genital sores, hematuria and urgency  Musculoskeletal: Negative for arthralgias, back pain, gait problem, joint swelling, myalgias, neck pain and neck stiffness  Skin: Negative for color change, pallor, rash and wound  Neurological: Negative for dizziness, tremors, seizures, syncope, facial asymmetry, speech difficulty, weakness, light-headedness, numbness and headaches  Hematological: Negative for adenopathy  Does not bruise/bleed easily  Psychiatric/Behavioral: Negative for agitation, behavioral problems, confusion, decreased concentration, dysphoric mood, hallucinations, self-injury, sleep disturbance and suicidal ideas  The patient is not nervous/anxious and is not hyperactive            Objective:  Vitals:    09/01/20 1303   BP: 112/74   BP Location: Left arm   Patient Position: Sitting   Cuff Size: Standard   Pulse: 72   Resp: 14   Temp: (!) 97 °F (36 1 °C)   TempSrc: Temporal   Weight: 74 5 kg (164 lb 3 2 oz)   Height: 5' 2 5" (1 588 m)     Body mass index is 29 55 kg/m²  Physical Exam  Constitutional:       Appearance: She is well-developed  HENT:      Head: Normocephalic  Right Ear: External ear normal       Left Ear: External ear normal       Nose: Nose normal       Mouth/Throat:      Mouth: Mucous membranes are moist       Pharynx: Oropharynx is clear  Eyes:      Extraocular Movements: Extraocular movements intact  Conjunctiva/sclera: Conjunctivae normal       Pupils: Pupils are equal, round, and reactive to light  Neck:      Musculoskeletal: Neck supple  Thyroid: No thyromegaly  Cardiovascular:      Rate and Rhythm: Normal rate and regular rhythm  Pulses: Normal pulses  Heart sounds: Normal heart sounds  No murmur  Pulmonary:      Effort: Pulmonary effort is normal  No respiratory distress  Breath sounds: Normal breath sounds  Abdominal:      General: Abdomen is flat  Bowel sounds are normal       Palpations: Abdomen is soft  Musculoskeletal: Normal range of motion  General: No swelling  Lymphadenopathy:      Cervical: No cervical adenopathy  Skin:     General: Skin is warm and dry  Neurological:      General: No focal deficit present  Mental Status: She is alert and oriented to person, place, and time  Deep Tendon Reflexes: Reflexes are normal and symmetric  Psychiatric:         Mood and Affect: Mood normal          Thought Content:  Thought content normal          Judgment: Judgment normal

## 2020-09-04 ENCOUNTER — APPOINTMENT (EMERGENCY)
Dept: RADIOLOGY | Facility: HOSPITAL | Age: 50
End: 2020-09-04
Payer: COMMERCIAL

## 2020-09-04 ENCOUNTER — HOSPITAL ENCOUNTER (EMERGENCY)
Facility: HOSPITAL | Age: 50
Discharge: HOME | End: 2020-09-04
Attending: EMERGENCY MEDICINE | Admitting: EMERGENCY MEDICINE
Payer: COMMERCIAL

## 2020-09-04 ENCOUNTER — TELEPHONE (OUTPATIENT)
Dept: INTERNAL MEDICINE CLINIC | Facility: CLINIC | Age: 50
End: 2020-09-04

## 2020-09-04 VITALS
SYSTOLIC BLOOD PRESSURE: 103 MMHG | HEIGHT: 63 IN | RESPIRATION RATE: 16 BRPM | OXYGEN SATURATION: 96 % | HEART RATE: 85 BPM | WEIGHT: 165.34 LBS | DIASTOLIC BLOOD PRESSURE: 59 MMHG | TEMPERATURE: 99.1 F | BODY MASS INDEX: 29.3 KG/M2

## 2020-09-04 DIAGNOSIS — S52.122A FRACTURE OF RADIAL HEAD, LEFT, CLOSED: Primary | ICD-10-CM

## 2020-09-04 DIAGNOSIS — S50.02XA CONTUSION OF LEFT ELBOW, INITIAL ENCOUNTER: ICD-10-CM

## 2020-09-04 PROCEDURE — 99283 EMERGENCY DEPT VISIT LOW MDM: CPT

## 2020-09-04 PROCEDURE — 73070 X-RAY EXAM OF ELBOW: CPT

## 2020-09-04 PROCEDURE — 99284 EMERGENCY DEPT VISIT MOD MDM: CPT | Performed by: EMERGENCY MEDICINE

## 2020-09-04 RX ORDER — HYDROCODONE BITARTRATE AND ACETAMINOPHEN 5; 325 MG/1; MG/1
1 TABLET ORAL EVERY 6 HOURS PRN
Qty: 15 TABLET | Refills: 0 | Status: SHIPPED | OUTPATIENT
Start: 2020-09-04 | End: 2020-09-11

## 2020-09-04 RX ORDER — IBUPROFEN 600 MG/1
600 TABLET ORAL ONCE
Status: COMPLETED | OUTPATIENT
Start: 2020-09-04 | End: 2020-09-04

## 2020-09-04 RX ADMIN — IBUPROFEN 600 MG: 600 TABLET, FILM COATED ORAL at 11:08

## 2020-09-04 NOTE — DISCHARGE INSTRUCTIONS
Wear the sling to protect your elbow while it heals  Keep it elevated, and apply ice at rest   Take ibuprofen (Motrin, Advil) or acetaminophen (Tylenol) as needed for pain, as per the instructions  Take the prescription pain medication as needed for continued severe pain  The more the swelling of the elbow goes down, the more the tingling in your fingers will improve  Since you do have a controlled substances contract with your PCP, confirm with your PCP that it is okay to fill the prescription pain medication written by the hospital before you do so  Follow-up with orthopedics in one week for further evaluation of your elbow  Return if you develop complete loss of sensation, or unable to move your fingers, or for any other concerns

## 2020-09-04 NOTE — TELEPHONE ENCOUNTER
She bumped her elbow on the door frame last night    went to the Er & they gave RX for percocet and because she is on clonazepam, said to check with Dr Benjy Sinclair to see if it's ok for her to take percocet      She's applying ice and taking Advil but was told it will probably become very painful at night    she amber be following up with ortho

## 2020-09-04 NOTE — ED PROVIDER NOTES
History  Chief Complaint   Patient presents with    Elbow Injury     Patient presents to the ED after hitting left elbow on door frame  Patient c/o pain from elbow down to wrist as well as numbness and tingling  HPI    Prior to Admission Medications   Prescriptions Last Dose Informant Patient Reported? Taking?    albuterol (PROVENTIL HFA,VENTOLIN HFA) 90 mcg/act inhaler  Self No No   Sig: Inhale 2 puffs every 4 (four) hours as needed for wheezing   amLODIPine (NORVASC) 2 5 mg tablet  Self No No   Sig: Take 1 tablet (2 5 mg total) by mouth daily   atorvastatin (LIPITOR) 10 mg tablet  Self No No   Sig: TAKE 1 TABLET BY MOUTH EVERY DAY   clonazePAM (KlonoPIN) 0 5 mg tablet  Self No No   Sig: TAKE 1 TABLET BY MOUTH  TWICE DAILY AS NEEDED FOR  ANXIETY   ergocalciferol (VITAMIN D2) 50,000 units  Self No No   Sig: Take 1 capsule (50,000 Units total) by mouth once a week   fluticasone (FLONASE) 50 mcg/act nasal spray  Self No No   Si sprays into each nostril daily   Patient taking differently: 2 sprays into each nostril as needed    levothyroxine 75 mcg tablet  Self No No   Sig: Take 1 tablet (75 mcg total) by mouth daily Take 50mcg daily   naproxen (NAPROSYN) 500 mg tablet  Self No No   Sig: Take 1 tablet (500 mg total) by mouth 2 (two) times a day with meals   Patient taking differently: Take 500 mg by mouth as needed    rizatriptan (MAXALT-MLT) 10 MG disintegrating tablet  Self No No   Sig: Take 1 tablet (10 mg total) by mouth once as needed for migraine for up to 1 dose May repeat in 2 hours if needed   venlafaxine 225 MG TB24  Self No No   Sig: TAKE 1 TABLET BY MOUTH  DAILY WITH BREAKFAST      Facility-Administered Medications: None       Past Medical History:   Diagnosis Date    Anxiety     Asthma     Depression     Disease of thyroid gland     Dizziness     Forgetfulness     Hashimoto's disease     Hashimoto's disease     History of fainting as a child     Hyperlipidemia     Hypertension     Numbness and tingling     MIRANDA (obstructive sleep apnea)     Post traumatic stress disorder 2/28/2019       Past Surgical History:   Procedure Laterality Date    BREAST SURGERY Bilateral 2002    reduction    HYSTERECTOMY  2005    [place] COLONOSCOPY FLX DX W/COLLJ SPEC WHEN PFRMD N/A 10/16/2018    Procedure: EGD AND COLONOSCOPY;  Surgeon: Valerie Hua MD;  Location: [facility]; Service: Gastroenterology    REDUCTION MAMMAPLASTY Bilateral 1997    REDUCTION MAMMAPLASTY Bilateral 2001    SINUS SURGERY         Family History   Problem Relation Age of Onset    Hyperlipidemia Mother    Earna Emmanuelle Lupus Mother     Depression Father     Cervical cancer Paternal Grandmother     Ovarian cancer Paternal Grandmother     No Known Problems Half-Sister     No Known Problems Half-Brother     No Known Problems Half-Brother     No Known Problems Half-Sister     No Known Problems Half-Sister     No Known Problems Maternal Aunt     No Known Problems Maternal Aunt     No Known Problems Maternal Aunt     No Known Problems Maternal Aunt     No Known Problems Paternal Aunt     No Known Problems Paternal Aunt     Breast cancer Neg Hx     Colon cancer Neg Hx     Uterine cancer Neg Hx     Seizures Neg Hx      I have reviewed and agree with the history as documented  E-Cigarette/Vaping    E-Cigarette Use Never User      E-Cigarette/Vaping Substances    Nicotine No     THC No     CBD No     Flavoring No     Other No     Unknown No      Social History     Tobacco Use    Smoking status: Never Smoker    Smokeless tobacco: Never Used   Substance Use Topics    Alcohol use: Yes     Frequency: 2-3 times a week     Drinks per session: 1 or 2     Binge frequency: Never     Comment: socially    Drug use: No       Review of Systems    Physical Exam  Physical Exam  Vitals signs and nursing note reviewed  Constitutional:       General: She is not in acute distress  Appearance: She is well-developed     HENT:      Head: Normocephalic and atraumatic  Eyes:      Conjunctiva/sclera: Conjunctivae normal       Pupils: Pupils are equal, round, and reactive to light  Neck:      Musculoskeletal: Normal range of motion  Trachea: No tracheal deviation  Cardiovascular:      Rate and Rhythm: Normal rate and regular rhythm  Pulses:           Radial pulses are 2+ on the left side  Pulmonary:      Effort: Pulmonary effort is normal  No respiratory distress  Musculoskeletal:      Left shoulder: She exhibits normal range of motion and no tenderness  Left elbow: She exhibits decreased range of motion (due to pain)  Tenderness (diffuse, worst posteriorly) found  Left wrist: She exhibits decreased range of motion (due to pain in elbow)  She exhibits no tenderness and no bony tenderness  Left upper arm: She exhibits no tenderness  Left forearm: She exhibits no tenderness  Left hand: She exhibits normal range of motion and no tenderness  Comments: Sensation intact throughout the hand in all nerve distributions  No reproduced paresthesias with palpation of the ulnar nerve at the elbow   Skin:     General: Skin is warm and dry  Neurological:      Mental Status: She is alert and oriented to person, place, and time  GCS: GCS eye subscore is 4  GCS verbal subscore is 5  GCS motor subscore is 6     Psychiatric:         Behavior: Behavior normal          Vital Signs  ED Triage Vitals   Temperature Pulse Respirations Blood Pressure SpO2   09/04/20 1112 09/04/20 1052 09/04/20 1052 09/04/20 1052 09/04/20 1052   99 1 °F (37 3 °C) 94 18 133/80 97 %      Temp Source Heart Rate Source Patient Position - Orthostatic VS BP Location FiO2 (%)   09/04/20 1112 09/04/20 1052 09/04/20 1052 09/04/20 1052 --   Oral Monitor Sitting Right arm       Pain Score       09/04/20 1052       Worst Possible Pain           Vitals:    09/04/20 1052   BP: 133/80   Pulse: 94   Patient Position - Orthostatic VS: Sitting         Visual Acuity      ED Medications  Medications   ibuprofen (MOTRIN) tablet 600 mg (600 mg Oral Given 9/4/20 1108)       Diagnostic Studies  Results Reviewed     None                 XR elbow 2 vw LEFT   Final Result by Tomas Caldwell DO (09/04 1215)      Comminuted, minimally displaced radial head fracture  The study was marked in [facility] for immediate notification  Workstation performed: [id number]                    Procedures  Procedures         ED Course       US AUDIT      Most Recent Value   Initial Alcohol Screen: US AUDIT-C    1  How often do you have a drink containing alcohol?  0 Filed at: 09/04/2020 1113   2  How many drinks containing alcohol do you have on a typical day you are drinking? 0 Filed at: 09/04/2020 1113   3a  Male UNDER 65: How often do you have five or more drinks on one occasion? 0 Filed at: 09/04/2020 1113   3b  FEMALE Any Age, or MALE 65+: How often do you have 4 or more drinks on one occassion? 0 Filed at: 09/04/2020 1113   Audit-C Score  0 Filed at: 09/04/2020 1113                  ARTUR/DAST-10      Most Recent Value   How many times in the past year have you    Used an illegal drug or used a prescription medication for non-medical reasons? Never Filed at: 09/04/2020 1113                                MDM  Number of Diagnoses or Management Options  Contusion of left elbow, initial encounter: new and requires workup  Fracture of radial head, left, closed: new and requires workup  Diagnosis management comments: This is a 50-year-old female who presents here today with left elbow pain  Yesterday evening she was walking up stairs carrying a stack of blankets so could not clearly see in front of her  She states she had her left elbow on the edge of a wooden door frame  She began having pain in her elbow at that time  She states it is persistent this morning  She feels like her fingers are tingling  She denies any other pain in her arm    She has not taken or done anything for the pain  She is right-hand dominant, and denies prior injuries to the arm  She takes no blood thinning medications  Review of systems:  Otherwise negative unless stated above    She is well-appearing, in no acute distress  She has significant tenderness to the elbow, worst posteriorly  She has significantly decreased range of motion of the elbow and of the wrist due to pain  She does have sensation intact diffusely through the hand  Exam is otherwise unremarkable  There is a possibility of fracture given her significant pain, however given the degree of swelling could all be related to soft tissue injury and contusion  She likely has paresthesia of the ulnar nerve from impact, but with intact sensation I am not concerned about complete nerve injury  We will get an x-ray to evaluate for underlying bony injury  X-ray was reviewed by myself the radiologist and show a radial head fracture  I discussed the patient findings, treatment at home, follow-up with Orthopedics, and indications for return, and she expresses understanding with this plan  She does have a controlled substances agreement with her primary care doctor, and per review of her chart and PDMP, is prescribed 60 tablets of 0 5 milligram clonazepam, filled every 1-2 months  She is not prescribed any narcotic medications  I did discuss with the patient that per her pain agreement, she is not supposed to get prescriptions filled by other providers, however most are lenient with in the setting of acute fractures, however she did confirm that her PCP will not consider this a violation of her pain agreement prior to her filling in this  The patient is given a paper prescription for the medication, to ensure that she is able to get it more easily filled over the weekend or later this evening as needed, to ensure that she is able to take him urgently for her pain as would be detrimental if she is unable to         Amount and/or Complexity of Data Reviewed  Tests in the radiology section of CPT®: ordered and reviewed  Independent visualization of images, tracings, or specimens: yes          Disposition  Final diagnoses:   Fracture of radial head, left, closed   Contusion of left elbow, initial encounter     Time reflects when diagnosis was documented in both MDM as applicable and the Disposition within this note     Time User Action Codes Description Comment    9/4/2020 12:29 PM Mca Carlton Add [S52 122A] Fracture of radial head, left, closed     9/4/2020 12:30 PM Mac Carlton Add [S50 02XA] Contusion of left elbow, initial encounter       ED Disposition     ED Disposition Condition Date/Time Comment    Discharge Good Fri Sep 4, 2020 1155 [place] discharge to home/self care        Follow-up Information     Follow up With Specialties Details Why Contact Info Additional [address] [facility] [place] [facility] Schedule an appointment as soon as possible for a visit in 1 week to follow up on your elbow [address] [place] [address] 42 [id number] [facility] [place], [address] [place] [address] [address], [place], [place], [address] [facility]    Carol Taylor MD Internal Medicine, Palliative Care Schedule an appointment as soon as possible for a visit on 9/4/2020 To ensure that you will not be in violation of your pain agreement by filling the prescription [facility] [address], [address]             Patient's Medications   Discharge Prescriptions    HYDROCODONE-ACETAMINOPHEN (NORCO) 5-325 MG PER TABLET    Take 1 tablet by mouth every 6 (six) hours as needed for pain (severe pain, not otherwise controlled) for up to 10 daysMax Daily Amount: 4 tablets       Start Date: 9/4/2020  End Date: 9/14/2020       Order Dose: 1 tablet Quantity: 15 tablet    Refills: 0     No discharge procedures on file      PDMP Review     None          ED Provider  Electronically Signed by           Feliz Akins MD  09/04/20 4973

## 2020-09-05 ENCOUNTER — TELEPHONE (OUTPATIENT)
Dept: INTERNAL MEDICINE CLINIC | Facility: CLINIC | Age: 50
End: 2020-09-05

## 2020-09-05 ENCOUNTER — TELEPHONE (OUTPATIENT)
Dept: OTHER | Facility: OTHER | Age: 50
End: 2020-09-05

## 2020-09-05 NOTE — TELEPHONE ENCOUNTER
----- Message from Debby Rizo MD sent at 9/5/2020  9:29 AM EDT -----  Regarding: FW: Prescription Question  Contact: [phone]  She can use the vicodin, she just shouldn't use w/in 4-6 hrs of the clonazepam   She should continue the ibuprofen q 8 hrs and take up to 4000 mg tylenol per day (including what is in the vidodin)    ----- Message -----  From: Neymar Reyes MA  Sent: 9/5/2020   7:48 AM EDT  To: Debby Rizo MD  Subject: FW: Prescription Question                        I spoke to the pt  She said the hospital prescribed her Percocet but Dr Danielle Genao told her not to take it  Pt said she is currently taking 800 mg of Ibuprofen  I explained to the pt Dr Danielle Genao was not in the office today but I will still forward this message to Dr Danielle Genao   Pt said ok it's no emergency   ----- Message -----  From: Nabil Cabrera  Sent: 9/5/2020   7:11 AM EDT  To: [facility] Internal [facility] Clinical  Subject: Prescription Question                            Help in lots of pain since yesterday's ER visit

## 2020-09-05 NOTE — TELEPHONE ENCOUNTER
Patient states she broke her elbow and is in pain  She says that Dr Serena Arboleda told her she cannot take the pain meds that was prescribed to her in ED because of all the other meds she is already taking  Please call patient and advise

## 2020-09-11 ENCOUNTER — OFFICE VISIT (OUTPATIENT)
Dept: OBGYN CLINIC | Facility: CLINIC | Age: 50
End: 2020-09-11
Payer: COMMERCIAL

## 2020-09-11 ENCOUNTER — TELEPHONE (OUTPATIENT)
Dept: OBGYN CLINIC | Facility: MEDICAL CENTER | Age: 50
End: 2020-09-11

## 2020-09-11 ENCOUNTER — APPOINTMENT (OUTPATIENT)
Dept: RADIOLOGY | Facility: CLINIC | Age: 50
End: 2020-09-11
Payer: COMMERCIAL

## 2020-09-11 VITALS
HEIGHT: 62 IN | HEART RATE: 88 BPM | WEIGHT: 167.6 LBS | SYSTOLIC BLOOD PRESSURE: 120 MMHG | BODY MASS INDEX: 30.84 KG/M2 | DIASTOLIC BLOOD PRESSURE: 83 MMHG

## 2020-09-11 DIAGNOSIS — M25.522 PAIN IN LEFT ELBOW: ICD-10-CM

## 2020-09-11 DIAGNOSIS — M25.532 PAIN IN LEFT WRIST: ICD-10-CM

## 2020-09-11 DIAGNOSIS — S52.122A CLOSED DISPLACED FRACTURE OF HEAD OF LEFT RADIUS, INITIAL ENCOUNTER: ICD-10-CM

## 2020-09-11 DIAGNOSIS — M25.532 PAIN IN LEFT WRIST: Primary | ICD-10-CM

## 2020-09-11 PROCEDURE — 73080 X-RAY EXAM OF ELBOW: CPT

## 2020-09-11 PROCEDURE — 99203 OFFICE O/P NEW LOW 30 MIN: CPT | Performed by: ORTHOPAEDIC SURGERY

## 2020-09-11 PROCEDURE — 73110 X-RAY EXAM OF WRIST: CPT

## 2020-09-11 RX ORDER — HYDROCODONE BITARTRATE AND ACETAMINOPHEN 5; 325 MG/1; MG/1
1 TABLET ORAL EVERY 6 HOURS PRN
Qty: 10 TABLET | Refills: 0 | Status: SHIPPED | OUTPATIENT
Start: 2020-09-11 | End: 2020-09-12

## 2020-09-11 RX ORDER — NALOXONE HYDROCHLORIDE 4 MG/.1ML
SPRAY NASAL
Qty: 1 EACH | Refills: 1 | Status: ON HOLD | OUTPATIENT
Start: 2020-09-11 | End: 2020-10-09 | Stop reason: ALTCHOICE

## 2020-09-11 NOTE — PROGRESS NOTES
HPI:  Patient is a 51y o  year old  female  who presents to the office for evaluation of her left elbow and wrist   Patient states that on 09/03/2020 she hit her left elbow on the door frame and had pain and swelling  Patient was seen at the emergency department where x-rays were performed and patient was provided with a sling  Patient has maintained sling and is taking Norco for pain  Patient states that she is also having pain on the radial side of left wrist that at times she feels is worse than her elbow  Patient states she was having pain and difficulty with motion at her left elbow prior to the injury        ROS:   General: No fever, no chills, no weight loss, no weight gain  HEENT:  No loss of hearing, no nose bleeds, no sore throat  Eyes:  No eye pain, no red eyes, no visual disturbance  Respiratory:  No cough, no shortness of breath, no wheezing  Cardiovascular:  No chest pain, no palpitations, no edema  GI: No abdominal pain, no nausea, no vomiting  Endocrine: No frequent urination, no excessive thirst  Urinary:  No dysuria, no hematuria, no incontinence  Musculoskeletal: see HPI and PE  Skin:  No rash, no wounds  Neurological:  No dizziness, no headache, no numbness  Psychiatric:  No difficulty concentrating, no depression, no suicide thoughts, no anxiety  Review of all other systems is negative    PMH:  Past Medical History:   Diagnosis Date    Anxiety     Asthma     Depression     Disease of thyroid gland     Dizziness     Forgetfulness     Hashimoto's disease     Hashimoto's disease     History of fainting as a child     Hyperlipidemia     Hypertension     Numbness and tingling     MIRANDA (obstructive sleep apnea)     Post traumatic stress disorder 2/28/2019       PSH:  Past Surgical History:   Procedure Laterality Date    BREAST SURGERY Bilateral 2002    reduction    HYSTERECTOMY  2005    AL COLONOSCOPY FLX DX W/COLLJ SPEC WHEN PFRMD N/A 10/16/2018    Procedure: EGD AND COLONOSCOPY;  Surgeon: Kristel Mayer MD;  Location: [facility]; Service: Gastroenterology    REDUCTION MAMMAPLASTY Bilateral 1997    REDUCTION MAMMAPLASTY Bilateral 2001    SINUS SURGERY         Medications:  Current Outpatient Medications   Medication Sig Dispense Refill    albuterol (PROVENTIL HFA,VENTOLIN HFA) 90 mcg/act inhaler Inhale 2 puffs every 4 (four) hours as needed for wheezing 1 Inhaler 2    amLODIPine (NORVASC) 2 5 mg tablet Take 1 tablet (2 5 mg total) by mouth daily 30 tablet 0    atorvastatin (LIPITOR) 10 mg tablet TAKE 1 TABLET BY MOUTH EVERY DAY 90 tablet 1    clonazePAM (KlonoPIN) 0 5 mg tablet TAKE 1 TABLET BY MOUTH  TWICE DAILY AS NEEDED FOR  ANXIETY 60 tablet 1    ergocalciferol (VITAMIN D2) 50,000 units Take 1 capsule (50,000 Units total) by mouth once a week 12 capsule 0    fluticasone (FLONASE) 50 mcg/act nasal spray 2 sprays into each nostril daily (Patient taking differently: 2 sprays into each nostril as needed ) 16 g 0    levothyroxine 75 mcg tablet Take 1 tablet (75 mcg total) by mouth daily Take 50mcg daily      rizatriptan (MAXALT-MLT) 10 MG disintegrating tablet Take 1 tablet (10 mg total) by mouth once as needed for migraine for up to 1 dose May repeat in 2 hours if needed 9 tablet 0    venlafaxine 225 MG TB24 TAKE 1 TABLET BY MOUTH  DAILY WITH BREAKFAST 90 tablet 3    HYDROcodone-acetaminophen (NORCO) 5-325 mg per tablet Take 1 tablet by mouth every 6 (six) hours as needed for painMax Daily Amount: 4 tablets 10 tablet 0    naloxone (NARCAN) 4 mg/0 1 mL nasal spray Administer 1 spray into a nostril  If breathing does not return to normal or if breathing difficulty resumes after 2-3 minutes, give another dose in the other nostril using a new spray   1 each 1    naproxen (NAPROSYN) 500 mg tablet Take 1 tablet (500 mg total) by mouth 2 (two) times a day with meals (Patient taking differently: Take 500 mg by mouth as needed ) 20 tablet 0     No current facility-administered medications for this visit  Allergies: Allergies   Allergen Reactions    Lisinopril-Hydrochlorothiazide Hives    Other Hives     States she has no allergies       Family History:  Family History   Problem Relation Age of Onset    Hyperlipidemia Mother     Lupus Mother     Depression Father     Cervical cancer Paternal Grandmother     Ovarian cancer Paternal Grandmother     No Known Problems Half-Sister     No Known Problems Half-Brother     No Known Problems Half-Brother     No Known Problems Half-Sister     No Known Problems Half-Sister     No Known Problems Maternal Aunt     No Known Problems Maternal Aunt     No Known Problems Maternal Aunt     No Known Problems Maternal Aunt     No Known Problems Paternal Aunt     No Known Problems Paternal Aunt     Breast cancer Neg Hx     Colon cancer Neg Hx     Uterine cancer Neg Hx     Seizures Neg Hx        Social History:  Social History     Occupational History    Not on file   Tobacco Use    Smoking status: Never Smoker    Smokeless tobacco: Never Used   Substance and Sexual Activity    Alcohol use: Yes     Frequency: 2-3 times a week     Drinks per session: 1 or 2     Binge frequency: Never     Comment: socially    Drug use: No    Sexual activity: Not Currently     Partners: Female     Birth control/protection: None       Physical Exam:  General :  Alert, cooperative, no distress, appears stated age  Blood pressure 120/83, pulse 88, height 5' 2" (1 575 m), weight 76 kg (167 lb 9 6 oz), not currently breastfeeding  Head:  Normocephalic, without obvious abnormality, atraumatic   Eyes:  Conjunctiva/corneas clear, EOM's intact,   Ears: Both ears normal appearance, no hearing deficits      Nose: Nares normal, septum midline, no drainage    Neck: Supple,  trachea midline, no adenopathy, no tenderness, no mass   Back:   Symmetric, no curvature, ROM normal, no tenderness   Lungs:   Respirations unlabored   Chest Wall: No tenderness or deformity   Extremities: Extremities normal, atraumatic, no cyanosis or edema      Pulses: 2+ and symmetric   Skin: Skin color, texture, turgor normal, no rashes or lesions      Neurologic: Normal           Left Hand Exam     Comments:  Snuff box tenderness   Pain with wrist motion       Left Elbow Exam     Comments:  Limited extension at the elbow  Ecchymosis and tenderness over the medial aspect of elbow  Tender to palpation over radial head               Imaging Studies: The following imaging studies were reviewed in office today  My findings are noted  X-rays of the left elbow  performed today show displaced comminuted radial head fracture with loose body anteriorly     X-rays of the left wrist performed today show  no acute fractures or dislocations, degenerative changes    Assessment  Encounter Diagnoses   Name Primary?  Pain in left elbow     Pain in left wrist Yes    Closed displaced fracture of head of left radius, initial encounter          Plan:    After reviewing x-rays of left elbow and wrist and examining patient she was advised that it would be best to order a CT scan of the left elbow for further evaluation of fracture as well as fragments  Patient was advised that she may also have an MCL injury due to the tenderness and bruising over the medial aspect of her left elbow although ligamentous testing was not performed today due to fracture of the radial head  MRI of the left wrist was ordered to evaluate for occult fracture of the scaphoid due to snuffbox tenderness  Patient was provided with thumb spica splint to wear for protection and support  Patient was advised to continue sling as well  Patient will follow up with Dr Linda Santana after CT and MRI are performed  Refill Ruby was sent to patient's pharmacy on file      Scribe Attestation    I,:   Nona Little am acting as a scribe while in the presence of the attending physician :        I,:   Blue Arrieta MD personally performed the services described in this documentation    as scribed in my presence  :        '

## 2020-09-11 NOTE — TELEPHONE ENCOUNTER
Patient sees Dr Vern Pavon  Patient calling in requesting a pain medication  She was told something would be ordered for her      [facility] [address] [facility] # [phone]

## 2020-09-12 DIAGNOSIS — S52.125S CLOSED NONDISPLACED FRACTURE OF HEAD OF LEFT RADIUS, SEQUELA: Primary | ICD-10-CM

## 2020-09-12 RX ORDER — HYDROCODONE BITARTRATE AND ACETAMINOPHEN 5; 325 MG/1; MG/1
1 TABLET ORAL EVERY 6 HOURS PRN
Qty: 10 TABLET | Refills: 0 | Status: SHIPPED | OUTPATIENT
Start: 2020-09-12 | End: 2020-12-15

## 2020-09-12 NOTE — TELEPHONE ENCOUNTER
Patient is calling because her meds were sent to [facility] and not [facility]  Page sent to on call doctor

## 2020-09-16 ENCOUNTER — HOSPITAL ENCOUNTER (OUTPATIENT)
Dept: CT IMAGING | Facility: HOSPITAL | Age: 50
Discharge: HOME | End: 2020-09-16
Attending: ORTHOPAEDIC SURGERY
Payer: COMMERCIAL

## 2020-09-16 DIAGNOSIS — M25.522 PAIN IN LEFT ELBOW: ICD-10-CM

## 2020-09-16 DIAGNOSIS — S52.122A CLOSED DISPLACED FRACTURE OF HEAD OF LEFT RADIUS, INITIAL ENCOUNTER: ICD-10-CM

## 2020-09-16 PROCEDURE — 73200 CT UPPER EXTREMITY W/O DYE: CPT

## 2020-09-19 ENCOUNTER — HOSPITAL ENCOUNTER (OUTPATIENT)
Dept: MRI IMAGING | Facility: HOSPITAL | Age: 50
Discharge: HOME | End: 2020-09-19
Attending: ORTHOPAEDIC SURGERY
Payer: COMMERCIAL

## 2020-09-19 DIAGNOSIS — M25.532 PAIN IN LEFT WRIST: ICD-10-CM

## 2020-09-19 PROCEDURE — 73221 MRI JOINT UPR EXTREM W/O DYE: CPT

## 2020-09-19 PROCEDURE — G1004 CDSM NDSC: HCPCS

## 2020-09-24 ENCOUNTER — APPOINTMENT (OUTPATIENT)
Dept: RADIOLOGY | Facility: CLINIC | Age: 50
End: 2020-09-24
Payer: COMMERCIAL

## 2020-09-24 ENCOUNTER — OFFICE VISIT (OUTPATIENT)
Dept: OBGYN CLINIC | Facility: CLINIC | Age: 50
End: 2020-09-24
Payer: COMMERCIAL

## 2020-09-24 VITALS
HEIGHT: 62 IN | DIASTOLIC BLOOD PRESSURE: 84 MMHG | BODY MASS INDEX: 30.73 KG/M2 | SYSTOLIC BLOOD PRESSURE: 133 MMHG | HEART RATE: 86 BPM | WEIGHT: 167 LBS

## 2020-09-24 DIAGNOSIS — S52.122A CLOSED DISPLACED FRACTURE OF HEAD OF LEFT RADIUS, INITIAL ENCOUNTER: ICD-10-CM

## 2020-09-24 DIAGNOSIS — S52.122A CLOSED DISPLACED FRACTURE OF HEAD OF LEFT RADIUS, INITIAL ENCOUNTER: Primary | ICD-10-CM

## 2020-09-24 PROCEDURE — 99214 OFFICE O/P EST MOD 30 MIN: CPT | Performed by: ORTHOPAEDIC SURGERY

## 2020-09-24 PROCEDURE — 73080 X-RAY EXAM OF ELBOW: CPT

## 2020-09-24 RX ORDER — ACETAMINOPHEN 500 MG
TABLET ORAL
Qty: 30 TABLET | Refills: 0 | Status: SHIPPED | OUTPATIENT
Start: 2020-09-24 | End: 2020-12-15

## 2020-09-24 RX ORDER — IBUPROFEN 600 MG/1
TABLET ORAL
Qty: 30 TABLET | Refills: 0 | Status: SHIPPED | OUTPATIENT
Start: 2020-09-24 | End: 2020-12-15

## 2020-09-24 NOTE — PROGRESS NOTES
CHIEF COMPLAINT:  Chief Complaint   Patient presents with    Left Elbow - Pain    Left Wrist - Pain       SUBJECTIVE:  Sharmin Vargas is a 51y o  year old female who presents to the office after being referred by Dr Jyothi Klein for evaluation of her left wrist and elbow  Patient sustained injury 09/03/2020 when she had her left elbow on the door frame and had pain and swelling  Patient proceed in the emergency department where x-rays were performed and patient was provided with a sling  Patient has maintained sling and taking Norco for pain  Patient states that she is also having pain on the radial aspect of her left wrist at times it is worse than her elbow  Today patient presents to the office without sling and wrist splint  Pt continues to have pain and stiffness at the left elbow  Patient also previously stated that she was having no pain and with motion of her left elbow prior to injury and was advised to receive CSI but is unsure of her Dx  Pt also states that she was receiving chiropractic care for her left elbow due to the stiffness  The patient denies any cardiac  Denies diabetes  Denies any history of MI, gastric ulcers, kidney or liver issues  Denies blood thinners  Pt has asthma from asbestos, no recent hospitalizations,  MIRANDA       PAST MEDICAL HISTORY:  Past Medical History:   Diagnosis Date    Anxiety     Asthma     Depression     Disease of thyroid gland     Dizziness     Forgetfulness     Hashimoto's disease     Hashimoto's disease     History of fainting as a child     Hyperlipidemia     Hypertension     Numbness and tingling     MIRANDA (obstructive sleep apnea)     Post traumatic stress disorder 2/28/2019       PAST SURGICAL HISTORY:  Past Surgical History:   Procedure Laterality Date    BREAST SURGERY Bilateral 2002    reduction    HYSTERECTOMY  2005    SC COLONOSCOPY FLX DX W/COLLJ SPEC WHEN PFRMD N/A 10/16/2018    Procedure: EGD AND COLONOSCOPY;  Surgeon: Iris Gray MD;  Location: [facility];   Service: Gastroenterology    REDUCTION MAMMAPLASTY Bilateral 1997    REDUCTION MAMMAPLASTY Bilateral 2001    SINUS SURGERY         FAMILY HISTORY:  Family History   Problem Relation Age of Onset    Hyperlipidemia Mother    Puneet [place] Lupus Mother     Depression Father     Cervical cancer Paternal Grandmother     Ovarian cancer Paternal Grandmother     No Known Problems Half-Sister     No Known Problems Half-Brother     No Known Problems Half-Brother     No Known Problems Half-Sister     No Known Problems Half-Sister     No Known Problems Maternal Aunt     No Known Problems Maternal Aunt     No Known Problems Maternal Aunt     No Known Problems Maternal Aunt     No Known Problems Paternal Aunt     No Known Problems Paternal Aunt     Breast cancer Neg Hx     Colon cancer Neg Hx     Uterine cancer Neg Hx     Seizures Neg Hx        SOCIAL HISTORY:  Social History     Tobacco Use    Smoking status: Never Smoker    Smokeless tobacco: Never Used   Substance Use Topics    Alcohol use: Yes     Frequency: 2-3 times a week     Drinks per session: 1 or 2     Binge frequency: Never     Comment: socially    Drug use: No       MEDICATIONS:    Current Outpatient Medications:     albuterol (PROVENTIL HFA,VENTOLIN HFA) 90 mcg/act inhaler, Inhale 2 puffs every 4 (four) hours as needed for wheezing, Disp: 1 Inhaler, Rfl: 2    amLODIPine (NORVASC) 2 5 mg tablet, Take 1 tablet (2 5 mg total) by mouth daily, Disp: 30 tablet, Rfl: 0    atorvastatin (LIPITOR) 10 mg tablet, TAKE 1 TABLET BY MOUTH EVERY DAY, Disp: 90 tablet, Rfl: 1    clonazePAM (KlonoPIN) 0 5 mg tablet, TAKE 1 TABLET BY MOUTH  TWICE DAILY AS NEEDED FOR  ANXIETY, Disp: 60 tablet, Rfl: 1    ergocalciferol (VITAMIN D2) 50,000 units, Take 1 capsule (50,000 Units total) by mouth once a week, Disp: 12 capsule, Rfl: 0    fluticasone (FLONASE) 50 mcg/act nasal spray, 2 sprays into each nostril daily (Patient taking differently: 2 sprays into each nostril as needed ), Disp: 16 g, Rfl: 0    HYDROcodone-acetaminophen (NORCO) 5-325 mg per tablet, Take 1 tablet by mouth every 6 (six) hours as needed for painMax Daily Amount: 4 tablets, Disp: 10 tablet, Rfl: 0    levothyroxine 75 mcg tablet, Take 1 tablet (75 mcg total) by mouth daily Take 50mcg daily, Disp: , Rfl:     naloxone (NARCAN) 4 mg/0 1 mL nasal spray, Administer 1 spray into a nostril  If breathing does not return to normal or if breathing difficulty resumes after 2-3 minutes, give another dose in the other nostril using a new spray , Disp: 1 each, Rfl: 1    rizatriptan (MAXALT-MLT) 10 MG disintegrating tablet, Take 1 tablet (10 mg total) by mouth once as needed for migraine for up to 1 dose May repeat in 2 hours if needed, Disp: 9 tablet, Rfl: 0    venlafaxine 225 MG TB24, TAKE 1 TABLET BY MOUTH  DAILY WITH BREAKFAST, Disp: 90 tablet, Rfl: 3    acetaminophen (TYLENOL) 500 mg tablet, Take one tablet the day of surgery, then take one tablet for breakfast lunch and dinner after surgery for 5 days, Disp: 30 tablet, Rfl: 0    ibuprofen (MOTRIN) 600 mg tablet, Take one tablet the day of surgery, then take one tablet for breakfast lunch and dinner after surgery for 5 days, Disp: 30 tablet, Rfl: 0    naproxen (NAPROSYN) 500 mg tablet, Take 1 tablet (500 mg total) by mouth 2 (two) times a day with meals (Patient taking differently: Take 500 mg by mouth as needed ), Disp: 20 tablet, Rfl: 0    ALLERGIES:  Allergies   Allergen Reactions    Lisinopril-Hydrochlorothiazide Hives    Other Hives     States she has no allergies       REVIEW OF SYSTEMS:  Review of Systems   Constitutional: Negative for chills, fever and unexpected weight change  HENT: Negative for hearing loss, nosebleeds and sore throat  Eyes: Negative for pain, redness and visual disturbance  Respiratory: Negative for cough, shortness of breath and wheezing      Cardiovascular: Negative for chest pain, palpitations and leg swelling  Gastrointestinal: Negative for abdominal pain, nausea and vomiting  Endocrine: Negative for polydipsia and polyuria  Genitourinary: Negative for dysuria and hematuria  Skin: Negative for rash and wound  Neurological: Negative for dizziness and headaches  Psychiatric/Behavioral: Negative for decreased concentration, dysphoric mood and suicidal ideas  The patient is not nervous/anxious          VITALS:  Vitals:    09/24/20 1252   BP: 133/84   Pulse: 86       LABS:  HgA1c:   Lab Results   Component Value Date    HGBA1C 5 8 (H) 06/15/2020     BMP:   Lab Results   Component Value Date    GLUCOSE 89 09/22/2017    CALCIUM 9 4 07/06/2020     09/22/2017    K 4 2 07/06/2020    CO2 28 07/06/2020     07/06/2020    BUN 21 07/06/2020    CREATININE 1 03 07/06/2020       _____________________________________________________  PHYSICAL EXAMINATION:  General: well developed and well nourished, alert, oriented times 3 and appears comfortable  Psychiatric: Normal  HEENT: Trachea Midline, No torticollis  Pulmonary: No audible wheezing or strider  Cardiovascular: No discernable arrhythmia   Skin: No masses, erythema, lacerations, fluctation, ulcerations  Neurovascular: Sensation Intact to the Median, Ulnar, Radial Nerve, Motor Intact to the Median, Ulnar, Radial Nerve and Pulses Intact    MUSCULOSKELETAL EXAMINATION:  Left elbow   Limited flexion extension at the elbow  No significant swelling  No ecchymosis or erythema    Left wrist   No swelling erythema or ecchymosis  USHARON stable    ___________________________________________________  STUDIES REVIEWED:  CT of the left elbow performed 09/16/2020 shows comminuted displaced intra-articular fracture of the radial head and neck with 7 mm displaced cortical fracture fragment, punctate cortical chip fractures of the posterior lateral capitellum, punctate bone fragment medially anterior to the coronoid process       MRI of the left wrist performed 09/19/2020 shows edema the volar aspect of the wrist in the extrinsics ligaments you need including the radius avoid capitate and radial scapholunate ligament, mild osteoarthritis at the 1st [facility] joint, degenerative changes in the peripheral aspect of TFCC    X-rays of the left elbow performed today show increased displacement of radial head and neck fracture continued visible displaced  fracture fragment    PROCEDURES PERFORMED:  Procedures  No Procedures performed today    _____________________________________________________  ASSESSMENT/PLAN:    Left wrist pain  * patient was advised after reviewing MRI that she does not have any significant anatomical abnormalities  * patient was advised that the discomfort she is currently experiencing will likely resolve postoperatively    Left radial head and neck fracture displaced, comminuted   Pt has failed conservative measures  Left radial head replacement was discussed at length today including the risks and benefits  Pt understands and wants to proceed  *Patient is advised that the pain at her left elbow because she was experiencing prior to recent injury could be from lateral epicondylitis although without being able to examining patient pre fracture it is very difficult to ascertain       *Surgery-left radial head replacement    * detailed consent was signed in the office   * anesthesia- regional with sedation    * antibiotics- ordered    * OT order was placed- no splint    * Post op medication pain medication other than  Tylenol and ibuprofen will be managed by her PCP   Surgery medication instructions: You will stop eating and drinking at midnight the night before your surgery, but you may continue to take your normal medications with a small sip of water      In the morning on the day of your surgery, we would like you to take the following medications (as long as you have never been told to avoid Tylenol or NSAIDs like ibuprofen, Naproxen, Aleve, Advil, etc):   Ibuprofen 600mg one tablet by mouth   Tylenol 500mg one tablet by mouth    After surgery, we would like you to take Ibuprofen 600mg one tablet by mouth every 6 hours with food (at breakfast, lunch and dinner)  AND Tylenol 500 mg one tablet by mouth every 6 hours  (at breakfast, lunch and dinner) for 5-7 days after your surgery  Please take these medication EVERYDAY after surgery for 5-7 days, and not just as needed  You can take these medications at the same time  Taking these medications after surgery will limit your need for prescription pain medication  Diagnoses and all orders for this visit:    Closed displaced fracture of head of left radius, initial encounter  -     XR elbow 3+ vw left; Future  -     Ambulatory referral to PT/OT hand therapy; Future  -     Case request operating room: RADIAL HEAD IMPLANT ARTHROPLASTY; Standing  -     acetaminophen (TYLENOL) 500 mg tablet; Take one tablet the day of surgery, then take one tablet for breakfast lunch and dinner after surgery for 5 days  -     ibuprofen (MOTRIN) 600 mg tablet; Take one tablet the day of surgery, then take one tablet for breakfast lunch and dinner after surgery for 5 days  -     Case request operating room: RADIAL HEAD IMPLANT ARTHROPLASTY    Other orders  -     Diet NPO; Sips with meds; Standing  -     Height and weight upon arrival; Standing  -     Void on call to OR; Standing  -     Insert peripheral IV; Standing  -     ceFAZolin (ANCEF) 2,000 mg in dextrose 5 % 100 mL IVPB        Follow Up:  Return for after surgery  To Do Next Visit:  Re-evaluation of current issue        Operative Discussions:  Standard Consent: The risks and benefits of the procedure were explained to the patient, which include, but are not limited to: Bleeding, infection, recurrence, pain, scar, damage to tendons, damage to nerves, and damage to blood vessels, failure to give desired results and complications related to anesthesia    These risks, along with alternative conservative treatment options, and postoperative protocols were voiced back and understood by the patient  All questions were answered to the patient's satisfaction  The patient agrees to comply with a standard postoperative protocol, and is willing to proceed  Education was provided via written and auditory forms  There were no barriers to learning  Written handouts regarding wound care, incision and scar care, and general preoperative information was provided to the patient  Prior to surgery, the patient may be requested to stop all anti-inflammatory medications  Prophylactic aspirin, Plavix, and Coumadin may be allowed to be continued  Medications including vitamin E , ginkgo, and fish oil are requested to be stopped approximately one week prior to surgery  Hypertensive medications and beta blockers, if taken, should be continued      Scribe Attestation    I,:   Moriah Hughes am acting as a scribe while in the presence of the attending physician :        I,:   Miranda Gilliam MD personally performed the services described in this documentation    as scribed in my presence :

## 2020-09-24 NOTE — PATIENT INSTRUCTIONS
Surgery medication instructions: You will stop eating and drinking at midnight the night before your surgery, but you may continue to take your normal medications with a small sip of water  In the morning on the day of your surgery, we would like you to take the following medications (as long as you have never been told to avoid Tylenol or NSAIDs like ibuprofen, Naproxen, Aleve, Advil, etc):   Ibuprofen 600mg one tablet by mouth   Tylenol 500mg one tablet by mouth    After surgery, we would like you to take Ibuprofen 600mg one tablet by mouth every 6 hours with food (at breakfast, lunch and dinner)  AND Tylenol 500 mg one tablet by mouth every 6 hours  (at breakfast, lunch and dinner) for 5-7 days after your surgery  Please take these medication EVERYDAY after surgery for 5-7 days, and not just as needed  You can take these medications at the same time  Taking these medications after surgery will limit your need for prescription pain medication

## 2020-09-24 NOTE — H&P
CHIEF COMPLAINT:  Chief Complaint   Patient presents with    Left Elbow - Pain    Left Wrist - Pain       SUBJECTIVE:  Radha Bradley is a 51y o  year old female who presents to the office after being referred by Dr Katy Corbin for evaluation of her left wrist and elbow  Patient sustained injury 09/03/2020 when she had her left elbow on the door frame and had pain and swelling  Patient proceed in the emergency department where x-rays were performed and patient was provided with a sling  Patient has maintained sling and taking Norco for pain  Patient states that she is also having pain on the radial aspect of her left wrist at times it is worse than her elbow  Today patient presents to the office without sling and wrist splint  Pt continues to have pain and stiffness at the left elbow  Patient also previously stated that she was having no pain and with motion of her left elbow prior to injury and was advised to receive CSI but is unsure of her Dx  Pt also states that she was receiving chiropractic care for her left elbow due to the stiffness  The patient denies any cardiac  Denies diabetes  Denies any history of MI, gastric ulcers, kidney or liver issues  Denies blood thinners  Pt has asthma from asbestos, no recent hospitalizations,  MIRANDA       PAST MEDICAL HISTORY:  Past Medical History:   Diagnosis Date    Anxiety     Asthma     Depression     Disease of thyroid gland     Dizziness     Forgetfulness     Hashimoto's disease     Hashimoto's disease     History of fainting as a child     Hyperlipidemia     Hypertension     Numbness and tingling     MIRANDA (obstructive sleep apnea)     Post traumatic stress disorder 2/28/2019       PAST SURGICAL HISTORY:  Past Surgical History:   Procedure Laterality Date    BREAST SURGERY Bilateral 2002    reduction    HYSTERECTOMY  2005    DC COLONOSCOPY FLX DX W/COLLJ SPEC WHEN PFRMD N/A 10/16/2018    Procedure: EGD AND COLONOSCOPY;  Surgeon: Liu Fox MD;  Location: [facility];   Service: Gastroenterology    REDUCTION MAMMAPLASTY Bilateral 1997    REDUCTION MAMMAPLASTY Bilateral 2001    SINUS SURGERY         FAMILY HISTORY:  Family History   Problem Relation Age of Onset    Hyperlipidemia Mother    Jillian Clunes Lupus Mother     Depression Father     Cervical cancer Paternal Grandmother     Ovarian cancer Paternal Grandmother     No Known Problems Half-Sister     No Known Problems Half-Brother     No Known Problems Half-Brother     No Known Problems Half-Sister     No Known Problems Half-Sister     No Known Problems Maternal Aunt     No Known Problems Maternal Aunt     No Known Problems Maternal Aunt     No Known Problems Maternal Aunt     No Known Problems Paternal Aunt     No Known Problems Paternal Aunt     Breast cancer Neg Hx     Colon cancer Neg Hx     Uterine cancer Neg Hx     Seizures Neg Hx        SOCIAL HISTORY:  Social History     Tobacco Use    Smoking status: Never Smoker    Smokeless tobacco: Never Used   Substance Use Topics    Alcohol use: Yes     Frequency: 2-3 times a week     Drinks per session: 1 or 2     Binge frequency: Never     Comment: socially    Drug use: No       MEDICATIONS:    Current Outpatient Medications:     albuterol (PROVENTIL HFA,VENTOLIN HFA) 90 mcg/act inhaler, Inhale 2 puffs every 4 (four) hours as needed for wheezing, Disp: 1 Inhaler, Rfl: 2    amLODIPine (NORVASC) 2 5 mg tablet, Take 1 tablet (2 5 mg total) by mouth daily, Disp: 30 tablet, Rfl: 0    atorvastatin (LIPITOR) 10 mg tablet, TAKE 1 TABLET BY MOUTH EVERY DAY, Disp: 90 tablet, Rfl: 1    clonazePAM (KlonoPIN) 0 5 mg tablet, TAKE 1 TABLET BY MOUTH  TWICE DAILY AS NEEDED FOR  ANXIETY, Disp: 60 tablet, Rfl: 1    ergocalciferol (VITAMIN D2) 50,000 units, Take 1 capsule (50,000 Units total) by mouth once a week, Disp: 12 capsule, Rfl: 0    fluticasone (FLONASE) 50 mcg/act nasal spray, 2 sprays into each nostril daily (Patient taking differently: 2 sprays into each nostril as needed ), Disp: 16 g, Rfl: 0    HYDROcodone-acetaminophen (NORCO) 5-325 mg per tablet, Take 1 tablet by mouth every 6 (six) hours as needed for painMax Daily Amount: 4 tablets, Disp: 10 tablet, Rfl: 0    levothyroxine 75 mcg tablet, Take 1 tablet (75 mcg total) by mouth daily Take 50mcg daily, Disp: , Rfl:     naloxone (NARCAN) 4 mg/0 1 mL nasal spray, Administer 1 spray into a nostril  If breathing does not return to normal or if breathing difficulty resumes after 2-3 minutes, give another dose in the other nostril using a new spray , Disp: 1 each, Rfl: 1    rizatriptan (MAXALT-MLT) 10 MG disintegrating tablet, Take 1 tablet (10 mg total) by mouth once as needed for migraine for up to 1 dose May repeat in 2 hours if needed, Disp: 9 tablet, Rfl: 0    venlafaxine 225 MG TB24, TAKE 1 TABLET BY MOUTH  DAILY WITH BREAKFAST, Disp: 90 tablet, Rfl: 3    acetaminophen (TYLENOL) 500 mg tablet, Take one tablet the day of surgery, then take one tablet for breakfast lunch and dinner after surgery for 5 days, Disp: 30 tablet, Rfl: 0    ibuprofen (MOTRIN) 600 mg tablet, Take one tablet the day of surgery, then take one tablet for breakfast lunch and dinner after surgery for 5 days, Disp: 30 tablet, Rfl: 0    naproxen (NAPROSYN) 500 mg tablet, Take 1 tablet (500 mg total) by mouth 2 (two) times a day with meals (Patient taking differently: Take 500 mg by mouth as needed ), Disp: 20 tablet, Rfl: 0    ALLERGIES:  Allergies   Allergen Reactions    Lisinopril-Hydrochlorothiazide Hives    Other Hives     States she has no allergies       REVIEW OF SYSTEMS:  Review of Systems   Constitutional: Negative for chills, fever and unexpected weight change  HENT: Negative for hearing loss, nosebleeds and sore throat  Eyes: Negative for pain, redness and visual disturbance  Respiratory: Negative for cough, shortness of breath and wheezing      Cardiovascular: Negative for chest pain, palpitations and leg swelling  Gastrointestinal: Negative for abdominal pain, nausea and vomiting  Endocrine: Negative for polydipsia and polyuria  Genitourinary: Negative for dysuria and hematuria  Skin: Negative for rash and wound  Neurological: Negative for dizziness and headaches  Psychiatric/Behavioral: Negative for decreased concentration, dysphoric mood and suicidal ideas  The patient is not nervous/anxious          VITALS:  Vitals:    09/24/20 1252   BP: 133/84   Pulse: 86       LABS:  HgA1c:   Lab Results   Component Value Date    HGBA1C 5 8 (H) 06/15/2020     BMP:   Lab Results   Component Value Date    GLUCOSE 89 09/22/2017    CALCIUM 9 4 07/06/2020     09/22/2017    K 4 2 07/06/2020    CO2 28 07/06/2020     07/06/2020    BUN 21 07/06/2020    CREATININE 1 03 07/06/2020       _____________________________________________________  PHYSICAL EXAMINATION:  General: well developed and well nourished, alert, oriented times 3 and appears comfortable  Psychiatric: Normal  HEENT: Trachea Midline, No torticollis  Pulmonary: No audible wheezing or strider  Cardiovascular: No discernable arrhythmia   Skin: No masses, erythema, lacerations, fluctation, ulcerations  Neurovascular: Sensation Intact to the Median, Ulnar, Radial Nerve, Motor Intact to the Median, Ulnar, Radial Nerve and Pulses Intact    MUSCULOSKELETAL EXAMINATION:  Left elbow   Limited flexion extension at the elbow  No significant swelling  No ecchymosis or erythema    Left wrist   No swelling erythema or ecchymosis  USHARON stable    ___________________________________________________  STUDIES REVIEWED:  CT of the left elbow performed 09/16/2020 shows comminuted displaced intra-articular fracture of the radial head and neck with 7 mm displaced cortical fracture fragment, punctate cortical chip fractures of the posterior lateral capitellum, punctate bone fragment medially anterior to the coronoid process       MRI of the left wrist performed 09/19/2020 shows edema the volar aspect of the wrist in the extrinsics ligaments you need including the radius avoid capitate and radial scapholunate ligament, mild osteoarthritis at the 1st [facility] joint, degenerative changes in the peripheral aspect of TFCC    X-rays of the left elbow performed today show increased displacement of radial head and neck fracture continued visible displaced  fracture fragment    PROCEDURES PERFORMED:  Procedures  No Procedures performed today    _____________________________________________________  ASSESSMENT/PLAN:    Left wrist pain  * patient was advised after reviewing MRI that she does not have any significant anatomical abnormalities  * patient was advised that the discomfort she is currently experiencing will likely resolve postoperatively    Left radial head and neck fracture displaced, comminuted   Pt has failed conservative measures  Left radial head replacement was discussed at length today including the risks and benefits  Pt understands and wants to proceed  *Patient is advised that the pain at her left elbow because she was experiencing prior to recent injury could be from lateral epicondylitis although without being able to examining patient pre fracture it is very difficult to ascertain       *Surgery-left radial head replacement    * detailed consent was signed in the office   * anesthesia- regional with sedation    * antibiotics- ordered    * OT order was placed- no splint    * Post op medication pain medication other than  Tylenol and ibuprofen will be managed by her PCP   Surgery medication instructions: You will stop eating and drinking at midnight the night before your surgery, but you may continue to take your normal medications with a small sip of water      In the morning on the day of your surgery, we would like you to take the following medications (as long as you have never been told to avoid Tylenol or NSAIDs like ibuprofen, Naproxen, Aleve, Advil, etc):   Ibuprofen 600mg one tablet by mouth   Tylenol 500mg one tablet by mouth    After surgery, we would like you to take Ibuprofen 600mg one tablet by mouth every 6 hours with food (at breakfast, lunch and dinner)  AND Tylenol 500 mg one tablet by mouth every 6 hours  (at breakfast, lunch and dinner) for 5-7 days after your surgery  Please take these medication EVERYDAY after surgery for 5-7 days, and not just as needed  You can take these medications at the same time  Taking these medications after surgery will limit your need for prescription pain medication  Diagnoses and all orders for this visit:    Closed displaced fracture of head of left radius, initial encounter  -     XR elbow 3+ vw left; Future  -     Ambulatory referral to PT/OT hand therapy; Future  -     Case request operating room: RADIAL HEAD IMPLANT ARTHROPLASTY; Standing  -     acetaminophen (TYLENOL) 500 mg tablet; Take one tablet the day of surgery, then take one tablet for breakfast lunch and dinner after surgery for 5 days  -     ibuprofen (MOTRIN) 600 mg tablet; Take one tablet the day of surgery, then take one tablet for breakfast lunch and dinner after surgery for 5 days  -     Case request operating room: RADIAL HEAD IMPLANT ARTHROPLASTY    Other orders  -     Diet NPO; Sips with meds; Standing  -     Height and weight upon arrival; Standing  -     Void on call to OR; Standing  -     Insert peripheral IV; Standing  -     ceFAZolin (ANCEF) 2,000 mg in dextrose 5 % 100 mL IVPB        Follow Up:  Return for after surgery  To Do Next Visit:  Re-evaluation of current issue        Operative Discussions:  Standard Consent: The risks and benefits of the procedure were explained to the patient, which include, but are not limited to: Bleeding, infection, recurrence, pain, scar, damage to tendons, damage to nerves, and damage to blood vessels, failure to give desired results and complications related to anesthesia    These risks, along with alternative conservative treatment options, and postoperative protocols were voiced back and understood by the patient  All questions were answered to the patient's satisfaction  The patient agrees to comply with a standard postoperative protocol, and is willing to proceed  Education was provided via written and auditory forms  There were no barriers to learning  Written handouts regarding wound care, incision and scar care, and general preoperative information was provided to the patient  Prior to surgery, the patient may be requested to stop all anti-inflammatory medications  Prophylactic aspirin, Plavix, and Coumadin may be allowed to be continued  Medications including vitamin E , ginkgo, and fish oil are requested to be stopped approximately one week prior to surgery  Hypertensive medications and beta blockers, if taken, should be continued      Scribe Attestation    I,:   Vinay Morgan am acting as a scribe while in the presence of the attending physician :        I,:   Rubia Castro MD personally performed the services described in this documentation    as scribed in my presence :

## 2020-09-29 ENCOUNTER — TELEPHONE (OUTPATIENT)
Dept: INTERNAL MEDICINE CLINIC | Facility: CLINIC | Age: 50
End: 2020-09-29

## 2020-09-29 ENCOUNTER — CONSULT (OUTPATIENT)
Dept: INTERNAL MEDICINE CLINIC | Facility: CLINIC | Age: 50
End: 2020-09-29
Payer: COMMERCIAL

## 2020-09-29 VITALS
OXYGEN SATURATION: 98 % | BODY MASS INDEX: 30.99 KG/M2 | SYSTOLIC BLOOD PRESSURE: 130 MMHG | WEIGHT: 168.4 LBS | HEIGHT: 62 IN | HEART RATE: 79 BPM | TEMPERATURE: 97.3 F | DIASTOLIC BLOOD PRESSURE: 78 MMHG

## 2020-09-29 DIAGNOSIS — S42.402S CLOSED FRACTURE OF LEFT ELBOW, SEQUELA: Primary | ICD-10-CM

## 2020-09-29 DIAGNOSIS — S21.002S WOUND OF LEFT BREAST, SEQUELA: ICD-10-CM

## 2020-09-29 DIAGNOSIS — I10 ESSENTIAL HYPERTENSION: ICD-10-CM

## 2020-09-29 PROCEDURE — 3078F DIAST BP <80 MM HG: CPT | Performed by: NURSE PRACTITIONER

## 2020-09-29 PROCEDURE — 3725F SCREEN DEPRESSION PERFORMED: CPT | Performed by: NURSE PRACTITIONER

## 2020-09-29 PROCEDURE — 99214 OFFICE O/P EST MOD 30 MIN: CPT | Performed by: NURSE PRACTITIONER

## 2020-09-29 RX ORDER — AMLODIPINE BESYLATE 2.5 MG/1
2.5 TABLET ORAL DAILY
Qty: 30 TABLET | Refills: 0 | Status: SHIPPED | OUTPATIENT
Start: 2020-09-29 | End: 2020-12-01

## 2020-09-29 NOTE — TELEPHONE ENCOUNTER
Spoke with Charissa Michelle at Dr Allison Rojas office, patient is scheduled for:      Friday, November 13, 2020 @ 9:45AM with Dr Brandon Eastman  [address], [place]     Informed patient

## 2020-09-29 NOTE — PATIENT INSTRUCTIONS
Left fracture radial head with multiple fragments will need surgery, patient would like to seek a 2nd opinion    She is going to consider her options and make an appropriate follow-up     wrist sprain improving with wrist splint     breast lesion of unclear etiology she has seen Dr Lazarus Wilkinson in the past for other issues will refer back for evaluation

## 2020-09-29 NOTE — PROGRESS NOTES
Assessment/Plan:    Patient Instructions    Left fracture radial head with multiple fragments will need surgery, patient would like to seek a 2nd opinion  She is going to consider her options and make an appropriate follow-up     wrist sprain improving with wrist splint     breast lesion of unclear etiology she has seen Dr Niru Hammond in the past for other issues will refer back for evaluation         Diagnoses and all orders for this visit:    Closed fracture of left elbow, sequela  -     Ambulatory referral to Orthopedic Surgery; Future    Essential hypertension  -     amLODIPine (NORVASC) 2 5 mg tablet; Take 1 tablet (2 5 mg total) by mouth daily    Wound of left breast, sequela         Subjective:      Patient ID: Maxi Galindo is a 51 y o  female     Earlier this month patient was walking up steps with holding to large comfort orders that were not folded she apparently had tripped on it causing her to fall she at first hit her left elbow on the door jam then she feel with an outstretched left hand  Went to ER dx w/ wrist sprain and elbow fracture requiring surgery  She would like to get another opinion from a hand/elbow specialist    She is wearing a splint on the wrist that helps with the pain but she isnt able to touch shoulder or full extend the elbow    Breast lesion for about 3 weeks getting better not sure where it came from- itchy non tender, due for mammogram this year           Current Outpatient Medications:     acetaminophen (TYLENOL) 500 mg tablet, Take one tablet the day of surgery, then take one tablet for breakfast lunch and dinner after surgery for 5 days, Disp: 30 tablet, Rfl: 0    albuterol (PROVENTIL HFA,VENTOLIN HFA) 90 mcg/act inhaler, Inhale 2 puffs every 4 (four) hours as needed for wheezing, Disp: 1 Inhaler, Rfl: 2    amLODIPine (NORVASC) 2 5 mg tablet, Take 1 tablet (2 5 mg total) by mouth daily, Disp: 30 tablet, Rfl: 0    atorvastatin (LIPITOR) 10 mg tablet, TAKE 1 TABLET BY MOUTH EVERY DAY, Disp: 90 tablet, Rfl: 1    clonazePAM (KlonoPIN) 0 5 mg tablet, TAKE 1 TABLET BY MOUTH  TWICE DAILY AS NEEDED FOR  ANXIETY, Disp: 60 tablet, Rfl: 1    ergocalciferol (VITAMIN D2) 50,000 units, Take 1 capsule (50,000 Units total) by mouth once a week, Disp: 12 capsule, Rfl: 0    fluticasone (FLONASE) 50 mcg/act nasal spray, 2 sprays into each nostril daily (Patient taking differently: 2 sprays into each nostril as needed ), Disp: 16 g, Rfl: 0    HYDROcodone-acetaminophen (NORCO) 5-325 mg per tablet, Take 1 tablet by mouth every 6 (six) hours as needed for painMax Daily Amount: 4 tablets, Disp: 10 tablet, Rfl: 0    ibuprofen (MOTRIN) 600 mg tablet, Take one tablet the day of surgery, then take one tablet for breakfast lunch and dinner after surgery for 5 days, Disp: 30 tablet, Rfl: 0    levothyroxine 75 mcg tablet, Take 1 tablet (75 mcg total) by mouth daily Take 50mcg daily, Disp: , Rfl:     naloxone (NARCAN) 4 mg/0 1 mL nasal spray, Administer 1 spray into a nostril  If breathing does not return to normal or if breathing difficulty resumes after 2-3 minutes, give another dose in the other nostril using a new spray , Disp: 1 each, Rfl: 1    naproxen (NAPROSYN) 500 mg tablet, Take 1 tablet (500 mg total) by mouth 2 (two) times a day with meals (Patient taking differently: Take 500 mg by mouth as needed ), Disp: 20 tablet, Rfl: 0    rizatriptan (MAXALT-MLT) 10 MG disintegrating tablet, Take 1 tablet (10 mg total) by mouth once as needed for migraine for up to 1 dose May repeat in 2 hours if needed, Disp: 9 tablet, Rfl: 0    venlafaxine 225 MG TB24, TAKE 1 TABLET BY MOUTH  DAILY WITH BREAKFAST, Disp: 90 tablet, Rfl: 3    No results found for this or any previous visit (from the past 1008 hour(s))      The following portions of the patient's history were reviewed and updated as appropriate: allergies, current medications, past family history, past medical history, past social history, past surgical history and problem list      Review of Systems   Constitutional: Negative for appetite change, chills, diaphoresis, fatigue, fever and unexpected weight change  HENT: Negative for postnasal drip and sneezing  Eyes: Negative for visual disturbance  Respiratory: Negative for chest tightness and shortness of breath  Cardiovascular: Negative for chest pain, palpitations and leg swelling  Gastrointestinal: Negative for abdominal pain and blood in stool  Endocrine: Negative for cold intolerance, heat intolerance, polydipsia, polyphagia and polyuria  Genitourinary: Negative for difficulty urinating, dysuria, frequency and urgency  Musculoskeletal: Negative for arthralgias and myalgias  Elbow pain   Skin: Positive for wound  Negative for rash  Neurological: Negative for dizziness, weakness, light-headedness and headaches  Hematological: Negative for adenopathy  Psychiatric/Behavioral: Negative for confusion, dysphoric mood and sleep disturbance  The patient is not nervous/anxious  Objective:      /78   Pulse 79   Temp (!) 97 3 °F (36 3 °C) (Temporal)   Ht 5' 2" (1 575 m)   Wt 76 4 kg (168 lb 6 4 oz)   SpO2 98%   BMI 30 80 kg/m²        Physical Exam  Constitutional:       General: She is not in acute distress  Appearance: She is well-developed  She is not diaphoretic  HENT:      Head: Normocephalic and atraumatic  Nose: Nose normal    Eyes:      Conjunctiva/sclera: Conjunctivae normal       Pupils: Pupils are equal, round, and reactive to light  Neck:      Musculoskeletal: Normal range of motion and neck supple  Thyroid: No thyromegaly  Vascular: No JVD  Trachea: No tracheal deviation  Cardiovascular:      Rate and Rhythm: Normal rate and regular rhythm  Heart sounds: Normal heart sounds  No murmur  No friction rub  No gallop  Pulmonary:      Effort: Pulmonary effort is normal  No respiratory distress        Breath sounds: Normal breath sounds  No wheezing or rales  Abdominal:      General: Bowel sounds are normal  There is no distension  Palpations: Abdomen is soft  Tenderness: There is no abdominal tenderness  Musculoskeletal: Normal range of motion  Comments: Left elbow swelling w/ limited rom unable to touch should or extend to 180 degrees   Lymphadenopathy:      Cervical: No cervical adenopathy  Skin:     General: Skin is warm and dry  Findings: Lesion present  No rash  Comments: Left breast outer quad w/ 2 5 x  5cm scabbed lesion  Picture in chart  Neurological:      Mental Status: She is alert and oriented to person, place, and time  Cranial Nerves: No cranial nerve deficit  Psychiatric:         Behavior: Behavior normal          Thought Content:  Thought content normal          Judgment: Judgment normal

## 2020-10-02 ENCOUNTER — OFFICE VISIT (OUTPATIENT)
Dept: DERMATOLOGY | Facility: CLINIC | Age: 50
End: 2020-10-02
Payer: COMMERCIAL

## 2020-10-02 VITALS — WEIGHT: 168 LBS | BODY MASS INDEX: 29.77 KG/M2 | TEMPERATURE: 97.6 F | HEIGHT: 63 IN

## 2020-10-02 DIAGNOSIS — D22.9 MULTIPLE MELANOCYTIC NEVI: ICD-10-CM

## 2020-10-02 DIAGNOSIS — D18.01 CHERRY ANGIOMA: ICD-10-CM

## 2020-10-02 DIAGNOSIS — D48.5 NEOPLASM OF UNCERTAIN BEHAVIOR OF SKIN: Primary | ICD-10-CM

## 2020-10-02 DIAGNOSIS — L81.4 LENTIGINES: ICD-10-CM

## 2020-10-02 DIAGNOSIS — L82.1 SEBORRHEIC KERATOSIS: ICD-10-CM

## 2020-10-02 PROCEDURE — 88312 SPECIAL STAINS GROUP 1: CPT | Performed by: STUDENT IN AN ORGANIZED HEALTH CARE EDUCATION/TRAINING PROGRAM

## 2020-10-02 PROCEDURE — 99204 OFFICE O/P NEW MOD 45 MIN: CPT | Performed by: STUDENT IN AN ORGANIZED HEALTH CARE EDUCATION/TRAINING PROGRAM

## 2020-10-02 PROCEDURE — 11102 TANGNTL BX SKIN SINGLE LES: CPT | Performed by: STUDENT IN AN ORGANIZED HEALTH CARE EDUCATION/TRAINING PROGRAM

## 2020-10-02 PROCEDURE — 88305 TISSUE EXAM BY PATHOLOGIST: CPT | Performed by: STUDENT IN AN ORGANIZED HEALTH CARE EDUCATION/TRAINING PROGRAM

## 2020-10-02 PROCEDURE — 1036F TOBACCO NON-USER: CPT | Performed by: STUDENT IN AN ORGANIZED HEALTH CARE EDUCATION/TRAINING PROGRAM

## 2020-10-08 ENCOUNTER — ANESTHESIA EVENT (OUTPATIENT)
Dept: PERIOP | Facility: HOSPITAL | Age: 50
End: 2020-10-08
Payer: COMMERCIAL

## 2020-10-09 ENCOUNTER — APPOINTMENT (OUTPATIENT)
Dept: RADIOLOGY | Facility: HOSPITAL | Age: 50
End: 2020-10-09
Payer: COMMERCIAL

## 2020-10-09 ENCOUNTER — TELEPHONE (OUTPATIENT)
Dept: OBGYN CLINIC | Facility: HOSPITAL | Age: 50
End: 2020-10-09

## 2020-10-09 ENCOUNTER — ANESTHESIA (OUTPATIENT)
Dept: PERIOP | Facility: HOSPITAL | Age: 50
End: 2020-10-09
Payer: COMMERCIAL

## 2020-10-09 ENCOUNTER — HOSPITAL ENCOUNTER (OUTPATIENT)
Facility: HOSPITAL | Age: 50
Setting detail: OUTPATIENT SURGERY
Discharge: HOME | End: 2020-10-09
Attending: ORTHOPAEDIC SURGERY | Admitting: ORTHOPAEDIC SURGERY
Payer: COMMERCIAL

## 2020-10-09 VITALS
SYSTOLIC BLOOD PRESSURE: 137 MMHG | OXYGEN SATURATION: 98 % | RESPIRATION RATE: 16 BRPM | WEIGHT: 173.06 LBS | BODY MASS INDEX: 31.85 KG/M2 | HEART RATE: 95 BPM | HEIGHT: 62 IN | TEMPERATURE: 97.9 F | DIASTOLIC BLOOD PRESSURE: 81 MMHG

## 2020-10-09 VITALS — HEART RATE: 109 BPM

## 2020-10-09 DIAGNOSIS — S52.122A CLOSED DISPLACED FRACTURE OF HEAD OF LEFT RADIUS, INITIAL ENCOUNTER: Primary | ICD-10-CM

## 2020-10-09 PROCEDURE — 24666 OPTX RADIAL HEAD/NCK FX RPLC: CPT | Performed by: ORTHOPAEDIC SURGERY

## 2020-10-09 PROCEDURE — 73080 X-RAY EXAM OF ELBOW: CPT

## 2020-10-09 PROCEDURE — C1776 JOINT DEVICE (IMPLANTABLE): HCPCS | Performed by: ORTHOPAEDIC SURGERY

## 2020-10-09 PROCEDURE — 24666 OPTX RADIAL HEAD/NCK FX RPLC: CPT | Performed by: PHYSICIAN ASSISTANT

## 2020-10-09 DEVICE — 8.0MM X 0.0MM STEM
Type: IMPLANTABLE DEVICE | Site: ELBOW | Status: FUNCTIONAL
Brand: ACUMED

## 2020-10-09 DEVICE — ARH SOLUTIONS 2 HEAD 20MM, LEFT
Type: IMPLANTABLE DEVICE | Site: ELBOW | Status: FUNCTIONAL
Brand: ACUMED

## 2020-10-09 RX ORDER — HYDROMORPHONE HCL/PF 1 MG/ML
SYRINGE (ML) INJECTION AS NEEDED
Status: DISCONTINUED | OUTPATIENT
Start: 2020-10-09 | End: 2020-10-09

## 2020-10-09 RX ORDER — FENTANYL CITRATE/PF 50 MCG/ML
50 SYRINGE (ML) INJECTION
Status: DISCONTINUED | OUTPATIENT
Start: 2020-10-09 | End: 2020-10-09 | Stop reason: HOSPADM

## 2020-10-09 RX ORDER — PROPOFOL 10 MG/ML
INJECTION, EMULSION INTRAVENOUS AS NEEDED
Status: DISCONTINUED | OUTPATIENT
Start: 2020-10-09 | End: 2020-10-09

## 2020-10-09 RX ORDER — LIDOCAINE HYDROCHLORIDE AND EPINEPHRINE 10; 10 MG/ML; UG/ML
INJECTION, SOLUTION INFILTRATION; PERINEURAL AS NEEDED
Status: DISCONTINUED | OUTPATIENT
Start: 2020-10-09 | End: 2020-10-09 | Stop reason: HOSPADM

## 2020-10-09 RX ORDER — MIDAZOLAM HYDROCHLORIDE 2 MG/2ML
INJECTION, SOLUTION INTRAMUSCULAR; INTRAVENOUS AS NEEDED
Status: DISCONTINUED | OUTPATIENT
Start: 2020-10-09 | End: 2020-10-09

## 2020-10-09 RX ORDER — ONDANSETRON 2 MG/ML
4 INJECTION INTRAMUSCULAR; INTRAVENOUS EVERY 6 HOURS PRN
Status: DISCONTINUED | OUTPATIENT
Start: 2020-10-09 | End: 2020-10-09 | Stop reason: HOSPADM

## 2020-10-09 RX ORDER — HYDROMORPHONE HCL/PF 1 MG/ML
0.4 SYRINGE (ML) INJECTION
Status: DISCONTINUED | OUTPATIENT
Start: 2020-10-09 | End: 2020-10-09 | Stop reason: HOSPADM

## 2020-10-09 RX ORDER — FENTANYL CITRATE 50 UG/ML
INJECTION, SOLUTION INTRAMUSCULAR; INTRAVENOUS AS NEEDED
Status: DISCONTINUED | OUTPATIENT
Start: 2020-10-09 | End: 2020-10-09

## 2020-10-09 RX ORDER — BUPIVACAINE HYDROCHLORIDE AND EPINEPHRINE 2.5; 5 MG/ML; UG/ML
INJECTION, SOLUTION EPIDURAL; INFILTRATION; INTRACAUDAL; PERINEURAL AS NEEDED
Status: DISCONTINUED | OUTPATIENT
Start: 2020-10-09 | End: 2020-10-09 | Stop reason: HOSPADM

## 2020-10-09 RX ORDER — PROMETHAZINE HYDROCHLORIDE 25 MG/ML
12.5 INJECTION, SOLUTION INTRAMUSCULAR; INTRAVENOUS ONCE AS NEEDED
Status: DISCONTINUED | OUTPATIENT
Start: 2020-10-09 | End: 2020-10-09 | Stop reason: HOSPADM

## 2020-10-09 RX ORDER — MAGNESIUM HYDROXIDE 1200 MG/15ML
LIQUID ORAL AS NEEDED
Status: DISCONTINUED | OUTPATIENT
Start: 2020-10-09 | End: 2020-10-09 | Stop reason: HOSPADM

## 2020-10-09 RX ORDER — SODIUM CHLORIDE, SODIUM LACTATE, POTASSIUM CHLORIDE, CALCIUM CHLORIDE 600; 310; 30; 20 MG/100ML; MG/100ML; MG/100ML; MG/100ML
INJECTION, SOLUTION INTRAVENOUS CONTINUOUS PRN
Status: DISCONTINUED | OUTPATIENT
Start: 2020-10-09 | End: 2020-10-09

## 2020-10-09 RX ORDER — METOCLOPRAMIDE HYDROCHLORIDE 5 MG/ML
10 INJECTION INTRAMUSCULAR; INTRAVENOUS ONCE AS NEEDED
Status: DISCONTINUED | OUTPATIENT
Start: 2020-10-09 | End: 2020-10-09 | Stop reason: HOSPADM

## 2020-10-09 RX ORDER — CEFAZOLIN SODIUM 2 G/50ML
2000 SOLUTION INTRAVENOUS ONCE
Status: COMPLETED | OUTPATIENT
Start: 2020-10-09 | End: 2020-10-09

## 2020-10-09 RX ORDER — TRAMADOL HYDROCHLORIDE 50 MG/1
50 TABLET ORAL EVERY 6 HOURS PRN
Status: DISCONTINUED | OUTPATIENT
Start: 2020-10-09 | End: 2020-10-09 | Stop reason: HOSPADM

## 2020-10-09 RX ORDER — ACETAMINOPHEN 325 MG/1
650 TABLET ORAL EVERY 6 HOURS PRN
Status: DISCONTINUED | OUTPATIENT
Start: 2020-10-09 | End: 2020-10-09 | Stop reason: HOSPADM

## 2020-10-09 RX ORDER — ROPIVACAINE HYDROCHLORIDE 5 MG/ML
INJECTION, SOLUTION EPIDURAL; INFILTRATION; PERINEURAL
Status: COMPLETED | OUTPATIENT
Start: 2020-10-09 | End: 2020-10-09

## 2020-10-09 RX ORDER — ALBUTEROL SULFATE 2.5 MG/3ML
2.5 SOLUTION RESPIRATORY (INHALATION) ONCE AS NEEDED
Status: DISCONTINUED | OUTPATIENT
Start: 2020-10-09 | End: 2020-10-09 | Stop reason: HOSPADM

## 2020-10-09 RX ORDER — ONDANSETRON 2 MG/ML
4 INJECTION INTRAMUSCULAR; INTRAVENOUS ONCE AS NEEDED
Status: DISCONTINUED | OUTPATIENT
Start: 2020-10-09 | End: 2020-10-09 | Stop reason: HOSPADM

## 2020-10-09 RX ORDER — OXYCODONE HYDROCHLORIDE AND ACETAMINOPHEN 5; 325 MG/1; MG/1
1 TABLET ORAL EVERY 4 HOURS PRN
Qty: 20 TABLET | Refills: 0 | Status: SHIPPED | OUTPATIENT
Start: 2020-10-09 | End: 2020-12-15

## 2020-10-09 RX ORDER — ONDANSETRON 2 MG/ML
INJECTION INTRAMUSCULAR; INTRAVENOUS AS NEEDED
Status: DISCONTINUED | OUTPATIENT
Start: 2020-10-09 | End: 2020-10-09

## 2020-10-09 RX ORDER — SODIUM CHLORIDE, SODIUM LACTATE, POTASSIUM CHLORIDE, CALCIUM CHLORIDE 600; 310; 30; 20 MG/100ML; MG/100ML; MG/100ML; MG/100ML
100 INJECTION, SOLUTION INTRAVENOUS CONTINUOUS
Status: DISCONTINUED | OUTPATIENT
Start: 2020-10-09 | End: 2020-10-09 | Stop reason: HOSPADM

## 2020-10-09 RX ADMIN — SODIUM CHLORIDE, SODIUM LACTATE, POTASSIUM CHLORIDE, AND CALCIUM CHLORIDE: .6; .31; .03; .02 INJECTION, SOLUTION INTRAVENOUS at 10:10

## 2020-10-09 RX ADMIN — FENTANYL CITRATE 50 MCG: 50 INJECTION, SOLUTION INTRAMUSCULAR; INTRAVENOUS at 09:08

## 2020-10-09 RX ADMIN — HYDROMORPHONE HYDROCHLORIDE 0.5 MG: 1 INJECTION, SOLUTION INTRAMUSCULAR; INTRAVENOUS; SUBCUTANEOUS at 09:44

## 2020-10-09 RX ADMIN — HYDROMORPHONE HYDROCHLORIDE 0.5 MG: 1 INJECTION, SOLUTION INTRAMUSCULAR; INTRAVENOUS; SUBCUTANEOUS at 10:17

## 2020-10-09 RX ADMIN — PROPOFOL 200 MG: 10 INJECTION, EMULSION INTRAVENOUS at 08:52

## 2020-10-09 RX ADMIN — ROPIVACAINE HYDROCHLORIDE 25 ML: 5 INJECTION, SOLUTION EPIDURAL; INFILTRATION; PERINEURAL at 07:59

## 2020-10-09 RX ADMIN — FENTANYL CITRATE 50 MCG: 50 INJECTION, SOLUTION INTRAMUSCULAR; INTRAVENOUS at 09:00

## 2020-10-09 RX ADMIN — SODIUM CHLORIDE, SODIUM LACTATE, POTASSIUM CHLORIDE, AND CALCIUM CHLORIDE: .6; .31; .03; .02 INJECTION, SOLUTION INTRAVENOUS at 08:05

## 2020-10-09 RX ADMIN — FENTANYL CITRATE 50 MCG: 50 INJECTION INTRAMUSCULAR; INTRAVENOUS at 10:32

## 2020-10-09 RX ADMIN — FENTANYL CITRATE 50 MCG: 50 INJECTION INTRAMUSCULAR; INTRAVENOUS at 10:40

## 2020-10-09 RX ADMIN — ONDANSETRON 4 MG: 2 INJECTION INTRAMUSCULAR; INTRAVENOUS at 09:56

## 2020-10-09 RX ADMIN — CEFAZOLIN SODIUM 2000 MG: 2 SOLUTION INTRAVENOUS at 08:30

## 2020-10-09 RX ADMIN — MIDAZOLAM HYDROCHLORIDE 2 MG: 1 INJECTION, SOLUTION INTRAMUSCULAR; INTRAVENOUS at 07:59

## 2020-10-10 ENCOUNTER — TELEPHONE (OUTPATIENT)
Dept: OBGYN CLINIC | Facility: HOSPITAL | Age: 50
End: 2020-10-10

## 2020-10-11 DIAGNOSIS — G47.00 INSOMNIA, UNSPECIFIED TYPE: ICD-10-CM

## 2020-10-12 ENCOUNTER — EVALUATION (OUTPATIENT)
Dept: OCCUPATIONAL THERAPY | Facility: CLINIC | Age: 50
End: 2020-10-12
Payer: COMMERCIAL

## 2020-10-12 DIAGNOSIS — S52.122D CLOSED DISPLACED FRACTURE OF HEAD OF LEFT RADIUS WITH ROUTINE HEALING, SUBSEQUENT ENCOUNTER: ICD-10-CM

## 2020-10-12 PROCEDURE — 97165 OT EVAL LOW COMPLEX 30 MIN: CPT | Performed by: OCCUPATIONAL THERAPIST

## 2020-10-12 PROCEDURE — 97110 THERAPEUTIC EXERCISES: CPT | Performed by: OCCUPATIONAL THERAPIST

## 2020-10-13 ENCOUNTER — OFFICE VISIT (OUTPATIENT)
Dept: OCCUPATIONAL THERAPY | Facility: CLINIC | Age: 50
End: 2020-10-13
Payer: COMMERCIAL

## 2020-10-13 DIAGNOSIS — S52.122D CLOSED DISPLACED FRACTURE OF HEAD OF LEFT RADIUS WITH ROUTINE HEALING, SUBSEQUENT ENCOUNTER: Primary | ICD-10-CM

## 2020-10-13 PROCEDURE — 97140 MANUAL THERAPY 1/> REGIONS: CPT | Performed by: OCCUPATIONAL THERAPIST

## 2020-10-13 PROCEDURE — 97110 THERAPEUTIC EXERCISES: CPT | Performed by: OCCUPATIONAL THERAPIST

## 2020-10-13 RX ORDER — CLONAZEPAM 0.5 MG/1
TABLET ORAL
Qty: 60 TABLET | Refills: 0 | Status: SHIPPED | OUTPATIENT
Start: 2020-10-13 | End: 2021-04-26 | Stop reason: SDUPTHER

## 2020-10-16 ENCOUNTER — ANNUAL EXAM (OUTPATIENT)
Dept: OBGYN CLINIC | Facility: CLINIC | Age: 50
End: 2020-10-16
Payer: COMMERCIAL

## 2020-10-16 VITALS
WEIGHT: 177 LBS | HEIGHT: 63 IN | SYSTOLIC BLOOD PRESSURE: 130 MMHG | DIASTOLIC BLOOD PRESSURE: 84 MMHG | BODY MASS INDEX: 31.36 KG/M2

## 2020-10-16 DIAGNOSIS — Z87.42 HISTORY OF OVARIAN CYST: ICD-10-CM

## 2020-10-16 DIAGNOSIS — Z01.419 ENCOUNTER FOR GYNECOLOGICAL EXAMINATION WITHOUT ABNORMAL FINDING: Primary | ICD-10-CM

## 2020-10-16 PROBLEM — Z12.11 SCREENING FOR COLON CANCER: Status: RESOLVED | Noted: 2018-10-09 | Resolved: 2020-10-16

## 2020-10-16 PROBLEM — B37.9 YEAST INFECTION: Status: RESOLVED | Noted: 2017-04-17 | Resolved: 2020-10-16

## 2020-10-16 PROBLEM — Z12.11 SCREEN FOR COLON CANCER: Status: RESOLVED | Noted: 2018-10-10 | Resolved: 2020-10-16

## 2020-10-16 PROCEDURE — S0612 ANNUAL GYNECOLOGICAL EXAMINA: HCPCS | Performed by: NURSE PRACTITIONER

## 2020-10-20 ENCOUNTER — OFFICE VISIT (OUTPATIENT)
Dept: OCCUPATIONAL THERAPY | Facility: CLINIC | Age: 50
End: 2020-10-20
Payer: COMMERCIAL

## 2020-10-20 DIAGNOSIS — S52.122D CLOSED DISPLACED FRACTURE OF HEAD OF LEFT RADIUS WITH ROUTINE HEALING, SUBSEQUENT ENCOUNTER: Primary | ICD-10-CM

## 2020-10-20 PROCEDURE — 97110 THERAPEUTIC EXERCISES: CPT | Performed by: OCCUPATIONAL THERAPIST

## 2020-10-20 PROCEDURE — 97140 MANUAL THERAPY 1/> REGIONS: CPT | Performed by: OCCUPATIONAL THERAPIST

## 2020-10-21 ENCOUNTER — OFFICE VISIT (OUTPATIENT)
Dept: OCCUPATIONAL THERAPY | Facility: CLINIC | Age: 50
End: 2020-10-21
Payer: COMMERCIAL

## 2020-10-21 DIAGNOSIS — S52.122D CLOSED DISPLACED FRACTURE OF HEAD OF LEFT RADIUS WITH ROUTINE HEALING, SUBSEQUENT ENCOUNTER: Primary | ICD-10-CM

## 2020-10-21 PROCEDURE — 97140 MANUAL THERAPY 1/> REGIONS: CPT | Performed by: OCCUPATIONAL THERAPIST

## 2020-10-21 PROCEDURE — 97110 THERAPEUTIC EXERCISES: CPT | Performed by: OCCUPATIONAL THERAPIST

## 2020-10-22 ENCOUNTER — APPOINTMENT (OUTPATIENT)
Dept: RADIOLOGY | Facility: CLINIC | Age: 50
End: 2020-10-22
Payer: COMMERCIAL

## 2020-10-22 ENCOUNTER — OFFICE VISIT (OUTPATIENT)
Dept: OBGYN CLINIC | Facility: CLINIC | Age: 50
End: 2020-10-22

## 2020-10-22 VITALS — HEART RATE: 85 BPM | SYSTOLIC BLOOD PRESSURE: 116 MMHG | DIASTOLIC BLOOD PRESSURE: 78 MMHG | TEMPERATURE: 97.6 F

## 2020-10-22 DIAGNOSIS — S52.122A CLOSED DISPLACED FRACTURE OF HEAD OF LEFT RADIUS, INITIAL ENCOUNTER: ICD-10-CM

## 2020-10-22 DIAGNOSIS — S52.122A CLOSED DISPLACED FRACTURE OF HEAD OF LEFT RADIUS, INITIAL ENCOUNTER: Primary | ICD-10-CM

## 2020-10-22 PROCEDURE — 73080 X-RAY EXAM OF ELBOW: CPT

## 2020-10-22 PROCEDURE — 99024 POSTOP FOLLOW-UP VISIT: CPT | Performed by: ORTHOPAEDIC SURGERY

## 2020-10-26 ENCOUNTER — OFFICE VISIT (OUTPATIENT)
Dept: OCCUPATIONAL THERAPY | Facility: CLINIC | Age: 50
End: 2020-10-26
Payer: COMMERCIAL

## 2020-10-26 DIAGNOSIS — S52.122D CLOSED DISPLACED FRACTURE OF HEAD OF LEFT RADIUS WITH ROUTINE HEALING, SUBSEQUENT ENCOUNTER: Primary | ICD-10-CM

## 2020-10-26 PROCEDURE — 97140 MANUAL THERAPY 1/> REGIONS: CPT | Performed by: OCCUPATIONAL THERAPIST

## 2020-10-26 PROCEDURE — 97110 THERAPEUTIC EXERCISES: CPT | Performed by: OCCUPATIONAL THERAPIST

## 2020-10-30 ENCOUNTER — OFFICE VISIT (OUTPATIENT)
Dept: OCCUPATIONAL THERAPY | Facility: CLINIC | Age: 50
End: 2020-10-30
Payer: COMMERCIAL

## 2020-10-30 DIAGNOSIS — S52.122D CLOSED DISPLACED FRACTURE OF HEAD OF LEFT RADIUS WITH ROUTINE HEALING, SUBSEQUENT ENCOUNTER: Primary | ICD-10-CM

## 2020-10-30 PROCEDURE — 97140 MANUAL THERAPY 1/> REGIONS: CPT | Performed by: OCCUPATIONAL THERAPIST

## 2020-10-30 PROCEDURE — 97110 THERAPEUTIC EXERCISES: CPT | Performed by: OCCUPATIONAL THERAPIST

## 2020-11-01 ENCOUNTER — HOSPITAL ENCOUNTER (OUTPATIENT)
Dept: ULTRASOUND IMAGING | Facility: HOSPITAL | Age: 50
Discharge: HOME | End: 2020-11-01
Payer: COMMERCIAL

## 2020-11-01 DIAGNOSIS — Z87.42 HISTORY OF OVARIAN CYST: ICD-10-CM

## 2020-11-01 PROCEDURE — 76856 US EXAM PELVIC COMPLETE: CPT

## 2020-11-01 PROCEDURE — 76830 TRANSVAGINAL US NON-OB: CPT

## 2020-11-02 ENCOUNTER — APPOINTMENT (OUTPATIENT)
Dept: OCCUPATIONAL THERAPY | Facility: CLINIC | Age: 50
End: 2020-11-02
Payer: COMMERCIAL

## 2020-11-03 ENCOUNTER — HOSPITAL ENCOUNTER (OUTPATIENT)
Dept: MAMMOGRAPHY | Facility: CLINIC | Age: 50
Discharge: HOME | End: 2020-11-03
Payer: COMMERCIAL

## 2020-11-03 VITALS — HEIGHT: 63 IN | WEIGHT: 169 LBS | BODY MASS INDEX: 29.95 KG/M2

## 2020-11-03 DIAGNOSIS — Z12.31 BREAST CANCER SCREENING BY MAMMOGRAM: ICD-10-CM

## 2020-11-03 PROCEDURE — 77067 SCR MAMMO BI INCL CAD: CPT

## 2020-11-03 PROCEDURE — 77063 BREAST TOMOSYNTHESIS BI: CPT

## 2020-11-04 ENCOUNTER — OFFICE VISIT (OUTPATIENT)
Dept: OCCUPATIONAL THERAPY | Facility: CLINIC | Age: 50
End: 2020-11-04
Payer: COMMERCIAL

## 2020-11-04 DIAGNOSIS — S52.122D CLOSED DISPLACED FRACTURE OF HEAD OF LEFT RADIUS WITH ROUTINE HEALING, SUBSEQUENT ENCOUNTER: Primary | ICD-10-CM

## 2020-11-04 PROCEDURE — 97110 THERAPEUTIC EXERCISES: CPT | Performed by: OCCUPATIONAL THERAPIST

## 2020-11-04 PROCEDURE — 97140 MANUAL THERAPY 1/> REGIONS: CPT | Performed by: OCCUPATIONAL THERAPIST

## 2020-11-05 ENCOUNTER — OFFICE VISIT (OUTPATIENT)
Dept: OCCUPATIONAL THERAPY | Facility: CLINIC | Age: 50
End: 2020-11-05
Payer: COMMERCIAL

## 2020-11-05 DIAGNOSIS — S52.122D CLOSED DISPLACED FRACTURE OF HEAD OF LEFT RADIUS WITH ROUTINE HEALING, SUBSEQUENT ENCOUNTER: Primary | ICD-10-CM

## 2020-11-05 PROCEDURE — 97140 MANUAL THERAPY 1/> REGIONS: CPT | Performed by: OCCUPATIONAL THERAPIST

## 2020-11-05 PROCEDURE — 97110 THERAPEUTIC EXERCISES: CPT | Performed by: OCCUPATIONAL THERAPIST

## 2020-11-06 ENCOUNTER — TELEPHONE (OUTPATIENT)
Dept: OBGYN CLINIC | Facility: CLINIC | Age: 50
End: 2020-11-06

## 2020-11-09 ENCOUNTER — OFFICE VISIT (OUTPATIENT)
Dept: OCCUPATIONAL THERAPY | Facility: CLINIC | Age: 50
End: 2020-11-09
Payer: COMMERCIAL

## 2020-11-09 DIAGNOSIS — S52.122D CLOSED DISPLACED FRACTURE OF HEAD OF LEFT RADIUS WITH ROUTINE HEALING, SUBSEQUENT ENCOUNTER: Primary | ICD-10-CM

## 2020-11-09 PROCEDURE — 97140 MANUAL THERAPY 1/> REGIONS: CPT | Performed by: OCCUPATIONAL THERAPIST

## 2020-11-09 PROCEDURE — 97110 THERAPEUTIC EXERCISES: CPT | Performed by: OCCUPATIONAL THERAPIST

## 2020-11-12 ENCOUNTER — OFFICE VISIT (OUTPATIENT)
Dept: OCCUPATIONAL THERAPY | Facility: CLINIC | Age: 50
End: 2020-11-12
Payer: COMMERCIAL

## 2020-11-12 DIAGNOSIS — S52.122D CLOSED DISPLACED FRACTURE OF HEAD OF LEFT RADIUS WITH ROUTINE HEALING, SUBSEQUENT ENCOUNTER: Primary | ICD-10-CM

## 2020-11-12 PROCEDURE — 97110 THERAPEUTIC EXERCISES: CPT | Performed by: OCCUPATIONAL THERAPIST

## 2020-11-17 ENCOUNTER — OFFICE VISIT (OUTPATIENT)
Dept: OCCUPATIONAL THERAPY | Facility: CLINIC | Age: 50
End: 2020-11-17
Payer: COMMERCIAL

## 2020-11-17 DIAGNOSIS — S52.122D CLOSED DISPLACED FRACTURE OF HEAD OF LEFT RADIUS WITH ROUTINE HEALING, SUBSEQUENT ENCOUNTER: Primary | ICD-10-CM

## 2020-11-17 PROCEDURE — 97110 THERAPEUTIC EXERCISES: CPT | Performed by: OCCUPATIONAL THERAPIST

## 2020-11-17 PROCEDURE — 97140 MANUAL THERAPY 1/> REGIONS: CPT | Performed by: OCCUPATIONAL THERAPIST

## 2020-11-19 ENCOUNTER — OFFICE VISIT (OUTPATIENT)
Dept: OCCUPATIONAL THERAPY | Facility: CLINIC | Age: 50
End: 2020-11-19
Payer: COMMERCIAL

## 2020-11-19 DIAGNOSIS — S52.122D CLOSED DISPLACED FRACTURE OF HEAD OF LEFT RADIUS WITH ROUTINE HEALING, SUBSEQUENT ENCOUNTER: Primary | ICD-10-CM

## 2020-11-19 PROCEDURE — 97110 THERAPEUTIC EXERCISES: CPT | Performed by: OCCUPATIONAL THERAPIST

## 2020-11-19 PROCEDURE — 97140 MANUAL THERAPY 1/> REGIONS: CPT | Performed by: OCCUPATIONAL THERAPIST

## 2020-11-30 ENCOUNTER — LAB (OUTPATIENT)
Dept: LAB | Facility: CLINIC | Age: 50
End: 2020-11-30
Payer: COMMERCIAL

## 2020-11-30 ENCOUNTER — TRANSCRIBE ORDERS (OUTPATIENT)
Dept: LAB | Facility: CLINIC | Age: 50
End: 2020-11-30

## 2020-11-30 DIAGNOSIS — E78.49 OTHER HYPERLIPIDEMIA: ICD-10-CM

## 2020-11-30 DIAGNOSIS — N62 BREAST HYPERTROPHY: Primary | ICD-10-CM

## 2020-11-30 DIAGNOSIS — Z11.59 NEED FOR HEPATITIS C SCREENING TEST: ICD-10-CM

## 2020-11-30 DIAGNOSIS — Z11.4 SCREENING FOR HIV (HUMAN IMMUNODEFICIENCY VIRUS): ICD-10-CM

## 2020-11-30 DIAGNOSIS — I10 ESSENTIAL HYPERTENSION: ICD-10-CM

## 2020-11-30 DIAGNOSIS — N62 BREAST HYPERTROPHY: ICD-10-CM

## 2020-11-30 DIAGNOSIS — E06.3 HASHIMOTO'S DISEASE: ICD-10-CM

## 2020-11-30 LAB
ALBUMIN SERPL BCP-MCNC: 3.8 G/DL (ref 3.5–5)
ALP SERPL-CCNC: 88 U/L (ref 46–116)
ALT SERPL W P-5'-P-CCNC: 18 U/L (ref 12–78)
ANION GAP SERPL CALCULATED.3IONS-SCNC: 5 MMOL/L (ref 4–13)
AST SERPL W P-5'-P-CCNC: 18 U/L (ref 5–45)
BASOPHILS # BLD AUTO: 0.03 THOUSANDS/ΜL (ref 0–0.1)
BASOPHILS NFR BLD AUTO: 0 % (ref 0–1)
BILIRUB SERPL-MCNC: 0.45 MG/DL (ref 0.2–1)
BUN SERPL-MCNC: 13 MG/DL (ref 5–25)
CALCIUM SERPL-MCNC: 9.6 MG/DL (ref 8.3–10.1)
CHLORIDE SERPL-SCNC: 107 MMOL/L (ref 100–108)
CHOLEST SERPL-MCNC: 214 MG/DL (ref 50–200)
CO2 SERPL-SCNC: 29 MMOL/L (ref 21–32)
CREAT SERPL-MCNC: 0.84 MG/DL (ref 0.6–1.3)
EOSINOPHIL # BLD AUTO: 0.29 THOUSAND/ΜL (ref 0–0.61)
EOSINOPHIL NFR BLD AUTO: 4 % (ref 0–6)
ERYTHROCYTE [DISTWIDTH] IN BLOOD BY AUTOMATED COUNT: 12.9 % (ref 11.6–15.1)
EST. AVERAGE GLUCOSE BLD GHB EST-MCNC: 117 MG/DL
GFR SERPL CREATININE-BSD FRML MDRD: 82 ML/MIN/1.73SQ M
GLUCOSE P FAST SERPL-MCNC: 80 MG/DL (ref 65–99)
HBA1C MFR BLD: 5.7 %
HCT VFR BLD AUTO: 38.6 % (ref 34.8–46.1)
HCV AB SER QL: NORMAL
HDLC SERPL-MCNC: 64 MG/DL
HGB BLD-MCNC: 12.4 G/DL (ref 11.5–15.4)
IMM GRANULOCYTES # BLD AUTO: 0.02 THOUSAND/UL (ref 0–0.2)
IMM GRANULOCYTES NFR BLD AUTO: 0 % (ref 0–2)
LDLC SERPL CALC-MCNC: 113 MG/DL (ref 0–100)
LYMPHOCYTES # BLD AUTO: 1.69 THOUSANDS/ΜL (ref 0.6–4.47)
LYMPHOCYTES NFR BLD AUTO: 23 % (ref 14–44)
MCH RBC QN AUTO: 30.4 PG (ref 26.8–34.3)
MCHC RBC AUTO-ENTMCNC: 32.1 G/DL (ref 31.4–37.4)
MCV RBC AUTO: 95 FL (ref 82–98)
MONOCYTES # BLD AUTO: 0.67 THOUSAND/ΜL (ref 0.17–1.22)
MONOCYTES NFR BLD AUTO: 9 % (ref 4–12)
NEUTROPHILS # BLD AUTO: 4.73 THOUSANDS/ΜL (ref 1.85–7.62)
NEUTS SEG NFR BLD AUTO: 64 % (ref 43–75)
NONHDLC SERPL-MCNC: 150 MG/DL
NRBC BLD AUTO-RTO: 0 /100 WBCS
PLATELET # BLD AUTO: 355 THOUSANDS/UL (ref 149–390)
PMV BLD AUTO: 10.2 FL (ref 8.9–12.7)
POTASSIUM SERPL-SCNC: 3.7 MMOL/L (ref 3.5–5.3)
PROT SERPL-MCNC: 7.9 G/DL (ref 6.4–8.2)
RBC # BLD AUTO: 4.08 MILLION/UL (ref 3.81–5.12)
SODIUM SERPL-SCNC: 141 MMOL/L (ref 136–145)
TRIGL SERPL-MCNC: 187 MG/DL
TSH SERPL DL<=0.05 MIU/L-ACNC: 2.34 UIU/ML (ref 0.36–3.74)
WBC # BLD AUTO: 7.43 THOUSAND/UL (ref 4.31–10.16)

## 2020-11-30 PROCEDURE — 86803 HEPATITIS C AB TEST: CPT

## 2020-11-30 PROCEDURE — 87389 HIV-1 AG W/HIV-1&-2 AB AG IA: CPT

## 2020-11-30 PROCEDURE — 36415 COLL VENOUS BLD VENIPUNCTURE: CPT

## 2020-11-30 PROCEDURE — 84443 ASSAY THYROID STIM HORMONE: CPT

## 2020-11-30 PROCEDURE — 80061 LIPID PANEL: CPT

## 2020-11-30 PROCEDURE — 85025 COMPLETE CBC W/AUTO DIFF WBC: CPT

## 2020-11-30 PROCEDURE — 80053 COMPREHEN METABOLIC PANEL: CPT

## 2020-11-30 PROCEDURE — 83036 HEMOGLOBIN GLYCOSYLATED A1C: CPT

## 2020-12-01 LAB — HIV 1+2 AB+HIV1 P24 AG SERPL QL IA: NORMAL

## 2020-12-01 RX ORDER — AMLODIPINE BESYLATE 2.5 MG/1
TABLET ORAL
Qty: 30 TABLET | Refills: 11 | Status: SHIPPED | OUTPATIENT
Start: 2020-12-01 | End: 2021-02-19 | Stop reason: SDUPTHER

## 2020-12-15 ENCOUNTER — APPOINTMENT (OUTPATIENT)
Dept: RADIOLOGY | Facility: CLINIC | Age: 50
End: 2020-12-15
Payer: COMMERCIAL

## 2020-12-15 ENCOUNTER — OFFICE VISIT (OUTPATIENT)
Dept: OBGYN CLINIC | Facility: CLINIC | Age: 50
End: 2020-12-15

## 2020-12-15 VITALS
HEART RATE: 90 BPM | DIASTOLIC BLOOD PRESSURE: 83 MMHG | BODY MASS INDEX: 29.95 KG/M2 | SYSTOLIC BLOOD PRESSURE: 121 MMHG | HEIGHT: 63 IN | WEIGHT: 169 LBS

## 2020-12-15 DIAGNOSIS — Z48.89 AFTERCARE FOLLOWING SURGERY: Primary | ICD-10-CM

## 2020-12-15 DIAGNOSIS — Z48.89 AFTERCARE FOLLOWING SURGERY: ICD-10-CM

## 2020-12-15 PROCEDURE — 99024 POSTOP FOLLOW-UP VISIT: CPT | Performed by: ORTHOPAEDIC SURGERY

## 2020-12-15 PROCEDURE — 73080 X-RAY EXAM OF ELBOW: CPT

## 2020-12-15 PROCEDURE — 3008F BODY MASS INDEX DOCD: CPT | Performed by: STUDENT IN AN ORGANIZED HEALTH CARE EDUCATION/TRAINING PROGRAM

## 2020-12-16 DIAGNOSIS — E78.5 HYPERLIPIDEMIA, UNSPECIFIED HYPERLIPIDEMIA TYPE: ICD-10-CM

## 2020-12-16 RX ORDER — ATORVASTATIN CALCIUM 10 MG/1
TABLET, FILM COATED ORAL
Qty: 90 TABLET | Refills: 3 | Status: SHIPPED | OUTPATIENT
Start: 2020-12-16 | End: 2021-02-19 | Stop reason: SDUPTHER

## 2021-02-17 DIAGNOSIS — N89.8 VAGINAL IRRITATION: Primary | ICD-10-CM

## 2021-02-17 RX ORDER — FLUCONAZOLE 150 MG/1
TABLET ORAL
Qty: 2 TABLET | Refills: 0 | Status: SHIPPED | OUTPATIENT
Start: 2021-02-17 | End: 2021-02-20

## 2021-02-17 NOTE — TELEPHONE ENCOUNTER
----- Message from Kathy Kim sent at 2/17/2021  9:22 AM EST -----  Regarding: Prescription Question  Contact: [phone]  Hello, I filled out an appointment request  However, was wondering if I need to go in person or video call will suffice or calling in a prescription  I had surgery and took a lot of antibiotics pills after the surgery  I currently have a minor infection  Small white discharge and a bit of discomfort  Again, let me know if a video call, visit, or calling in a prescription will work  My immune system is compromised and I am at high risk for COVID: Hence, my questions above    Thanks in advance:  Abb

## 2021-02-17 NOTE — TELEPHONE ENCOUNTER
Pt is having vaginal itching and whitish discharge, is asking for diflucan as she believes she has a yeast infection

## 2021-02-19 DIAGNOSIS — E78.5 HYPERLIPIDEMIA, UNSPECIFIED HYPERLIPIDEMIA TYPE: ICD-10-CM

## 2021-02-19 DIAGNOSIS — J45.909 UNCOMPLICATED ASTHMA, UNSPECIFIED ASTHMA SEVERITY, UNSPECIFIED WHETHER PERSISTENT: ICD-10-CM

## 2021-02-19 DIAGNOSIS — I10 ESSENTIAL HYPERTENSION: ICD-10-CM

## 2021-02-19 RX ORDER — FLUTICASONE PROPIONATE 50 MCG
2 SPRAY, SUSPENSION (ML) NASAL DAILY
Qty: 16 G | Refills: 0 | Status: SHIPPED | OUTPATIENT
Start: 2021-02-19

## 2021-02-19 RX ORDER — ATORVASTATIN CALCIUM 10 MG/1
10 TABLET, FILM COATED ORAL DAILY
Qty: 90 TABLET | Refills: 3 | Status: SHIPPED | OUTPATIENT
Start: 2021-02-19 | End: 2021-10-20 | Stop reason: ALTCHOICE

## 2021-02-19 RX ORDER — FLUTICASONE PROPIONATE 50 MCG
2 SPRAY, SUSPENSION (ML) NASAL DAILY
Qty: 16 G | Refills: 0 | Status: CANCELLED | OUTPATIENT
Start: 2021-02-19

## 2021-02-19 RX ORDER — ALBUTEROL SULFATE 90 UG/1
2 AEROSOL, METERED RESPIRATORY (INHALATION) EVERY 4 HOURS PRN
Qty: 1 INHALER | Refills: 0 | Status: CANCELLED | OUTPATIENT
Start: 2021-02-19

## 2021-02-19 RX ORDER — AMLODIPINE BESYLATE 2.5 MG/1
2.5 TABLET ORAL DAILY
Qty: 90 TABLET | Refills: 0 | Status: SHIPPED | OUTPATIENT
Start: 2021-02-19 | End: 2021-10-20 | Stop reason: SDUPTHER

## 2021-02-19 RX ORDER — ALBUTEROL SULFATE 90 UG/1
2 AEROSOL, METERED RESPIRATORY (INHALATION) EVERY 4 HOURS PRN
Qty: 1 INHALER | Refills: 2 | Status: SHIPPED | OUTPATIENT
Start: 2021-02-19

## 2021-02-21 RX ORDER — ATORVASTATIN CALCIUM 10 MG/1
10 TABLET, FILM COATED ORAL DAILY
Qty: 90 TABLET | Refills: 2 | Status: SHIPPED | OUTPATIENT
Start: 2021-02-21 | End: 2022-05-09

## 2021-02-21 RX ORDER — AMLODIPINE BESYLATE 2.5 MG/1
2.5 TABLET ORAL DAILY
Qty: 90 TABLET | Refills: 2 | Status: SHIPPED | OUTPATIENT
Start: 2021-02-21 | End: 2022-01-24

## 2021-02-25 ENCOUNTER — TELEPHONE (OUTPATIENT)
Dept: OBGYN CLINIC | Facility: CLINIC | Age: 51
End: 2021-02-25

## 2021-02-25 NOTE — TELEPHONE ENCOUNTER
----- Message from Aureliano Quan sent at 2/25/2021  2:46 PM EST -----  Regarding: Prescription Question  Contact: [phone]  Syd, can you please check my chart and refill the last medication? I noticed today that I still have a slight white discharge  Thanks in advance

## 2021-02-25 NOTE — TELEPHONE ENCOUNTER
Pt took 2 diflucan last week - 1 on 2/17 and 1 on 2/20  Pt said itch gone and today saw slight dsch again  I told pt to wait few more days as diflucan still working in system   If still symptomatic - tcb Mon

## 2021-04-12 ENCOUNTER — OFFICE VISIT (OUTPATIENT)
Dept: OBGYN CLINIC | Facility: CLINIC | Age: 51
End: 2021-04-12
Payer: COMMERCIAL

## 2021-04-12 VITALS — BODY MASS INDEX: 29.88 KG/M2 | SYSTOLIC BLOOD PRESSURE: 120 MMHG | DIASTOLIC BLOOD PRESSURE: 76 MMHG | WEIGHT: 166 LBS

## 2021-04-12 DIAGNOSIS — B37.9 YEAST INFECTION: ICD-10-CM

## 2021-04-12 DIAGNOSIS — R35.0 FREQUENT URINATION: Primary | ICD-10-CM

## 2021-04-12 PROCEDURE — 3078F DIAST BP <80 MM HG: CPT | Performed by: STUDENT IN AN ORGANIZED HEALTH CARE EDUCATION/TRAINING PROGRAM

## 2021-04-12 PROCEDURE — 99214 OFFICE O/P EST MOD 30 MIN: CPT | Performed by: STUDENT IN AN ORGANIZED HEALTH CARE EDUCATION/TRAINING PROGRAM

## 2021-04-12 PROCEDURE — 3074F SYST BP LT 130 MM HG: CPT | Performed by: STUDENT IN AN ORGANIZED HEALTH CARE EDUCATION/TRAINING PROGRAM

## 2021-04-12 PROCEDURE — 87086 URINE CULTURE/COLONY COUNT: CPT | Performed by: STUDENT IN AN ORGANIZED HEALTH CARE EDUCATION/TRAINING PROGRAM

## 2021-04-12 RX ORDER — FLUCONAZOLE 150 MG/1
TABLET ORAL
Qty: 2 TABLET | Refills: 0 | Status: SHIPPED | OUTPATIENT
Start: 2021-04-12 | End: 2021-04-14

## 2021-04-12 NOTE — PROGRESS NOTES
Assessment/Plan:      54 yo G0 - yeast infection + urinary symptoms  Problem List Items Addressed This Visit    Visit Diagnoses     Frequent urination    -  Primary  Has urology referral from PCP  Discussed avoiding bladder irritants  If continued feeling of incomplete emptying and negative urine culture would perform PVR at next visit  Consider trial of meds for OAB  Relevant Orders    Urine culture    Yeast infection        Relevant Medications    fluconazole (DIFLUCAN) 150 mg tablet          Subjective:     Patient ID:     54 yo G0 s/p hysterectomy -    Pain after urinating - doesn't feel like she's emptying bladder  Sometimes burning sensation w/ urinating  Increased frequency - usually empties large volumes of urine  No leaking  Drinks flavored water, juice  Rarely plain water  Vaginal itching w/o discharge or odor  H/o yeast infections, usually diflucan helps  Recently on antibiotics following surgery  Review of Systems   All other systems reviewed and are negative  Objective:     Physical Exam  Exam conducted with a chaperone present  Pulmonary:      Effort: Pulmonary effort is normal    Genitourinary:     General: Normal vulva  Labia:         Right: No rash  Left: No rash  Comments: Some white discharge  Cervix,  Uterus surgically absent  On wet prep - few yeast visualized  Neurological:      Mental Status: She is alert and oriented to person, place, and time     Psychiatric:         Behavior: Behavior normal

## 2021-04-13 LAB — BACTERIA UR CULT: NORMAL

## 2021-04-15 ENCOUNTER — IMMUNIZATIONS (OUTPATIENT)
Dept: FAMILY MEDICINE CLINIC | Facility: HOSPITAL | Age: 51
End: 2021-04-15

## 2021-04-15 DIAGNOSIS — Z23 ENCOUNTER FOR IMMUNIZATION: Primary | ICD-10-CM

## 2021-04-15 PROCEDURE — 91300 SARS-COV-2 / COVID-19 MRNA VACCINE (PFIZER-BIONTECH) 30 MCG: CPT | Performed by: INTERNAL MEDICINE

## 2021-04-15 PROCEDURE — 0001A SARS-COV-2 / COVID-19 MRNA VACCINE (PFIZER-BIONTECH) 30 MCG: CPT | Performed by: INTERNAL MEDICINE

## 2021-04-26 DIAGNOSIS — G47.00 INSOMNIA, UNSPECIFIED TYPE: ICD-10-CM

## 2021-04-27 RX ORDER — CLONAZEPAM 0.5 MG/1
0.5 TABLET ORAL 2 TIMES DAILY PRN
Qty: 60 TABLET | Refills: 0 | Status: SHIPPED | OUTPATIENT
Start: 2021-04-27 | End: 2021-07-02

## 2021-05-08 ENCOUNTER — IMMUNIZATIONS (OUTPATIENT)
Dept: FAMILY MEDICINE CLINIC | Facility: HOSPITAL | Age: 51
End: 2021-05-08

## 2021-05-08 DIAGNOSIS — Z23 ENCOUNTER FOR IMMUNIZATION: Primary | ICD-10-CM

## 2021-05-08 PROCEDURE — 0002A SARS-COV-2 / COVID-19 MRNA VACCINE (PFIZER-BIONTECH) 30 MCG: CPT

## 2021-05-08 PROCEDURE — 91300 SARS-COV-2 / COVID-19 MRNA VACCINE (PFIZER-BIONTECH) 30 MCG: CPT

## 2021-06-08 ENCOUNTER — OFFICE VISIT (OUTPATIENT)
Dept: OBGYN CLINIC | Facility: CLINIC | Age: 51
End: 2021-06-08
Payer: COMMERCIAL

## 2021-06-08 VITALS
DIASTOLIC BLOOD PRESSURE: 95 MMHG | BODY MASS INDEX: 29.41 KG/M2 | WEIGHT: 166 LBS | HEART RATE: 77 BPM | SYSTOLIC BLOOD PRESSURE: 153 MMHG | HEIGHT: 63 IN

## 2021-06-08 DIAGNOSIS — Z48.89 AFTERCARE FOLLOWING SURGERY: Primary | ICD-10-CM

## 2021-06-08 PROCEDURE — 3008F BODY MASS INDEX DOCD: CPT | Performed by: ORTHOPAEDIC SURGERY

## 2021-06-08 PROCEDURE — 99213 OFFICE O/P EST LOW 20 MIN: CPT | Performed by: ORTHOPAEDIC SURGERY

## 2021-06-08 PROCEDURE — 1036F TOBACCO NON-USER: CPT | Performed by: ORTHOPAEDIC SURGERY

## 2021-06-08 PROCEDURE — 3077F SYST BP >= 140 MM HG: CPT | Performed by: ORTHOPAEDIC SURGERY

## 2021-06-08 PROCEDURE — 3080F DIAST BP >= 90 MM HG: CPT | Performed by: ORTHOPAEDIC SURGERY

## 2021-06-08 NOTE — PROGRESS NOTES
CHIEF COMPLAINT:  Chief Complaint   Patient presents with    Left Elbow - Follow-up       SUBJECTIVE:  Shaggy Farris is a 53y o  year old female who presents for follow-up regarding s/p left radial head implant arthroplasty performed on 10/09/2020  Patient states that she is doing very well and has no discomfort at this time  She has been working on her own home exercise program to work on her range of motion as well as strengthening  She offers no other complaints at this time  She denies any numbness or tingling  PAST MEDICAL HISTORY:  Past Medical History:   Diagnosis Date    Anxiety     Asthma     Depression     Disease of thyroid gland     Dizziness     Forgetfulness     Hashimoto's disease     Hashimoto's disease     History of fainting as a child     Hyperlipidemia     Hypertension     Numbness and tingling     MIRANDA (obstructive sleep apnea)     Post traumatic stress disorder 2/28/2019       PAST SURGICAL HISTORY:  Past Surgical History:   Procedure Laterality Date    BREAST BIOPSY Left 2020    BREAST SURGERY Bilateral 2002    reduction    COLONOSCOPY      HYSTERECTOMY  2005    MD COLONOSCOPY FLX DX W/COLLJ SPEC WHEN PFRMD N/A 10/16/2018    Procedure: EGD AND COLONOSCOPY;  Surgeon: Iris Gray MD;  Location: [facility];   Service: Gastroenterology    MD REMOVAL OF HEAD OF RADIUS Left 10/9/2020    Procedure: RADIAL HEAD IMPLANT ARTHROPLASTY left;  Surgeon: Linda Salomon MD;  Location: [facility];  Service: Orthopedics    REDUCTION MAMMAPLASTY Bilateral 1997    REDUCTION MAMMAPLASTY Bilateral 2001    SINUS SURGERY         FAMILY HISTORY:  Family History   Problem Relation Age of Onset    Hyperlipidemia Mother    [place] Lupus Mother     Depression Father     Cervical cancer Paternal Grandmother     Ovarian cancer Paternal Grandmother 77    Uterine cancer Paternal Grandmother     No Known Problems Half-Sister     No Known Problems Half-Brother     No Known Problems Half-Brother     No Known Problems Half-Sister     No Known Problems Half-Sister     No Known Problems Maternal Aunt     No Known Problems Maternal Aunt     No Known Problems Maternal Aunt     No Known Problems Maternal Aunt     No Known Problems Paternal Aunt     No Known Problems Paternal Aunt     Breast cancer Neg Hx     Colon cancer Neg Hx     Seizures Neg Hx        SOCIAL HISTORY:  Social History     Tobacco Use    Smoking status: Never Smoker    Smokeless tobacco: Never Used   Substance Use Topics    Alcohol use: Yes     Frequency: 2-3 times a week     Drinks per session: 1 or 2     Binge frequency: Never     Comment: socially    Drug use: No       MEDICATIONS:    Current Outpatient Medications:     albuterol (PROVENTIL HFA,VENTOLIN HFA) 90 mcg/act inhaler, Inhale 2 puffs every 4 (four) hours as needed for wheezing, Disp: 1 Inhaler, Rfl: 2    amLODIPine (NORVASC) 2 5 mg tablet, Take 1 tablet (2 5 mg total) by mouth daily, Disp: 90 tablet, Rfl: 2    atorvastatin (LIPITOR) 10 mg tablet, Take 1 tablet (10 mg total) by mouth daily, Disp: 90 tablet, Rfl: 2    clonazePAM (KlonoPIN) 0 5 mg tablet, Take 1 tablet (0 5 mg total) by mouth 2 (two) times a day as needed for anxiety, Disp: 60 tablet, Rfl: 0    fluticasone (FLONASE) 50 mcg/act nasal spray, 2 sprays into each nostril daily, Disp: 16 g, Rfl: 0    levothyroxine 75 mcg tablet, Take 1 tablet (75 mcg total) by mouth daily Take 50mcg daily, Disp: , Rfl:     rizatriptan (MAXALT-MLT) 10 MG disintegrating tablet, Take 1 tablet (10 mg total) by mouth once as needed for migraine for up to 1 dose May repeat in 2 hours if needed, Disp: 9 tablet, Rfl: 0    venlafaxine 225 MG TB24, TAKE 1 TABLET BY MOUTH  DAILY WITH BREAKFAST, Disp: 90 tablet, Rfl: 3    amLODIPine (NORVASC) 2 5 mg tablet, Take 1 tablet (2 5 mg total) by mouth daily, Disp: 90 tablet, Rfl: 0    atorvastatin (LIPITOR) 10 mg tablet, Take 1 tablet (10 mg total) by mouth daily, Disp: 90 tablet, Rfl: 3    ALLERGIES:  Allergies   Allergen Reactions    Lisinopril-Hydrochlorothiazide Hives    Other Hives     States she has no allergies       REVIEW OF SYSTEMS:  Review of Systems  ROS:   General: no fever, no chills  HEENT:  No loss of hearing or eyesight problems  Eyes:  No red eyes  Respiratory:  No coughing, shortness of breath or wheezing  Cardiovascular:  No chest pain, no palpitations  GI:  Abdomen soft nontender, denies nausea  Endocrine:  No muscle weakness, no frequent urination, no excessive thirst  Urinary:  No dysuria, no incontinence  Musculoskeletal: see HPI and PE  SKIN:  No skin rash, no dry skin  Neurological:  No headaches, no confusion  Psychiatric:  No suicide thoughts, no anxiety, no depression  Review of all other systems is negative    VITALS:  Vitals:    06/08/21 1054   BP: 153/95   Pulse: 77       LABS:  HgA1c:   Lab Results   Component Value Date    HGBA1C 5 7 (H) 11/30/2020     BMP:   Lab Results   Component Value Date    GLUCOSE 89 09/22/2017    CALCIUM 9 6 11/30/2020     09/22/2017    K 3 7 11/30/2020    CO2 29 11/30/2020     11/30/2020    BUN 13 11/30/2020    CREATININE 0 84 11/30/2020       _____________________________________________________  PHYSICAL EXAMINATION:  General: well developed and well nourished, alert, oriented times 3 and appears comfortable  Psychiatric: Normal  HEENT: Trachea Midline, No torticollis  Pulmonary: No audible wheezing or respiratory distress   Skin: No masses, erythema, lacerations, fluctation, ulcerations  Neurovascular: Sensation Intact to the Median, Ulnar, Radial Nerve, Motor Intact to the Median, Ulnar, Radial Nerve and Pulses Intact    MUSCULOSKELETAL EXAMINATION:  Left elbow  No erythema edema or ecchymosis noted, skin is warm to touch  No tenderness to palpation over the incision site  Incision is well healed no signs or symptoms of infection  She has full range of motion with flexion extension, pronation and supination  Elbow stable valgus varus test    ___________________________________________________  STUDIES REVIEWED:  No studies reviewed  PROCEDURES PERFORMED:  Procedures  No Procedures performed today    _____________________________________________________  ASSESSMENT/PLAN:      S/p left elbow radial head arthroplasty  -patient was advised to continue to work on her range of motion and strengthening  -she can weightbear as tolerated  -she will follow-up with us as needed        Follow Up:  Return if symptoms worsen or fail to improve  Work/school status:  No restrictions    To Do Next Visit:  Re-evaluation of current issue      Scribe Attestation    I,:  Kera Smith PA-C am acting as a scribe while in the presence of the attending physician :       I,:  Pattie Carter MD personally performed the services described in this documentation    as scribed in my presence :           Portions of the record may have been created with voice recognition software  Occasional wrong word or "sound a like" substitutions may have occurred due to the inherent limitations of voice recognition software  Read the chart carefully and recognize, using context, where substitutions have occurred

## 2021-06-10 ENCOUNTER — TELEPHONE (OUTPATIENT)
Dept: INTERNAL MEDICINE CLINIC | Facility: CLINIC | Age: 51
End: 2021-06-10

## 2021-06-10 DIAGNOSIS — F41.8 DEPRESSION WITH ANXIETY: ICD-10-CM

## 2021-06-10 DIAGNOSIS — F41.9 ANXIETY: Primary | ICD-10-CM

## 2021-06-19 DIAGNOSIS — F32.A DEPRESSION, UNSPECIFIED DEPRESSION TYPE: ICD-10-CM

## 2021-06-21 RX ORDER — VENLAFAXINE HYDROCHLORIDE 225 MG/1
TABLET, EXTENDED RELEASE ORAL
Qty: 90 TABLET | Refills: 3 | Status: SHIPPED | OUTPATIENT
Start: 2021-06-21 | End: 2022-01-03 | Stop reason: SDUPTHER

## 2021-07-02 DIAGNOSIS — G47.00 INSOMNIA, UNSPECIFIED TYPE: ICD-10-CM

## 2021-07-02 RX ORDER — CLONAZEPAM 0.5 MG/1
TABLET ORAL
Qty: 60 TABLET | Refills: 0 | Status: SHIPPED | OUTPATIENT
Start: 2021-07-02 | End: 2021-07-06 | Stop reason: SDUPTHER

## 2021-07-03 DIAGNOSIS — G47.00 INSOMNIA, UNSPECIFIED TYPE: ICD-10-CM

## 2021-07-03 NOTE — TELEPHONE ENCOUNTER
clonazePAM (KlonoPIN) 0 5 mg    Refill was sent to mail order    but she also requested that  some tablets  be sent to the [facility]  pharmacy   [place]     Please sent   Some tablets of clonazePAM (KlonoPIN) 0 5 mg

## 2021-07-06 RX ORDER — CLONAZEPAM 0.5 MG/1
0.5 TABLET ORAL 2 TIMES DAILY PRN
Qty: 28 TABLET | Refills: 0 | Status: SHIPPED | OUTPATIENT
Start: 2021-07-06 | End: 2021-08-16 | Stop reason: SDUPTHER

## 2021-07-07 ENCOUNTER — TELEPHONE (OUTPATIENT)
Dept: PSYCHIATRY | Facility: CLINIC | Age: 51
End: 2021-07-07

## 2021-07-07 NOTE — TELEPHONE ENCOUNTER
Patient called on 7/7/21 at 1:40pm to schedule a New patient appointment  Said she missed a call from our office  Transferred patient to intake

## 2021-07-09 ENCOUNTER — TELEPHONE (OUTPATIENT)
Dept: PSYCHIATRY | Facility: CLINIC | Age: 51
End: 2021-07-09

## 2021-07-09 NOTE — TELEPHONE ENCOUNTER
Behavorial Health Outpatient Intake Questions    Referred by: PCP    Please advised interviewee that they need to answer all questions truthfully to allow for best care and any misrepresentations of information may affect their ability to be seen at this clinic   => Was this discussed? Yes     [facility]     Presenting Problem (in patient's words):   Depression and anxiety. Medication management.  Patient used to see Dr. Millicent Coronado in [place]. Patient hasn't been seen by a psychiatrist since 2016.     Are there any developmental disabilities? ? If yes, can they speak to you on the phone? If they are too limited to speak to you on phone, refer out No    Are you taking any psychiatric medications? Yes    => If yes, who prescribes? If yes, are they injectable medications?   venlafaxine 225 MG TB24 - Former Psych  clonazePAM (KlonoPIN) 0.5 mg tablet - Former Psych    Does the patient have a language barrier or hearing impairment? No    Have you been treated at [facility] by a therapist or a doctor in the past? If yes, who? No    Has the patient been hospitalized for mental health? Yes   If yes, how long ago was last hospitalization and where was it?  2012- [place]    Do you actively use alcohol or marijuana or illegal substances? If yes, what and how much - refer out to Drug and alcohol treatment if use is excessive or daily use of illegal substances No concerns of substance abuse are reported.    Do you have a community treatment team or ? No    Legal History-     Does the patient have any history of arrests, FDC/FPC time, or DUIs? No  If Yes-  1) What types of charges?  2) When were they last incarcerated?  3) Are they currently on parole or probation?    Minor Child-    Who has custody of the child?     Is there a custody agreement?     If there is a custody agreement remind parent that they must bring a copy to the first appt or they will not be seen.      Intake Team, please check with provider before scheduling if flags come up such as:  - complex case  - legal history (other than DUI)  - communication barrier concerns are present  - if, in your judgment, this needs further review    ACCEPTED as a patient Yes  => Appointment Date: 08/26/2021 at 1:30 p.m with Dr. Tapia     Referred Elsewhere? No    Name of Insurance Co: Blue Cross/ Medicare A and B/ [employer] healthcare [facility]  Insurance ID# [id number]/ [id number]/ [id number]  Insurance Phone #  If ins is primary or secondary  If patient is a minor, parents information such as Name, D.O.B of guarantor.

## 2021-08-10 ENCOUNTER — APPOINTMENT (OUTPATIENT)
Dept: LAB | Facility: CLINIC | Age: 51
End: 2021-08-10
Payer: COMMERCIAL

## 2021-08-10 DIAGNOSIS — E06.3 HASHIMOTO'S DISEASE: ICD-10-CM

## 2021-08-10 LAB
T3 SERPL-MCNC: 0.8 NG/ML (ref 0.6–1.8)
T4 FREE SERPL-MCNC: 0.95 NG/DL (ref 0.76–1.46)
TSH SERPL DL<=0.05 MIU/L-ACNC: 1.29 UIU/ML (ref 0.36–3.74)

## 2021-08-10 PROCEDURE — 36415 COLL VENOUS BLD VENIPUNCTURE: CPT

## 2021-08-10 PROCEDURE — 84443 ASSAY THYROID STIM HORMONE: CPT

## 2021-08-10 PROCEDURE — 84480 ASSAY TRIIODOTHYRONINE (T3): CPT

## 2021-08-10 PROCEDURE — 84439 ASSAY OF FREE THYROXINE: CPT

## 2021-08-16 DIAGNOSIS — G47.00 INSOMNIA, UNSPECIFIED TYPE: ICD-10-CM

## 2021-08-18 RX ORDER — CLONAZEPAM 0.5 MG/1
0.5 TABLET ORAL 2 TIMES DAILY PRN
Qty: 28 TABLET | Refills: 0 | Status: SHIPPED | OUTPATIENT
Start: 2021-08-18 | End: 2021-08-23 | Stop reason: SDUPTHER

## 2021-08-26 ENCOUNTER — OFFICE VISIT (OUTPATIENT)
Dept: PSYCHIATRY | Facility: CLINIC | Age: 51
End: 2021-08-26
Payer: COMMERCIAL

## 2021-08-26 VITALS — HEART RATE: 72 BPM | SYSTOLIC BLOOD PRESSURE: 159 MMHG | DIASTOLIC BLOOD PRESSURE: 102 MMHG

## 2021-08-26 DIAGNOSIS — F33.1 MAJOR DEPRESSIVE DISORDER, RECURRENT, MODERATE (HCC): Primary | ICD-10-CM

## 2021-08-26 DIAGNOSIS — F43.10 POST TRAUMATIC STRESS DISORDER (PTSD): ICD-10-CM

## 2021-08-26 PROCEDURE — 90792 PSYCH DIAG EVAL W/MED SRVCS: CPT | Performed by: PSYCHIATRY & NEUROLOGY

## 2021-08-26 RX ORDER — ARIPIPRAZOLE 5 MG/1
5 TABLET ORAL DAILY
Qty: 30 TABLET | Refills: 0 | Status: SHIPPED | OUTPATIENT
Start: 2021-08-26 | End: 2021-10-12

## 2021-08-26 NOTE — BH TREATMENT PLAN
TREATMENT PLAN (Medication Management Only)        [place]    Name/Date of Birth/MRN:  Salo Jaquez 53 y o  1970 MRN: [id number]  Date of Treatment Plan: August 26, 2021  Diagnosis/Diagnoses:   1  Major depressive disorder, recurrent, moderate (HCC)    2  Post traumatic stress disorder (PTSD)      Strengths/Personal Resources for Self-Care: Cares around the house, compliant with medication  Area/Areas of need (in own words): "depression and anxiety"  1  Long Term Goal: "improve depression and anxiety"   Target Date: 180 days from treatment plan  Person/Persons responsible for completion of goal: Dr Denise Bishop and Self  2  Short Term Objective (s) - How will we reach this goal?:   A  Provider new recommended medication/dosage changes and/or continue medication(s): Continue Venlafaxine, Klonopin  Start Abilify  B    Referral for individual psychotherapy  Target Date: 6 months from treatment plan unless noted otherwise  Person/Persons Responsible for Completion of Goal: Dr Denise Bishop and Self   Progress Towards Goals: initiating treatment   Treatment Modality: Medication management and therapy PRN  Review due 180 days from date of this plan: Approximately 6 months from today ( 2/26/2022 )    Expected length of service: ongoing treatment unless revised
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
## 2021-09-17 ENCOUNTER — HOSPITAL ENCOUNTER (OUTPATIENT)
Dept: ULTRASOUND IMAGING | Facility: HOSPITAL | Age: 51
Discharge: HOME | End: 2021-09-17
Payer: COMMERCIAL

## 2021-09-17 DIAGNOSIS — E06.3 HASHIMOTO'S DISEASE: ICD-10-CM

## 2021-09-17 PROCEDURE — 76536 US EXAM OF HEAD AND NECK: CPT

## 2021-10-18 ENCOUNTER — TELEMEDICINE (OUTPATIENT)
Dept: PSYCHIATRY | Facility: CLINIC | Age: 51
End: 2021-10-18
Payer: COMMERCIAL

## 2021-10-18 DIAGNOSIS — F33.1 MAJOR DEPRESSIVE DISORDER, RECURRENT, MODERATE (HCC): Primary | ICD-10-CM

## 2021-10-18 DIAGNOSIS — F43.10 POST TRAUMATIC STRESS DISORDER (PTSD): ICD-10-CM

## 2021-10-18 DIAGNOSIS — G47.00 INSOMNIA, UNSPECIFIED TYPE: ICD-10-CM

## 2021-10-18 PROCEDURE — 1036F TOBACCO NON-USER: CPT | Performed by: PSYCHIATRY & NEUROLOGY

## 2021-10-18 PROCEDURE — 99213 OFFICE O/P EST LOW 20 MIN: CPT | Performed by: PSYCHIATRY & NEUROLOGY

## 2021-10-18 RX ORDER — CLONAZEPAM 0.5 MG/1
0.5 TABLET ORAL
Qty: 30 TABLET | Refills: 1
Start: 2021-10-18 | End: 2021-11-01 | Stop reason: SDUPTHER

## 2021-10-20 ENCOUNTER — ANNUAL EXAM (OUTPATIENT)
Dept: OBGYN CLINIC | Facility: CLINIC | Age: 51
End: 2021-10-20
Payer: COMMERCIAL

## 2021-10-20 VITALS
WEIGHT: 154 LBS | HEIGHT: 63 IN | BODY MASS INDEX: 27.29 KG/M2 | DIASTOLIC BLOOD PRESSURE: 78 MMHG | SYSTOLIC BLOOD PRESSURE: 116 MMHG

## 2021-10-20 DIAGNOSIS — Z12.31 SCREENING MAMMOGRAM FOR BREAST CANCER: ICD-10-CM

## 2021-10-20 DIAGNOSIS — N95.1 HOT FLASHES DUE TO MENOPAUSE: ICD-10-CM

## 2021-10-20 DIAGNOSIS — Z01.419 ENCOUNTER FOR GYNECOLOGICAL EXAMINATION WITHOUT ABNORMAL FINDING: Primary | ICD-10-CM

## 2021-10-20 PROBLEM — R19.5 PASSAGE OF LOOSE STOOLS: Status: RESOLVED | Noted: 2018-11-27 | Resolved: 2021-10-20

## 2021-10-20 PROBLEM — K29.70 HELICOBACTER POSITIVE GASTRITIS: Status: RESOLVED | Noted: 2018-10-23 | Resolved: 2021-10-20

## 2021-10-20 PROBLEM — B96.81 HELICOBACTER POSITIVE GASTRITIS: Status: RESOLVED | Noted: 2018-10-23 | Resolved: 2021-10-20

## 2021-10-20 PROBLEM — R13.10 DYSPHAGIA: Status: RESOLVED | Noted: 2018-10-10 | Resolved: 2021-10-20

## 2021-10-20 PROBLEM — E55.9 VITAMIN D DEFICIENCY: Status: RESOLVED | Noted: 2020-06-16 | Resolved: 2021-10-20

## 2021-10-20 PROBLEM — M35.00 SJOGREN'S SYNDROME (HCC): Status: RESOLVED | Noted: 2017-10-06 | Resolved: 2021-10-20

## 2021-10-20 PROBLEM — R56.9 CONVULSIONS (HCC): Status: RESOLVED | Noted: 2020-09-01 | Resolved: 2021-10-20

## 2021-10-20 PROBLEM — B37.49 CANDIDA INFECTION OF GENITAL REGION: Status: RESOLVED | Noted: 2018-12-14 | Resolved: 2021-10-20

## 2021-10-20 PROBLEM — M34.9 SCLERODERMA (HCC): Status: RESOLVED | Noted: 2018-06-14 | Resolved: 2021-10-20

## 2021-10-20 PROCEDURE — 3008F BODY MASS INDEX DOCD: CPT | Performed by: PSYCHIATRY & NEUROLOGY

## 2021-10-20 PROCEDURE — S0612 ANNUAL GYNECOLOGICAL EXAMINA: HCPCS | Performed by: NURSE PRACTITIONER

## 2021-11-01 ENCOUNTER — TELEPHONE (OUTPATIENT)
Dept: PSYCHIATRY | Facility: CLINIC | Age: 51
End: 2021-11-01

## 2021-11-01 DIAGNOSIS — G47.00 INSOMNIA, UNSPECIFIED TYPE: ICD-10-CM

## 2021-11-01 RX ORDER — CLONAZEPAM 0.5 MG/1
0.5 TABLET ORAL
Qty: 30 TABLET | Refills: 1 | Status: SHIPPED | OUTPATIENT
Start: 2021-11-01 | End: 2022-01-03 | Stop reason: SDUPTHER

## 2021-11-11 ENCOUNTER — OFFICE VISIT (OUTPATIENT)
Dept: INTERNAL MEDICINE CLINIC | Facility: CLINIC | Age: 51
End: 2021-11-11
Payer: COMMERCIAL

## 2021-11-11 ENCOUNTER — APPOINTMENT (OUTPATIENT)
Dept: LAB | Facility: CLINIC | Age: 51
End: 2021-11-11
Payer: COMMERCIAL

## 2021-11-11 VITALS
DIASTOLIC BLOOD PRESSURE: 80 MMHG | WEIGHT: 157.4 LBS | OXYGEN SATURATION: 98 % | HEART RATE: 99 BPM | RESPIRATION RATE: 14 BRPM | HEIGHT: 63 IN | BODY MASS INDEX: 27.89 KG/M2 | SYSTOLIC BLOOD PRESSURE: 122 MMHG | TEMPERATURE: 98.3 F

## 2021-11-11 DIAGNOSIS — F41.8 DEPRESSION WITH ANXIETY: ICD-10-CM

## 2021-11-11 DIAGNOSIS — E55.9 VITAMIN D DEFICIENCY: Primary | ICD-10-CM

## 2021-11-11 DIAGNOSIS — Z23 ENCOUNTER FOR IMMUNIZATION: ICD-10-CM

## 2021-11-11 DIAGNOSIS — E78.49 OTHER HYPERLIPIDEMIA: ICD-10-CM

## 2021-11-11 DIAGNOSIS — I10 ESSENTIAL HYPERTENSION: ICD-10-CM

## 2021-11-11 DIAGNOSIS — E06.3 HASHIMOTO'S DISEASE: ICD-10-CM

## 2021-11-11 DIAGNOSIS — R73.01 IMPAIRED FASTING GLUCOSE: ICD-10-CM

## 2021-11-11 DIAGNOSIS — E55.9 VITAMIN D DEFICIENCY: ICD-10-CM

## 2021-11-11 LAB
25(OH)D3 SERPL-MCNC: 24 NG/ML (ref 30–100)
ALBUMIN SERPL BCP-MCNC: 3.7 G/DL (ref 3.5–5)
ALP SERPL-CCNC: 72 U/L (ref 46–116)
ALT SERPL W P-5'-P-CCNC: 27 U/L (ref 12–78)
ANION GAP SERPL CALCULATED.3IONS-SCNC: 7 MMOL/L (ref 4–13)
AST SERPL W P-5'-P-CCNC: 24 U/L (ref 5–45)
BASOPHILS # BLD AUTO: 0.02 THOUSANDS/ΜL (ref 0–0.1)
BASOPHILS NFR BLD AUTO: 0 % (ref 0–1)
BILIRUB SERPL-MCNC: 0.42 MG/DL (ref 0.2–1)
BUN SERPL-MCNC: 14 MG/DL (ref 5–25)
CALCIUM SERPL-MCNC: 9.8 MG/DL (ref 8.3–10.1)
CHLORIDE SERPL-SCNC: 105 MMOL/L (ref 100–108)
CHOLEST SERPL-MCNC: 178 MG/DL (ref 50–200)
CO2 SERPL-SCNC: 27 MMOL/L (ref 21–32)
CREAT SERPL-MCNC: 0.79 MG/DL (ref 0.6–1.3)
EOSINOPHIL # BLD AUTO: 0.16 THOUSAND/ΜL (ref 0–0.61)
EOSINOPHIL NFR BLD AUTO: 2 % (ref 0–6)
ERYTHROCYTE [DISTWIDTH] IN BLOOD BY AUTOMATED COUNT: 12.7 % (ref 11.6–15.1)
EST. AVERAGE GLUCOSE BLD GHB EST-MCNC: 120 MG/DL
GFR SERPL CREATININE-BSD FRML MDRD: 88 ML/MIN/1.73SQ M
GLUCOSE SERPL-MCNC: 69 MG/DL (ref 65–140)
HBA1C MFR BLD: 5.8 %
HCT VFR BLD AUTO: 37.3 % (ref 34.8–46.1)
HDLC SERPL-MCNC: 68 MG/DL
HGB BLD-MCNC: 12.1 G/DL (ref 11.5–15.4)
IMM GRANULOCYTES # BLD AUTO: 0.01 THOUSAND/UL (ref 0–0.2)
IMM GRANULOCYTES NFR BLD AUTO: 0 % (ref 0–2)
LDLC SERPL CALC-MCNC: 77 MG/DL (ref 0–100)
LYMPHOCYTES # BLD AUTO: 1.7 THOUSANDS/ΜL (ref 0.6–4.47)
LYMPHOCYTES NFR BLD AUTO: 23 % (ref 14–44)
MCH RBC QN AUTO: 30.6 PG (ref 26.8–34.3)
MCHC RBC AUTO-ENTMCNC: 32.4 G/DL (ref 31.4–37.4)
MCV RBC AUTO: 94 FL (ref 82–98)
MONOCYTES # BLD AUTO: 0.59 THOUSAND/ΜL (ref 0.17–1.22)
MONOCYTES NFR BLD AUTO: 8 % (ref 4–12)
NEUTROPHILS # BLD AUTO: 4.9 THOUSANDS/ΜL (ref 1.85–7.62)
NEUTS SEG NFR BLD AUTO: 67 % (ref 43–75)
NONHDLC SERPL-MCNC: 110 MG/DL
NRBC BLD AUTO-RTO: 0 /100 WBCS
PLATELET # BLD AUTO: 306 THOUSANDS/UL (ref 149–390)
PMV BLD AUTO: 10.5 FL (ref 8.9–12.7)
POTASSIUM SERPL-SCNC: 3.8 MMOL/L (ref 3.5–5.3)
PROT SERPL-MCNC: 7.9 G/DL (ref 6.4–8.2)
RBC # BLD AUTO: 3.95 MILLION/UL (ref 3.81–5.12)
SODIUM SERPL-SCNC: 139 MMOL/L (ref 136–145)
TRIGL SERPL-MCNC: 167 MG/DL
TSH SERPL DL<=0.05 MIU/L-ACNC: 1.2 UIU/ML (ref 0.36–3.74)
WBC # BLD AUTO: 7.38 THOUSAND/UL (ref 4.31–10.16)

## 2021-11-11 PROCEDURE — 84443 ASSAY THYROID STIM HORMONE: CPT

## 2021-11-11 PROCEDURE — 80061 LIPID PANEL: CPT

## 2021-11-11 PROCEDURE — 1036F TOBACCO NON-USER: CPT | Performed by: NURSE PRACTITIONER

## 2021-11-11 PROCEDURE — 36415 COLL VENOUS BLD VENIPUNCTURE: CPT

## 2021-11-11 PROCEDURE — 90682 RIV4 VACC RECOMBINANT DNA IM: CPT | Performed by: NURSE PRACTITIONER

## 2021-11-11 PROCEDURE — 82306 VITAMIN D 25 HYDROXY: CPT

## 2021-11-11 PROCEDURE — 80053 COMPREHEN METABOLIC PANEL: CPT

## 2021-11-11 PROCEDURE — 99396 PREV VISIT EST AGE 40-64: CPT | Performed by: NURSE PRACTITIONER

## 2021-11-11 PROCEDURE — 83036 HEMOGLOBIN GLYCOSYLATED A1C: CPT

## 2021-11-11 PROCEDURE — 3008F BODY MASS INDEX DOCD: CPT | Performed by: NURSE PRACTITIONER

## 2021-11-11 PROCEDURE — 90471 IMMUNIZATION ADMIN: CPT | Performed by: NURSE PRACTITIONER

## 2021-11-11 PROCEDURE — 85025 COMPLETE CBC W/AUTO DIFF WBC: CPT

## 2021-12-27 DIAGNOSIS — F33.1 MAJOR DEPRESSIVE DISORDER, RECURRENT, MODERATE (HCC): ICD-10-CM

## 2021-12-27 RX ORDER — ARIPIPRAZOLE 5 MG/1
5 TABLET ORAL DAILY
Qty: 30 TABLET | Refills: 0 | Status: SHIPPED | OUTPATIENT
Start: 2021-12-27 | End: 2021-12-29 | Stop reason: SDUPTHER

## 2021-12-29 DIAGNOSIS — F33.1 MAJOR DEPRESSIVE DISORDER, RECURRENT, MODERATE (HCC): ICD-10-CM

## 2021-12-29 RX ORDER — ARIPIPRAZOLE 5 MG/1
5 TABLET ORAL DAILY
Qty: 30 TABLET | Refills: 0 | Status: SHIPPED | OUTPATIENT
Start: 2021-12-29 | End: 2022-01-03 | Stop reason: SDUPTHER

## 2021-12-29 RX ORDER — ARIPIPRAZOLE 5 MG/1
5 TABLET ORAL DAILY
Qty: 30 TABLET | Refills: 0 | Status: CANCELLED | OUTPATIENT
Start: 2021-12-29
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
## 2022-01-03 ENCOUNTER — TELEMEDICINE (OUTPATIENT)
Dept: PSYCHIATRY | Facility: CLINIC | Age: 52
End: 2022-01-03

## 2022-01-03 DIAGNOSIS — F33.1 MAJOR DEPRESSIVE DISORDER, RECURRENT, MODERATE (HCC): Primary | ICD-10-CM

## 2022-01-03 DIAGNOSIS — F43.10 POST TRAUMATIC STRESS DISORDER (PTSD): ICD-10-CM

## 2022-01-03 DIAGNOSIS — G47.00 INSOMNIA, UNSPECIFIED TYPE: ICD-10-CM

## 2022-01-03 RX ORDER — CLONAZEPAM 0.5 MG/1
0.5 TABLET ORAL
Qty: 30 TABLET | Refills: 1 | Status: SHIPPED | OUTPATIENT
Start: 2022-01-03 | End: 2022-03-16 | Stop reason: SDUPTHER

## 2022-01-03 RX ORDER — ARIPIPRAZOLE 5 MG/1
5 TABLET ORAL DAILY
Qty: 30 TABLET | Refills: 1 | Status: SHIPPED | OUTPATIENT
Start: 2022-01-03 | End: 2022-02-24 | Stop reason: SDUPTHER

## 2022-01-03 RX ORDER — VENLAFAXINE HYDROCHLORIDE 225 MG/1
225 TABLET, EXTENDED RELEASE ORAL
Qty: 30 TABLET | Refills: 2 | Status: SHIPPED | OUTPATIENT
Start: 2022-01-03 | End: 2022-04-25 | Stop reason: SDUPTHER

## 2022-01-03 NOTE — BH TREATMENT PLAN
TREATMENT PLAN (Medication Management Only)        [facility]    Name/Date of Birth/MRN:  Mckayla Paz 49 y o  1970 MRN: [id number]  Date of Treatment Plan: January 3, 2022  Diagnosis/Diagnoses:   1  Major depressive disorder, recurrent, moderate (HCC)    2  Insomnia, unspecified type    3  Post traumatic stress disorder (PTSD)      Strengths/Personal Resources for Self-Care:  Cares around the house, compliant with treatment  Area/Areas of need (in own words):  "Depression and anxiety"  1  Long Term Goal: "Improve depression and anxiety"   Target Date: 180 days from treatment plan  Person/Persons responsible for completion of goal: Dr Tristan Salazar and Self  2  Short Term Objective (s) - How will we reach this goal?:   A  Provider new recommended medication/dosage changes and/or continue medication(s):  Continue Effexor, Abilify, clonazepam   B    Consider starting light therapy  C  Consider starting individual psychotherapy  Target Date: 6 months from treatment plan unless noted otherwise  Person/Persons Responsible for Completion of Goal: Dr Tristan Salazar and Self   Progress Towards Goals: continuing treatment   Treatment Modality: Medication management and therapy PRN  Review due 180 days from date of this plan: Approximately 6 months from today ( 7/3/2022 )    Expected length of service: ongoing treatment unless revised     Treatment Plan done but not signed at time of office visit due to:  Plan reviewed by phone or in person  and verbal consent given due to [address] distancing    My Physician/PA/NP and I have developed this plan together and I agree to work on the goals and objectives  I understand the treatment goals that were developed for my treatment    Signature:       Date and time:  Signature of parent/guardian if under age of 15 years: Date and time:  Signature of provider:      Date and time:  Signature of Supervising Physician:    Date and time: 1/3/2022      Rebecca Bar DO

## 2022-01-14 ENCOUNTER — IMMUNIZATIONS (OUTPATIENT)
Dept: FAMILY MEDICINE CLINIC | Facility: HOSPITAL | Age: 52
End: 2022-01-14

## 2022-01-14 DIAGNOSIS — Z23 ENCOUNTER FOR IMMUNIZATION: Primary | ICD-10-CM

## 2022-01-14 PROCEDURE — 0001A COVID-19 PFIZER VACC 0.3 ML: CPT

## 2022-01-14 PROCEDURE — 91300 COVID-19 PFIZER VACC 0.3 ML: CPT

## 2022-01-24 DIAGNOSIS — I10 ESSENTIAL HYPERTENSION: ICD-10-CM

## 2022-01-24 RX ORDER — AMLODIPINE BESYLATE 2.5 MG/1
TABLET ORAL
Qty: 90 TABLET | Refills: 3 | Status: SHIPPED | OUTPATIENT
Start: 2022-01-24 | End: 2022-08-03 | Stop reason: SDUPTHER

## 2022-02-24 ENCOUNTER — TELEMEDICINE (OUTPATIENT)
Dept: PSYCHIATRY | Facility: CLINIC | Age: 52
End: 2022-02-24
Payer: COMMERCIAL

## 2022-02-24 DIAGNOSIS — G47.00 INSOMNIA, UNSPECIFIED TYPE: ICD-10-CM

## 2022-02-24 DIAGNOSIS — F33.1 MAJOR DEPRESSIVE DISORDER, RECURRENT, MODERATE (HCC): Primary | ICD-10-CM

## 2022-02-24 DIAGNOSIS — F43.10 POST TRAUMATIC STRESS DISORDER (PTSD): ICD-10-CM

## 2022-02-24 PROCEDURE — 99214 OFFICE O/P EST MOD 30 MIN: CPT | Performed by: PSYCHIATRY & NEUROLOGY

## 2022-02-24 RX ORDER — MIRTAZAPINE 7.5 MG/1
7.5 TABLET, FILM COATED ORAL
Qty: 30 TABLET | Refills: 1 | Status: SHIPPED | OUTPATIENT
Start: 2022-02-24 | End: 2022-04-25 | Stop reason: SDUPTHER

## 2022-02-24 RX ORDER — ARIPIPRAZOLE 5 MG/1
5 TABLET ORAL DAILY
Qty: 30 TABLET | Refills: 1 | Status: SHIPPED | OUTPATIENT
Start: 2022-02-24 | End: 2022-03-17 | Stop reason: SDUPTHER
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
## 2022-02-28 ENCOUNTER — TELEPHONE (OUTPATIENT)
Dept: PSYCHIATRY | Facility: CLINIC | Age: 52
End: 2022-02-28

## 2022-02-28 NOTE — TELEPHONE ENCOUNTER
Patient said you were to get back to her about writing a letter that recommends she attend light therapy in order for her insurance to cover it

## 2022-03-01 NOTE — TELEPHONE ENCOUNTER
DORIS  Informed the patient that Dr Pedro Juarez declined to write the letter because she does not have a diagnosis that supports the reason for light therapy  Also, he feels it is not medically necessary

## 2022-03-16 DIAGNOSIS — G47.00 INSOMNIA, UNSPECIFIED TYPE: ICD-10-CM

## 2022-03-16 RX ORDER — CLONAZEPAM 0.5 MG/1
0.5 TABLET ORAL
Qty: 30 TABLET | Refills: 0 | Status: SHIPPED | OUTPATIENT
Start: 2022-03-16 | End: 2022-03-17 | Stop reason: SDUPTHER

## 2022-03-16 NOTE — TELEPHONE ENCOUNTER
PDMP website reviewed  Kathleen Dinh has been appropriately adherent to controlled psychotropic medications without evidence of abuse or misuse  As such, will send 30-day refill to pharmacy of choice and follow up as necessary

## 2022-03-17 ENCOUNTER — TELEPHONE (OUTPATIENT)
Dept: PSYCHIATRY | Facility: CLINIC | Age: 52
End: 2022-03-17

## 2022-03-17 DIAGNOSIS — F33.1 MAJOR DEPRESSIVE DISORDER, RECURRENT, MODERATE (HCC): ICD-10-CM

## 2022-03-17 DIAGNOSIS — G47.00 INSOMNIA, UNSPECIFIED TYPE: ICD-10-CM

## 2022-03-17 RX ORDER — CLONAZEPAM 0.5 MG/1
0.5 TABLET ORAL
Qty: 30 TABLET | Refills: 0 | Status: SHIPPED | OUTPATIENT
Start: 2022-03-17 | End: 2022-04-25 | Stop reason: SDUPTHER

## 2022-03-17 RX ORDER — ARIPIPRAZOLE 5 MG/1
5 TABLET ORAL DAILY
Qty: 30 TABLET | Refills: 1 | Status: SHIPPED | OUTPATIENT
Start: 2022-03-17 | End: 2022-04-25 | Stop reason: SDUPTHER

## 2022-03-17 NOTE — TELEPHONE ENCOUNTER
PDMP website reviewed  Octavia Bell has been appropriately adherent to controlled psychotropic medications without evidence of abuse or misuse  As such, will send 30-day refill to pharmacy of choice and follow up as necessary      Will send refill to correct pharmacy

## 2022-04-12 ENCOUNTER — APPOINTMENT (OUTPATIENT)
Dept: LAB | Facility: HOSPITAL | Age: 52
End: 2022-04-12
Payer: COMMERCIAL

## 2022-04-12 DIAGNOSIS — E06.3 HASHIMOTO'S DISEASE: ICD-10-CM

## 2022-04-12 LAB
T3 SERPL-MCNC: 1 NG/ML (ref 0.6–1.8)
T4 FREE SERPL-MCNC: 0.95 NG/DL (ref 0.76–1.46)
TSH SERPL DL<=0.05 MIU/L-ACNC: 0.78 UIU/ML (ref 0.45–4.5)

## 2022-04-12 PROCEDURE — 36415 COLL VENOUS BLD VENIPUNCTURE: CPT

## 2022-04-12 PROCEDURE — 84439 ASSAY OF FREE THYROXINE: CPT

## 2022-04-12 PROCEDURE — 84480 ASSAY TRIIODOTHYRONINE (T3): CPT

## 2022-04-12 PROCEDURE — 84443 ASSAY THYROID STIM HORMONE: CPT

## 2022-04-15 ENCOUNTER — TELEPHONE (OUTPATIENT)
Dept: OBGYN CLINIC | Facility: CLINIC | Age: 52
End: 2022-04-15

## 2022-04-25 ENCOUNTER — TELEMEDICINE (OUTPATIENT)
Dept: PSYCHIATRY | Facility: CLINIC | Age: 52
End: 2022-04-25

## 2022-04-25 DIAGNOSIS — F33.1 MAJOR DEPRESSIVE DISORDER, RECURRENT, MODERATE (HCC): Primary | ICD-10-CM

## 2022-04-25 DIAGNOSIS — F43.10 POST TRAUMATIC STRESS DISORDER (PTSD): ICD-10-CM

## 2022-04-25 DIAGNOSIS — G47.00 INSOMNIA, UNSPECIFIED TYPE: ICD-10-CM

## 2022-04-25 RX ORDER — ARIPIPRAZOLE 5 MG/1
5 TABLET ORAL DAILY
Qty: 90 TABLET | Refills: 0 | Status: SHIPPED | OUTPATIENT
Start: 2022-04-25 | End: 2022-08-03

## 2022-04-25 RX ORDER — MIRTAZAPINE 7.5 MG/1
7.5 TABLET, FILM COATED ORAL
Qty: 90 TABLET | Refills: 0 | Status: SHIPPED | OUTPATIENT
Start: 2022-04-25 | End: 2022-08-03 | Stop reason: SDUPTHER

## 2022-04-25 RX ORDER — VENLAFAXINE HYDROCHLORIDE 225 MG/1
225 TABLET, EXTENDED RELEASE ORAL
Qty: 90 TABLET | Refills: 0 | Status: SHIPPED | OUTPATIENT
Start: 2022-04-25 | End: 2022-05-02

## 2022-04-25 RX ORDER — CLONAZEPAM 0.5 MG/1
0.5 TABLET ORAL
Qty: 30 TABLET | Refills: 0 | Status: SHIPPED | OUTPATIENT
Start: 2022-04-25 | End: 2022-08-03 | Stop reason: SDUPTHER
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
## 2022-04-29 ENCOUNTER — TELEPHONE (OUTPATIENT)
Dept: PSYCHIATRY | Facility: CLINIC | Age: 52
End: 2022-04-29

## 2022-04-29 DIAGNOSIS — F43.10 POST TRAUMATIC STRESS DISORDER (PTSD): ICD-10-CM

## 2022-04-29 DIAGNOSIS — F33.1 MAJOR DEPRESSIVE DISORDER, RECURRENT, MODERATE (HCC): Primary | ICD-10-CM

## 2022-04-29 NOTE — TELEPHONE ENCOUNTER
Monico Mac @ [facility] lm on nursing line  She stated Dr Tristan Cerda prescribed TABS of the Venlafaxine  The preferred alternative is Venlafaxine CAPS The caps marco antonio in 150 mg and 75 mg  Would you like to re-send for alternative or have staff try or a PA?     Octavia Nichole- [phone]  REF# [id number]

## 2022-05-02 RX ORDER — VENLAFAXINE HYDROCHLORIDE 150 MG/1
150 CAPSULE, EXTENDED RELEASE ORAL DAILY
Qty: 90 CAPSULE | Refills: 0 | Status: SHIPPED | OUTPATIENT
Start: 2022-05-02 | End: 2022-08-03 | Stop reason: SDUPTHER

## 2022-05-02 RX ORDER — VENLAFAXINE HYDROCHLORIDE 75 MG/1
75 CAPSULE, EXTENDED RELEASE ORAL DAILY
Qty: 90 CAPSULE | Refills: 0 | Status: SHIPPED | OUTPATIENT
Start: 2022-05-02 | End: 2022-08-03 | Stop reason: SDUPTHER

## 2022-05-02 NOTE — TELEPHONE ENCOUNTER
Will send Venlafaxine 150 mg and 75 mg 24hr capsules  Please make patient aware of the change from tablet to capsules

## 2022-05-02 NOTE — TELEPHONE ENCOUNTER
ALLEN for Tamar Henry  Provided nursing number for a call back to discuss a change in one of her medications

## 2022-05-03 ENCOUNTER — OFFICE VISIT (OUTPATIENT)
Dept: INTERNAL MEDICINE CLINIC | Facility: CLINIC | Age: 52
End: 2022-05-03
Payer: COMMERCIAL

## 2022-05-03 VITALS
WEIGHT: 168.4 LBS | DIASTOLIC BLOOD PRESSURE: 60 MMHG | OXYGEN SATURATION: 98 % | HEIGHT: 63 IN | BODY MASS INDEX: 29.84 KG/M2 | RESPIRATION RATE: 16 BRPM | SYSTOLIC BLOOD PRESSURE: 100 MMHG | HEART RATE: 100 BPM | TEMPERATURE: 100 F

## 2022-05-03 DIAGNOSIS — R73.01 IMPAIRED FASTING GLUCOSE: ICD-10-CM

## 2022-05-03 DIAGNOSIS — G47.33 OSA (OBSTRUCTIVE SLEEP APNEA): ICD-10-CM

## 2022-05-03 DIAGNOSIS — E55.9 VITAMIN D DEFICIENCY: ICD-10-CM

## 2022-05-03 DIAGNOSIS — I10 ESSENTIAL HYPERTENSION: ICD-10-CM

## 2022-05-03 DIAGNOSIS — F33.1 MAJOR DEPRESSIVE DISORDER, RECURRENT, MODERATE (HCC): ICD-10-CM

## 2022-05-03 DIAGNOSIS — J45.909 UNCOMPLICATED ASTHMA, UNSPECIFIED ASTHMA SEVERITY, UNSPECIFIED WHETHER PERSISTENT: ICD-10-CM

## 2022-05-03 DIAGNOSIS — E06.3 HASHIMOTO'S DISEASE: ICD-10-CM

## 2022-05-03 DIAGNOSIS — F41.9 ANXIETY: ICD-10-CM

## 2022-05-03 DIAGNOSIS — Z12.31 ENCOUNTER FOR SCREENING MAMMOGRAM FOR BREAST CANCER: Primary | ICD-10-CM

## 2022-05-03 DIAGNOSIS — E78.49 OTHER HYPERLIPIDEMIA: ICD-10-CM

## 2022-05-03 PROCEDURE — 3008F BODY MASS INDEX DOCD: CPT | Performed by: INTERNAL MEDICINE

## 2022-05-03 PROCEDURE — 1036F TOBACCO NON-USER: CPT | Performed by: INTERNAL MEDICINE

## 2022-05-03 PROCEDURE — 99214 OFFICE O/P EST MOD 30 MIN: CPT | Performed by: INTERNAL MEDICINE

## 2022-05-03 NOTE — ASSESSMENT & PLAN NOTE
BP stable  Continue on amlodipine 2 5 mg daily  Advised on weight loss and exercise  Continue Low salt/DASH diet  Keep log of BP at home

## 2022-05-03 NOTE — ASSESSMENT & PLAN NOTE
Probable sleep apnea  Gained about 11 lb in the past few months  Reports frequent nighttime awakening, snoring, unrefreshed sleep, morning headaches  Uses a lot of sleep medication to be able to sleep but still wakes up at night  Last sleep study in 2018 did not show sleep apnea but patient reported she was unable to sleep through the test   Advised on weight loss and exercise  Ordered home sleep study

## 2022-05-03 NOTE — ASSESSMENT & PLAN NOTE
Stable  With concern about weight gain  Currently on Remeron 7 5 mg daily, Abilify 5 mg daily, venlafaxine 75 mg daily  Follow-up with psychiatry about possibly adjusting Remeron and Abilify due to possible weight gain

## 2022-05-03 NOTE — TELEPHONE ENCOUNTER
Spoke with Mary Kumar and made aware of the change to the Venlafaxine to 150 mg CAP and 75 mg CAP   She verbalized understanding

## 2022-05-03 NOTE — PROGRESS NOTES
Assessment/Plan:    Essential hypertension  BP stable  Continue on amlodipine 2 5 mg daily  Advised on weight loss and exercise  Continue Low salt/DASH diet  Keep log of BP at home  Asthma  C/o of occasional  Wheezing especially with weather change  Continue albuterol prn  Major depressive disorder, recurrent, moderate (HCC)  Stable  With concern about weight gain  Currently on Remeron 7 5 mg daily, Abilify 5 mg daily, venlafaxine 75 mg daily  Follow-up with psychiatry about possibly adjusting Remeron and Abilify due to possible weight gain  Anxiety  Follows up with psychiatric  Currently stable  Continue clonazepam and, venlafaxine  MIRANDA (obstructive sleep apnea)  Probable sleep apnea  Gained about 11 lb in the past few months  Reports frequent nighttime awakening, snoring, unrefreshed sleep, morning headaches  Uses a lot of sleep medication to be able to sleep but still wakes up at night  Last sleep study in 2018 did not show sleep apnea but patient reported she was unable to sleep through the test   Advised on weight loss and exercise  Ordered home sleep study  Hashimoto's disease  Follows with endocrinology  TSH 0 7  Free T4 0 95  On levothyroxine 75 mcg daily  Continue on Synthroid dose  Impaired fasting glucose  Recent weight gain  On aripiprazole  Check hemoglobin A1c  Diagnoses and all orders for this visit:    Encounter for screening mammogram for breast cancer  -     Mammo screening bilateral w 3d & cad; Future    Essential hypertension  -     CBC (Includes Diff/Plt) (Refl); Future  -     Comprehensive metabolic panel; Future  -     HEMOGLOBIN A1C W/ EAG ESTIMATION; Future  -     Lipid panel; Future    Uncomplicated asthma, unspecified asthma severity, unspecified whether persistent    Major depressive disorder, recurrent, moderate (HCC)    Impaired fasting glucose  -     HEMOGLOBIN A1C W/ EAG ESTIMATION;  Future    Other hyperlipidemia  -     Lipid panel; Future    MIRANDA (obstructive sleep apnea)  -     Home Study; Future    Vitamin D deficiency  -     Vitamin D 25 hydroxy; Future    Anxiety    Hashimoto's disease          Subjective:      Patient ID: Beck Dominguez is a 49 y o  female with a past medical history of hypothyroidism, hypertension, obstructive sleep apnea, hyperlipidemia, MDD here for routine follow up   HPI    C/o- generalized weakness in the past 2 weeks  Present with minimal activity able walking  Progressively worsening  Reports night sweats and hot flashes which has been progressively worsened in the past few months  Episode of leg swelling 3 weeks ago, while she was at Infectious   At the time she was walking about 3 miles a day  Denied chest pain or shortness of breath at the time  Patient received dose of Shingrix today  She felt a lightheaded and sometimes retrosternal chest pain afterwards  Currently improving  Denies shortness of breath or wheezing  F/u and eval for HTN:  Does not check blood pressure  Currently on amlodipine 2 5 mg daily  Goes to the gym 3xs/week  Keeps her low-salt diet  F/u andf eval for Hashimoto/hypothyroidism:  Follows up with endocrinology  Saw endocrinologist last week  TSH 0 7  Free T4 0 95  On levothyroxine 75 mcg daily  F/u and eval MIRANDA: does not use CPAP :  Reports frequent nighttime awakening, snoring, unrefreshed sleep, morning headaches  Uses a lot of sleep medication to be able to sleep but still wakes up at night  Gained about 12 lb since the last few  F/u and eval MDD/Anxiety:  Stable  Saw Psychiatry recently  No side effects from medication  F/u and eval asthma :  A uses albuterol every day to lately  Reported wheezing, coughing  Thinks is related to weather change  No recent exacerbation  Health maintenance:  Colonoscopy-3 years ago was normal   Result not on chart  Also had EGD done which was positive for H pylori  Completed triple therapy      Up-to-date with COVID and flu vaccine  Due for mammogram     The following portions of the patient's history were reviewed and updated as appropriate: allergies, current medications, past family history, past medical history, past social history, past surgical history and problem list     Review of Systems   Constitutional: Positive for fatigue  Negative for activity change, appetite change, chills, fever and unexpected weight change  HENT: Negative for congestion, ear pain and sore throat  Eyes: Negative for pain and visual disturbance  Respiratory: Negative for cough, shortness of breath and wheezing  Cardiovascular: Negative for chest pain, palpitations and leg swelling  Gastrointestinal: Negative for abdominal distention, abdominal pain, constipation, diarrhea and vomiting  Endocrine: Negative for polyuria  Genitourinary: Negative for dysuria, frequency and hematuria  Musculoskeletal: Negative for arthralgias and back pain  Skin: Negative for color change and rash  Neurological: Positive for dizziness  Negative for seizures and syncope  Psychiatric/Behavioral: Positive for sleep disturbance  Negative for agitation and confusion  All other systems reviewed and are negative  Objective:      /60 (BP Location: Left arm, Patient Position: Sitting, Cuff Size: Standard)   Pulse 100   Temp 100 °F (37 8 °C) (Tympanic)   Resp 16   Ht 5' 3" (1 6 m)   Wt 76 4 kg (168 lb 6 4 oz)   SpO2 98%   BMI 29 83 kg/m²          Physical Exam  Constitutional:       General: She is not in acute distress  Appearance: Normal appearance  She is not ill-appearing, toxic-appearing or diaphoretic  HENT:      Head: Normocephalic and atraumatic  Nose: Nose normal  No congestion or rhinorrhea  Mouth/Throat:      Mouth: Mucous membranes are moist    Eyes:      Extraocular Movements: Extraocular movements intact  Pupils: Pupils are equal, round, and reactive to light     Cardiovascular:      Rate and Rhythm: Normal rate  Pulses: Normal pulses  Heart sounds: No murmur heard  No friction rub  No gallop  Pulmonary:      Effort: Pulmonary effort is normal  No respiratory distress  Breath sounds: Normal breath sounds  No wheezing or rales  Abdominal:      General: Abdomen is flat  There is no distension  Palpations: Abdomen is soft  There is no mass  Tenderness: There is no abdominal tenderness  There is no guarding or rebound  Hernia: No hernia is present  Musculoskeletal:         General: No swelling or deformity  Cervical back: Normal range of motion and neck supple  Right lower leg: No edema  Left lower leg: No edema  Skin:     Capillary Refill: Capillary refill takes less than 2 seconds  Neurological:      General: No focal deficit present  Mental Status: She is alert and oriented to person, place, and time  Cranial Nerves: No cranial nerve deficit  Motor: No weakness

## 2022-05-03 NOTE — ASSESSMENT & PLAN NOTE
Follows with endocrinology  TSH 0 7  Free T4 0 95  On levothyroxine 75 mcg daily  Continue on Synthroid dose

## 2022-05-04 ENCOUNTER — APPOINTMENT (OUTPATIENT)
Dept: LAB | Facility: HOSPITAL | Age: 52
End: 2022-05-04
Payer: COMMERCIAL

## 2022-05-04 ENCOUNTER — TELEPHONE (OUTPATIENT)
Dept: ADMINISTRATIVE | Facility: OTHER | Age: 52
End: 2022-05-04

## 2022-05-04 DIAGNOSIS — E55.9 VITAMIN D DEFICIENCY: ICD-10-CM

## 2022-05-04 DIAGNOSIS — E78.49 OTHER HYPERLIPIDEMIA: ICD-10-CM

## 2022-05-04 DIAGNOSIS — I10 ESSENTIAL HYPERTENSION: Primary | ICD-10-CM

## 2022-05-04 DIAGNOSIS — R73.01 IMPAIRED FASTING GLUCOSE: ICD-10-CM

## 2022-05-04 DIAGNOSIS — G47.33 OSA (OBSTRUCTIVE SLEEP APNEA): ICD-10-CM

## 2022-05-04 LAB
25(OH)D3 SERPL-MCNC: 23.9 NG/ML (ref 30–100)
ALBUMIN SERPL BCP-MCNC: 3.9 G/DL (ref 3.5–5)
ALP SERPL-CCNC: 73 U/L (ref 46–116)
ALT SERPL W P-5'-P-CCNC: 20 U/L (ref 12–78)
ANION GAP SERPL CALCULATED.3IONS-SCNC: 10 MMOL/L (ref 4–13)
AST SERPL W P-5'-P-CCNC: 17 U/L (ref 5–45)
BASOPHILS # BLD AUTO: 0.01 THOUSANDS/ΜL (ref 0–0.1)
BASOPHILS NFR BLD AUTO: 0 % (ref 0–1)
BILIRUB SERPL-MCNC: 0.7 MG/DL (ref 0.2–1)
BUN SERPL-MCNC: 14 MG/DL (ref 5–25)
CALCIUM SERPL-MCNC: 9.5 MG/DL (ref 8.3–10.1)
CHLORIDE SERPL-SCNC: 100 MMOL/L (ref 100–108)
CHOLEST SERPL-MCNC: 293 MG/DL
CO2 SERPL-SCNC: 29 MMOL/L (ref 21–32)
CREAT SERPL-MCNC: 0.94 MG/DL (ref 0.6–1.3)
EOSINOPHIL # BLD AUTO: 0.16 THOUSAND/ΜL (ref 0–0.61)
EOSINOPHIL NFR BLD AUTO: 3 % (ref 0–6)
ERYTHROCYTE [DISTWIDTH] IN BLOOD BY AUTOMATED COUNT: 12.4 % (ref 11.6–15.1)
GFR SERPL CREATININE-BSD FRML MDRD: 70 ML/MIN/1.73SQ M
GLUCOSE P FAST SERPL-MCNC: 94 MG/DL (ref 65–99)
HCT VFR BLD AUTO: 40.8 % (ref 34.8–46.1)
HDLC SERPL-MCNC: 70 MG/DL
HGB BLD-MCNC: 13.5 G/DL (ref 11.5–15.4)
IMM GRANULOCYTES # BLD AUTO: 0.01 THOUSAND/UL (ref 0–0.2)
IMM GRANULOCYTES NFR BLD AUTO: 0 % (ref 0–2)
LDLC SERPL CALC-MCNC: 196 MG/DL (ref 0–100)
LYMPHOCYTES # BLD AUTO: 1.27 THOUSANDS/ΜL (ref 0.6–4.47)
LYMPHOCYTES NFR BLD AUTO: 22 % (ref 14–44)
MCH RBC QN AUTO: 30.6 PG (ref 26.8–34.3)
MCHC RBC AUTO-ENTMCNC: 33.1 G/DL (ref 31.4–37.4)
MCV RBC AUTO: 93 FL (ref 82–98)
MONOCYTES # BLD AUTO: 0.46 THOUSAND/ΜL (ref 0.17–1.22)
MONOCYTES NFR BLD AUTO: 8 % (ref 4–12)
NEUTROPHILS # BLD AUTO: 3.82 THOUSANDS/ΜL (ref 1.85–7.62)
NEUTS SEG NFR BLD AUTO: 67 % (ref 43–75)
NONHDLC SERPL-MCNC: 223 MG/DL
NRBC BLD AUTO-RTO: 0 /100 WBCS
PLATELET # BLD AUTO: 290 THOUSANDS/UL (ref 149–390)
PMV BLD AUTO: 9.8 FL (ref 8.9–12.7)
POTASSIUM SERPL-SCNC: 4.2 MMOL/L (ref 3.5–5.3)
PROT SERPL-MCNC: 7.9 G/DL (ref 6.4–8.2)
RBC # BLD AUTO: 4.41 MILLION/UL (ref 3.81–5.12)
SODIUM SERPL-SCNC: 139 MMOL/L (ref 136–145)
TRIGL SERPL-MCNC: 135 MG/DL
WBC # BLD AUTO: 5.73 THOUSAND/UL (ref 4.31–10.16)

## 2022-05-04 PROCEDURE — 83036 HEMOGLOBIN GLYCOSYLATED A1C: CPT

## 2022-05-04 PROCEDURE — 80053 COMPREHEN METABOLIC PANEL: CPT

## 2022-05-04 PROCEDURE — 82306 VITAMIN D 25 HYDROXY: CPT

## 2022-05-04 PROCEDURE — 85025 COMPLETE CBC W/AUTO DIFF WBC: CPT

## 2022-05-04 PROCEDURE — 36415 COLL VENOUS BLD VENIPUNCTURE: CPT

## 2022-05-04 PROCEDURE — 80061 LIPID PANEL: CPT

## 2022-05-04 NOTE — TELEPHONE ENCOUNTER
----- Message from Shwetha Doe LPN sent at 0/6/3446  2:56 PM EDT -----  Regarding: colonoscopy  05/03/22 2:56 PM    Hello, our patient Morenita Moreno has had CRC: Colonoscopy completed/performed  Please assist in updating the patient chart by pulling a previous Electronic Medical Record (EMR) document  The previous EMR is [employer]   The date of service is 20019    Thank you,  Shwetha Doe LPN  [facility]

## 2022-05-04 NOTE — TELEPHONE ENCOUNTER
Please review with practice admin or , they have been informed of next steps for this particular ongoing scenario; an IT ticket will have to be submitted to review this Lumen's issue further  Your request will now be closed  Contact the [email or web address] with any further questions   Thank you

## 2022-05-05 LAB
EST. AVERAGE GLUCOSE BLD GHB EST-MCNC: 117 MG/DL
HBA1C MFR BLD: 5.7 %

## 2022-05-09 ENCOUNTER — TELEPHONE (OUTPATIENT)
Dept: PSYCHIATRY | Facility: CLINIC | Age: 52
End: 2022-05-09

## 2022-05-09 DIAGNOSIS — E78.5 HYPERLIPIDEMIA, UNSPECIFIED HYPERLIPIDEMIA TYPE: ICD-10-CM

## 2022-05-09 RX ORDER — ATORVASTATIN CALCIUM 10 MG/1
TABLET, FILM COATED ORAL
Qty: 90 TABLET | Refills: 3 | Status: SHIPPED | OUTPATIENT
Start: 2022-05-09 | End: 2022-08-03 | Stop reason: SDUPTHER

## 2022-07-19 ENCOUNTER — HOSPITAL ENCOUNTER (OUTPATIENT)
Dept: MAMMOGRAPHY | Facility: CLINIC | Age: 52
Discharge: HOME | End: 2022-07-19
Payer: COMMERCIAL

## 2022-07-19 VITALS — BODY MASS INDEX: 29.77 KG/M2 | WEIGHT: 168 LBS | HEIGHT: 63 IN

## 2022-07-19 DIAGNOSIS — Z12.31 ENCOUNTER FOR SCREENING MAMMOGRAM FOR BREAST CANCER: ICD-10-CM

## 2022-07-19 PROCEDURE — 77067 SCR MAMMO BI INCL CAD: CPT

## 2022-07-19 PROCEDURE — 77063 BREAST TOMOSYNTHESIS BI: CPT

## 2022-08-03 ENCOUNTER — OFFICE VISIT (OUTPATIENT)
Dept: INTERNAL MEDICINE CLINIC | Facility: CLINIC | Age: 52
End: 2022-08-03
Payer: COMMERCIAL

## 2022-08-03 VITALS
TEMPERATURE: 99 F | SYSTOLIC BLOOD PRESSURE: 136 MMHG | HEART RATE: 76 BPM | OXYGEN SATURATION: 98 % | DIASTOLIC BLOOD PRESSURE: 80 MMHG | HEIGHT: 63 IN | RESPIRATION RATE: 16 BRPM | BODY MASS INDEX: 28.88 KG/M2 | WEIGHT: 163 LBS

## 2022-08-03 DIAGNOSIS — G47.00 INSOMNIA, UNSPECIFIED TYPE: ICD-10-CM

## 2022-08-03 DIAGNOSIS — I10 ESSENTIAL HYPERTENSION: ICD-10-CM

## 2022-08-03 DIAGNOSIS — E78.49 OTHER HYPERLIPIDEMIA: ICD-10-CM

## 2022-08-03 DIAGNOSIS — F43.10 POST TRAUMATIC STRESS DISORDER (PTSD): ICD-10-CM

## 2022-08-03 DIAGNOSIS — R73.01 IMPAIRED FASTING GLUCOSE: ICD-10-CM

## 2022-08-03 DIAGNOSIS — E03.9 HYPOTHYROIDISM, UNSPECIFIED TYPE: ICD-10-CM

## 2022-08-03 DIAGNOSIS — F41.9 ANXIETY: ICD-10-CM

## 2022-08-03 DIAGNOSIS — E78.5 HYPERLIPIDEMIA, UNSPECIFIED HYPERLIPIDEMIA TYPE: ICD-10-CM

## 2022-08-03 DIAGNOSIS — F33.1 MAJOR DEPRESSIVE DISORDER, RECURRENT, MODERATE (HCC): ICD-10-CM

## 2022-08-03 DIAGNOSIS — E06.3 HASHIMOTO'S DISEASE: Primary | ICD-10-CM

## 2022-08-03 PROCEDURE — 99214 OFFICE O/P EST MOD 30 MIN: CPT | Performed by: INTERNAL MEDICINE

## 2022-08-03 RX ORDER — MIRTAZAPINE 7.5 MG/1
7.5 TABLET, FILM COATED ORAL
Qty: 90 TABLET | Refills: 0 | Status: SHIPPED | OUTPATIENT
Start: 2022-08-03

## 2022-08-03 RX ORDER — AMLODIPINE BESYLATE 2.5 MG/1
2.5 TABLET ORAL DAILY
Qty: 90 TABLET | Refills: 0 | Status: SHIPPED | OUTPATIENT
Start: 2022-08-03

## 2022-08-03 RX ORDER — CLONAZEPAM 0.5 MG/1
0.5 TABLET ORAL
Qty: 30 TABLET | Refills: 2 | Status: SHIPPED | OUTPATIENT
Start: 2022-08-03

## 2022-08-03 RX ORDER — LEVOTHYROXINE SODIUM 0.07 MG/1
75 TABLET ORAL DAILY
Qty: 90 TABLET | Refills: 0 | Status: SHIPPED | OUTPATIENT
Start: 2022-08-03 | End: 2022-08-04 | Stop reason: SDUPTHER

## 2022-08-03 RX ORDER — ATORVASTATIN CALCIUM 10 MG/1
10 TABLET, FILM COATED ORAL DAILY
Qty: 90 TABLET | Refills: 0 | Status: SHIPPED | OUTPATIENT
Start: 2022-08-03

## 2022-08-03 RX ORDER — VENLAFAXINE HYDROCHLORIDE 150 MG/1
150 CAPSULE, EXTENDED RELEASE ORAL DAILY
Qty: 90 CAPSULE | Refills: 0 | Status: SHIPPED | OUTPATIENT
Start: 2022-08-03

## 2022-08-03 RX ORDER — VENLAFAXINE HYDROCHLORIDE 75 MG/1
75 CAPSULE, EXTENDED RELEASE ORAL DAILY
Qty: 90 CAPSULE | Refills: 0 | Status: SHIPPED | OUTPATIENT
Start: 2022-08-03

## 2022-08-03 NOTE — PATIENT INSTRUCTIONS
Labs from May reviewed    Hypertension stable on amlodipine    Hyperlipidemia-LDL was 196, likely related to Abilify and potentially medication noncompliance  Continue with atorvastatin and recheck lipids in November    Depression/anxiety/PTSD and insomnia stable on present regimen, meds refilled    Hashimoto's thyroiditis-refill levothyroxine, check TSH prior to follow-up    Follow-up after labs in November, sooner as needed

## 2022-08-03 NOTE — PROGRESS NOTES
Assessment/Plan:    Diagnoses and all orders for this visit:    Hashimoto's disease  -     TSH, 3rd generation with Free T4 reflex; Future    Impaired fasting glucose  -     Hemoglobin A1C; Future    Essential hypertension  -     CBC and differential; Future  -     Comprehensive metabolic panel; Future  -     amLODIPine (NORVASC) 2 5 mg tablet; Take 1 tablet (2 5 mg total) by mouth daily    Anxiety    Other hyperlipidemia  -     Lipid panel; Future    Major depressive disorder, recurrent, moderate (HCC)  -     venlafaxine (EFFEXOR-XR) 75 mg 24 hr capsule; Take 1 capsule (75 mg total) by mouth daily with 150 mg capsule (225 mg total daily)  -     venlafaxine (EFFEXOR-XR) 150 mg 24 hr capsule; Take 1 capsule (150 mg total) by mouth daily with 75 mg capsule (225 mg total daily)  -     mirtazapine (REMERON) 7 5 MG tablet; Take 1 tablet (7 5 mg total) by mouth daily at bedtime    Post traumatic stress disorder (PTSD)  -     venlafaxine (EFFEXOR-XR) 75 mg 24 hr capsule; Take 1 capsule (75 mg total) by mouth daily with 150 mg capsule (225 mg total daily)  -     venlafaxine (EFFEXOR-XR) 150 mg 24 hr capsule; Take 1 capsule (150 mg total) by mouth daily with 75 mg capsule (225 mg total daily)    Insomnia, unspecified type  -     mirtazapine (REMERON) 7 5 MG tablet; Take 1 tablet (7 5 mg total) by mouth daily at bedtime  -     clonazePAM (KlonoPIN) 0 5 mg tablet; Take 1 tablet (0 5 mg total) by mouth daily at bedtime    Hypothyroidism, unspecified type  -     levothyroxine 75 mcg tablet; Take 1 tablet (75 mcg total) by mouth daily Take 50mcg daily    Hyperlipidemia, unspecified hyperlipidemia type  -     atorvastatin (LIPITOR) 10 mg tablet; Take 1 tablet (10 mg total) by mouth daily            Patient Instructions   Labs from May reviewed    Hypertension stable on amlodipine    Hyperlipidemia-LDL was 196, likely related to Abilify and potentially medication noncompliance    Continue with atorvastatin and recheck lipids in November    Depression/anxiety/PTSD and insomnia stable on present regimen, meds refilled    Hashimoto's thyroiditis-refill levothyroxine, check TSH prior to follow-up    Follow-up after labs in November, sooner as needed  Subjective:      Patient ID: Krissy Monaco is a 49 y o  female    F/u mmp  Feeling well    Wants to stop seeing liang and buffy, has been stable on meds    Depression/anxiety/insomnia/PTSD-has been stable with venlafaxine 225 mg daily, using clonazepam 0 5 mg and Remeron 7 5 mg at night for sleep  Discontinued Abilify proximally 3 months ago due to a desired weight gain  Atorvastatin-taking Lipitor daily    Hypertension-taking amlodipine daily, home blood pressures have been stable    Hypothyroidism with history of Hashimoto's taking levothyroxine 75 mcg in the morning, not on empty stomach          Current Outpatient Medications:     albuterol (PROVENTIL HFA,VENTOLIN HFA) 90 mcg/act inhaler, Inhale 2 puffs every 4 (four) hours as needed for wheezing, Disp: 1 Inhaler, Rfl: 2    amLODIPine (NORVASC) 2 5 mg tablet, Take 1 tablet (2 5 mg total) by mouth daily, Disp: 90 tablet, Rfl: 0    atorvastatin (LIPITOR) 10 mg tablet, Take 1 tablet (10 mg total) by mouth daily, Disp: 90 tablet, Rfl: 0    clonazePAM (KlonoPIN) 0 5 mg tablet, Take 1 tablet (0 5 mg total) by mouth daily at bedtime, Disp: 30 tablet, Rfl: 2    fluticasone (FLONASE) 50 mcg/act nasal spray, 2 sprays into each nostril daily, Disp: 16 g, Rfl: 0    levothyroxine 75 mcg tablet, Take 1 tablet (75 mcg total) by mouth daily Take 50mcg daily, Disp: 90 tablet, Rfl: 0    mirtazapine (REMERON) 7 5 MG tablet, Take 1 tablet (7 5 mg total) by mouth daily at bedtime, Disp: 90 tablet, Rfl: 0    rizatriptan (MAXALT-MLT) 10 MG disintegrating tablet, Take 1 tablet (10 mg total) by mouth once as needed for migraine for up to 1 dose May repeat in 2 hours if needed, Disp: 9 tablet, Rfl: 0    venlafaxine (EFFEXOR-XR) 150 mg 24 hr capsule, Take 1 capsule (150 mg total) by mouth daily with 75 mg capsule (225 mg total daily), Disp: 90 capsule, Rfl: 0    venlafaxine (EFFEXOR-XR) 75 mg 24 hr capsule, Take 1 capsule (75 mg total) by mouth daily with 150 mg capsule (225 mg total daily), Disp: 90 capsule, Rfl: 0    No results found for this or any previous visit (from the past 1008 hour(s))  The following portions of the patient's history were reviewed and updated as appropriate: allergies, current medications, past family history, past medical history, past social history, past surgical history and problem list      Review of Systems   Constitutional: Negative for appetite change, chills, diaphoresis, fatigue, fever and unexpected weight change  HENT: Negative for congestion, hearing loss and rhinorrhea  Eyes: Negative for visual disturbance  Respiratory: Negative for cough, chest tightness, shortness of breath and wheezing  Cardiovascular: Negative for chest pain, palpitations and leg swelling  Gastrointestinal: Negative for abdominal pain and blood in stool  Endocrine: Negative for cold intolerance, heat intolerance, polydipsia and polyuria  Genitourinary: Negative for difficulty urinating, dysuria, frequency and urgency  Musculoskeletal: Negative for arthralgias and myalgias  Skin: Negative for rash  Neurological: Negative for dizziness, weakness, light-headedness and headaches  Hematological: Does not bruise/bleed easily  Psychiatric/Behavioral: Negative for dysphoric mood and sleep disturbance  Objective:      Vitals:    08/03/22 1428   BP: 136/80   Pulse: 76   Resp: 16   Temp: 99 °F (37 2 °C)   SpO2: 98%          Physical Exam  Constitutional:       Appearance: She is well-developed  HENT:      Head: Normocephalic and atraumatic  Nose: Nose normal    Eyes:      General: No scleral icterus  Conjunctiva/sclera: Conjunctivae normal       Pupils: Pupils are equal, round, and reactive to light     Neck: Thyroid: No thyromegaly  Vascular: No JVD  Trachea: No tracheal deviation  Cardiovascular:      Rate and Rhythm: Normal rate and regular rhythm  Heart sounds: No murmur heard  No friction rub  No gallop  Pulmonary:      Effort: Pulmonary effort is normal  No respiratory distress  Breath sounds: Normal breath sounds  No wheezing or rales  Abdominal:      General: Bowel sounds are normal  There is no distension  Palpations: Abdomen is soft  There is no mass  Tenderness: There is no abdominal tenderness  There is no guarding or rebound  Musculoskeletal:         General: No tenderness  Cervical back: Normal range of motion and neck supple  Lymphadenopathy:      Cervical: No cervical adenopathy  Skin:     General: Skin is warm and dry  Findings: No erythema or rash  Neurological:      Mental Status: She is alert and oriented to person, place, and time  Cranial Nerves: No cranial nerve deficit  Psychiatric:         Behavior: Behavior normal          Thought Content:  Thought content normal          Judgment: Judgment normal

## 2022-08-04 ENCOUNTER — TELEPHONE (OUTPATIENT)
Dept: INTERNAL MEDICINE CLINIC | Facility: CLINIC | Age: 52
End: 2022-08-04

## 2022-08-04 DIAGNOSIS — E03.9 HYPOTHYROIDISM, UNSPECIFIED TYPE: ICD-10-CM

## 2022-08-04 RX ORDER — LEVOTHYROXINE SODIUM 0.07 MG/1
75 TABLET ORAL DAILY
Qty: 90 TABLET | Refills: 0 | Status: SHIPPED | OUTPATIENT
Start: 2022-08-04 | End: 2022-11-30 | Stop reason: SDUPTHER

## 2022-08-04 NOTE — TELEPHONE ENCOUNTER
Please clarify for the pharmacy the directions for Levothyroxine 75 mcg tablet  Directions say to take 1 tablet (&5 mcg total by moth daily Take 50 mcg daily

## 2022-08-19 ENCOUNTER — DOCUMENTATION (OUTPATIENT)
Dept: PSYCHIATRY | Facility: CLINIC | Age: 52
End: 2022-08-19
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
## 2022-08-22 ENCOUNTER — TELEPHONE (OUTPATIENT)
Dept: PSYCHIATRY | Facility: CLINIC | Age: 52
End: 2022-08-22

## 2022-08-22 NOTE — TELEPHONE ENCOUNTER
DISCHARGE LETTER for Dr Nayeli Kelsey (certified and regular) placed in outgoing mail on 08/22/22      Article #:  [id number]    Address:  [address] [address]

## 2022-09-07 NOTE — TELEPHONE ENCOUNTER
Certificate for the Discharge Letter was signed/received in Sept 2022  A copy has been scanned into Media

## 2022-11-15 ENCOUNTER — APPOINTMENT (OUTPATIENT)
Dept: LAB | Facility: CLINIC | Age: 52
End: 2022-11-15

## 2022-11-15 ENCOUNTER — OFFICE VISIT (OUTPATIENT)
Dept: INTERNAL MEDICINE CLINIC | Facility: CLINIC | Age: 52
End: 2022-11-15

## 2022-11-15 VITALS
BODY MASS INDEX: 30.12 KG/M2 | SYSTOLIC BLOOD PRESSURE: 130 MMHG | RESPIRATION RATE: 16 BRPM | HEART RATE: 89 BPM | DIASTOLIC BLOOD PRESSURE: 90 MMHG | WEIGHT: 170 LBS | OXYGEN SATURATION: 96 % | TEMPERATURE: 98.5 F | HEIGHT: 63 IN

## 2022-11-15 DIAGNOSIS — I10 ESSENTIAL HYPERTENSION: ICD-10-CM

## 2022-11-15 DIAGNOSIS — E06.3 HASHIMOTO'S DISEASE: ICD-10-CM

## 2022-11-15 DIAGNOSIS — E78.49 OTHER HYPERLIPIDEMIA: ICD-10-CM

## 2022-11-15 DIAGNOSIS — R73.01 IMPAIRED FASTING GLUCOSE: ICD-10-CM

## 2022-11-15 DIAGNOSIS — R55 SYNCOPE, UNSPECIFIED SYNCOPE TYPE: Primary | ICD-10-CM

## 2022-11-15 LAB
ALBUMIN SERPL BCP-MCNC: 3.5 G/DL (ref 3.5–5)
ALP SERPL-CCNC: 76 U/L (ref 46–116)
ALT SERPL W P-5'-P-CCNC: 19 U/L (ref 12–78)
ANION GAP SERPL CALCULATED.3IONS-SCNC: 6 MMOL/L (ref 4–13)
AST SERPL W P-5'-P-CCNC: 18 U/L (ref 5–45)
BASOPHILS # BLD AUTO: 0.02 THOUSANDS/ÂΜL (ref 0–0.1)
BASOPHILS NFR BLD AUTO: 0 % (ref 0–1)
BILIRUB SERPL-MCNC: 0.51 MG/DL (ref 0.2–1)
BUN SERPL-MCNC: 12 MG/DL (ref 5–25)
CALCIUM SERPL-MCNC: 9.3 MG/DL (ref 8.3–10.1)
CHLORIDE SERPL-SCNC: 105 MMOL/L (ref 96–108)
CHOLEST SERPL-MCNC: 179 MG/DL
CO2 SERPL-SCNC: 26 MMOL/L (ref 21–32)
CREAT SERPL-MCNC: 0.85 MG/DL (ref 0.6–1.3)
EOSINOPHIL # BLD AUTO: 0.33 THOUSAND/ÂΜL (ref 0–0.61)
EOSINOPHIL NFR BLD AUTO: 5 % (ref 0–6)
ERYTHROCYTE [DISTWIDTH] IN BLOOD BY AUTOMATED COUNT: 12.6 % (ref 11.6–15.1)
GFR SERPL CREATININE-BSD FRML MDRD: 79 ML/MIN/1.73SQ M
GLUCOSE P FAST SERPL-MCNC: 103 MG/DL (ref 65–99)
HCT VFR BLD AUTO: 39.3 % (ref 34.8–46.1)
HDLC SERPL-MCNC: 50 MG/DL
HGB BLD-MCNC: 12.6 G/DL (ref 11.5–15.4)
IMM GRANULOCYTES # BLD AUTO: 0.01 THOUSAND/UL (ref 0–0.2)
IMM GRANULOCYTES NFR BLD AUTO: 0 % (ref 0–2)
LDLC SERPL CALC-MCNC: 98 MG/DL (ref 0–100)
LYMPHOCYTES # BLD AUTO: 1.57 THOUSANDS/ÂΜL (ref 0.6–4.47)
LYMPHOCYTES NFR BLD AUTO: 25 % (ref 14–44)
MCH RBC QN AUTO: 29.5 PG (ref 26.8–34.3)
MCHC RBC AUTO-ENTMCNC: 32.1 G/DL (ref 31.4–37.4)
MCV RBC AUTO: 92 FL (ref 82–98)
MONOCYTES # BLD AUTO: 0.46 THOUSAND/ÂΜL (ref 0.17–1.22)
MONOCYTES NFR BLD AUTO: 7 % (ref 4–12)
NEUTROPHILS # BLD AUTO: 3.98 THOUSANDS/ÂΜL (ref 1.85–7.62)
NEUTS SEG NFR BLD AUTO: 63 % (ref 43–75)
NONHDLC SERPL-MCNC: 129 MG/DL
NRBC BLD AUTO-RTO: 0 /100 WBCS
PLATELET # BLD AUTO: 336 THOUSANDS/UL (ref 149–390)
PMV BLD AUTO: 10.6 FL (ref 8.9–12.7)
POTASSIUM SERPL-SCNC: 4.1 MMOL/L (ref 3.5–5.3)
PROT SERPL-MCNC: 8 G/DL (ref 6.4–8.4)
RBC # BLD AUTO: 4.27 MILLION/UL (ref 3.81–5.12)
SODIUM SERPL-SCNC: 137 MMOL/L (ref 135–147)
TRIGL SERPL-MCNC: 156 MG/DL
TSH SERPL DL<=0.05 MIU/L-ACNC: 0.62 UIU/ML (ref 0.45–4.5)
WBC # BLD AUTO: 6.37 THOUSAND/UL (ref 4.31–10.16)

## 2022-11-15 NOTE — PATIENT INSTRUCTIONS
Likely vasovagal syncope, pathophysiology discussed  This can be exacerbated by squatting and then standing quickly in addition to being under the influence of alcohol  Tongue biting is of some concern as is prior history of syncopal events with abnormal EEG  We will complete cardiac workup with EKG, echo and Holter and order sleep-deprived EEG  Patient is due for her regular lab work and will do that today and follow-up subsequent to testing
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
## 2022-11-16 LAB
EST. AVERAGE GLUCOSE BLD GHB EST-MCNC: 117 MG/DL
HBA1C MFR BLD: 5.7 %

## 2022-11-28 ENCOUNTER — HOSPITAL ENCOUNTER (OUTPATIENT)
Dept: NEUROLOGY | Facility: HOSPITAL | Age: 52
Discharge: HOME | End: 2022-11-28
Attending: INTERNAL MEDICINE

## 2022-11-28 DIAGNOSIS — R55 SYNCOPE, UNSPECIFIED SYNCOPE TYPE: ICD-10-CM

## 2022-11-30 DIAGNOSIS — I10 ESSENTIAL HYPERTENSION: ICD-10-CM

## 2022-11-30 DIAGNOSIS — E03.9 HYPOTHYROIDISM, UNSPECIFIED TYPE: ICD-10-CM

## 2022-11-30 DIAGNOSIS — E78.5 HYPERLIPIDEMIA, UNSPECIFIED HYPERLIPIDEMIA TYPE: ICD-10-CM

## 2022-11-30 DIAGNOSIS — J45.909 UNCOMPLICATED ASTHMA, UNSPECIFIED ASTHMA SEVERITY, UNSPECIFIED WHETHER PERSISTENT: ICD-10-CM

## 2022-11-30 RX ORDER — FLUTICASONE PROPIONATE 50 MCG
2 SPRAY, SUSPENSION (ML) NASAL DAILY
Qty: 16 G | Refills: 0 | Status: SHIPPED | OUTPATIENT
Start: 2022-11-30

## 2022-11-30 RX ORDER — AMLODIPINE BESYLATE 2.5 MG/1
2.5 TABLET ORAL DAILY
Qty: 90 TABLET | Refills: 0 | Status: SHIPPED | OUTPATIENT
Start: 2022-11-30

## 2022-11-30 RX ORDER — ALBUTEROL SULFATE 90 UG/1
2 AEROSOL, METERED RESPIRATORY (INHALATION) EVERY 4 HOURS PRN
Qty: 16 G | Refills: 11 | Status: SHIPPED | OUTPATIENT
Start: 2022-11-30

## 2022-11-30 RX ORDER — LEVOTHYROXINE SODIUM 0.07 MG/1
75 TABLET ORAL DAILY
Qty: 90 TABLET | Refills: 0 | Status: SHIPPED | OUTPATIENT
Start: 2022-11-30

## 2022-11-30 RX ORDER — ATORVASTATIN CALCIUM 10 MG/1
10 TABLET, FILM COATED ORAL DAILY
Qty: 90 TABLET | Refills: 0 | Status: SHIPPED | OUTPATIENT
Start: 2022-11-30

## 2022-12-07 DIAGNOSIS — E03.9 HYPOTHYROIDISM, UNSPECIFIED TYPE: ICD-10-CM

## 2022-12-07 RX ORDER — LEVOTHYROXINE SODIUM 0.07 MG/1
75 TABLET ORAL DAILY
Qty: 90 TABLET | Refills: 0 | Status: CANCELLED | OUTPATIENT
Start: 2022-12-07

## 2022-12-08 ENCOUNTER — HOSPITAL ENCOUNTER (OUTPATIENT)
Dept: NON INVASIVE DIAGNOSTICS | Facility: CLINIC | Age: 52
Discharge: HOME | End: 2022-12-08

## 2022-12-08 VITALS
SYSTOLIC BLOOD PRESSURE: 130 MMHG | BODY MASS INDEX: 30.12 KG/M2 | HEART RATE: 89 BPM | WEIGHT: 170 LBS | HEIGHT: 63 IN | DIASTOLIC BLOOD PRESSURE: 90 MMHG

## 2022-12-08 DIAGNOSIS — R55 SYNCOPE, UNSPECIFIED SYNCOPE TYPE: ICD-10-CM

## 2022-12-08 LAB
AORTIC ROOT: 2.8 CM
APICAL FOUR CHAMBER EJECTION FRACTION: 75 %
ASCENDING AORTA: 2.6 CM
E WAVE DECELERATION TIME: 202 MS
FRACTIONAL SHORTENING: 32 (ref 28–44)
INTERVENTRICULAR SEPTUM IN DIASTOLE (PARASTERNAL SHORT AXIS VIEW): 0.7 CM
INTERVENTRICULAR SEPTUM: 0.7 CM (ref 0.6–1.1)
LAAS-AP2: 11.9 CM2
LAAS-AP4: 11.1 CM2
LEFT ATRIUM SIZE: 2.4 CM
LEFT INTERNAL DIMENSION IN SYSTOLE: 2.5 CM (ref 2.1–4)
LEFT VENTRICULAR INTERNAL DIMENSION IN DIASTOLE: 3.7 CM (ref 3.5–6)
LEFT VENTRICULAR POSTERIOR WALL IN END DIASTOLE: 0.9 CM
LEFT VENTRICULAR STROKE VOLUME: 36 ML
LVSV (TEICH): 36 ML
MV E'TISSUE VEL-SEP: 10 CM/S
MV PEAK A VEL: 0.6 M/S
MV PEAK E VEL: 69 CM/S
MV STENOSIS PRESSURE HALF TIME: 58 MS
MV VALVE AREA P 1/2 METHOD: 3.79
RIGHT ATRIAL 2D VOLUME: 11 ML
RIGHT ATRIUM AREA SYSTOLE A4C: 7.5 CM2
RIGHT VENTRICLE ID DIMENSION: 2.3 CM
SL CV LEFT ATRIUM LENGTH A2C: 4.4 CM
SL CV LV EF: 55
SL CV PED ECHO LEFT VENTRICLE DIASTOLIC VOLUME (MOD BIPLANE) 2D: 58 ML
SL CV PED ECHO LEFT VENTRICLE SYSTOLIC VOLUME (MOD BIPLANE) 2D: 23 ML

## 2022-12-12 DIAGNOSIS — E03.9 HYPOTHYROIDISM, UNSPECIFIED TYPE: ICD-10-CM

## 2022-12-12 RX ORDER — LEVOTHYROXINE SODIUM 0.07 MG/1
75 TABLET ORAL DAILY
Qty: 10 TABLET | Refills: 0 | Status: SHIPPED | OUTPATIENT
Start: 2022-12-12 | End: 2022-12-19 | Stop reason: SDUPTHER

## 2022-12-12 NOTE — TELEPHONE ENCOUNTER
PT NEEDS NEW RX SENT TO [facility] - LEVOTHYROXINE [facility]MCG 90 DAY SUPPLY    - REFILL WAS DENIED    PT HASNT HAD RX IN 5 DAYS - CAN 10 PILLS BE SENT TO   [facility]    ANY PROBS - CALL PT

## 2022-12-14 ENCOUNTER — OFFICE VISIT (OUTPATIENT)
Dept: INTERNAL MEDICINE CLINIC | Facility: CLINIC | Age: 52
End: 2022-12-14

## 2022-12-14 VITALS
SYSTOLIC BLOOD PRESSURE: 122 MMHG | HEIGHT: 63 IN | BODY MASS INDEX: 29.7 KG/M2 | HEART RATE: 90 BPM | RESPIRATION RATE: 16 BRPM | DIASTOLIC BLOOD PRESSURE: 80 MMHG | WEIGHT: 167.6 LBS | TEMPERATURE: 97.6 F | OXYGEN SATURATION: 97 %

## 2022-12-14 DIAGNOSIS — I10 ESSENTIAL HYPERTENSION: ICD-10-CM

## 2022-12-14 DIAGNOSIS — E78.5 HYPERLIPIDEMIA, UNSPECIFIED HYPERLIPIDEMIA TYPE: ICD-10-CM

## 2022-12-14 DIAGNOSIS — G47.33 OSA (OBSTRUCTIVE SLEEP APNEA): ICD-10-CM

## 2022-12-14 DIAGNOSIS — R73.01 IMPAIRED FASTING GLUCOSE: ICD-10-CM

## 2022-12-14 DIAGNOSIS — R55 VASOVAGAL SYNCOPE: Primary | ICD-10-CM

## 2022-12-14 DIAGNOSIS — E03.9 HYPOTHYROIDISM, UNSPECIFIED TYPE: ICD-10-CM

## 2022-12-14 DIAGNOSIS — E06.3 HASHIMOTO'S DISEASE: ICD-10-CM

## 2022-12-14 NOTE — PATIENT INSTRUCTIONS
Lab data reviewed in detail and compared to prior    Impaired fasting glucose stable with A1c 5 7    Hyperlipidemia-LDL at goal on atorvastatin    Hypothyroidism stable on levothyroxine    Syncope-Holter monitor, echocardiogram and EEG reviewed and all unremarkable  Likely vasovagal, no further work-up  Symptoms suggestive of obstructive sleep apnea-patient encouraged to schedule sleep study and will follow accordingly  Routine follow-up after labs in 6 months, sooner as needed

## 2022-12-14 NOTE — PROGRESS NOTES
Assessment/Plan:    Diagnoses and all orders for this visit:    Vasovagal syncope    MIRANDA (obstructive sleep apnea)    Hyperlipidemia, unspecified hyperlipidemia type  -     Lipid panel; Future    Hypothyroidism, unspecified type  -     TSH, 3rd generation with Free T4 reflex; Future    Essential hypertension  -     CBC and differential; Future  -     Comprehensive metabolic panel; Future    Hashimoto's disease  -     TSH, 3rd generation with Free T4 reflex; Future    Impaired fasting glucose  -     Hemoglobin A1C; Future            Patient Instructions   Lab data reviewed in detail and compared to prior    Impaired fasting glucose stable with A1c 5 7    Hyperlipidemia-LDL at goal on atorvastatin    Hypothyroidism stable on levothyroxine    Syncope-Holter monitor, echocardiogram and EEG reviewed and all unremarkable  Likely vasovagal, no further work-up  Symptoms suggestive of obstructive sleep apnea-patient encouraged to schedule sleep study and will follow accordingly  Routine follow-up after labs in 6 months, sooner as needed  Subjective:      Patient ID: Will Mcdaniel is a 47 y o  female    Follow-up MMP and review labs and studies    syncope-no further episodes, no palpitations    HTN/HPL-taking rx as directed    Hypothyroid-taking rx on empty stomach in am    Patient was seen in May and was noted to have loud snoring, frequent awakenings, unrefreshing sleep and a m  headaches for which Home sleep study was ordered but was never completed  She continues to have same symptoms but no longer has a m  headaches  She denies any significant EDS          Current Outpatient Medications:   •  albuterol (PROVENTIL HFA,VENTOLIN HFA) 90 mcg/act inhaler, Inhale 2 puffs every 4 (four) hours as needed for wheezing, Disp: 16 g, Rfl: 11  •  amLODIPine (NORVASC) 2 5 mg tablet, Take 1 tablet (2 5 mg total) by mouth daily, Disp: 90 tablet, Rfl: 0  •  atorvastatin (LIPITOR) 10 mg tablet, Take 1 tablet (10 mg total) by mouth daily, Disp: 90 tablet, Rfl: 0  •  clonazePAM (KlonoPIN) 0 5 mg tablet, Take 1 tablet (0 5 mg total) by mouth daily at bedtime, Disp: 30 tablet, Rfl: 2  •  fluticasone (FLONASE) 50 mcg/act nasal spray, 2 sprays into each nostril daily, Disp: 16 g, Rfl: 0  •  levothyroxine 75 mcg tablet, Take 1 tablet (75 mcg total) by mouth daily, Disp: 10 tablet, Rfl: 0  •  mirtazapine (REMERON) 7 5 MG tablet, Take 1 tablet (7 5 mg total) by mouth daily at bedtime, Disp: 90 tablet, Rfl: 0  •  rizatriptan (MAXALT-MLT) 10 MG disintegrating tablet, Take 1 tablet (10 mg total) by mouth once as needed for migraine for up to 1 dose May repeat in 2 hours if needed, Disp: 9 tablet, Rfl: 0  •  venlafaxine (EFFEXOR-XR) 150 mg 24 hr capsule, Take 1 capsule (150 mg total) by mouth daily with 75 mg capsule (225 mg total daily), Disp: 90 capsule, Rfl: 0  •  venlafaxine (EFFEXOR-XR) 75 mg 24 hr capsule, Take 1 capsule (75 mg total) by mouth daily with 150 mg capsule (225 mg total daily), Disp: 90 capsule, Rfl: 0  •  mirtazapine (REMERON) 7 5 MG tablet, Take 1 tablet (7 5 mg total) by mouth daily at bedtime, Disp: 90 tablet, Rfl: 0  •  rizatriptan (MAXALT-MLT) 10 MG disintegrating tablet, Take 1 tablet (10 mg total) by mouth once as needed for migraine for up to 1 dose May repeat in 2 hours if needed, Disp: 9 tablet, Rfl: 0  •  venlafaxine (EFFEXOR-XR) 75 mg 24 hr capsule, Take 1 capsule (75 mg total) by mouth daily with 150 mg capsule (225 mg total daily), Disp: 90 capsule, Rfl: 0    Recent Results (from the past 1008 hour(s))   CBC and differential    Collection Time: 11/15/22 11:29 AM   Result Value Ref Range    WBC 6 37 4 31 - 10 16 Thousand/uL    RBC 4 27 3 81 - 5 12 Million/uL    Hemoglobin 12 6 11 5 - 15 4 g/dL    Hematocrit 39 3 34 8 - 46 1 %    MCV 92 82 - 98 fL    MCH 29 5 26 8 - 34 3 pg    MCHC 32 1 31 4 - 37 4 g/dL    RDW 12 6 11 6 - 15 1 %    MPV 10 6 8 9 - 12 7 fL    Platelets 668 873 - 208 Thousands/uL    nRBC 0 /100 WBCs Neutrophils Relative 63 43 - 75 %    Immat GRANS % 0 0 - 2 %    Lymphocytes Relative 25 14 - 44 %    Monocytes Relative 7 4 - 12 %    Eosinophils Relative 5 0 - 6 %    Basophils Relative 0 0 - 1 %    Neutrophils Absolute 3 98 1 85 - 7 62 Thousands/µL    Immature Grans Absolute 0 01 0 00 - 0 20 Thousand/uL    Lymphocytes Absolute 1 57 0 60 - 4 47 Thousands/µL    Monocytes Absolute 0 46 0 17 - 1 22 Thousand/µL    Eosinophils Absolute 0 33 0 00 - 0 61 Thousand/µL    Basophils Absolute 0 02 0 00 - 0 10 Thousands/µL   Comprehensive metabolic panel    Collection Time: 11/15/22 11:29 AM   Result Value Ref Range    Sodium 137 135 - 147 mmol/L    Potassium 4 1 3 5 - 5 3 mmol/L    Chloride 105 96 - 108 mmol/L    CO2 26 21 - 32 mmol/L    ANION GAP 6 4 - 13 mmol/L    BUN 12 5 - 25 mg/dL    Creatinine 0 85 0 60 - 1 30 mg/dL    Glucose, Fasting 103 (H) 65 - 99 mg/dL    Calcium 9 3 8 3 - 10 1 mg/dL    AST 18 5 - 45 U/L    ALT 19 12 - 78 U/L    Alkaline Phosphatase 76 46 - 116 U/L    Total Protein 8 0 6 4 - 8 4 g/dL    Albumin 3 5 3 5 - 5 0 g/dL    Total Bilirubin 0 51 0 20 - 1 00 mg/dL    eGFR 79 ml/min/1 73sq m   Lipid panel    Collection Time: 11/15/22 11:29 AM   Result Value Ref Range    Cholesterol 179 See Comment mg/dL    Triglycerides 156 (H) See Comment mg/dL    HDL, Direct 50 >=50 mg/dL    LDL Calculated 98 0 - 100 mg/dL    Non-HDL-Chol (CHOL-HDL) 129 mg/dl   Hemoglobin A1C    Collection Time: 11/15/22 11:29 AM   Result Value Ref Range    Hemoglobin A1C 5 7 (H) Normal 3 8-5 6%; PreDiabetic 5 7-6 4%;  Diabetic >=6 5%; Glycemic control for adults with diabetes <7 0% %     mg/dl   TSH, 3rd generation with Free T4 reflex    Collection Time: 11/15/22 11:29 AM   Result Value Ref Range    TSH 3RD GENERATON 0 623 0 450 - 4 500 uIU/mL   Echo complete w/ contrast if indicated    Collection Time: 12/08/22  8:06 AM   Result Value Ref Range    A4C EF 75 %    LVIDd 3 70 cm    LVIDS 2 50 cm    IVSd 0 70 cm    LVPWd 0 90 cm    FS 32 28 - 44    MV E' Tissue Velocity Septal 10 cm/s    E wave deceleration time 202 ms    MV Peak E Diego 69 cm/s    MV Peak A Diego 0 6 m/s    RVID d 2 3 cm    LA size 2 4 cm    LA length (A2C) 4 40 cm    RA 2D Volume 11 0 mL    RAA A4C 7 5 cm2    MV stenosis pressure 1/2 time 58 ms    MV valve area p 1/2 method 3 79     Ao root 2 80 cm    Asc Ao 2 6 cm    Left ventricular stroke volume (2D) 36 00 mL    IVS 0 7 cm    LEFT VENTRICLE SYSTOLIC VOLUME (MOD BIPLANE) 2D 23 mL    LV DIASTOLIC VOLUME (MOD BIPLANE) 2D 58 mL    Left Atrium Area-systolic Four Chamber 07 2 cm2    Left Atrium Area-systolic Apical Two Chamber 11 9 cm2    LVSV, 2D 36 mL    LV EF 55        The following portions of the patient's history were reviewed and updated as appropriate: allergies, current medications, past family history, past medical history, past social history, past surgical history and problem list      Review of Systems   Constitutional: Negative for appetite change, chills, diaphoresis, fatigue, fever and unexpected weight change  HENT: Negative for congestion, hearing loss and rhinorrhea  Eyes: Negative for visual disturbance  Respiratory: Negative for cough, chest tightness, shortness of breath and wheezing  Cardiovascular: Negative for chest pain, palpitations and leg swelling  Gastrointestinal: Negative for abdominal pain and blood in stool  Endocrine: Negative for cold intolerance, heat intolerance, polydipsia and polyuria  Genitourinary: Negative for difficulty urinating, dysuria, frequency and urgency  Musculoskeletal: Negative for arthralgias and myalgias  Skin: Negative for rash  Neurological: Negative for dizziness, weakness, light-headedness and headaches  Hematological: Does not bruise/bleed easily  Psychiatric/Behavioral: Negative for dysphoric mood and sleep disturbance           Objective:      Vitals:    12/14/22 1512   BP: 122/80   Pulse: 90   Resp: 16   Temp: 97 6 °F (36 4 °C)   SpO2: 97% Physical Exam  Constitutional:       Appearance: She is well-developed  HENT:      Head: Normocephalic and atraumatic  Nose: Nose normal    Eyes:      General: No scleral icterus  Conjunctiva/sclera: Conjunctivae normal       Pupils: Pupils are equal, round, and reactive to light  Neck:      Thyroid: No thyromegaly  Vascular: No JVD  Trachea: No tracheal deviation  Cardiovascular:      Rate and Rhythm: Normal rate and regular rhythm  Heart sounds: No murmur heard  No friction rub  No gallop  Pulmonary:      Effort: Pulmonary effort is normal  No respiratory distress  Breath sounds: Normal breath sounds  No wheezing or rales  Musculoskeletal:         General: No deformity  Cervical back: Normal range of motion and neck supple  Lymphadenopathy:      Cervical: No cervical adenopathy  Skin:     General: Skin is warm and dry  Coloration: Skin is not pale  Findings: No erythema or rash  Neurological:      Mental Status: She is alert and oriented to person, place, and time  Cranial Nerves: No cranial nerve deficit  Psychiatric:         Behavior: Behavior normal          Thought Content:  Thought content normal          Judgment: Judgment normal

## 2022-12-19 DIAGNOSIS — E03.9 HYPOTHYROIDISM, UNSPECIFIED TYPE: ICD-10-CM

## 2022-12-19 RX ORDER — LEVOTHYROXINE SODIUM 0.07 MG/1
75 TABLET ORAL DAILY
Qty: 10 TABLET | Refills: 0 | Status: SHIPPED | OUTPATIENT
Start: 2022-12-19

## 2022-12-19 NOTE — TELEPHONE ENCOUNTER
----- Message from Akila AcunaShorewood Tri sent at 12/18/2022 10:49 AM EST -----  Regarding: Levothyroxine 75 MCG  Contact: [phone]  I apologize on [facility] behave, but I have not received my Levothyroxine order as of yet  They claim the medication is in their processing center,  but they do not know when it will be shipped  Can you please send another 10 or 15 day supply to my [facility] pharmacy asap  My remaining doses are enough for 3 more days  The last 10 day order went in on 12/12/22    Let me know if there are any concerns or issues    Thank You & Happy Holidays

## 2022-12-22 ENCOUNTER — TELEPHONE (OUTPATIENT)
Dept: INTERNAL MEDICINE CLINIC | Facility: CLINIC | Age: 52
End: 2022-12-22

## 2022-12-22 NOTE — TELEPHONE ENCOUNTER
Keren Xiao    Pt called back regarding the [facility]     She states she is certified by [facility] for Asthma, Post Tramatic Syndrome, Anxiety, Depression, Sinus and any other health issues that may arise in the future      If additional information is needed, you may call her at [phone]

## 2023-01-05 DIAGNOSIS — J45.909 UNCOMPLICATED ASTHMA, UNSPECIFIED ASTHMA SEVERITY, UNSPECIFIED WHETHER PERSISTENT: Primary | ICD-10-CM

## 2023-01-05 RX ORDER — ALBUTEROL SULFATE 2.5 MG/3ML
2.5 SOLUTION RESPIRATORY (INHALATION) EVERY 6 HOURS PRN
Qty: 180 ML | Refills: 3 | Status: SHIPPED | OUTPATIENT
Start: 2023-01-05 | End: 2023-02-21 | Stop reason: SDUPTHER

## 2023-01-17 DIAGNOSIS — G47.00 INSOMNIA, UNSPECIFIED TYPE: ICD-10-CM

## 2023-01-17 RX ORDER — CLONAZEPAM 0.5 MG/1
0.5 TABLET ORAL
Qty: 30 TABLET | Refills: 2 | Status: SHIPPED | OUTPATIENT
Start: 2023-01-17

## 2023-01-20 DIAGNOSIS — G47.33 OSA (OBSTRUCTIVE SLEEP APNEA): Primary | ICD-10-CM

## 2023-01-23 ENCOUNTER — TELEPHONE (OUTPATIENT)
Dept: SLEEP CENTER | Facility: CLINIC | Age: 53
End: 2023-01-23

## 2023-01-23 NOTE — TELEPHONE ENCOUNTER
----- Message from Ranjit Rod MD sent at 1/23/2023 12:52 PM EST -----  Approved    ----- Message -----  From: Mariaa Duong  Sent: 0/49/7171   9:40 AM EST  To: Sleep Medicine [facility] Provider    This Home sleep study needs approval      If approved please sign and return to clerical pool  If denied please include reasons why  Also provide alternative testing if warranted  Please sign and return to clerical pool

## 2023-02-08 ENCOUNTER — TELEPHONE (OUTPATIENT)
Dept: SLEEP CENTER | Facility: CLINIC | Age: 53
End: 2023-02-08

## 2023-02-08 DIAGNOSIS — F33.1 MAJOR DEPRESSIVE DISORDER, RECURRENT, MODERATE (HCC): ICD-10-CM

## 2023-02-08 DIAGNOSIS — F43.10 POST TRAUMATIC STRESS DISORDER (PTSD): ICD-10-CM

## 2023-02-08 RX ORDER — VENLAFAXINE HYDROCHLORIDE 75 MG/1
75 CAPSULE, EXTENDED RELEASE ORAL DAILY
Qty: 90 CAPSULE | Refills: 0 | Status: SHIPPED | OUTPATIENT
Start: 2023-02-08 | End: 2023-02-13 | Stop reason: SDUPTHER

## 2023-02-08 RX ORDER — VENLAFAXINE HYDROCHLORIDE 150 MG/1
150 CAPSULE, EXTENDED RELEASE ORAL DAILY
Qty: 90 CAPSULE | Refills: 0 | Status: SHIPPED | OUTPATIENT
Start: 2023-02-08 | End: 2023-02-13 | Stop reason: SDUPTHER

## 2023-02-08 NOTE — TELEPHONE ENCOUNTER
----- Message from Noah Oconnell MD sent at 2/8/2023  2:36 PM EST -----  Approved    ----- Message -----  From: Emory Niño  Sent: 2/7/2023  11:07 AM EST  To: Sleep Medicine [facility] Provider    This home sleep study needs approval      If approved please sign and return to clerical pool  If denied please include reasons why  Also provide alternative testing if warranted  Please sign and return to clerical pool

## 2023-02-13 DIAGNOSIS — G47.00 INSOMNIA, UNSPECIFIED TYPE: ICD-10-CM

## 2023-02-13 DIAGNOSIS — F33.1 MAJOR DEPRESSIVE DISORDER, RECURRENT, MODERATE (HCC): ICD-10-CM

## 2023-02-13 DIAGNOSIS — F43.10 POST TRAUMATIC STRESS DISORDER (PTSD): ICD-10-CM

## 2023-02-14 RX ORDER — VENLAFAXINE HYDROCHLORIDE 75 MG/1
75 CAPSULE, EXTENDED RELEASE ORAL DAILY
Qty: 90 CAPSULE | Refills: 0 | Status: SHIPPED | OUTPATIENT
Start: 2023-02-14

## 2023-02-14 RX ORDER — VENLAFAXINE HYDROCHLORIDE 150 MG/1
150 CAPSULE, EXTENDED RELEASE ORAL DAILY
Qty: 90 CAPSULE | Refills: 0 | Status: SHIPPED | OUTPATIENT
Start: 2023-02-14 | End: 2023-02-21 | Stop reason: SDUPTHER

## 2023-02-14 RX ORDER — CLONAZEPAM 0.5 MG/1
0.5 TABLET ORAL
Qty: 30 TABLET | Refills: 0 | Status: SHIPPED | OUTPATIENT
Start: 2023-02-14

## 2023-02-21 DIAGNOSIS — F43.10 POST TRAUMATIC STRESS DISORDER (PTSD): ICD-10-CM

## 2023-02-21 DIAGNOSIS — J45.909 UNCOMPLICATED ASTHMA, UNSPECIFIED ASTHMA SEVERITY, UNSPECIFIED WHETHER PERSISTENT: ICD-10-CM

## 2023-02-21 DIAGNOSIS — F33.1 MAJOR DEPRESSIVE DISORDER, RECURRENT, MODERATE (HCC): ICD-10-CM

## 2023-02-21 RX ORDER — VENLAFAXINE HYDROCHLORIDE 150 MG/1
150 CAPSULE, EXTENDED RELEASE ORAL DAILY
Qty: 90 CAPSULE | Refills: 1 | Status: SHIPPED | OUTPATIENT
Start: 2023-02-21

## 2023-02-21 RX ORDER — ALBUTEROL SULFATE 2.5 MG/3ML
2.5 SOLUTION RESPIRATORY (INHALATION) EVERY 6 HOURS PRN
Qty: 180 ML | Refills: 0 | Status: SHIPPED | OUTPATIENT
Start: 2023-02-21

## 2023-02-21 RX ORDER — ALBUTEROL SULFATE 90 UG/1
2 AEROSOL, METERED RESPIRATORY (INHALATION) EVERY 4 HOURS PRN
Qty: 16 G | Refills: 0 | Status: SHIPPED | OUTPATIENT
Start: 2023-02-21

## 2023-03-14 DIAGNOSIS — G47.00 INSOMNIA, UNSPECIFIED TYPE: ICD-10-CM

## 2023-03-15 RX ORDER — CLONAZEPAM 0.5 MG/1
TABLET ORAL
Qty: 30 TABLET | Refills: 0 | Status: SHIPPED | OUTPATIENT
Start: 2023-03-15

## 2023-03-21 ENCOUNTER — HOSPITAL ENCOUNTER (OUTPATIENT)
Dept: SLEEP CENTER | Facility: CLINIC | Age: 53
Discharge: HOME | End: 2023-03-21

## 2023-03-21 DIAGNOSIS — J45.909 UNCOMPLICATED ASTHMA, UNSPECIFIED ASTHMA SEVERITY, UNSPECIFIED WHETHER PERSISTENT: ICD-10-CM

## 2023-03-21 DIAGNOSIS — G47.33 OSA (OBSTRUCTIVE SLEEP APNEA): ICD-10-CM

## 2023-03-21 RX ORDER — ALBUTEROL SULFATE 90 UG/1
2 AEROSOL, METERED RESPIRATORY (INHALATION) EVERY 4 HOURS PRN
Qty: 16 G | Refills: 0 | Status: SHIPPED | OUTPATIENT
Start: 2023-03-21

## 2023-03-25 NOTE — PROGRESS NOTES
Home Sleep Study Documentation    HOME STUDY DEVICE: Noxturnal no                                           Josy G3 yes      Pre-Sleep Home Study:    Set-up and instructions performed by: homestudy tech    Technician performed demonstration for Patient: yes    Return demonstration performed by Patient: yes    Written instructions provided to Patient: yes    Patient signed consent form: yes        Post-Sleep Home Study:    Additional comments by Patient: none    Home Sleep Study Failed:no:    Failure reason: N/A    Reported or Detected: N/A    Scored by: ANETTE Godinez

## 2023-04-02 DIAGNOSIS — E03.9 HYPOTHYROIDISM, UNSPECIFIED TYPE: ICD-10-CM

## 2023-04-02 RX ORDER — LEVOTHYROXINE SODIUM 0.07 MG/1
75 TABLET ORAL DAILY
Qty: 10 TABLET | Refills: 0 | Status: SHIPPED | OUTPATIENT
Start: 2023-04-02 | End: 2023-04-05 | Stop reason: SDUPTHER

## 2023-04-05 ENCOUNTER — TELEPHONE (OUTPATIENT)
Age: 53
End: 2023-04-05

## 2023-04-05 DIAGNOSIS — E03.9 HYPOTHYROIDISM, UNSPECIFIED TYPE: ICD-10-CM

## 2023-04-05 RX ORDER — LEVOTHYROXINE SODIUM 0.07 MG/1
75 TABLET ORAL DAILY
Qty: 30 TABLET | Refills: 5 | Status: SHIPPED | OUTPATIENT
Start: 2023-04-05 | End: 2023-04-07

## 2023-04-05 NOTE — TELEPHONE ENCOUNTER
----- Message from Maritza Ayala sent at 4/4/2023  9:51 PM EDT -----  Regarding: levothyroxine 75 mcg tablet  Contact: [phone]  Good morning Dr Samir Bailey  I requested for levothyroxine, but I see only 10 pills were refilled/approved I was wondering why

## 2023-04-07 RX ORDER — LEVOTHYROXINE SODIUM 0.07 MG/1
75 TABLET ORAL DAILY
Qty: 30 TABLET | Refills: 5 | Status: SHIPPED | OUTPATIENT
Start: 2023-04-07

## 2023-04-22 DIAGNOSIS — J45.909 UNCOMPLICATED ASTHMA, UNSPECIFIED ASTHMA SEVERITY, UNSPECIFIED WHETHER PERSISTENT: ICD-10-CM

## 2023-04-22 DIAGNOSIS — G47.00 INSOMNIA, UNSPECIFIED TYPE: ICD-10-CM

## 2023-04-24 DIAGNOSIS — G47.00 INSOMNIA, UNSPECIFIED TYPE: ICD-10-CM

## 2023-04-24 RX ORDER — RIZATRIPTAN BENZOATE 10 MG/1
10 TABLET, ORALLY DISINTEGRATING ORAL ONCE AS NEEDED
Qty: 9 TABLET | Refills: 0 | Status: SHIPPED | OUTPATIENT
Start: 2023-04-24

## 2023-04-24 RX ORDER — ALBUTEROL SULFATE 90 UG/1
2 AEROSOL, METERED RESPIRATORY (INHALATION) EVERY 4 HOURS PRN
Qty: 16 G | Refills: 0 | Status: SHIPPED | OUTPATIENT
Start: 2023-04-24 | End: 2023-04-26 | Stop reason: SDUPTHER

## 2023-04-24 RX ORDER — CLONAZEPAM 0.5 MG/1
0.5 TABLET ORAL
Qty: 30 TABLET | Refills: 0 | Status: SHIPPED | OUTPATIENT
Start: 2023-04-24 | End: 2023-04-26 | Stop reason: SDUPTHER

## 2023-04-26 DIAGNOSIS — G47.00 INSOMNIA, UNSPECIFIED TYPE: ICD-10-CM

## 2023-04-26 DIAGNOSIS — J45.909 UNCOMPLICATED ASTHMA, UNSPECIFIED ASTHMA SEVERITY, UNSPECIFIED WHETHER PERSISTENT: ICD-10-CM

## 2023-04-27 RX ORDER — CLONAZEPAM 0.5 MG/1
0.5 TABLET ORAL
Qty: 30 TABLET | Refills: 0 | Status: SHIPPED | OUTPATIENT
Start: 2023-04-27

## 2023-04-27 RX ORDER — ALBUTEROL SULFATE 90 UG/1
2 AEROSOL, METERED RESPIRATORY (INHALATION) EVERY 4 HOURS PRN
Qty: 16 G | Refills: 0 | Status: SHIPPED | OUTPATIENT
Start: 2023-04-27

## 2023-05-03 DIAGNOSIS — F43.10 POST TRAUMATIC STRESS DISORDER (PTSD): ICD-10-CM

## 2023-05-03 DIAGNOSIS — F33.1 MAJOR DEPRESSIVE DISORDER, RECURRENT, MODERATE (HCC): ICD-10-CM

## 2023-05-03 RX ORDER — VENLAFAXINE HYDROCHLORIDE 75 MG/1
75 CAPSULE, EXTENDED RELEASE ORAL DAILY
Qty: 30 CAPSULE | Refills: 2 | Status: SHIPPED | OUTPATIENT
Start: 2023-05-03

## 2023-05-24 DIAGNOSIS — F43.10 POST TRAUMATIC STRESS DISORDER (PTSD): ICD-10-CM

## 2023-05-24 DIAGNOSIS — F33.1 MAJOR DEPRESSIVE DISORDER, RECURRENT, MODERATE (HCC): ICD-10-CM

## 2023-05-24 DIAGNOSIS — E78.5 HYPERLIPIDEMIA, UNSPECIFIED HYPERLIPIDEMIA TYPE: ICD-10-CM

## 2023-05-24 RX ORDER — ATORVASTATIN CALCIUM 10 MG/1
10 TABLET, FILM COATED ORAL DAILY
Qty: 90 TABLET | Refills: 0 | Status: SHIPPED | OUTPATIENT
Start: 2023-05-24

## 2023-05-24 RX ORDER — VENLAFAXINE HYDROCHLORIDE 150 MG/1
150 CAPSULE, EXTENDED RELEASE ORAL DAILY
Qty: 90 CAPSULE | Refills: 0 | Status: SHIPPED | OUTPATIENT
Start: 2023-05-24

## 2023-06-08 DIAGNOSIS — F43.10 POST TRAUMATIC STRESS DISORDER (PTSD): ICD-10-CM

## 2023-06-08 DIAGNOSIS — F33.1 MAJOR DEPRESSIVE DISORDER, RECURRENT, MODERATE (HCC): ICD-10-CM

## 2023-06-08 RX ORDER — VENLAFAXINE HYDROCHLORIDE 75 MG/1
75 CAPSULE, EXTENDED RELEASE ORAL DAILY
Qty: 90 CAPSULE | Refills: 1 | Status: SHIPPED | OUTPATIENT
Start: 2023-06-08

## 2023-06-09 ENCOUNTER — TELEPHONE (OUTPATIENT)
Dept: PSYCHIATRY | Facility: CLINIC | Age: 53
End: 2023-06-09

## 2023-06-09 DIAGNOSIS — J45.909 UNCOMPLICATED ASTHMA, UNSPECIFIED ASTHMA SEVERITY, UNSPECIFIED WHETHER PERSISTENT: ICD-10-CM

## 2023-06-09 RX ORDER — ALBUTEROL SULFATE 90 UG/1
AEROSOL, METERED RESPIRATORY (INHALATION)
Qty: 6.7 G | Refills: 3 | Status: SHIPPED | OUTPATIENT
Start: 2023-06-09

## 2023-06-09 NOTE — TELEPHONE ENCOUNTER
called patient to see if Dr Cinda Matias was a PC in [facility]  This is for her HERMILA to release records

## 2023-06-13 DIAGNOSIS — G47.00 INSOMNIA, UNSPECIFIED TYPE: ICD-10-CM

## 2023-06-13 RX ORDER — CLONAZEPAM 0.5 MG/1
0.5 TABLET ORAL
Qty: 30 TABLET | Refills: 0 | Status: CANCELLED | OUTPATIENT
Start: 2023-06-13

## 2023-06-14 RX ORDER — CLONAZEPAM 0.5 MG/1
TABLET ORAL
Qty: 30 TABLET | Refills: 0 | Status: SHIPPED | OUTPATIENT
Start: 2023-06-14

## 2023-07-12 DIAGNOSIS — G47.00 INSOMNIA, UNSPECIFIED TYPE: ICD-10-CM

## 2023-07-12 RX ORDER — CLONAZEPAM 0.5 MG/1
0.5 TABLET ORAL
Qty: 30 TABLET | Refills: 0 | Status: CANCELLED | OUTPATIENT
Start: 2023-07-12

## 2023-07-13 DIAGNOSIS — G47.00 INSOMNIA, UNSPECIFIED TYPE: ICD-10-CM

## 2023-07-13 NOTE — TELEPHONE ENCOUNTER
----- Message from Candace Rodrigues sent at 7/13/2023 12:19 PM EDT -----  Regarding: Clonazepam Prescription 0.5   Contact: [phone]  Hello, please send a 30 day supply of clonazepam .5 to [facility] , [phone], [place]. Thank you.

## 2023-07-14 RX ORDER — CLONAZEPAM 0.5 MG/1
0.5 TABLET ORAL
Qty: 30 TABLET | Refills: 0 | Status: SHIPPED | OUTPATIENT
Start: 2023-07-14

## 2023-07-14 RX ORDER — CLONAZEPAM 0.5 MG/1
TABLET ORAL
Qty: 30 TABLET | Refills: 0 | OUTPATIENT
Start: 2023-07-14

## 2023-07-26 DIAGNOSIS — F33.1 MAJOR DEPRESSIVE DISORDER, RECURRENT, MODERATE (HCC): ICD-10-CM

## 2023-07-26 DIAGNOSIS — G47.00 INSOMNIA, UNSPECIFIED TYPE: ICD-10-CM

## 2023-07-26 RX ORDER — MIRTAZAPINE 7.5 MG/1
7.5 TABLET, FILM COATED ORAL
Qty: 90 TABLET | Refills: 0 | Status: SHIPPED | OUTPATIENT
Start: 2023-07-26 | End: 2023-07-29 | Stop reason: SDUPTHER

## 2023-07-29 DIAGNOSIS — F33.1 MAJOR DEPRESSIVE DISORDER, RECURRENT, MODERATE (HCC): ICD-10-CM

## 2023-07-29 DIAGNOSIS — G47.00 INSOMNIA, UNSPECIFIED TYPE: ICD-10-CM

## 2023-07-29 RX ORDER — MIRTAZAPINE 7.5 MG/1
7.5 TABLET, FILM COATED ORAL
Qty: 90 TABLET | Refills: 0 | Status: SHIPPED | OUTPATIENT
Start: 2023-07-29

## 2023-08-06 DIAGNOSIS — E03.9 HYPOTHYROIDISM, UNSPECIFIED TYPE: ICD-10-CM

## 2023-08-06 RX ORDER — LEVOTHYROXINE SODIUM 0.07 MG/1
TABLET ORAL
Qty: 30 TABLET | Refills: 2 | Status: SHIPPED | OUTPATIENT
Start: 2023-08-06

## 2023-08-10 DIAGNOSIS — E55.9 VITAMIN D DEFICIENCY: Primary | ICD-10-CM

## 2023-08-11 ENCOUNTER — APPOINTMENT (OUTPATIENT)
Dept: LAB | Facility: HOSPITAL | Age: 53
End: 2023-08-11
Attending: INTERNAL MEDICINE
Payer: COMMERCIAL

## 2023-08-11 DIAGNOSIS — E55.9 VITAMIN D DEFICIENCY: ICD-10-CM

## 2023-08-11 DIAGNOSIS — E03.9 HYPOTHYROIDISM, UNSPECIFIED TYPE: ICD-10-CM

## 2023-08-11 DIAGNOSIS — R73.01 IMPAIRED FASTING GLUCOSE: ICD-10-CM

## 2023-08-11 DIAGNOSIS — E06.3 HASHIMOTO'S DISEASE: ICD-10-CM

## 2023-08-11 DIAGNOSIS — I10 ESSENTIAL HYPERTENSION: ICD-10-CM

## 2023-08-11 DIAGNOSIS — E78.5 HYPERLIPIDEMIA, UNSPECIFIED HYPERLIPIDEMIA TYPE: ICD-10-CM

## 2023-08-11 LAB
25(OH)D3 SERPL-MCNC: 24.4 NG/ML (ref 30–100)
ALBUMIN SERPL BCP-MCNC: 4.6 G/DL (ref 3.5–5)
ALP SERPL-CCNC: 70 U/L (ref 34–104)
ALT SERPL W P-5'-P-CCNC: 21 U/L (ref 7–52)
ANION GAP SERPL CALCULATED.3IONS-SCNC: 6 MMOL/L
AST SERPL W P-5'-P-CCNC: 25 U/L (ref 13–39)
BASOPHILS # BLD AUTO: 0.01 THOUSANDS/ÂΜL (ref 0–0.1)
BASOPHILS NFR BLD AUTO: 0 % (ref 0–1)
BILIRUB SERPL-MCNC: 0.47 MG/DL (ref 0.2–1)
BUN SERPL-MCNC: 19 MG/DL (ref 5–25)
CALCIUM SERPL-MCNC: 9.3 MG/DL (ref 8.4–10.2)
CHLORIDE SERPL-SCNC: 104 MMOL/L (ref 96–108)
CHOLEST SERPL-MCNC: 251 MG/DL
CO2 SERPL-SCNC: 28 MMOL/L (ref 21–32)
CREAT SERPL-MCNC: 0.81 MG/DL (ref 0.6–1.3)
EOSINOPHIL # BLD AUTO: 0.12 THOUSAND/ÂΜL (ref 0–0.61)
EOSINOPHIL NFR BLD AUTO: 2 % (ref 0–6)
ERYTHROCYTE [DISTWIDTH] IN BLOOD BY AUTOMATED COUNT: 13.4 % (ref 11.6–15.1)
GFR SERPL CREATININE-BSD FRML MDRD: 84 ML/MIN/1.73SQ M
GLUCOSE P FAST SERPL-MCNC: 91 MG/DL (ref 65–99)
HCT VFR BLD AUTO: 37.6 % (ref 34.8–46.1)
HDLC SERPL-MCNC: 59 MG/DL
HGB BLD-MCNC: 12.3 G/DL (ref 11.5–15.4)
IMM GRANULOCYTES # BLD AUTO: 0.03 THOUSAND/UL (ref 0–0.2)
IMM GRANULOCYTES NFR BLD AUTO: 1 % (ref 0–2)
LDLC SERPL CALC-MCNC: 145 MG/DL (ref 0–100)
LYMPHOCYTES # BLD AUTO: 1.7 THOUSANDS/ÂΜL (ref 0.6–4.47)
LYMPHOCYTES NFR BLD AUTO: 31 % (ref 14–44)
MCH RBC QN AUTO: 30.4 PG (ref 26.8–34.3)
MCHC RBC AUTO-ENTMCNC: 32.7 G/DL (ref 31.4–37.4)
MCV RBC AUTO: 93 FL (ref 82–98)
MONOCYTES # BLD AUTO: 0.36 THOUSAND/ÂΜL (ref 0.17–1.22)
MONOCYTES NFR BLD AUTO: 7 % (ref 4–12)
NEUTROPHILS # BLD AUTO: 3.2 THOUSANDS/ÂΜL (ref 1.85–7.62)
NEUTS SEG NFR BLD AUTO: 59 % (ref 43–75)
NONHDLC SERPL-MCNC: 192 MG/DL
NRBC BLD AUTO-RTO: 0 /100 WBCS
PLATELET # BLD AUTO: 332 THOUSANDS/UL (ref 149–390)
PMV BLD AUTO: 10 FL (ref 8.9–12.7)
POTASSIUM SERPL-SCNC: 4.3 MMOL/L (ref 3.5–5.3)
PROT SERPL-MCNC: 7.7 G/DL (ref 6.4–8.4)
RBC # BLD AUTO: 4.05 MILLION/UL (ref 3.81–5.12)
SODIUM SERPL-SCNC: 138 MMOL/L (ref 135–147)
TRIGL SERPL-MCNC: 236 MG/DL
TSH SERPL DL<=0.05 MIU/L-ACNC: 1.36 UIU/ML (ref 0.45–4.5)
WBC # BLD AUTO: 5.42 THOUSAND/UL (ref 4.31–10.16)

## 2023-08-11 PROCEDURE — 80061 LIPID PANEL: CPT

## 2023-08-11 PROCEDURE — 84443 ASSAY THYROID STIM HORMONE: CPT

## 2023-08-11 PROCEDURE — 82306 VITAMIN D 25 HYDROXY: CPT

## 2023-08-11 PROCEDURE — 83036 HEMOGLOBIN GLYCOSYLATED A1C: CPT

## 2023-08-11 PROCEDURE — 80053 COMPREHEN METABOLIC PANEL: CPT

## 2023-08-11 PROCEDURE — 85025 COMPLETE CBC W/AUTO DIFF WBC: CPT

## 2023-08-11 PROCEDURE — 36415 COLL VENOUS BLD VENIPUNCTURE: CPT

## 2023-08-12 LAB
EST. AVERAGE GLUCOSE BLD GHB EST-MCNC: 128 MG/DL
HBA1C MFR BLD: 6.1 %

## 2023-08-15 ENCOUNTER — OFFICE VISIT (OUTPATIENT)
Age: 53
End: 2023-08-15
Payer: COMMERCIAL

## 2023-08-15 VITALS
OXYGEN SATURATION: 95 % | SYSTOLIC BLOOD PRESSURE: 124 MMHG | HEART RATE: 83 BPM | DIASTOLIC BLOOD PRESSURE: 82 MMHG | WEIGHT: 167 LBS | BODY MASS INDEX: 29.59 KG/M2 | HEIGHT: 63 IN

## 2023-08-15 DIAGNOSIS — E55.9 VITAMIN D INSUFFICIENCY: ICD-10-CM

## 2023-08-15 DIAGNOSIS — R73.01 IMPAIRED FASTING GLUCOSE: ICD-10-CM

## 2023-08-15 DIAGNOSIS — F33.1 MAJOR DEPRESSIVE DISORDER, RECURRENT, MODERATE (HCC): ICD-10-CM

## 2023-08-15 DIAGNOSIS — E78.49 OTHER HYPERLIPIDEMIA: Primary | ICD-10-CM

## 2023-08-15 PROCEDURE — 99214 OFFICE O/P EST MOD 30 MIN: CPT | Performed by: INTERNAL MEDICINE

## 2023-08-15 RX ORDER — ATORVASTATIN CALCIUM 20 MG/1
20 TABLET, FILM COATED ORAL DAILY
Qty: 90 TABLET | Refills: 3 | Status: SHIPPED | OUTPATIENT
Start: 2023-08-15 | End: 2023-09-14

## 2023-08-15 NOTE — PROGRESS NOTES
de  Assessment/Plan:       Diagnoses and all orders for this visit:    Other hyperlipidemia    Major depressive disorder, recurrent, moderate (HCC)    Impaired fasting glucose    Vitamin D insufficiency          Patient Instructions   Increased A1c since last visit. Recommend walking weekly and eating fibers rich diet. Your lipid profile increased since your last visit. Discussed in details the need to make dietary changes and compliance with atorvastatin. Your depressive symptoms are well controlled. If you experience any symptoms please report that to our clinic. Please follow up future labs within 6 months. Please follow up with your Mammogram on 9/12/2023      Subjective:      Patient ID: Ruth Dennison is a 47 y.o. female. Hyperlipidemia: LDL, Cholesterol, and HDL increased since last visit. She reports is compliant on atorvastatin 10 mg, We will increase her atorvastatin to 20 mg.     Prediabetes: A1c increased to 6.1. Discussed with her the need of eating healthy diet of fibers and walking weekly. Patient denies any depressive symptoms today and thinks her depression is controlled at this point. She had previous hospitalizations in a hospital and [facility] due to depression. Vitamin D insufficieny: Chronic low vitamin D. Patient's diet consists of both proteins and vegetables, but states she does not watch her diet closely. No exercise. The following portions of the patient's history were reviewed and updated as appropriate:   She has a past medical history of Anxiety, Asthma, Depression, Disease of thyroid gland, Dizziness, Forgetfulness, Hashimoto's disease, Hashimoto's disease, Headache(784.0) (2002), History of fainting as a child, Hyperlipidemia, Hypertension, Numbness and tingling, MIRANDA (obstructive sleep apnea), and Post traumatic stress disorder (02/28/2019). ,  does not have any pertinent problems on file. ,   has a past surgical history that includes Sinus surgery; Breast surgery (Bilateral, 2002); pr colonoscopy flx dx w/collj spec when pfrmd (N/A, 10/16/2018); Hysterectomy (2005); Reduction mammaplasty (Bilateral, 1997); Reduction mammaplasty (Bilateral, 2002); Colonoscopy; pr excision radial head (Left, 10/09/2020); Breast biopsy (Left, 2020); Reduction mammaplasty (Bilateral, 2020); and Total abdominal hysterectomy. ,  family history includes Anxiety disorder in her mother; Bipolar disorder in her cousin; Cervical cancer in her paternal grandmother; Depression in her father; Hyperlipidemia in her mother; Hypertension in her mother; Lupus in her mother; No Known Problems in her half-brother, half-brother, half-sister, half-sister, half-sister, maternal aunt, maternal aunt, maternal aunt, maternal aunt, paternal aunt, and paternal aunt; Ovarian cancer (age of onset: 77) in her paternal grandmother; Psychiatric Illness in her mother; Psychosis in her maternal uncle; Schizoaffective Disorder  in her cousin and maternal uncle; Schizophrenia in her cousin and maternal uncle; Self-Injury in her cousin and maternal uncle; Suicide Attempts in her cousin and maternal uncle; Uterine cancer in her paternal grandmother. ,   reports that she has never smoked. She has never used smokeless tobacco. She reports current alcohol use of about 2.0 standard drinks of alcohol per week. She reports that she does not use drugs. ,  is allergic to lisinopril-hydrochlorothiazide and other. .  Current Outpatient Medications   Medication Sig Dispense Refill   • albuterol (2.5 mg/3 mL) 0.083 % nebulizer solution Take 3 mL (2.5 mg total) by nebulization every 6 (six) hours as needed for wheezing or shortness of breath 180 mL 0   • albuterol (PROVENTIL HFA,VENTOLIN HFA) 90 mcg/act inhaler TAKE 2 PUFFS BY MOUTH EVERY 4 HOURS AS NEEDED FOR WHEEZE 6.7 g 3   • amLODIPine (NORVASC) 2.5 mg tablet Take 1 tablet (2.5 mg total) by mouth daily 90 tablet 0   • atorvastatin (LIPITOR) 10 mg tablet Take 1 tablet (10 mg total) by mouth daily 90 tablet 0   • clonazePAM (KlonoPIN) 0.5 mg tablet Take 1 tablet (0.5 mg total) by mouth daily at bedtime 30 tablet 0   • fluticasone (FLONASE) 50 mcg/act nasal spray 2 sprays into each nostril daily 16 g 0   • levothyroxine 75 mcg tablet TAKE 1 TABLET BY MOUTH EVERY DAY 30 tablet 2   • mirtazapine (REMERON) 7.5 MG tablet Take 1 tablet (7.5 mg total) by mouth daily at bedtime 90 tablet 0   • rizatriptan (MAXALT-MLT) 10 mg disintegrating tablet Take 1 tablet (10 mg total) by mouth once as needed for migraine for up to 1 dose May repeat in 2 hours if needed 9 tablet 0   • venlafaxine (EFFEXOR-XR) 150 mg 24 hr capsule Take 1 capsule (150 mg total) by mouth daily with 75 mg capsule (225 mg total daily) 90 capsule 0   • venlafaxine (EFFEXOR-XR) 75 mg 24 hr capsule TAKE 1 CAPSULE (75 MG TOTAL) BY MOUTH DAILY WITH 150 MG CAPSULE (225 MG TOTAL DAILY) 90 capsule 1     No current facility-administered medications for this visit. Review of Systems   Constitutional: Negative for activity change and fatigue. Respiratory: Negative for cough. Gastrointestinal: Negative for abdominal pain, constipation and diarrhea. Endocrine: Positive for cold intolerance. Genitourinary: Negative for dysuria and frequency. Musculoskeletal: Negative for joint swelling. Neurological: Negative for tremors and headaches. Objective:  Vitals:    08/15/23 0945   BP: 124/82   BP Location: Left arm   Patient Position: Sitting   Cuff Size: Standard   Pulse: 83   SpO2: 95%   Weight: 75.8 kg (167 lb)   Height: 5' 3" (1.6 m)     Body mass index is 29.58 kg/m². Physical Exam  HENT:      Mouth/Throat:      Mouth: Mucous membranes are moist.   Eyes:      Extraocular Movements: Extraocular movements intact. Cardiovascular:      Rate and Rhythm: Normal rate and regular rhythm. Pulses: Normal pulses. Heart sounds: Normal heart sounds.    Pulmonary:      Effort: Pulmonary effort is normal.      Breath sounds: Normal breath sounds. Abdominal:      Tenderness: There is no abdominal tenderness. There is no guarding. Musculoskeletal:      Right lower leg: No edema. Left lower leg: No edema. Neurological:      Mental Status: She is alert and oriented to person, place, and time.

## 2023-08-15 NOTE — PATIENT INSTRUCTIONS
Increased A1c since last visit. Recommend walking for 30 minutes a day for 5 days a week. Consider a mediterranean diet that is rich of fibers. Your lipid profile increased since your last visit. Your atorvastatin will be increased to 20 mg daily. Discussed in details the need to make dietary changes and compliance with atorvastatin. Your depressive symptoms are well controlled. If you experience any symptoms please report that to our clinic. Start taking over the counter Vitamin D 2000 mg capsules. Please follow up future labs within 6 months. Recommended following up with your Mammogram on 9/12/2023.

## 2023-08-24 DIAGNOSIS — G47.00 INSOMNIA, UNSPECIFIED TYPE: ICD-10-CM

## 2023-08-24 RX ORDER — CLONAZEPAM 0.5 MG/1
0.5 TABLET ORAL
Qty: 30 TABLET | Refills: 0 | Status: SHIPPED | OUTPATIENT
Start: 2023-08-24

## 2023-08-29 DIAGNOSIS — E03.9 HYPOTHYROIDISM, UNSPECIFIED TYPE: ICD-10-CM

## 2023-08-29 RX ORDER — LEVOTHYROXINE SODIUM 0.07 MG/1
TABLET ORAL
Qty: 90 TABLET | Refills: 1 | Status: SHIPPED | OUTPATIENT
Start: 2023-08-29

## 2023-09-02 DIAGNOSIS — F33.1 MAJOR DEPRESSIVE DISORDER, RECURRENT, MODERATE (HCC): ICD-10-CM

## 2023-09-02 DIAGNOSIS — F43.10 POST TRAUMATIC STRESS DISORDER (PTSD): ICD-10-CM

## 2023-09-03 RX ORDER — VENLAFAXINE HYDROCHLORIDE 150 MG/1
150 CAPSULE, EXTENDED RELEASE ORAL DAILY
Qty: 30 CAPSULE | Refills: 2 | Status: SHIPPED | OUTPATIENT
Start: 2023-09-03

## 2023-09-04 DIAGNOSIS — F33.1 MAJOR DEPRESSIVE DISORDER, RECURRENT, MODERATE (HCC): ICD-10-CM

## 2023-09-04 DIAGNOSIS — G47.00 INSOMNIA, UNSPECIFIED TYPE: ICD-10-CM

## 2023-09-05 RX ORDER — MIRTAZAPINE 7.5 MG/1
7.5 TABLET, FILM COATED ORAL
Qty: 90 TABLET | Refills: 0 | Status: SHIPPED | OUTPATIENT
Start: 2023-09-05

## 2023-09-12 ENCOUNTER — HOSPITAL ENCOUNTER (OUTPATIENT)
Dept: MAMMOGRAPHY | Facility: CLINIC | Age: 53
Discharge: HOME | End: 2023-09-12
Payer: COMMERCIAL

## 2023-09-12 VITALS — HEIGHT: 63 IN | WEIGHT: 167 LBS | BODY MASS INDEX: 29.59 KG/M2

## 2023-09-12 DIAGNOSIS — Z12.31 VISIT FOR SCREENING MAMMOGRAM: ICD-10-CM

## 2023-09-12 PROCEDURE — 77067 SCR MAMMO BI INCL CAD: CPT

## 2023-09-12 PROCEDURE — 77063 BREAST TOMOSYNTHESIS BI: CPT

## 2023-09-13 ENCOUNTER — TELEPHONE (OUTPATIENT)
Age: 53
End: 2023-09-13

## 2023-09-13 NOTE — TELEPHONE ENCOUNTER
----- Message from Jacquie Barreto PA-C sent at 9/13/2023  9:28 AM EDT -----  Mammogram is normal. Repeat in 1 year.

## 2023-09-22 DIAGNOSIS — E03.9 HYPOTHYROIDISM, UNSPECIFIED TYPE: ICD-10-CM

## 2023-09-22 DIAGNOSIS — J45.909 UNCOMPLICATED ASTHMA, UNSPECIFIED ASTHMA SEVERITY, UNSPECIFIED WHETHER PERSISTENT: ICD-10-CM

## 2023-09-22 DIAGNOSIS — G47.00 INSOMNIA, UNSPECIFIED TYPE: ICD-10-CM

## 2023-09-22 RX ORDER — LEVOTHYROXINE SODIUM 0.07 MG/1
75 TABLET ORAL DAILY
Qty: 90 TABLET | Refills: 0 | Status: SHIPPED | OUTPATIENT
Start: 2023-09-22

## 2023-09-22 RX ORDER — ALBUTEROL SULFATE 90 UG/1
2 AEROSOL, METERED RESPIRATORY (INHALATION) 4 TIMES DAILY
Qty: 6.7 G | Refills: 6 | Status: SHIPPED | OUTPATIENT
Start: 2023-09-22

## 2023-09-22 RX ORDER — CLONAZEPAM 0.5 MG/1
0.5 TABLET ORAL
Qty: 30 TABLET | Refills: 0 | Status: SHIPPED | OUTPATIENT
Start: 2023-09-22

## 2023-10-22 DIAGNOSIS — G47.00 INSOMNIA, UNSPECIFIED TYPE: ICD-10-CM

## 2023-10-23 ENCOUNTER — TELEPHONE (OUTPATIENT)
Dept: OBGYN CLINIC | Facility: CLINIC | Age: 53
End: 2023-10-23

## 2023-10-23 DIAGNOSIS — G47.00 INSOMNIA, UNSPECIFIED TYPE: ICD-10-CM

## 2023-10-23 RX ORDER — CLONAZEPAM 0.5 MG/1
0.5 TABLET ORAL
Qty: 30 TABLET | Refills: 0 | Status: SHIPPED | OUTPATIENT
Start: 2023-10-23

## 2023-10-23 RX ORDER — CLONAZEPAM 0.5 MG/1
0.5 TABLET ORAL
Qty: 30 TABLET | Refills: 0 | OUTPATIENT
Start: 2023-10-23

## 2023-10-23 NOTE — TELEPHONE ENCOUNTER
----- Message from Usha Fragoso. Shayla Holly sent at 10/23/2023  3:02 PM EDT -----  Regarding: Yeast infection   Contact: [phone]  Good afternoon Dr Za Guillory I have a yeast infection. Is it possible you can [facility] 2 [facility] [place]     Have a great day

## 2023-10-23 NOTE — TELEPHONE ENCOUNTER
----- Message from Garfield Elias sent at 10/23/2023  9:05 AM EDT -----  Regarding: Prescription denied   Contact: [phone]  The portal says clonazePAM 0.5 mg tablet is not renewable?

## 2023-10-24 ENCOUNTER — TELEPHONE (OUTPATIENT)
Dept: OBGYN CLINIC | Facility: CLINIC | Age: 53
End: 2023-10-24

## 2023-10-24 NOTE — TELEPHONE ENCOUNTER
----- Message from Breanna Eaton sent at 10/23/2023  7:45 PM EDT -----  Regarding: yeast  Contact: [phone]  Only white discharge nothing else.   No discomfort nor itching

## 2023-10-24 NOTE — TELEPHONE ENCOUNTER
----- Message from Etsher Pineda sent at 10/23/2023  7:45 PM EDT -----  Regarding: yeast  Contact: [phone]  Only white discharge nothing else.   No discomfort nor itching

## 2023-11-05 NOTE — TELEPHONE ENCOUNTER
----- Message from Gunnar Young sent at 7/7/2020  4:07 PM EDT -----  Her allergy panel showed she was allergic to cat,dog and cedar tree's [facility]    Hospitalist Progress Note    Interval History   - No significant change in mental status today, mild cough. Oriented to self only still.  - Narrow antibiotics based on sputum culture results  - Nurse will try to place keofeed instead of NG tube, may have more success    Assessment & Plan   Summary: Mya Hui is a 82 year old female with PMH Scoliosis, Lumbar radiculopathy, HTN, HLP, DM2, Hypothyroidism, CKD stage 3, Morbid obesity, GERD, Sliding hiatal hernia, Overactive bladder, OA, Chronic right shoulder pain/rotator cuff arthropathy, Migraines, Asymptomatic carotid artery stenosis who was admitted on 10/25/2023 with an elective T11 to pelvis robotic decompression and fusion.  Surgery was performed on 10/25/23 under general anesthesia.  Surgery was complicated with development of arrhythmia with significant hypotension and noted 3.5 L blood loss per Op Note and Anesthesia postprocedure evaluation note.  Patient received IV fluids, 6 units of pRBCs and 2 units of FFP intra-op.  ST changes were noted intraoperatively.  Patient remained intubated and started on Levophed and was transferred to the ICU due to hemorrhagic shock.  She required vasopressor support with Levophed until 10/27.   Patient remained intubated until the morning of 11/2 and has been on supplemental O2 per Oxymask currently at 4 L/m.   Patient received additional 2 units FFP, 10 units of cryo and 1 unit of PLT.     Hospitalist service consulted on 11/3 for transfer of care.    Post-op hemorrhagic shock, resolved  Shocked liver, improving  Surgery was complicated with development of arrhythmia with significant hypotension and noted 3.5 L blood loss per Op Note and Anesthesia postprocedure evaluation note.  Patient received IV fluids, 6 units of pRBCs and 2 units of FFP intra-op. Patient received additional 2 units FFP, 10 units of cryo and 1 unit of PLT while in the ICU. LFTs continue to improve 11/5  -  Monitor    Encephalopathy, multifactorial  Incidental finding of left basal ganglia restricted diffusion  Patient was noted to be encephalopathic for which Vascular Neurology was consulted.  Brain MRI was completed and noted a left basal ganglia small focus of restricted diffusion but not a cause for encephalopathy and felt it was likely multifactorial.  EEG monitor was completed and negative and subsequently stopped.  Suspect due to uremic encephalopathy and sepsis.  Delirium prevention:   - Reorient patient at each interaction.  - Keep room lights on and blinds open during day (8am-8pm), low lights 8pm-8am.  - Minimize interruptions of sleep at night (consolidate vitals, nursing assessments, medications).  - Avoid sedating medications as able.    - Vascular Neurology recommended repeat brain MRI in 2-3 months.      LISA/ATN requiring hemodialysis  Anion gap metabolic acidosis due to above  CKD stage 3a  Hypoalbuminemia  Anasarca  Baseline creatinine between 1.1-1.4 with GFR in the 40-50's.    Patient had been developed fluid overload and was treated with lasix drip with good output but developed anephric rising creatinine, BUN and developed worsening acidosis.  Due to development of LSIA-D Nephrology was consulted and tunnel dialysis catheter placed 10/30 and has been undergoing HD Mon, Wed, Fri (last session was today, 11/3/2023).     Troponin were trended and minimally elevated.  ECHO completed and without any abnormalities (LV normal size and function with EF of 65-70%.  RV normal size, function and structure.  No valve disease).  - Nephrology consultation appreciated/continued  - Continue dialysis  - Avoid nephrotic medications as able.    - Hold PTA lisinopril 40 mg/d    Aspiration/ventilator associated PNA (Enterobacter, stenotrophomonas and E. Coli)  Acute hypoxic respiratory failure (Improving)  Leukocytosis due to above  Extubated 11/2. Patient has also developed aspiration/ventilator associated pneumonia  with sputum Cx growing Enterobacter, stenotrophomonas and E. coli and was initially started on Zoysn and switched to Meropenem on 11/1/2023.  Currently continued on Meropenem and awaiting final cultures. Procal 3. Sputum cultures growing Enterobacter, E coli, Stenotrophomonas.  - Continue Levofloxacin  - Follow sputum cultures  - Duonebs scheduled  - Wean o2 as able  - RCAT consult requested  - Encourage use of IS/Flutter valve.    - Continue IMC status one more day    Dysphagia  Moderate malnutrition  Failed SLP assessment. Bedside feeding tube placement coiling due to hiatal hernia  - NG tube placement per nurse/radiology over the weekend pending  - Continue essential meds as IV    Troponin elevation  Demand ischemia due to hemorrhagic shock.  ECHO completed and without abnormalities.      Scoliosis  Lumbar radiculopathy  S/p T11 to pelvis robotic decompression and fusion  POD# 9.   - Neurosurgery is managing.   - DVT ppx  - Pain control  - Chest PT  - Neuro checks q4h   - PT/OT    HTN  *Currently PTA medications have not been resumed (diltiazem  mg BID and lisinopril 40 mg/).  - Diltiazem 60 mg every 6 hours per FT and hydralazine 10 mg every 8 hours per FT ordered and will be started tomorrow 11/4.    - Monitor closely.      HLP: Hold PTA pravastatin 40 mg/d.      Type 2 DM, controlled  Stress induced hyperglycemia  - High dose insulin sliding scale ordered.  - Glucose checks every 4 hours.  - Hypoglycemic protocol in place.      Hypothyroidism: PTA synthroid pending feeding tube placement    GERD  Sliding hiatal hernia  - PTA PPI pending feeding tube placement    OA  Chronic right shoulder pain/rotator cuff arthropathy  Chronic Pain  - Pain/analgesic management per Neurosurgery.      Obesity   Suspected ANAYELI  Body mass index is 37.64 kg/m .  Increase in all-cause morbidity and mortality.   - Follow up with PCP regarding ongoing management.    - Monitor O2 saturations.    - Recommend outpatient sleep study.  "     Overactive bladder  - Hold PTA Ditropan XL 10 mg/d.      Migraines  - Pain/analgesic management per Neurosurgery.      Asymptomatic carotid artery stenosis  *Noted on chart review.  No interventions at this time.      Clinically Significant Risk Factors             # Anion Gap Metabolic Acidosis: Highest Anion Gap = 21 mmol/L in last 2 days, will monitor and treat as appropriate  # Hypoalbuminemia: Lowest albumin = 2.5 g/dL at 11/1/2023  4:19 AM, will monitor as appropriate     # Hypertension: Noted on problem list        # Obesity: Estimated body mass index is 37.02 kg/m  as calculated from the following:    Height as of this encounter: 1.651 m (5' 5\").    Weight as of this encounter: 100.9 kg (222 lb 7.1 oz).   # Moderate Malnutrition: based on nutrition assessment      # Financial/Environmental Concerns: none          PT/OT: yes  Diet: Adult Formula Drip Feeding: Continuous Novasource Renal; Nasogastric tube; Goal Rate: 40; mL/hr; Once new FT placed and OK to use, resume TF at 20 mL/hr x 12 hours and then advance to goal.  Hold TF 1 hour before and 1 hour after Synthroid adminis...    DVT Prophylaxis: Heparin SQ  Spicer Catheter: Not present  Lines: PRESENT      CVC Double Lumen Right Internal jugular Non - valved (open ended);Tunneled-Site Assessment: WDL      Cardiac Monitoring: ACTIVE order. Indication: hypoxia, pneumonia  Code Status: Full Code    Disposition: Anticipated discharge 1 week, needs tube feeds placed, then diet advanced or tube feeds removed    Satish Anguiano MD  Hospitalist Service  [facility]  Securely message with [employer] (more info)  Text page via [email or web address] Paging/Directory     Data reviewed today: I reviewed all new labs and imaging results over the last 24 hours.    Physical Exam   Temp: 97.9  F (36.6  C) Temp src: Axillary BP: (!) 157/78 Pulse: 81   Resp: 22 SpO2: 95 % O2 Device: Nasal cannula Oxygen Delivery: 2 LPM  Vitals:    11/02/23 0525 11/03/23 0600 " 11/04/23 0000   Weight: 101.2 kg (223 lb 1.7 oz) 102.6 kg (226 lb 3.1 oz) 100.9 kg (222 lb 7.1 oz)     Vital Signs with Ranges  Temp:  [97.4  F (36.3  C)-98.2  F (36.8  C)] 97.9  F (36.6  C)  Pulse:  [72-94] 81  Resp:  [10-26] 22  BP: (135-167)/(73-85) 157/78  SpO2:  [90 %-98 %] 95 %  I/O last 3 completed shifts:  In: 198 [P.O.:55; I.V.:143]  Out: -   O2 requirements: yes    Constitutional: Elderly female appears notably ill, coughing  HEENT: Eyes nonicteric, oral mucosa moist  Cardiovascular: RRR, normal S1/2, no m/r/g  Respiratory: Very coarse with some mild wheezing  Vascular: Trace BLE pitting edema  GI: Normoactive bowel sounds, appears nontender  Skin/Integumen: No rashes  Neuro/Psych: Confused and oriented to self only, moves all extremities    Medications    sodium chloride Stopped (11/04/23 0700)      - MEDICATION INSTRUCTIONS for Dialysis Patients -   Does not apply See Admin Instructions    artificial saliva  2 spray Swish & Spit 4x Daily    B and C vitamin Complex with folic acid  5 mL Per Feeding Tube Daily    diltiazem  60 mg Oral or Feeding Tube Q6H NATACHA    heparin ANTICOAGULANT  5,000 Units Subcutaneous Q8H    hydrALAZINE  10 mg Oral or Feeding Tube Q8H    insulin aspart  1-12 Units Subcutaneous Q4H    ipratropium - albuterol 0.5 mg/2.5 mg/3 mL  3 mL Nebulization Q4H    levofloxacin  500 mg Intravenous Q24H    levothyroxine  100 mcg Oral or NG Tube QAM AC    nystatin   Topical BID    pantoprazole  40 mg Per Feeding Tube QAM AC    Or    pantoprazole  40 mg Intravenous QAM AC    polyethylene glycol  17 g Oral or NG Tube Daily    protein modular  1 packet Per Feeding Tube Daily    senna-docusate  1 tablet Oral or NG Tube BID    sodium chloride (PF)  10 mL Intracatheter Q8H    sodium chloride (PF)  3 mL Intracatheter Q8H    sodium chloride (PF)  3 mL Intracatheter Q8H    traZODone  25 mg Oral At Bedtime       Data   Recent Labs   Lab 11/05/23  1153 11/05/23  1136 11/05/23  0759 11/05/23  0514  11/04/23  0804 11/04/23  0409 11/03/23  1712 11/03/23  1645 11/03/23  0502 11/03/23  0458 11/02/23  0800 11/02/23  0521   WBC  --   --   --   --   --  16.2*  --   --   --  18.5*  --  16.7*   HGB  --   --   --   --   --  8.8*  --   --   --  8.1*  --  8.1*   MCV  --   --   --   --   --  90  --   --   --  90  --  87   PLT  --   --   --   --   --  187  --   --   --  183  --  156   NA  --   --   --  140  --  140  --   --   --  140  --  139   POTASSIUM  --   --   --  4.4  --  4.4  --  4.0  --  4.1  --  4.1   CHLORIDE  --   --   --  97*  --  97*  --   --   --  96*  --  98   CO2  --   --   --  22  --  22  --   --   --  22  --  22   BUN  --   --   --  74.3*  --  55.4*  --   --   --  90.1*  --  75.6*   CR  --   --   --  6.15*  --  4.50*  --  2.66*  --  6.37*  --  4.88*   ANIONGAP  --   --   --  21*  --  21*  --   --   --  22*  --  19*   TERESA  --   --   --  8.3*  --  8.1*  --   --   --  7.9*  --  7.8*   * 85 84 105*   < > 88   < >  --    < > 95   < > 108*  115*   ALBUMIN  --   --   --  3.0*  --  2.9*  --   --   --  2.7*  --  2.9*   PROTTOTAL  --   --   --  5.6*  --  5.7*  --   --   --  5.1*  --  5.0*   BILITOTAL  --   --   --  0.5  --  0.6  --   --   --  0.5  --  0.6   ALKPHOS  --   --   --  131*  --  148*  --   --   --  141*  --  121*   ALT  --   --   --  28  --  36  --   --   --  38  --  36   AST  --   --   --  72*  --  113*  --   --   --  140*  --  101*    < > = values in this interval not displayed.       Imaging:   No results found for this or any previous visit (from the past 24 hour(s)).

## 2023-11-07 NOTE — PATIENT INSTRUCTIONS
Breast Self Exam for Women   AMBULATORY CARE:   A breast self-exam (BSE)  is a way to check your breasts for lumps and other changes. Regular BSEs can help you know how your breasts normally look and feel. Most breast lumps or changes are not cancer, but you should always have them checked by a healthcare provider. Why you should do a BSE:  Breast cancer is the most common type of cancer in women. Even if you have mammograms, you may still want to do a BSE regularly. If you know how your breasts normally feel and look, it may help you know when to contact your healthcare provider. Mammograms can miss some cancers. You may find a lump during a BSE that did not show up on a mammogram.  When you should do a BSE:  If you have periods, you may want to do your BSE 1 week after your period ends. This is the time when your breasts may be the least swollen, lumpy, or tender. You can do regular BSEs even if you are breastfeeding or have breast implants. Call your doctor if:   You find any lumps or changes in your breasts. You have breast pain or fluid coming from your nipples. You have questions or concerns about your condition or care. How to do a BSE:       Look at your breasts in a mirror. Look at the size and shape of each breast and nipple. Check for swelling, lumps, dimpling, scaly skin, or other skin changes. Look for nipple changes, such as a nipple that is painful or beginning to pull inward. Gently squeeze both nipples and check to see if fluid (that is not breast milk) comes out of them. If you find any of these or other breast changes, contact your healthcare provider. Check your breasts while you sit or  the following 3 positions:    [place] your arms down at your sides. Raise your hands and join them behind your head. Put firm pressure with your hands on your hips. Bend slightly forward while you look at your breasts in the mirror. Lie down and feel your breasts.   When you lie down, your breast tissue spreads out evenly over your chest. This makes it easier for you to feel for lumps and anything that may not be normal for your breasts. Do a BSE on one breast at a time. Place a small pillow or towel under your left shoulder. Put your left arm behind your head. Use the 3 middle fingers of your right hand. Use your fingertip pads, on the top of your fingers. Your fingertip pad is the most sensitive part of your finger. Use small circles to feel your breast tissue. Use your fingertip pads to make dime-sized, overlapping circles on your breast and armpits. Use light, medium, and firm pressure. First, press lightly. Second, press with medium pressure to feel a little deeper into the breast. Last, use firm pressure to feel deep within your breast.    Examine your entire breast area. Examine the breast area from above the breast to below the breast where you feel only ribs. Make small circles with your fingertips, starting in the middle of your armpit. Make circles going up and down the breast area. Continue toward your breast and all the way across it. Examine the area from your armpit all the way over to the middle of your chest (breastbone). Stop at the middle of your chest.    Move the pillow or towel to your right shoulder, and put your right arm behind your head. Use the 3 fingertip pads of your left hand, and repeat the above steps to do a BSE on your right breast.  What else you can do to check for breast problems or cancer:  Talk to your healthcare provider about mammograms. A mammogram is an x-ray of your breasts to screen for breast cancer or other problems. Your provider can tell you the benefits and risks of mammograms. The first mammogram is usually at age 39 or 48. Your provider may recommend you start at 36 or younger if your risk for breast cancer is high. Mammograms usually continue every 1 to 2 years until age 76.        Follow up with your doctor as directed:  Write down your questions so you remember to ask them during your visits. © Copyright Gunnar Abts 2023 Information is for End User's use only and may not be sold, redistributed or otherwise used for commercial purposes. The above information is an  only. It is not intended as medical advice for individual conditions or treatments. Talk to your doctor, nurse or pharmacist before following any medical regimen to see if it is safe and effective for you. Wellness Visit for Adults   AMBULATORY CARE:   A wellness visit  is when you see your healthcare provider to get screened for health problems. Your healthcare provider will also give you advice on how to stay healthy. Write down your questions so you remember to ask them. Ask your healthcare provider how often you should have a wellness visit. What happens at a wellness visit:  Your healthcare provider will ask about your health, and your family history of health problems. This includes high blood pressure, heart disease, and cancer. He or she will ask if you have symptoms that concern you, if you smoke, and about your mood. You may also be asked about your intake of medicines, supplements, food, and alcohol. Any of the following may be done: Your weight  will be checked. Your height may also be checked so your body mass index (BMI) can be calculated. Your BMI shows if you are at a healthy weight. Your blood pressure  and heart rate will be checked. Your temperature may also be checked. Blood and urine tests  may be done. Blood tests may be done to check your cholesterol levels. Abnormal cholesterol levels increase your risk for heart disease and stroke. You may also need a blood or urine test to check for diabetes if you are at increased risk. Urine tests may be done to look for signs of an infection or kidney disease. A physical exam  includes checking your heartbeat and lungs with a stethoscope.  Your healthcare provider may also check your skin to look for sun damage. Screening tests  may be recommended. A screening test is done to check for diseases that may not cause symptoms. The screening tests you may need depend on your age, gender, family history, and lifestyle habits. For example, colorectal screening may be recommended if you are 48years old or older. Screening tests you need if you are a woman:   A Pap smear  is used to screen for cervical cancer. Pap smears are usually done every 3 to 5 years depending on your age. You may need them more often if you have had abnormal Pap smear test results in the past. Ask your healthcare provider how often you should have a Pap smear. A mammogram  is an x-ray of your breasts to screen for breast cancer. Experts recommend mammograms every 2 years starting at age 48 years. You may need a mammogram at age 52 years or younger if you have an increased risk for breast cancer. Talk to your healthcare provider about when you should start having mammograms and how often you need them. Vaccines you may need:   Get an influenza vaccine  every year. The influenza vaccine protects you from the flu. Several types of viruses cause the flu. The viruses change over time, so new vaccines are made each year. Get a tetanus-diphtheria (Td) booster vaccine  every 10 years. This vaccine protects you against tetanus and diphtheria. Tetanus is a severe infection that may cause painful muscle spasms and lockjaw. Diphtheria is a severe bacterial infection that causes a thick covering in the back of your mouth and throat. Get a human papillomavirus (HPV) vaccine  if you are female and aged 23 to 32 or male 23 to 24 and never received it. This vaccine protects you from HPV infection. HPV is the most common infection spread by sexual contact. HPV may also cause vaginal, penile, and anal cancers. Get a pneumococcal vaccine  if you are aged 72 years or older.  The pneumococcal vaccine is an injection given to protect you from pneumococcal disease. Pneumococcal disease is an infection caused by pneumococcal bacteria. The infection may cause pneumonia, meningitis, or an ear infection. Get a shingles vaccine  if you are 60 or older, even if you have had shingles before. The shingles vaccine is an injection to protect you from the varicella-zoster virus. This is the same virus that causes chickenpox. Shingles is a painful rash that develops in people who had chickenpox or have been exposed to the virus. How to eat healthy:  My Plate is a model for planning healthy meals. It shows the types and amounts of foods that should go on your plate. Fruits and vegetables make up about half of your plate, and grains and protein make up the other half. A serving of dairy is included on the side of your plate. The amount of calories and serving sizes you need depends on your age, gender, weight, and height. Examples of healthy foods are listed below:  Eat a variety of vegetables  such as dark green, red, and orange vegetables. You can also include canned vegetables low in sodium (salt) and frozen vegetables without added butter or sauces. Eat a variety of fresh fruits , canned fruit in 100% juice, frozen fruit, and dried fruit. Include whole grains. At least half of the grains you eat should be whole grains. Examples include whole-wheat bread, wheat pasta, brown rice, and whole-grain cereals such as oatmeal.    Eat a variety of protein foods such as seafood (fish and shellfish), lean meat, and poultry without skin (turkey and chicken). Examples of lean meats include pork leg, shoulder, or tenderloin, and beef round, sirloin, tenderloin, and extra lean ground beef. Other protein foods include eggs and egg substitutes, beans, peas, soy products, nuts, and seeds. Choose low-fat dairy products such as skim or 1% milk or low-fat yogurt, cheese, and cottage cheese. Limit unhealthy fats  such as butter, hard margarine, and shortening. Exercise:  Exercise at least 30 minutes per day on most days of the week. Some examples of exercise include walking, biking, dancing, and swimming. You can also fit in more physical activity by taking the stairs instead of the elevator or parking farther away from stores. Include muscle strengthening activities 2 days each week. Regular exercise provides many health benefits. It helps you manage your weight, and decreases your risk for type 2 diabetes, heart disease, stroke, and high blood pressure. Exercise can also help improve your mood. Ask your healthcare provider about the best exercise plan for you. General health and safety guidelines:   Do not smoke. Nicotine and other chemicals in cigarettes and cigars can cause lung damage. Ask your healthcare provider for information if you currently smoke and need help to quit. E-cigarettes or smokeless tobacco still contain nicotine. Talk to your healthcare provider before you use these products. Limit alcohol. A drink of alcohol is 12 ounces of beer, 5 ounces of wine, or 1½ ounces of liquor. Lose weight, if needed. Being overweight increases your risk of certain health conditions. These include heart disease, high blood pressure, type 2 diabetes, and certain types of cancer. Protect your skin. Do not sunbathe or use tanning beds. Use sunscreen with a SPF 15 or higher. Apply sunscreen at least 15 minutes before you go outside. Reapply sunscreen every 2 hours. Wear protective clothing, hats, and sunglasses when you are outside. Drive safely. Always wear your seatbelt. Make sure everyone in your car wears a seatbelt. A seatbelt can save your life if you are in an accident. Do not use your cell phone when you are driving. This could distract you and cause an accident. Pull over if you need to make a call or send a text message. Practice safe sex. Use latex condoms if are sexually active and have more than one partner.  Your healthcare provider may recommend screening tests for sexually transmitted infections (STIs). Wear helmets, lifejackets, and protective gear. Always wear a helmet when you ride a bike or motorcycle, go skiing, or play sports that could cause a head injury. Wear protective equipment when you play sports. Wear a lifejacket when you are on a boat or doing water sports. © Copyright Demetrio Mccabe 2023 Information is for End User's use only and may not be sold, redistributed or otherwise used for commercial purposes. The above information is an  only. It is not intended as medical advice for individual conditions or treatments. Talk to your doctor, nurse or pharmacist before following any medical regimen to see if it is safe and effective for you. Kegel Exercises for Women   AMBULATORY CARE:   Kegel exercises  help strengthen your pelvic muscles. Pelvic muscles hold your pelvic organs, such as your bladder and uterus, in place. Kegel exercises help prevent or control certain conditions, such as urine incontinence (leakage) or uterine prolapse. Call your doctor or physical therapist if:   You cannot feel your muscles tighten or relax. You continue to leak urine. You have questions or concerns about your condition or care. Use the correct muscles:  Pelvic muscles are the muscles you use to control urine flow. To target these muscles, stop and start the flow of urine several times. This will help you become familiar with how it feels to tighten and relax these muscles. How to do Kegel exercises:   Get into a comfortable position. You may lie down, stand up, or sit down to do these exercises. When you first try to do these exercises, it may be easier if you lie down. Tighten or squeeze your pelvic muscles slowly. It may feel like you are trying to hold back urine or gas. Hold this position for 3 seconds. Relax for 3 seconds. Repeat this cycle 10 times.  Do not hold your breath when you do Kegel exercises. Keep your stomach, back, and leg muscles relaxed. Do 10 sets of Kegel exercises, at least 3 times a day. When you know how to do Kegel exercises, use different positions. This will help to strengthen your pelvic muscles as much as possible. You can do these exercises while you lie on the floor, watch TV, or while you stand. Tighten your pelvic muscles before you sneeze, cough, or lift to prevent urine leakage. You may notice improved bladder control within about 6 weeks. Follow up with your doctor or physical therapist as directed:  Write down your questions so you remember to ask them during your visits. © Copyright [employer] 2023 Information is for End User's use only and may not be sold, redistributed or otherwise used for commercial purposes. The above information is an  only. It is not intended as medical advice for individual conditions or treatments. Talk to your doctor, nurse or pharmacist before following any medical regimen to see if it is safe and effective for you. Perineal Hygiene      Your vaginal naturally takes care of its self, it is a self washing system, the less you mess the healthier it will be     No soaps or feminine wash to the vulva, these products can cause dermitis, bacterial infections and other vulvar problems. Use only water to cleanse, or water with Dove or [employer] if necessary. No scented lotions or products are advised in or near your vulva. Use only coconut oil for moisture if needed. No douching this may cause imbalance in your vaginal PH and further issues. If you wear panty liners, you may apply a thin coating of Vaseline, A&D ointment or coconut oil to the vulvar tissues as a skin barrier     [place] underware, loose fitting clothing  Only perfume-free, dye-free laundry detergent, use a second rinse cycle   Avoid fabric softeners/dryer sheets. Partner should avoid the same products as well.        Over the counter probiotic to restore vaginal renea may be helpful as well, take daily. You may also look into Boric Acid vaginal suppositories to restore vaginal PH balance for up to 2 weeks as directed on the box. You may not use these if you are pregnant      For vaginal dryness: You may use:     Coconut oil (organic, pure, unscented) as needed for moisture or lubrication. ( Do not use if allergic)       Replens moisture restore external comfort gel daily ( use as directed on the box)        Replens long lasting vaginal moisturizer  ( use as directed on the box)         For Vaginal Lubrication:          You may use:     Coconut oil (organic, pure, unscented) as a lubricant or another scent-free lubricant (Astroglide, Uberlube) if needed. Do not use coconut oil or silicone if using a condom as this may break down the integrity of the condom and cause an unplanned pregnancy              Do not use coconut oil if allergic               Replens silky smooth lubricant, premium silicone based lubricant for intercourse. ( use as directed, a small amount will provide an enhanced natural feeling)     Any premium over the counter vaginal lubricant water or silicone based. Silicone based will have more staying power. Menopause   WHAT YOU NEED TO KNOW:   What is menopause? Menopause is a normal stage in a person's life when monthly periods stop. You are considered to be in menopause when you have not had a period for a full year after the age of 36. Menopause usually occurs between ages 52 to 48. Perimenopause is a stage before menopause that may cause signs and symptoms similar to menopause. Perimenopause may start about 4 years before menopause. What causes menopause? Menopause starts when the ovaries stop making the female hormones estrogen and progesterone. After menopause, you are no longer able to become pregnant.  Any of the following may trigger menopause or early menopause:  Surgery, including a hysterectomy or oophorectomy    Family history of early menopause    Smoking    Chemotherapy or pelvic radiation    Chromosome abnormalities, including Wilson syndrome and Fragile X syndrome    Premature ovarian insufficiency (the ovaries stop producing eggs before age 36)    What are the signs and symptoms of menopause? Irregular menstrual cycles with heavy vaginal bleeding followed by decreased bleeding until it stops    Hot flashes (feeling warm, flushed, and sweaty)    Vaginal changes such as increased dryness    Mood changes such as anxiety, depression, or decreased desire to have sex    Trouble sleeping, joint pain, headaches    Brittle nails, hair on chin or chest where it is normally absent    Decrease in breast size and change in skin texture    Weight gain    How is menopause treated or managed? Hormone replacement therapy (HRT) is medicine that replaces your low hormone levels. HRT contains estrogen and sometimes progestin. HRT has several benefits. HRT helps prevent osteoporosis, which decreases your risk for bone fractures. HRT also protects you from colorectal cancer. HRT also has some risks. HRT increases your risk for breast cancer, blood clots, heart disease, a heart attack, or a stroke. If you are 72 years or older, HRT can also increase your risk for dementia. Your risk for uterine or endometrial cancer, gallbladder disease, and urinary incontinence is higher if you take estrogen-only HRT. Manage hot flashes. Hot flashes are brief periods of feeling very warm, flushed, and sweaty. Hot flashes can last from a few seconds to several minutes. They may happen many times during the day, and are common at night. Layer your clothing so that you can easily remove some clothing and cool yourself during a hot flash. Cold drinks may also be helpful. Non-hormone medicines can help relieve or prevent hot flashes.  Examples include certain antidepressants, nerve medicines, and high blood pressure medicines. Reduce vaginal dryness by using over-the-counter vaginal creams. Vaginal dryness may cause you to have pain or discomfort during sex. Only use creams that are made for vaginal use. Do  not  use petroleum jelly. You may put an estrogen cream in and around your vagina. Estrogen cream may help decrease vaginal dryness and lower your risk of vaginal infections. Continue to use birth control during perimenopause if you do not want to get pregnant. You may need to use birth control until it has been 1 year since your periods stopped. Ask your healthcare provider when you can stop using birth control to prevent pregnancy. How can I live a healthy lifestyle during and after menopause? After menopause, your risk for heart disease and bone loss increases. Ask about these and other ways to stay healthy:  Exercise regularly. Exercise helps you maintain a healthy weight. Exercise can also help to control your blood pressure and cholesterol levels. Include weight-bearing exercise for strong bones. Weight bearing exercise is recommended for at least 30 minutes, 3 times a week. Ask your healthcare provider about the best exercise plan for you. Eat a variety of healthy foods. Include fruits, vegetables, whole grains (whole-wheat bread, pasta, and cereals), low-fat dairy, and lean protein foods (beans, poultry, and fish). Limit foods high in sodium (salt). Ask your healthcare provider for more information about a meal plan that is right for you. Do not smoke. If you smoke, it is never too late to quit. You are more likely to have a heart attack, lung disease, blood clots, and cancer if you smoke. Ask your healthcare provider for information if you need help quitting. Take supplements as directed. You may need extra calcium and vitamin D to help prevent osteoporosis. Limit alcohol and caffeine. Alcohol and caffeine may worsen your symptoms.     When should I call my doctor? You have vaginal bleeding after menopause. You have questions or concerns about your condition or care. CARE AGREEMENT:   You have the right to help plan your care. Learn about your health condition and how it may be treated. Discuss treatment options with your healthcare providers to decide what care you want to receive. You always have the right to refuse treatment. The above information is an  only. It is not intended as medical advice for individual conditions or treatments. Talk to your doctor, nurse or pharmacist before following any medical regimen to see if it is safe and effective for you. © Copyright Jonathon Sin 2023 Information is for End User's use only and may not be sold, redistributed or otherwise used for commercial purposes.

## 2023-11-07 NOTE — PROGRESS NOTES
Diagnoses and all orders for this visit:    Encounter for gynecological examination without abnormal finding    Encounter for screening mammogram for malignant neoplasm of breast  -     Mammo screening bilateral w 3d & cad; Future    Hx of ovarian cyst  -     US pelvis complete w transvaginal; Future        Advised to call with any Post Menopausal bleeding. Calcium/ Vit D dietary requirements discussed,   Weight bearing exercises minium of 150 mins/weekly advised. Kegel exercises advised daily, see AVS for instructions and recommendations  Reviewed perineal hygiene and vaginal dryness post menopause  SBE and yearly mammography advised. ASCCP guidelines reviewed. Will discontinue PAP's according to recommendations. Condoms encouraged with all sexual activity to prevent STI's. Health maintenance encouraged with PMD, remain current with Colonoscopy, Dexa scan, and recommended vaccines. Advised to call with any issues, all concerns & questions addressed. See after visit summary for further information    F/U annually or Biannual if Medicare       Health Maintenance:    Last PAP: 10/02/2015 Negative   Next PAP Due: no longer needs s/p hysterectomy     Last Mammogram:2023  Life time Raudel Poole % 2.91, Density A almost entirely fatty, Bi-Rads 2 Benign  Next Mammogram: Order given    Last Colonoscopy:10/16/2018 RTO in 10 years     Last DEXA: Not on file    Subjective    CC: Yearly Exam     Pleasant 47 y.o. , female   postmenopausal here for annual exam.   GYN hx includes: S/P Hysterectomy in  , for fibroids, bilateral breast reduction, ovarian cysts   She requests an US for surveillance of known ovarian cysts   No personal hx of breast, cervical, ovarian or colon CA  Family Hx: PGM- Cervical, Ovarian ,Uterine cancer   She reports no new changes in her health. Medically stable     Denies history of abnormal pap smears.   Denies abnormal Mammograms   Denies postmenopausal bleeding   Denies issues with vasomotor symptoms  Denies vaginal issues. Denies dyspareunia   Denies pelvic pain   Denies symptoms of pelvic organ prolapse, urinary, or fecal incontinence. Mentions that she will feel like she has to go to the bathroom and will only be able to urinate a small amount, has appt with urology on 11/28   Is sexually active occasionally  Monogamous relationship. Same sex marriage   Denies STI concerns. declines STD testing   Denies intimate partner violence    Past Medical History:   Diagnosis Date    Anxiety     Asthma     Depression     Disease of thyroid gland     Dizziness     Forgetfulness     Hashimoto's disease     Hashimoto's disease     Headache(784.0) 2002    History of fainting as a child     Hyperlipidemia     Hypertension     Numbness and tingling     MIRANDA (obstructive sleep apnea)     Post traumatic stress disorder 02/28/2019    Varicella      Past Surgical History:   Procedure Laterality Date    BREAST BIOPSY Left 2020    BREAST SURGERY Bilateral 2002    reduction    COLONOSCOPY      HYSTERECTOMY  2005    DE COLONOSCOPY FLX DX W/COLLJ SPEC WHEN PFRMD N/A 10/16/2018    Procedure: EGD AND COLONOSCOPY;  Surgeon: Sheng Callahan MD;  Location: [facility];   Service: Gastroenterology    DE EXCISION RADIAL HEAD Left 10/09/2020    Procedure: RADIAL HEAD IMPLANT ARTHROPLASTY ;  Surgeon: Monico العراقي MD;  Location: [facility];  Service: Orthopedics    REDUCTION MAMMAPLASTY Bilateral 1997    REDUCTION MAMMAPLASTY Bilateral 2002    REDUCTION MAMMAPLASTY Bilateral 2020    SINUS SURGERY      TOTAL ABDOMINAL HYSTERECTOMY        Family History   Problem Relation Age of Onset    Hyperlipidemia Mother     Lupus Mother     Hypertension Mother     Anxiety disorder Mother     Psychiatric Illness Mother         unknown-lost memory for six months    Depression Father     Cervical cancer Paternal Grandmother     Ovarian cancer Paternal Grandmother 77    Uterine cancer Paternal Grandmother     No Known Problems Half-Sister     No Known Problems Half-Brother     No Known Problems Half-Brother     No Known Problems Half-Sister     No Known Problems Half-Sister     No Known Problems Maternal Aunt     No Known Problems Maternal Aunt     No Known Problems Maternal Aunt     No Known Problems Maternal Aunt     No Known Problems Paternal Aunt     No Known Problems Paternal Aunt     Bipolar disorder Cousin     Schizoaffective Disorder  Cousin     Schizophrenia Cousin     Self-Injury Cousin     Suicide Attempts Cousin     Psychosis Maternal Uncle         need his life    Schizoaffective Disorder  Maternal Uncle     Schizophrenia Maternal Uncle     Self-Injury Maternal Uncle     Suicide Attempts Maternal Uncle     Breast cancer Neg Hx     Colon cancer Neg Hx     Seizures Neg Hx      Social History     Tobacco Use    Smoking status: Never    Smokeless tobacco: Never    Tobacco comments:     na   Vaping Use    Vaping Use: Never used   Substance Use Topics    Alcohol use:  Yes     Alcohol/week: 2.0 standard drinks of alcohol     Types: 2 Glasses of wine per week     Comment: socially    Drug use: No       Current Outpatient Medications:     albuterol (2.5 mg/3 mL) 0.083 % nebulizer solution, Take 3 mL (2.5 mg total) by nebulization every 6 (six) hours as needed for wheezing or shortness of breath, Disp: 180 mL, Rfl: 0    albuterol (PROVENTIL HFA,VENTOLIN HFA) 90 mcg/act inhaler, Inhale 2 puffs 4 (four) times a day, Disp: 6.7 g, Rfl: 6    amLODIPine (NORVASC) 2.5 mg tablet, Take 1 tablet (2.5 mg total) by mouth daily, Disp: 90 tablet, Rfl: 0    clonazePAM (KlonoPIN) 0.5 mg tablet, Take 1 tablet (0.5 mg total) by mouth daily at bedtime, Disp: 30 tablet, Rfl: 0    fluticasone (FLONASE) 50 mcg/act nasal spray, 2 sprays into each nostril daily, Disp: 16 g, Rfl: 0    levothyroxine 75 mcg tablet, Take 1 tablet (75 mcg total) by mouth daily, Disp: 90 tablet, Rfl: 0    mirtazapine (REMERON) 7.5 MG tablet, Take 1 tablet (7.5 mg total) by mouth daily at bedtime, Disp: 90 tablet, Rfl: 0    rizatriptan (MAXALT-MLT) 10 mg disintegrating tablet, Take 1 tablet (10 mg total) by mouth once as needed for migraine for up to 1 dose May repeat in 2 hours if needed, Disp: 9 tablet, Rfl: 0    venlafaxine (EFFEXOR-XR) 150 mg 24 hr capsule, TAKE 1 CAPSULE (150 MG TOTAL) BY MOUTH DAILY WITH 75 MG CAPSULE (225 MG TOTAL DAILY), Disp: 90 capsule, Rfl: 1    venlafaxine (EFFEXOR-XR) 75 mg 24 hr capsule, TAKE 1 CAPSULE (75 MG TOTAL) BY MOUTH DAILY WITH 150 MG CAPSULE (225 MG TOTAL DAILY), Disp: 90 capsule, Rfl: 1    atorvastatin (LIPITOR) 20 mg tablet, Take 1 tablet (20 mg total) by mouth daily, Disp: 90 tablet, Rfl: 3  Patient Active Problem List    Diagnosis Date Noted    Excessive daytime sleepiness     Syncope     Major depressive disorder, recurrent, moderate (HCC) 2022    Insomnia 2019    Post traumatic stress disorder (PTSD) 2019    Spells of trembling 2018    REM sleep behavior disorder 2018    Essential hypertension 2018    Pharyngoesophageal dysphagia 10/09/2018    Hashimoto's disease 2017    Asthma 2016    LALITA positive 10/22/2015    Anxiety 03/10/2015       Allergies   Allergen Reactions    Lisinopril-Hydrochlorothiazide Hives    Other Hives     States she has no allergies       OB History    Para Term  AB Living   0 0 0 0 0 0   SAB IAB Ectopic Multiple Live Births   0 0 0 0 0       Vitals:    23 1356   BP: 128/72   BP Location: Right arm   Patient Position: Sitting   Cuff Size: Large   Weight: 75.8 kg (167 lb)   Height: 5' 3" (1.6 m)     Body mass index is 29.58 kg/m². Review of Systems     Constitutional: Negative for chills, fatigue, fever, headaches, visual disturbances, and unexpected weight change. Respiratory: Negative for cough, & shortness of breath. Cardiovascular: Negative for chest pain. .    Gastrointestinal: Negative for Abd pain, nausea & vomiting, constipation and diarrhea. Genitourinary: Negative for difficulty urinating, dysuria, hematuria, , unusual vaginal bleeding or discharge. dyspareunia  Skin: Negative skin changes    Physical Exam     Constitutional: Alert & Oriented x3, well-developed and well-nourished. No distress. HENT: Atraumatic, Normocephalic, Conjunctivae clear  Neck: Normal range of motion. Neck supple. No thyromegaly, mass, nodules or tenderness  Pulmonary: Effort normal.   Abdominal: Soft. No tenderness or masses  Musculoskeletal: Normal ROM  Skin: Warm & Dry  Psychological: Normal mood, thought content, behavior & judgement     Breasts: bilateral breast reduction   Right: tissue soft without masses, tenderness, skin changes or nipple discharge. No areas of erythema or pain. No subclavicular, axillary, pectoral adenopathy  Left:  tissue soft without masses, tenderness, skin changes or nipple discharge. No areas of erythema or pain. No subclavicular, axillary, pectoral adenopathy    Pelvic exam was performed with patient supine, lithotomy position. Exam is consistent with  atrophic changes. Vulva/ Vestibule: Right: Negative rash, tenderness, lesion or injury                               Left: Negative rash, tenderness, lesion or injury   Urethral meatus: Negative for  tenderness, inflammation or discharge. Uterus: Surgically absent   Cervix:  Surgically absent   Right adnexa: Not palpable   Left adnexa: Not palpable  Vagina: Consistent with  atrophic changes. No erythema, tenderness, masses, or foreign body in the vagina. No signs of injury around the vagina. No unusual vaginal discharge   Perineum without lesions, signs of injury, erythema or swelling. Inguinal Canal:        Right: No inguinal adenopathy or hernia present. Left: No inguinal adenopathy or hernia present.

## 2023-11-08 ENCOUNTER — ANNUAL EXAM (OUTPATIENT)
Dept: OBGYN CLINIC | Facility: CLINIC | Age: 53
End: 2023-11-08

## 2023-11-08 VITALS
HEIGHT: 63 IN | WEIGHT: 167 LBS | DIASTOLIC BLOOD PRESSURE: 72 MMHG | BODY MASS INDEX: 29.59 KG/M2 | SYSTOLIC BLOOD PRESSURE: 128 MMHG

## 2023-11-08 DIAGNOSIS — Z87.42 HX OF OVARIAN CYST: ICD-10-CM

## 2023-11-08 DIAGNOSIS — Z01.419 ENCOUNTER FOR GYNECOLOGICAL EXAMINATION WITHOUT ABNORMAL FINDING: Primary | ICD-10-CM

## 2023-11-08 DIAGNOSIS — Z12.31 ENCOUNTER FOR SCREENING MAMMOGRAM FOR MALIGNANT NEOPLASM OF BREAST: ICD-10-CM

## 2023-11-08 PROBLEM — G47.33 OSA (OBSTRUCTIVE SLEEP APNEA): Status: RESOLVED | Noted: 2022-05-03 | Resolved: 2023-11-08

## 2023-11-24 DIAGNOSIS — G47.00 INSOMNIA, UNSPECIFIED TYPE: ICD-10-CM

## 2023-11-26 RX ORDER — CLONAZEPAM 0.5 MG/1
0.5 TABLET ORAL
Qty: 30 TABLET | Refills: 0 | Status: SHIPPED | OUTPATIENT
Start: 2023-11-26

## 2023-11-28 ENCOUNTER — OFFICE VISIT (OUTPATIENT)
Dept: UROLOGY | Facility: CLINIC | Age: 53
End: 2023-11-28
Payer: COMMERCIAL

## 2023-11-28 VITALS
SYSTOLIC BLOOD PRESSURE: 120 MMHG | RESPIRATION RATE: 16 BRPM | HEIGHT: 63 IN | DIASTOLIC BLOOD PRESSURE: 86 MMHG | BODY MASS INDEX: 30.3 KG/M2 | HEART RATE: 97 BPM | OXYGEN SATURATION: 99 % | WEIGHT: 171 LBS

## 2023-11-28 DIAGNOSIS — R39.15 URINARY URGENCY: Primary | ICD-10-CM

## 2023-11-28 LAB
POST-VOID RESIDUAL VOLUME, ML POC: 56 ML
SL AMB  POCT GLUCOSE, UA: NORMAL
SL AMB LEUKOCYTE ESTERASE,UA: NORMAL
SL AMB POCT BILIRUBIN,UA: NORMAL
SL AMB POCT BLOOD,UA: NORMAL
SL AMB POCT CLARITY,UA: CLEAR
SL AMB POCT COLOR,UA: YELLOW
SL AMB POCT KETONES,UA: NORMAL
SL AMB POCT NITRITE,UA: NORMAL
SL AMB POCT PH,UA: 5
SL AMB POCT SPECIFIC GRAVITY,UA: 1.03
SL AMB POCT URINE PROTEIN: NORMAL
SL AMB POCT UROBILINOGEN: 0.2

## 2023-11-28 PROCEDURE — 81002 URINALYSIS NONAUTO W/O SCOPE: CPT | Performed by: PHYSICIAN ASSISTANT

## 2023-11-28 PROCEDURE — 51798 US URINE CAPACITY MEASURE: CPT | Performed by: PHYSICIAN ASSISTANT

## 2023-11-28 PROCEDURE — 99204 OFFICE O/P NEW MOD 45 MIN: CPT | Performed by: PHYSICIAN ASSISTANT

## 2023-11-28 NOTE — PROGRESS NOTES
11/28/2023      Chief Complaint   Patient presents with    New Patient Visit     Assessment and Plan    47 y.o. female new patient     LUTS  - Urine dip negative  - PVR 56 mL   - Recommend avoiding bladder irritants, adequate hydration, avoiding constipation, and use of Squatty potty  - Will trial pelvic floor PT  -  Consider OAB medication  - Follow up in 12 weeks for reassessment. History of Present Illness  Jackie Brannon is a 47 y.o. female here for new patient evaluation of lower urinary tract symptoms. She complains of urinary frequency, urgency, and sensation of incomplete bladder emptying. This has been going on for years but worsened after having hysterectomy for fibroids. Denies any pelvic pain, dysuria, or hematuria. Denies any prior  surgical manipulation. Notes urinary leakage on occasion. Denies any pregnancies. Review of Systems   Constitutional:  Negative for chills and fever. Respiratory:  Negative for shortness of breath. Cardiovascular:  Negative for chest pain. Gastrointestinal:  Negative for abdominal pain. Genitourinary:  Positive for frequency and urgency. Negative for difficulty urinating, dysuria, flank pain and hematuria. Neurological:  Negative for dizziness.                   Past Medical History  Past Medical History:   Diagnosis Date    Anxiety     Asthma     Depression     Disease of thyroid gland     Dizziness     Forgetfulness     Hashimoto's disease     Hashimoto's disease     Headache(784.0) 2002    History of fainting as a child     Hyperlipidemia     Hypertension     Numbness and tingling     MIRANDA (obstructive sleep apnea)     Post traumatic stress disorder 02/28/2019    Varicella        Past Social History  Past Surgical History:   Procedure Laterality Date    BREAST BIOPSY Left 2020    BREAST SURGERY Bilateral 2002    reduction    COLONOSCOPY      HYSTERECTOMY  2005    VA COLONOSCOPY FLX DX W/COLLJ SPEC WHEN PFRMD N/A 10/16/2018    Procedure: EGD AND COLONOSCOPY;  Surgeon: Jen Huffman MD;  Location: [facility];   Service: Gastroenterology    ME EXCISION RADIAL HEAD Left 10/09/2020    Procedure: RADIAL HEAD IMPLANT ARTHROPLASTY ;  Surgeon: Agustin Siegel MD;  Location: [facility];  Service: Orthopedics    REDUCTION MAMMAPLASTY Bilateral 1997    REDUCTION MAMMAPLASTY Bilateral 2002    REDUCTION MAMMAPLASTY Bilateral 2020    SINUS SURGERY      TOTAL ABDOMINAL HYSTERECTOMY       Social History     Tobacco Use   Smoking Status Never   Smokeless Tobacco Never   Tobacco Comments    na       Past Family History  Family History   Problem Relation Age of Onset    Hyperlipidemia Mother     Lupus Mother     Hypertension Mother     Anxiety disorder Mother     Psychiatric Illness Mother         unknown-lost memory for six months    Depression Father     Cervical cancer Paternal Grandmother     Ovarian cancer Paternal Grandmother 77    Uterine cancer Paternal Grandmother     No Known Problems Half-Sister     No Known Problems Half-Brother     No Known Problems Half-Brother     No Known Problems Half-Sister     No Known Problems Half-Sister     No Known Problems Maternal Aunt     No Known Problems Maternal Aunt     No Known Problems Maternal Aunt     No Known Problems Maternal Aunt     No Known Problems Paternal Aunt     No Known Problems Paternal Aunt     Bipolar disorder Cousin     Schizoaffective Disorder  Cousin     Schizophrenia Cousin     Self-Injury Cousin     Suicide Attempts Cousin     Psychosis Maternal Uncle         need his life    Schizoaffective Disorder  Maternal Uncle     Schizophrenia Maternal Uncle     Self-Injury Maternal Uncle     Suicide Attempts Maternal Uncle     Breast cancer Neg Hx     Colon cancer Neg Hx     Seizures Neg Hx        Past Social history  Social History     Socioeconomic History    Marital status: /Civil Union     Spouse name: Not on file    Number of children: Not on file    Years of education: Not on file    Highest education level: Not on file   Occupational History    Not on file   Tobacco Use    Smoking status: Never    Smokeless tobacco: Never    Tobacco comments:     na   Vaping Use    Vaping Use: Never used   Substance and Sexual Activity    Alcohol use: Yes     Alcohol/week: 2.0 standard drinks of alcohol     Types: 2 Glasses of wine per week     Comment: socially    Drug use: No    Sexual activity: Not Currently     Partners: Female     Birth control/protection: None     Comment: JACKELIN   Other Topics Concern    Not on file   Social History Narrative    Lives [employer] [facility], with her spouse.       Social Determinants of Health     Financial Resource Strain: Not on file   Food Insecurity: Not on file   Transportation Needs: Not on file   Physical Activity: Unknown (11/15/2022)    Exercise Vital Sign     Days of Exercise per Week: 0 days     Minutes of Exercise per Session: Not on file   Stress: No Stress Concern Present (9/1/2020)    [facility]     Feeling of Stress : Not at all   Social Connections: Not on file   Intimate Partner Violence: Not on file   Housing Stability: Not on file       Current Medications  Current Outpatient Medications   Medication Sig Dispense Refill    albuterol (2.5 mg/3 mL) 0.083 % nebulizer solution Take 3 mL (2.5 mg total) by nebulization every 6 (six) hours as needed for wheezing or shortness of breath 180 mL 0    albuterol (PROVENTIL HFA,VENTOLIN HFA) 90 mcg/act inhaler Inhale 2 puffs 4 (four) times a day 6.7 g 6    amLODIPine (NORVASC) 2.5 mg tablet Take 1 tablet (2.5 mg total) by mouth daily 90 tablet 0    atorvastatin (LIPITOR) 20 mg tablet Take 1 tablet (20 mg total) by mouth daily 90 tablet 3    clonazePAM (KlonoPIN) 0.5 mg tablet Take 1 tablet (0.5 mg total) by mouth daily at bedtime 30 tablet 0    fluticasone (FLONASE) 50 mcg/act nasal spray 2 sprays into each nostril daily 16 g 0    levothyroxine 75 mcg tablet Take 1 tablet (75 mcg total) by mouth daily 90 tablet 0    mirtazapine (REMERON) 7.5 MG tablet Take 1 tablet (7.5 mg total) by mouth daily at bedtime 90 tablet 0    rizatriptan (MAXALT-MLT) 10 mg disintegrating tablet Take 1 tablet (10 mg total) by mouth once as needed for migraine for up to 1 dose May repeat in 2 hours if needed 9 tablet 0    venlafaxine (EFFEXOR-XR) 150 mg 24 hr capsule TAKE 1 CAPSULE (150 MG TOTAL) BY MOUTH DAILY WITH 75 MG CAPSULE (225 MG TOTAL DAILY) 90 capsule 1    venlafaxine (EFFEXOR-XR) 75 mg 24 hr capsule TAKE 1 CAPSULE (75 MG TOTAL) BY MOUTH DAILY WITH 150 MG CAPSULE (225 MG TOTAL DAILY) 90 capsule 1     No current facility-administered medications for this visit. Allergies  Allergies   Allergen Reactions    Lisinopril-Hydrochlorothiazide Hives    Other Hives     States she has no allergies         The following portions of the patient's history were reviewed and updated as appropriate: allergies, current medications, past medical history, past social history, past surgical history and problem list.      Vitals  Vitals:    11/28/23 1314   BP: 120/86   Pulse: 97   Resp: 16   SpO2: 99%   Weight: 77.6 kg (171 lb)   Height: 5' 3" (1.6 m)           Physical Exam  Physical Exam  Constitutional:       Appearance: Normal appearance. HENT:      Head: Normocephalic and atraumatic. Right Ear: External ear normal.      Left Ear: External ear normal.      Nose: Nose normal.   Eyes:      General: No scleral icterus. Conjunctiva/sclera: Conjunctivae normal.   Cardiovascular:      Pulses: Normal pulses. Pulmonary:      Effort: Pulmonary effort is normal.   Genitourinary:     General: Normal vulva. Vagina: No vaginal discharge. Comments: No urethral abnormalities or evidence of bladder prolapse  Musculoskeletal:         General: Normal range of motion. Cervical back: Normal range of motion. Skin:     General: Skin is warm and dry. Neurological:      General: No focal deficit present. Mental Status: She is alert and oriented to person, place, and time. Psychiatric:         Mood and Affect: Mood normal.         Behavior: Behavior normal.         Thought Content:  Thought content normal.         Judgment: Judgment normal.           Results  Recent Results (from the past 1 hour(s))   POCT Measure PVR    Collection Time: 11/28/23  1:17 PM   Result Value Ref Range    POST-VOID RESIDUAL VOLUME, ML POC 56 mL   POCT urine dip    Collection Time: 11/28/23  1:17 PM   Result Value Ref Range    LEUKOCYTE ESTERASE,UA -     NITRITE,UA -     SL AMB POCT UROBILINOGEN 0.2     POCT URINE PROTEIN -      PH,UA 5.0     BLOOD,UA -     SPECIFIC GRAVITY,UA 1.030     KETONES,UA -     BILIRUBIN,UA -     GLUCOSE, UA -      COLOR,UA yellow     CLARITY,UA clear    ]  No results found for: "PSA"  Lab Results   Component Value Date    GLUCOSE 89 09/22/2017    CALCIUM 9.3 08/11/2023     09/22/2017    K 4.3 08/11/2023    CO2 28 08/11/2023     08/11/2023    BUN 19 08/11/2023    CREATININE 0.81 08/11/2023     Lab Results   Component Value Date    WBC 5.42 08/11/2023    HGB 12.3 08/11/2023    HCT 37.6 08/11/2023    MCV 93 08/11/2023     08/11/2023           Orders  Orders Placed This Encounter   Procedures    POCT Measure PVR    POCT urine dip       Myron Noe

## 2023-12-02 ENCOUNTER — HOSPITAL ENCOUNTER (OUTPATIENT)
Dept: ULTRASOUND IMAGING | Facility: HOSPITAL | Age: 53
Discharge: HOME | End: 2023-12-02
Payer: COMMERCIAL

## 2023-12-02 DIAGNOSIS — Z87.42 HX OF OVARIAN CYST: ICD-10-CM

## 2023-12-02 PROCEDURE — 76856 US EXAM PELVIC COMPLETE: CPT

## 2023-12-02 PROCEDURE — 76830 TRANSVAGINAL US NON-OB: CPT

## 2023-12-12 ENCOUNTER — TELEPHONE (OUTPATIENT)
Dept: OBGYN CLINIC | Facility: CLINIC | Age: 53
End: 2023-12-12

## 2023-12-12 NOTE — TELEPHONE ENCOUNTER
Spoke with patient and reviewed ultrasound findings. Patient is aware there is a simple cyst present that needs no follow-up.   Patient was encouraged to continue annual exams and follow-up as needed  Patient thankful for phone call

## 2023-12-16 DIAGNOSIS — G47.00 INSOMNIA, UNSPECIFIED TYPE: ICD-10-CM

## 2023-12-16 DIAGNOSIS — F33.1 MAJOR DEPRESSIVE DISORDER, RECURRENT, MODERATE (HCC): ICD-10-CM

## 2023-12-16 DIAGNOSIS — F43.10 POST TRAUMATIC STRESS DISORDER (PTSD): ICD-10-CM

## 2023-12-17 RX ORDER — MIRTAZAPINE 7.5 MG/1
7.5 TABLET, FILM COATED ORAL
Qty: 90 TABLET | Refills: 0 | Status: SHIPPED | OUTPATIENT
Start: 2023-12-17

## 2023-12-17 RX ORDER — VENLAFAXINE HYDROCHLORIDE 75 MG/1
75 CAPSULE, EXTENDED RELEASE ORAL DAILY
Qty: 30 CAPSULE | Refills: 5 | Status: SHIPPED | OUTPATIENT
Start: 2023-12-17

## 2023-12-21 DIAGNOSIS — G47.00 INSOMNIA, UNSPECIFIED TYPE: ICD-10-CM

## 2023-12-21 RX ORDER — CLONAZEPAM 0.5 MG/1
0.5 TABLET ORAL
Qty: 30 TABLET | Refills: 0 | Status: SHIPPED | OUTPATIENT
Start: 2023-12-21

## 2024-01-03 DIAGNOSIS — E03.9 HYPOTHYROIDISM, UNSPECIFIED TYPE: ICD-10-CM

## 2024-01-04 RX ORDER — LEVOTHYROXINE SODIUM 0.07 MG/1
75 TABLET ORAL DAILY
Qty: 90 TABLET | Refills: 0 | Status: SHIPPED | OUTPATIENT
Start: 2024-01-04

## 2024-01-21 DIAGNOSIS — G47.00 INSOMNIA, UNSPECIFIED TYPE: ICD-10-CM

## 2024-01-22 RX ORDER — CLONAZEPAM 0.5 MG/1
0.5 TABLET ORAL
Qty: 30 TABLET | Refills: 0 | Status: SHIPPED | OUTPATIENT
Start: 2024-01-22

## 2024-01-29 DIAGNOSIS — F43.10 POST TRAUMATIC STRESS DISORDER (PTSD): ICD-10-CM

## 2024-01-29 DIAGNOSIS — E03.9 HYPOTHYROIDISM, UNSPECIFIED TYPE: ICD-10-CM

## 2024-01-29 DIAGNOSIS — F33.1 MAJOR DEPRESSIVE DISORDER, RECURRENT, MODERATE (HCC): ICD-10-CM

## 2024-01-29 RX ORDER — VENLAFAXINE HYDROCHLORIDE 150 MG/1
150 CAPSULE, EXTENDED RELEASE ORAL DAILY
Qty: 90 CAPSULE | Refills: 3 | Status: SHIPPED | OUTPATIENT
Start: 2024-01-29

## 2024-01-29 RX ORDER — LEVOTHYROXINE SODIUM 0.07 MG/1
75 TABLET ORAL DAILY
Qty: 90 TABLET | Refills: 3 | Status: SHIPPED | OUTPATIENT
Start: 2024-01-29

## 2024-01-30 DIAGNOSIS — E06.3 HASHIMOTO'S DISEASE: Primary | ICD-10-CM

## 2024-02-02 DIAGNOSIS — F43.10 POST TRAUMATIC STRESS DISORDER (PTSD): ICD-10-CM

## 2024-02-02 DIAGNOSIS — F33.1 MAJOR DEPRESSIVE DISORDER, RECURRENT, MODERATE (HCC): ICD-10-CM

## 2024-02-02 RX ORDER — VENLAFAXINE HYDROCHLORIDE 75 MG/1
75 CAPSULE, EXTENDED RELEASE ORAL DAILY
Qty: 90 CAPSULE | Refills: 3 | Status: SHIPPED | OUTPATIENT
Start: 2024-02-02

## 2024-02-09 ENCOUNTER — TELEPHONE (OUTPATIENT)
Dept: PSYCHIATRY | Facility: CLINIC | Age: 54
End: 2024-02-09

## 2024-02-09 NOTE — TELEPHONE ENCOUNTER
New Medication Mgmt---PTSD,depression, anxiety---PCP prescribes for now---flexible ain appointments time & days---knows about referral---doesn't mind the [facility] office for appts too---falls under the [employer] # [phone]---id # [id number]

## 2024-02-13 ENCOUNTER — TELEPHONE (OUTPATIENT)
Age: 54
End: 2024-02-13

## 2024-02-13 DIAGNOSIS — F33.1 MAJOR DEPRESSIVE DISORDER, RECURRENT, MODERATE (HCC): ICD-10-CM

## 2024-02-13 DIAGNOSIS — G47.00 INSOMNIA, UNSPECIFIED TYPE: ICD-10-CM

## 2024-02-13 RX ORDER — MIRTAZAPINE 7.5 MG/1
7.5 TABLET, FILM COATED ORAL
Qty: 90 TABLET | Refills: 3 | Status: SHIPPED | OUTPATIENT
Start: 2024-02-13

## 2024-02-14 ENCOUNTER — APPOINTMENT (OUTPATIENT)
Dept: LAB | Facility: HOSPITAL | Age: 54
End: 2024-02-14
Payer: COMMERCIAL

## 2024-02-14 DIAGNOSIS — E06.3 HASHIMOTO'S DISEASE: ICD-10-CM

## 2024-02-14 DIAGNOSIS — R73.01 IMPAIRED FASTING GLUCOSE: ICD-10-CM

## 2024-02-14 DIAGNOSIS — E78.49 OTHER HYPERLIPIDEMIA: ICD-10-CM

## 2024-02-14 LAB
ALBUMIN SERPL BCP-MCNC: 4.4 G/DL (ref 3.5–5)
ALP SERPL-CCNC: 70 U/L (ref 34–104)
ALT SERPL W P-5'-P-CCNC: 23 U/L (ref 7–52)
ANION GAP SERPL CALCULATED.3IONS-SCNC: 7 MMOL/L
AST SERPL W P-5'-P-CCNC: 26 U/L (ref 13–39)
BILIRUB SERPL-MCNC: 0.47 MG/DL (ref 0.2–1)
BUN SERPL-MCNC: 15 MG/DL (ref 5–25)
CALCIUM SERPL-MCNC: 9.8 MG/DL (ref 8.4–10.2)
CHLORIDE SERPL-SCNC: 104 MMOL/L (ref 96–108)
CHOLEST SERPL-MCNC: 197 MG/DL
CO2 SERPL-SCNC: 30 MMOL/L (ref 21–32)
CREAT SERPL-MCNC: 0.89 MG/DL (ref 0.6–1.3)
ERYTHROCYTE [DISTWIDTH] IN BLOOD BY AUTOMATED COUNT: 12.5 % (ref 11.6–15.1)
GFR SERPL CREATININE-BSD FRML MDRD: 74 ML/MIN/1.73SQ M
GLUCOSE P FAST SERPL-MCNC: 102 MG/DL (ref 65–99)
HCT VFR BLD AUTO: 38.8 % (ref 34.8–46.1)
HDLC SERPL-MCNC: 62 MG/DL
HGB BLD-MCNC: 12.6 G/DL (ref 11.5–15.4)
LDLC SERPL CALC-MCNC: 101 MG/DL (ref 0–100)
MCH RBC QN AUTO: 30.3 PG (ref 26.8–34.3)
MCHC RBC AUTO-ENTMCNC: 32.5 G/DL (ref 31.4–37.4)
MCV RBC AUTO: 93 FL (ref 82–98)
NONHDLC SERPL-MCNC: 135 MG/DL
PLATELET # BLD AUTO: 292 THOUSANDS/UL (ref 149–390)
PMV BLD AUTO: 9.3 FL (ref 8.9–12.7)
POTASSIUM SERPL-SCNC: 4.5 MMOL/L (ref 3.5–5.3)
PROT SERPL-MCNC: 7.8 G/DL (ref 6.4–8.4)
RBC # BLD AUTO: 4.16 MILLION/UL (ref 3.81–5.12)
SODIUM SERPL-SCNC: 141 MMOL/L (ref 135–147)
T4 FREE SERPL-MCNC: 0.6 NG/DL (ref 0.61–1.12)
TRIGL SERPL-MCNC: 172 MG/DL
TSH SERPL DL<=0.05 MIU/L-ACNC: 0.25 UIU/ML (ref 0.45–4.5)
WBC # BLD AUTO: 5.43 THOUSAND/UL (ref 4.31–10.16)

## 2024-02-14 PROCEDURE — 36415 COLL VENOUS BLD VENIPUNCTURE: CPT

## 2024-02-14 PROCEDURE — 80053 COMPREHEN METABOLIC PANEL: CPT

## 2024-02-14 PROCEDURE — 85027 COMPLETE CBC AUTOMATED: CPT

## 2024-02-14 PROCEDURE — 80061 LIPID PANEL: CPT

## 2024-02-14 PROCEDURE — 84439 ASSAY OF FREE THYROXINE: CPT

## 2024-02-14 PROCEDURE — 84443 ASSAY THYROID STIM HORMONE: CPT

## 2024-02-14 PROCEDURE — 83036 HEMOGLOBIN GLYCOSYLATED A1C: CPT

## 2024-02-15 ENCOUNTER — TELEPHONE (OUTPATIENT)
Dept: PSYCHIATRY | Facility: CLINIC | Age: 54
End: 2024-02-15

## 2024-02-15 ENCOUNTER — APPOINTMENT (OUTPATIENT)
Dept: LAB | Facility: CLINIC | Age: 54
End: 2024-02-15
Payer: COMMERCIAL

## 2024-02-15 ENCOUNTER — OFFICE VISIT (OUTPATIENT)
Age: 54
End: 2024-02-15
Payer: MEDICARE

## 2024-02-15 VITALS
DIASTOLIC BLOOD PRESSURE: 86 MMHG | WEIGHT: 174 LBS | RESPIRATION RATE: 16 BRPM | HEIGHT: 63 IN | SYSTOLIC BLOOD PRESSURE: 118 MMHG | HEART RATE: 94 BPM | BODY MASS INDEX: 30.83 KG/M2 | OXYGEN SATURATION: 96 %

## 2024-02-15 DIAGNOSIS — F41.9 ANXIETY: ICD-10-CM

## 2024-02-15 DIAGNOSIS — E53.9 VITAMIN B DEFICIENCY: ICD-10-CM

## 2024-02-15 DIAGNOSIS — E34.9 HORMONE DEFICIENCY: ICD-10-CM

## 2024-02-15 DIAGNOSIS — F33.1 MAJOR DEPRESSIVE DISORDER, RECURRENT, MODERATE (HCC): ICD-10-CM

## 2024-02-15 DIAGNOSIS — E06.3 HASHIMOTO'S DISEASE: ICD-10-CM

## 2024-02-15 DIAGNOSIS — E55.9 VITAMIN D DEFICIENCY: ICD-10-CM

## 2024-02-15 DIAGNOSIS — E46 PROTEIN-CALORIE MALNUTRITION, UNSPECIFIED SEVERITY (HCC): ICD-10-CM

## 2024-02-15 DIAGNOSIS — R53.83 OTHER FATIGUE: ICD-10-CM

## 2024-02-15 DIAGNOSIS — I10 ESSENTIAL HYPERTENSION: Primary | ICD-10-CM

## 2024-02-15 DIAGNOSIS — F43.10 POST TRAUMATIC STRESS DISORDER (PTSD): ICD-10-CM

## 2024-02-15 DIAGNOSIS — R63.5 WEIGHT GAIN: ICD-10-CM

## 2024-02-15 DIAGNOSIS — E04.1 THYROID NODULE: ICD-10-CM

## 2024-02-15 DIAGNOSIS — J45.909 UNCOMPLICATED ASTHMA, UNSPECIFIED ASTHMA SEVERITY, UNSPECIFIED WHETHER PERSISTENT: ICD-10-CM

## 2024-02-15 LAB
25(OH)D3 SERPL-MCNC: 39.5 NG/ML (ref 30–100)
EST. AVERAGE GLUCOSE BLD GHB EST-MCNC: 131 MG/DL
ESTRADIOL SERPL-MCNC: 15.8 PG/ML
HBA1C MFR BLD: 6.2 %
LH SERPL-ACNC: 39.1 MIU/ML
T3 SERPL-MCNC: 1.1 NG/ML
TSH SERPL DL<=0.05 MIU/L-ACNC: 0.24 UIU/ML (ref 0.45–4.5)
VIT B12 SERPL-MCNC: 296 PG/ML (ref 180–914)

## 2024-02-15 PROCEDURE — 84480 ASSAY TRIIODOTHYRONINE (T3): CPT

## 2024-02-15 PROCEDURE — 99214 OFFICE O/P EST MOD 30 MIN: CPT

## 2024-02-15 PROCEDURE — 84439 ASSAY OF FREE THYROXINE: CPT

## 2024-02-15 PROCEDURE — 82607 VITAMIN B-12: CPT

## 2024-02-15 PROCEDURE — 83001 ASSAY OF GONADOTROPIN (FSH): CPT

## 2024-02-15 PROCEDURE — 82306 VITAMIN D 25 HYDROXY: CPT

## 2024-02-15 PROCEDURE — 83002 ASSAY OF GONADOTROPIN (LH): CPT

## 2024-02-15 PROCEDURE — 82670 ASSAY OF TOTAL ESTRADIOL: CPT

## 2024-02-15 PROCEDURE — 36415 COLL VENOUS BLD VENIPUNCTURE: CPT

## 2024-02-15 PROCEDURE — 84443 ASSAY THYROID STIM HORMONE: CPT

## 2024-02-15 NOTE — PROGRESS NOTES
INTERNAL MEDICINE FOLLOW-UP VISIT  [facility] - [facility] INTERNAL MEDICINE [address]    NAME: Mindy Truong  AGE: 53 y.o. SEX: female  : 1970     DATE: 2/15/2024     Assessment and Plan:   1. Essential hypertension  Table at 118/86.  Continue on amlodipine 2.5 mg daily    2. Hashimoto's disease  TSH and T4 were low.  Will order T3 and repeat TSH and T4.  Currently she is on levothyroxine 75 mcg.  She does have complaints of fatigue and a weird sensation in her neck area.  Will ultrasound thyroid to assess for the presence of new nodules  - TSH, 3rd generation; Future  - T3; Future  - US thyroid; Future    3. Uncomplicated asthma, unspecified asthma severity, unspecified whether persistent  Stable    4. Major depressive disorder, recurrent, moderate (HCC)  Stable    5. Vitamin D deficiency  - Vitamin D 25 hydroxy; Future    6. Weight gain  - Ambulatory Referral to Weight Management; Future  - US thyroid; Future    7. Vitamin B deficiency  - Vitamin B12; Future    8. Protein-calorie malnutrition, unspecified severity (HCC)  - Vitamin B12; Future    9. Thyroid nodule  - US thyroid; Future    10. Other fatigue  - US thyroid; Future    11. Hormone deficiency  - Estradiol; Future  - Luteinizing hormone; Future    12. Anxiety  - Ambulatory referral to Psych Services; Future    13. Post traumatic stress disorder (PTSD)  - Ambulatory referral to Psych Services; Future        No follow-ups on file.       Chief Complaint:     Chief Complaint   Patient presents with   • Follow-up     6 months with labs.      History of Present Illness:     Complaints of fatigue, weight gain. She feels she can't get full and wants to keep eating. She feels weight gain has been putting a strain on her mental health  PTSD feels not controlled  Mood swings, poor libido    The following portions of the patient's history were reviewed and updated as appropriate: allergies, current medications, past family history, past  medical history, past social history, past surgical history and problem list.     Review of Systems:     Review of Systems   Constitutional:  Negative for appetite change, chills, diaphoresis, fatigue, fever and unexpected weight change.   HENT:  Negative for postnasal drip and sneezing.    Eyes:  Negative for visual disturbance.   Respiratory:  Negative for chest tightness and shortness of breath.    Cardiovascular:  Negative for chest pain, palpitations and leg swelling.   Gastrointestinal:  Negative for abdominal pain and blood in stool.   Endocrine: Negative for cold intolerance, heat intolerance, polydipsia, polyphagia and polyuria.   Genitourinary:  Negative for difficulty urinating, dysuria, frequency and urgency.   Musculoskeletal:  Negative for arthralgias and myalgias.   Skin:  Negative for rash and wound.   Neurological:  Negative for dizziness, weakness, light-headedness and headaches.   Hematological:  Negative for adenopathy.   Psychiatric/Behavioral:  Negative for confusion, dysphoric mood and sleep disturbance. The patient is not nervous/anxious.         Past Medical History:     Past Medical History:   Diagnosis Date   • Anxiety    • Asthma    • Depression    • Disease of thyroid gland    • Dizziness    • Forgetfulness    • Hashimoto's disease    • Hashimoto's disease    • Headache(784.0) 2002   • History of fainting as a child    • Hyperlipidemia    • Hypertension    • Numbness and tingling    • MIRANDA (obstructive sleep apnea)    • Post traumatic stress disorder 02/28/2019   • Varicella         Current Medications:     Current Outpatient Medications:   •  albuterol (2.5 mg/3 mL) 0.083 % nebulizer solution, Take 3 mL (2.5 mg total) by nebulization every 6 (six) hours as needed for wheezing or shortness of breath, Disp: 180 mL, Rfl: 0  •  albuterol (PROVENTIL HFA,VENTOLIN HFA) 90 mcg/act inhaler, Inhale 2 puffs 4 (four) times a day, Disp: 6.7 g, Rfl: 6  •  amLODIPine (NORVASC) 2.5 mg tablet, Take 1  "tablet (2.5 mg total) by mouth daily, Disp: 90 tablet, Rfl: 0  •  atorvastatin (LIPITOR) 20 mg tablet, Take 1 tablet (20 mg total) by mouth daily, Disp: 90 tablet, Rfl: 3  •  clonazePAM (KlonoPIN) 0.5 mg tablet, Take 1 tablet (0.5 mg total) by mouth daily at bedtime, Disp: 30 tablet, Rfl: 0  •  fluticasone (FLONASE) 50 mcg/act nasal spray, 2 sprays into each nostril daily, Disp: 16 g, Rfl: 0  •  levothyroxine 75 mcg tablet, TAKE 1 TABLET BY MOUTH EVERY DAY, Disp: 90 tablet, Rfl: 3  •  mirtazapine (REMERON) 7.5 MG tablet, TAKE 1 TABLET (7.5 MG TOTAL) BY MOUTH DAILY AT BEDTIME, Disp: 90 tablet, Rfl: 3  •  rizatriptan (MAXALT-MLT) 10 mg disintegrating tablet, Take 1 tablet (10 mg total) by mouth once as needed for migraine for up to 1 dose May repeat in 2 hours if needed, Disp: 9 tablet, Rfl: 0  •  venlafaxine (EFFEXOR-XR) 150 mg 24 hr capsule, TAKE 1 CAPSULE (150 MG TOTAL) BY MOUTH DAILY WITH 75 MG CAPSULE (225 MG TOTAL DAILY), Disp: 90 capsule, Rfl: 3  •  venlafaxine (EFFEXOR-XR) 75 mg 24 hr capsule, TAKE 1 CAPSULE (75 MG TOTAL) BY MOUTH DAILY WITH 150 MG CAPSULE (225 MG TOTAL DAILY), Disp: 90 capsule, Rfl: 3     Allergies:     Allergies   Allergen Reactions   • Lisinopril-Hydrochlorothiazide Hives   • Other Hives     States she has no allergies        Physical Exam:     /86 (BP Location: Left arm, Patient Position: Sitting, Cuff Size: Standard)   Pulse 94   Resp 16   Ht 5' 3\" (1.6 m)   Wt 78.9 kg (174 lb)   SpO2 96%   BMI 30.82 kg/m²     Physical Exam  Constitutional:       Appearance: She is well-developed.   HENT:      Head: Normocephalic and atraumatic.   Eyes:      Pupils: Pupils are equal, round, and reactive to light.   Neck:      Thyroid: No thyromegaly.   Cardiovascular:      Rate and Rhythm: Normal rate and regular rhythm.      Heart sounds: No murmur heard.  Pulmonary:      Effort: Pulmonary effort is normal.      Breath sounds: Normal breath sounds.   Abdominal:      General: Bowel sounds are " normal.      Palpations: Abdomen is soft.   Musculoskeletal:         General: Normal range of motion.      Cervical back: Normal range of motion and neck supple.   Lymphadenopathy:      Cervical: No cervical adenopathy.   Skin:     General: Skin is warm and dry.   Neurological:      Mental Status: She is alert and oriented to person, place, and time.           Data:     Laboratory Results: I have personally reviewed the pertinent laboratory results/reports   Radiology/Other Diagnostic Testing Results: I have personally reviewed pertinent reports.      KAYLA Main  [facility] INTERNAL MEDICINE [address]

## 2024-02-15 NOTE — TELEPHONE ENCOUNTER
"Behavioral Health Outpatient Intake Questions    Referred By   : self    Please advise interviewee that they need to answer all questions truthfully to allow for best care, and any misrepresentations of information may affect their ability to be seen at this clinic   => Was this discussed? Yes     If Minor Child (under age 18)    Who is/are the legal guardian(s) of the child?     Is there a custody agreement?      If \"YES\"- Custody orders must be obtained prior to scheduling the first appointment  In addition, Consent to Treatment must be signed by all legal guardians prior to scheduling the first appointment    If \"NO\"- Consent to Treatment must be signed by all legal guardians prior to scheduling the first appointment    Behavioral Health Outpatient Intake History -     Presenting Problem (in patient's own words): Depression, Anxiety, PTSD; wants to discuss medication for PTSD - impacts pt at night with nightmares and night paralysis    Are there any communication barriers for this patient?     No                                               If yes, please describe barriers:   If there is a unique situation, please refer to Ruben Lux/Darcy Ladd for final determination.    Are you taking any psychiatric medications? Yes     If \"YES\" -What are they Remeron, Klonopin, Effexor XR     If \"YES\" -Who prescribes? Darling Gerard    Has the Patient previously received outpatient Talk Therapy or Medication Management from [facility]  Yes        If \"YES\"- When, Where and with Whom? Was pt of Trung Tapia in [facility] until 8/2022        If \"NO\" -Has Patient received these services elsewhere?       If \"YES\" -When, Where, and with Whom?    Has the Patient abused alcohol or other substances in the last 6 months ? No  No concerns of substance abuse are reported.     If \"YES\" -What substance, How much, How often?     If illegal substance: Refer to [facility] (for EL[facility]) or [facility].   If Alcohol in " "excess of 10 drinks per week:  Refer to [facility] (for [facility]) or [facility] Offices    Legal History-     Is this treatment court ordered? No   If \"yes \"send to :  Talk Therapy : Send to Ruben Ladd for final determination   Med Management: Send to Dr Graves for final determination     Has the Patient been convicted of a felony?  No   If \"Yes\" send to -When, What?  Talk Therapy : Send to Ruben Ladd for final determination   Med Management: Send to Dr Graves for final determination     ACCEPTED as a patient Yes  If \"Yes\" Appointment Date: with Garima Rea  Friday, March 15, 2024 @ 4:00pm  Monday, April 29, 2024 @ 10:00am ([place])    Referred Elsewhere? No  If “Yes” - (Where? Ex: [facility], [facility], [facility], [facility], etc.)       Name of Insurance Co: [employer]  Insurance ID# [id number]  Insurance Phone # [phone]  If ins is primary or secondary? Per pt, this is what will pay for appts  If patient is a minor, parents information such as Name, D.O.B of guarantor.  "

## 2024-02-16 ENCOUNTER — TELEPHONE (OUTPATIENT)
Age: 54
End: 2024-02-16

## 2024-02-16 DIAGNOSIS — E06.3 HASHIMOTO'S DISEASE: Primary | ICD-10-CM

## 2024-02-16 LAB
FSH SERPL-ACNC: 40.2 MIU/ML
T4 FREE SERPL-MCNC: 0.81 NG/DL (ref 0.61–1.12)

## 2024-02-16 NOTE — TELEPHONE ENCOUNTER
----- Message from KAYLA Main sent at 2/16/2024  9:36 AM EST -----  Can you see if the lab will report the T4 from yesterday also add in a FSH. Pretty please

## 2024-02-19 DIAGNOSIS — F43.10 POST TRAUMATIC STRESS DISORDER (PTSD): ICD-10-CM

## 2024-02-19 DIAGNOSIS — E06.3 HASHIMOTO'S DISEASE: Primary | ICD-10-CM

## 2024-02-19 DIAGNOSIS — F33.1 MAJOR DEPRESSIVE DISORDER, RECURRENT, MODERATE (HCC): ICD-10-CM

## 2024-02-19 RX ORDER — VENLAFAXINE HYDROCHLORIDE 75 MG/1
75 CAPSULE, EXTENDED RELEASE ORAL DAILY
Qty: 90 CAPSULE | Refills: 0 | Status: SHIPPED | OUTPATIENT
Start: 2024-02-19

## 2024-02-23 DIAGNOSIS — G47.00 INSOMNIA, UNSPECIFIED TYPE: ICD-10-CM

## 2024-02-23 RX ORDER — CLONAZEPAM 0.5 MG/1
0.5 TABLET ORAL
Qty: 30 TABLET | Refills: 0 | Status: SHIPPED | OUTPATIENT
Start: 2024-02-23

## 2024-02-28 ENCOUNTER — HOSPITAL ENCOUNTER (OUTPATIENT)
Dept: ULTRASOUND IMAGING | Facility: HOSPITAL | Age: 54
Discharge: HOME | End: 2024-02-28
Payer: COMMERCIAL

## 2024-02-28 DIAGNOSIS — E04.1 THYROID NODULE: ICD-10-CM

## 2024-02-28 DIAGNOSIS — E06.3 HASHIMOTO'S DISEASE: ICD-10-CM

## 2024-02-28 DIAGNOSIS — R63.5 WEIGHT GAIN: ICD-10-CM

## 2024-02-28 DIAGNOSIS — R53.83 OTHER FATIGUE: ICD-10-CM

## 2024-02-28 PROCEDURE — 76536 US EXAM OF HEAD AND NECK: CPT

## 2024-03-05 DIAGNOSIS — E03.9 HYPOTHYROIDISM, UNSPECIFIED TYPE: ICD-10-CM

## 2024-03-05 RX ORDER — LEVOTHYROXINE SODIUM 0.07 MG/1
75 TABLET ORAL DAILY
Qty: 90 TABLET | Refills: 3 | Status: SHIPPED | OUTPATIENT
Start: 2024-03-05

## 2024-03-06 DIAGNOSIS — E78.49 OTHER HYPERLIPIDEMIA: ICD-10-CM

## 2024-03-06 RX ORDER — ATORVASTATIN CALCIUM 20 MG/1
20 TABLET, FILM COATED ORAL DAILY
Qty: 90 TABLET | Refills: 3 | Status: SHIPPED | OUTPATIENT
Start: 2024-03-06 | End: 2024-03-07 | Stop reason: SDUPTHER

## 2024-03-07 DIAGNOSIS — E78.49 OTHER HYPERLIPIDEMIA: ICD-10-CM

## 2024-03-07 NOTE — TELEPHONE ENCOUNTER
----- Message from Mindy Truong sent at 3/7/2024 10:43 AM EST -----  Regarding: Refill  Contact: [phone]  hello,     I need the following medication  send to the pharmacy:  [facility] - [place], [place] - [address] [place] [address] [[phone]. Local pharmacy is unable to fulfill 90 count.    90 count; Atorvastatin 20 mg tablet.

## 2024-03-08 ENCOUNTER — TELEPHONE (OUTPATIENT)
Age: 54
End: 2024-03-08

## 2024-03-08 RX ORDER — ATORVASTATIN CALCIUM 20 MG/1
20 TABLET, FILM COATED ORAL DAILY
Qty: 90 TABLET | Refills: 3 | Status: SHIPPED | OUTPATIENT
Start: 2024-03-08 | End: 2024-04-07

## 2024-03-08 NOTE — TELEPHONE ENCOUNTER
Mindy called in to schedule an appt with rheum advise the patient she will need a referral  . Provide with pt with our fax #

## 2024-03-14 ENCOUNTER — TELEPHONE (OUTPATIENT)
Dept: PSYCHIATRY | Facility: CLINIC | Age: 54
End: 2024-03-14

## 2024-03-14 NOTE — TELEPHONE ENCOUNTER
Writer called [facility] to get approval for patient to come to the [place] office. There was a long wait so we left a call back number to have an agent call when our turn became available.

## 2024-03-15 ENCOUNTER — OFFICE VISIT (OUTPATIENT)
Dept: PSYCHIATRY | Facility: CLINIC | Age: 54
End: 2024-03-15
Payer: COMMERCIAL

## 2024-03-15 DIAGNOSIS — F41.1 GAD (GENERALIZED ANXIETY DISORDER): ICD-10-CM

## 2024-03-15 DIAGNOSIS — F33.1 MAJOR DEPRESSIVE DISORDER, RECURRENT, MODERATE (HCC): Primary | ICD-10-CM

## 2024-03-15 DIAGNOSIS — F43.10 POST TRAUMATIC STRESS DISORDER (PTSD): ICD-10-CM

## 2024-03-15 PROCEDURE — 90792 PSYCH DIAG EVAL W/MED SRVCS: CPT | Performed by: PHYSICIAN ASSISTANT

## 2024-03-15 RX ORDER — CLONAZEPAM 0.5 MG/1
TABLET ORAL
Qty: 60 TABLET | Refills: 0 | Status: SHIPPED | OUTPATIENT
Start: 2024-03-15

## 2024-03-15 RX ORDER — CLONIDINE HYDROCHLORIDE 0.1 MG/1
0.1 TABLET ORAL EVERY 12 HOURS SCHEDULED
Qty: 30 TABLET | Refills: 1 | Status: SHIPPED | OUTPATIENT
Start: 2024-03-15 | End: 2024-03-21 | Stop reason: SDUPTHER

## 2024-03-18 ENCOUNTER — TELEPHONE (OUTPATIENT)
Dept: PSYCHIATRY | Facility: CLINIC | Age: 54
End: 2024-03-18
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
## 2024-03-18 NOTE — TELEPHONE ENCOUNTER
Spoke with pt regarding her [place]  ins. According to the [place]  ins her coverage should be primary and secondary is the [address] [place]. Patient said in ins on file is her spouses and [place] should pay. Pt is calling them and will let us know

## 2024-03-21 DIAGNOSIS — F43.10 POST TRAUMATIC STRESS DISORDER (PTSD): ICD-10-CM

## 2024-03-21 DIAGNOSIS — F41.1 GAD (GENERALIZED ANXIETY DISORDER): ICD-10-CM

## 2024-03-21 DIAGNOSIS — I10 ESSENTIAL HYPERTENSION: Primary | ICD-10-CM

## 2024-03-21 RX ORDER — CLONIDINE HYDROCHLORIDE 0.1 MG/1
0.1 TABLET ORAL
Qty: 16 TABLET | Refills: 1 | Status: SHIPPED | OUTPATIENT
Start: 2024-03-21

## 2024-03-21 RX ORDER — LISINOPRIL AND HYDROCHLOROTHIAZIDE 12.5; 1 MG/1; MG/1
1 TABLET ORAL DAILY
Qty: 30 TABLET | Refills: 5 | Status: SHIPPED | OUTPATIENT
Start: 2024-03-21

## 2024-03-21 NOTE — TELEPHONE ENCOUNTER
Patient requesting this medication for travel, as per the patient she was prescribed this medication in the past because she swells up whenever she goes overseas to tropical places. Patient requesting the script be sent to [facility] in [place], please advise

## 2024-03-21 NOTE — TELEPHONE ENCOUNTER
Patient is going out of the country and will not have enough until she gets back, she is asking for 16 tablet.      Medication Refill Request     Name of Medication  cloNIDine (Catapres) 0.1 mg tablet   Dose/Frequency      Take 1 tablet (0.1 mg total) by mouth every 12 (twelve) hours     Quantity 16  Verified pharmacy   [x]  Verified ordering Provider   [x]  Does patient have enough for the next 3 days? Yes [x] No []  Does patient have a follow-up appointment scheduled? Yes [x] No []   If so when is appointment: 04/29/24 @ 10 am   No

## 2024-04-03 ENCOUNTER — TELEPHONE (OUTPATIENT)
Dept: PSYCHIATRY | Facility: CLINIC | Age: 54
End: 2024-04-03

## 2024-04-03 NOTE — TELEPHONE ENCOUNTER
Received HERMILA for Mindy Truong's request for records     [x] HERMILA scanned into patient chart.    [] Completed letter/form faxed to:  Facility/Attn: [facility]  Fax #: [phone]  Parent/Guardian and/or Mindy Truong informed.    [x] Paperwork scanned into patient chart.    [] Completed letter/form ready for pick-up.

## 2024-04-11 ENCOUNTER — TELEPHONE (OUTPATIENT)
Dept: PSYCHIATRY | Facility: CLINIC | Age: 54
End: 2024-04-11

## 2024-04-18 DIAGNOSIS — I10 ESSENTIAL HYPERTENSION: ICD-10-CM

## 2024-04-18 RX ORDER — LISINOPRIL AND HYDROCHLOROTHIAZIDE 12.5; 1 MG/1; MG/1
1 TABLET ORAL DAILY
Qty: 90 TABLET | Refills: 3 | Status: SHIPPED | OUTPATIENT
Start: 2024-04-18

## 2024-04-24 ENCOUNTER — TELEPHONE (OUTPATIENT)
Age: 54
End: 2024-04-24

## 2024-04-24 ENCOUNTER — APPOINTMENT (OUTPATIENT)
Dept: LAB | Facility: HOSPITAL | Age: 54
End: 2024-04-24
Payer: COMMERCIAL

## 2024-04-24 DIAGNOSIS — E06.3 HASHIMOTO'S DISEASE: ICD-10-CM

## 2024-04-24 LAB
T3 SERPL-MCNC: 1 NG/ML
T4 FREE SERPL-MCNC: 0.72 NG/DL (ref 0.61–1.12)
TSH SERPL DL<=0.05 MIU/L-ACNC: 0.22 UIU/ML (ref 0.45–4.5)

## 2024-04-24 PROCEDURE — 84443 ASSAY THYROID STIM HORMONE: CPT

## 2024-04-24 PROCEDURE — 84480 ASSAY TRIIODOTHYRONINE (T3): CPT

## 2024-04-24 PROCEDURE — 36415 COLL VENOUS BLD VENIPUNCTURE: CPT

## 2024-04-24 PROCEDURE — 84439 ASSAY OF FREE THYROXINE: CPT

## 2024-04-24 NOTE — TELEPHONE ENCOUNTER
Pt is at a [facility]'s lab getting blood work done.  She is requesting to have an A1C done due to the fact she is on new diabetes meds.  Can a script be put into the system?  Please call patient and let her know if this can be done.

## 2024-04-26 ENCOUNTER — TELEMEDICINE (OUTPATIENT)
Dept: PSYCHIATRY | Facility: CLINIC | Age: 54
End: 2024-04-26

## 2024-04-26 DIAGNOSIS — Z91.199 NO-SHOW FOR APPOINTMENT: Primary | ICD-10-CM

## 2024-04-26 NOTE — PSYCH
No Call. No Show. No Charge    Mindy Truong no showed 04/26/24 appointment , staff called and left message to reschedule appointment     Treatment Plan not due at this session.

## 2024-04-29 ENCOUNTER — TELEPHONE (OUTPATIENT)
Dept: PSYCHIATRY | Facility: CLINIC | Age: 54
End: 2024-04-29

## 2024-04-29 ENCOUNTER — TELEMEDICINE (OUTPATIENT)
Dept: PSYCHIATRY | Facility: CLINIC | Age: 54
End: 2024-04-29
Payer: COMMERCIAL

## 2024-04-29 DIAGNOSIS — E06.3 HASHIMOTO'S DISEASE: ICD-10-CM

## 2024-04-29 DIAGNOSIS — F41.1 GAD (GENERALIZED ANXIETY DISORDER): Primary | ICD-10-CM

## 2024-04-29 DIAGNOSIS — R76.8 ANA POSITIVE: Primary | ICD-10-CM

## 2024-04-29 DIAGNOSIS — F33.1 MAJOR DEPRESSIVE DISORDER, RECURRENT, MODERATE (HCC): ICD-10-CM

## 2024-04-29 DIAGNOSIS — F43.10 POST TRAUMATIC STRESS DISORDER (PTSD): ICD-10-CM

## 2024-04-29 PROCEDURE — 99214 OFFICE O/P EST MOD 30 MIN: CPT | Performed by: PHYSICIAN ASSISTANT

## 2024-04-29 RX ORDER — CLONIDINE HYDROCHLORIDE 0.1 MG/1
0.1 TABLET ORAL
Qty: 90 TABLET | Refills: 1 | Status: SHIPPED | OUTPATIENT
Start: 2024-04-29

## 2024-04-29 NOTE — PSYCH
This note was not shared with the patient due to reasonable likelihood of causing patient harm    Virtual Regular Visit    Visit Date: 04/29/24     Verification of patient location:    Patient is located at Home in the following state in which I hold an active license [place]    Problem List Items Addressed This Visit       Post traumatic stress disorder (PTSD)    Relevant Medications    cloNIDine (Catapres) 0.1 mg tablet    Major depressive disorder, recurrent, moderate (HCC)     Other Visit Diagnoses       JUHI (generalized anxiety disorder)    -  Primary    Relevant Medications    cloNIDine (Catapres) 0.1 mg tablet          Reason for visit is   Chief Complaint   Patient presents with    Follow-up    Medication Management     Encounter provider Garima Rea PA-C    Provider located at [address]  [facility] [address] [phone]    Recent Visits  No visits were found meeting these conditions.  Showing recent visits within past 7 days and meeting all other requirements  Today's Visits  Date Type Provider Dept   04/29/24 Telemedicine Garima Rea PA-C  [facility]   Showing today's visits and meeting all other requirements  Future Appointments  No visits were found meeting these conditions.  Showing future appointments within next 150 days and meeting all other requirements       The patient was identified by name and date of birth. Mindy RADHA Truong was informed that this is a telemedicine visit and that the visit is being conducted throughthe [employer] platform. She agrees to proceed.  My office door was closed. No one else was in the room. She acknowledged consent and understanding of privacy and security of the video platform. The patient has agreed to participate and understands they can discontinue the visit at any time.    Patient is aware this is a billable service.        SUBJECTIVE:    Mindy Truong is a drake 53 y.o. female with a history of Major Depressive Disorder, Generalized Anxiety Disorder, and PTSD who presents today for follow-up and medication management. Since her last visit she was able to start the clonidine. This has helped reduced the frequency of nightmares and helped with the anxiety and sleep as well. She tolerates this well. She notes that when she was also taking another medication (lisinopril-HCTZ) she did have some lightheaded episodes but that she is actually allergic to this and when she stopped taking it the lightheaded episodes stopped. [place] and feels this was rejuvenating. She was supposed to have a virtual therapist appointment last Friday but due to a death in the family she was unable to make it and is looking to reschedule.     She denies any suicidal ideation, intent or plan at present, denies any homicidal ideation, intent or plan at present.  She denies any auditory hallucinations, denies any visual hallucinations, denies any delusions.  She denies any side effects from current psychiatric medications.    HPI ROS Appetite Changes and Sleep: normal appetite, normal energy level, and normal number of sleep hours    Review Of Systems:     Mood Normal   Behavior Normal    Thought Content Normal   General Normal    Personality Normal   Other Psych Symptoms Normal   Constitutional As Noted in HPI   ENT As Noted in HPI   Cardiovascular As Noted in HPI   Respiratory As Noted in HPI   Gastrointestinal As Noted in HPI   Genitourinary As Noted in HPI   Musculoskeletal As Noted in HPI   Integumentary As Noted in HPI   Neurological As Noted in HPI   Endocrine Normal    Other Symptoms Normal        Substance Abuse History:    Social History     Substance and Sexual Activity   Drug Use No       Family Psychiatric History:     Family History   Problem Relation Age of Onset    Hyperlipidemia Mother     Lupus  Mother     Hypertension Mother     Anxiety disorder Mother     Psychiatric Illness Mother         unknown-lost memory for six months    Depression Father     Cervical cancer Paternal Grandmother     Ovarian cancer Paternal Grandmother 66    Uterine cancer Paternal Grandmother     No Known Problems Half-Sister     No Known Problems Half-Brother     No Known Problems Half-Brother     No Known Problems Half-Sister     No Known Problems Half-Sister     No Known Problems Maternal Aunt     No Known Problems Maternal Aunt     No Known Problems Maternal Aunt     No Known Problems Maternal Aunt     No Known Problems Paternal Aunt     No Known Problems Paternal Aunt     Bipolar disorder Cousin     Schizoaffective Disorder  Cousin     Schizophrenia Cousin     Self-Injury Cousin     Suicide Attempts Cousin     Psychosis Maternal Uncle         need his life    Schizoaffective Disorder  Maternal Uncle     Schizophrenia Maternal Uncle     Self-Injury Maternal Uncle     Suicide Attempts Maternal Uncle     Breast cancer Neg Hx     Colon cancer Neg Hx     Seizures Neg Hx        Social History     Socioeconomic History    Marital status: /Civil Union     Spouse name: Not on file    Number of children: Not on file    Years of education: Not on file    Highest education level: Not on file   Occupational History    Not on file   Tobacco Use    Smoking status: Never    Smokeless tobacco: Never    Tobacco comments:     na   Vaping Use    Vaping status: Never Used   Substance and Sexual Activity    Alcohol use: Yes     Alcohol/week: 2.0 standard drinks of alcohol     Types: 2 Glasses of wine per week     Comment: socially    Drug use: No    Sexual activity: Not Currently     Partners: Female     Birth control/protection: None     Comment: JACKELIN   Other Topics Concern    Not on file   Social History Narrative    Lives [place], with her spouse.      Social Determinants of Health     Financial Resource Strain: Not on file   Food  Insecurity: Not on file   Transportation Needs: Not on file   Physical Activity: Unknown (11/15/2022)    Exercise Vital Sign     Days of Exercise per Week: 0 days     Minutes of Exercise per Session: Not on file   Stress: No Stress Concern Present (9/1/2020)    [employer] - Occupational Stress Questionnaire     Feeling of Stress : Not at all   Social Connections: Not on file   Intimate Partner Violence: Not on file   Housing Stability: Not on file       Past Medical History:   Diagnosis Date    Anxiety     Asthma     Depression     Disease of thyroid gland     Dizziness     Forgetfulness     Hashimoto's disease     Hashimoto's disease     Headache(784.0) 2002    History of fainting as a child     Hyperlipidemia     Hypertension     Numbness and tingling     MIRANDA (obstructive sleep apnea)     Post traumatic stress disorder 02/28/2019    Varicella        Past Surgical History:   Procedure Laterality Date    BREAST BIOPSY Left 2020    BREAST SURGERY Bilateral 2002    reduction    COLONOSCOPY      HYSTERECTOMY  2005    HI COLONOSCOPY FLX DX W/COLLJ SPEC WHEN PFRMD N/A 10/16/2018    Procedure: EGD AND COLONOSCOPY;  Surgeon: Bunny Ruvalcaba MD;  Location: [facility];  Service: Gastroenterology    HI EXCISION RADIAL HEAD Left 10/09/2020    Procedure: RADIAL HEAD IMPLANT ARTHROPLASTY ;  Surgeon: Zackary Craig MD;  Location: [facility];  Service: Orthopedics    REDUCTION MAMMAPLASTY Bilateral 1997    REDUCTION MAMMAPLASTY Bilateral 2002    REDUCTION MAMMAPLASTY Bilateral 2020    SINUS SURGERY      TOTAL ABDOMINAL HYSTERECTOMY         Current Outpatient Medications   Medication Sig Dispense Refill    albuterol (2.5 mg/3 mL) 0.083 % nebulizer solution Take 3 mL (2.5 mg total) by nebulization every 6 (six) hours as needed for wheezing or shortness of breath 180 mL 0    albuterol (PROVENTIL HFA,VENTOLIN HFA) 90 mcg/act inhaler Inhale 2 puffs 4 (four) times a day 6.7 g 6    amLODIPine (NORVASC) 2.5  mg tablet Take 1 tablet (2.5 mg total) by mouth daily 90 tablet 0    clonazePAM (KlonoPIN) 0.5 mg tablet Take 1 tablet (0.5 mg total) by mouth daily at bedtime. May also take 1 tablet (0.5 mg total) daily as needed for anxiety. 60 tablet 0    cloNIDine (Catapres) 0.1 mg tablet Take 1 tablet (0.1 mg total) by mouth daily at bedtime 90 tablet 1    fluticasone (FLONASE) 50 mcg/act nasal spray 2 sprays into each nostril daily 16 g 0    levothyroxine 75 mcg tablet Take 1 tablet (75 mcg total) by mouth daily 90 tablet 3    metFORMIN (GLUCOPHAGE) 500 mg tablet Take 500 mg by mouth 2 (two) times a day with meals      mirtazapine (REMERON) 7.5 MG tablet TAKE 1 TABLET (7.5 MG TOTAL) BY MOUTH DAILY AT BEDTIME 90 tablet 3    rizatriptan (MAXALT-MLT) 10 mg disintegrating tablet Take 1 tablet (10 mg total) by mouth once as needed for migraine for up to 1 dose May repeat in 2 hours if needed 9 tablet 0    venlafaxine (EFFEXOR-XR) 150 mg 24 hr capsule TAKE 1 CAPSULE (150 MG TOTAL) BY MOUTH DAILY WITH 75 MG CAPSULE (225 MG TOTAL DAILY) 90 capsule 3    venlafaxine (EFFEXOR-XR) 75 mg 24 hr capsule Take 1 capsule (75 mg total) by mouth daily with 150 mg capsule (225 mg total daily) 90 capsule 0    atorvastatin (LIPITOR) 20 mg tablet Take 1 tablet (20 mg total) by mouth daily 90 tablet 3     No current facility-administered medications for this visit.        Allergies   Allergen Reactions    Lisinopril-Hydrochlorothiazide Hives    Other Hives     States she has no allergies       The following portions of the patient's history were reviewed and updated as appropriate: allergies, current medications, past family history, past medical history, past social history, past surgical history, and problem list.    OBJECTIVE:     Mental Status Evaluation:  Appearance:  casually dressed, dressed appropriately, adequate grooming, looks stated age   Behavior:  pleasant, cooperative, interacts appropriately with this writer   Speech:  normal rate,  normal volume, normal pitch   Mood:  improved   Affect:  brighter   Thought Process:  organized, logical, coherent   Associations: intact associations   Thought Content:  no overt delusions, no paranoia noted on exam   Perceptual Disturbances: no auditory hallucinations, no visual hallucinations   Risk Potential: Suicidal ideation - None  Homicidal ideation - None  Potential for aggression - No   Sensorium:  oriented to person, place, and time/date   Memory:  recent and remote memory grossly intact   Consciousness:  alert and awake   Attention/Concentration: attention span and concentration are age appropriate   Insight:  age appropriate   Judgment: age appropriate   Gait/Station: unable to assess today due to virtual visit   Motor Activity: unable to assess today due to virtual visit     Laboratory Results: No results found for this or any previous visit.    Assessment/Plan:     Since her last visit she has been taking the clonidine which has reduced the amount of anxiety and nightmares she was experiencing. She is tolerating it well. I have advised continuing her medications unchanged at this time. She is due to start therapy soon. We have discussed their safety plan and pt agrees that if they experience unsafe thoughts that they will reach out to their supports including this office, the suicide hotline, and emergency services if necessary. Patient is aware of non-emergent and emergent mental health resources. She is able to contract for their own safety at this time.    Will follow up in 3 months. Patient is aware to call the office if questions or concerns arise sooner.       Diagnoses and all orders for this visit:    JUHI (generalized anxiety disorder)  -     cloNIDine (Catapres) 0.1 mg tablet; Take 1 tablet (0.1 mg total) by mouth daily at bedtime    Post traumatic stress disorder (PTSD)  -     cloNIDine (Catapres) 0.1 mg tablet; Take 1 tablet (0.1 mg total) by mouth daily at bedtime    Major depressive  disorder, recurrent, moderate (HCC)          Treatment Recommendations/Precautions:    Continue current medications:     - venlafaxine 225 mg qd     - mirtazapine 7.5 mg qhs     - clonazepam 0.5 mg qhs     - clonidine 0.1 mg qhs    Risks/Benefits      Risks, Benefits And Possible Side Effects Of Medications:  Risks, benefits, and possible side effects of medications explained to patient and patient verbalizes understanding    Controlled Medication Discussion: Discussed with patient the risks of sedation, respiratory depression, impairment of ability to drive and potential for abuse and addiction related to treatment with benzodiazepine medications. The patient understands risk of treatment with benzodiazepine medications, agrees to not drive if feels impaired and agrees to take medications as prescribed. and The patient has been filling controlled prescriptions on time as prescribed to [place] Prescription Drug Monitoring program.      Psychotherapy Provided:     Individual psychotherapy provided: No    Visit Start Time:  10:00 AM  Visit End Time:  10:15 AM  Total Visit Duration: 15 minutes     Treatment Plan not complete within time limits due to: Not done within 30 days of initial visit due to; virtual visit, pt preferred to do in person next visit.     Garima Rea PA-C 04/29/24      VIRTUAL VISIT DISCLAIMER    Mindy Truong verbally agrees to participate in Virtual Care Services. Pt is aware that Virtual Care Services could be limited without vital signs or the ability to perform a full hands-on physical exam. Mindy Truong understands she or the provider may request at any time to terminate the video visit and request the patient to seek care or treatment in person.

## 2024-04-29 NOTE — TELEPHONE ENCOUNTER
NO-SHOW LETTER MAILED TO Mindy Truong ON 4/30/2024.    ADDRESS: [address] [place] [id number]

## 2024-04-30 DIAGNOSIS — Z00.6 ENCOUNTER FOR EXAMINATION FOR NORMAL COMPARISON OR CONTROL IN CLINICAL RESEARCH PROGRAM: ICD-10-CM

## 2024-05-02 ENCOUNTER — TELEMEDICINE (OUTPATIENT)
Dept: PSYCHIATRY | Facility: CLINIC | Age: 54
End: 2024-05-02

## 2024-05-02 DIAGNOSIS — F33.1 MAJOR DEPRESSIVE DISORDER, RECURRENT, MODERATE (HCC): ICD-10-CM

## 2024-05-02 DIAGNOSIS — F41.1 GAD (GENERALIZED ANXIETY DISORDER): ICD-10-CM

## 2024-05-02 DIAGNOSIS — F43.10 POST TRAUMATIC STRESS DISORDER (PTSD): Primary | ICD-10-CM
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
## 2024-05-07 ENCOUNTER — TELEPHONE (OUTPATIENT)
Age: 54
End: 2024-05-07

## 2024-05-07 NOTE — TELEPHONE ENCOUNTER
Patient called stating she is having discomfort when urinating, urine smells and cramping, she denies fever, scheduled appt for tomw advised if she gets any new or worsening symptoms go to [facility] or [facility].

## 2024-05-08 ENCOUNTER — OFFICE VISIT (OUTPATIENT)
Age: 54
End: 2024-05-08
Payer: COMMERCIAL

## 2024-05-08 ENCOUNTER — APPOINTMENT (OUTPATIENT)
Dept: LAB | Facility: CLINIC | Age: 54
End: 2024-05-08
Payer: COMMERCIAL

## 2024-05-08 VITALS
WEIGHT: 169 LBS | HEART RATE: 72 BPM | OXYGEN SATURATION: 99 % | SYSTOLIC BLOOD PRESSURE: 120 MMHG | BODY MASS INDEX: 29.95 KG/M2 | DIASTOLIC BLOOD PRESSURE: 70 MMHG | HEIGHT: 63 IN

## 2024-05-08 DIAGNOSIS — R30.0 DYSURIA: ICD-10-CM

## 2024-05-08 DIAGNOSIS — Z00.6 ENCOUNTER FOR EXAMINATION FOR NORMAL COMPARISON OR CONTROL IN CLINICAL RESEARCH PROGRAM: ICD-10-CM

## 2024-05-08 DIAGNOSIS — R30.0 DYSURIA: Primary | ICD-10-CM

## 2024-05-08 LAB
BACTERIA UR QL AUTO: ABNORMAL /HPF
BILIRUB UR QL STRIP: NEGATIVE
CLARITY UR: ABNORMAL
COLOR UR: ABNORMAL
GLUCOSE UR STRIP-MCNC: NEGATIVE MG/DL
HGB UR QL STRIP.AUTO: ABNORMAL
HYALINE CASTS #/AREA URNS LPF: ABNORMAL /LPF
KETONES UR STRIP-MCNC: NEGATIVE MG/DL
LEUKOCYTE ESTERASE UR QL STRIP: ABNORMAL
MUCOUS THREADS UR QL AUTO: ABNORMAL
NITRITE UR QL STRIP: POSITIVE
NON-SQ EPI CELLS URNS QL MICRO: ABNORMAL /HPF
PH UR STRIP.AUTO: 6 [PH]
PROT UR STRIP-MCNC: ABNORMAL MG/DL
RBC #/AREA URNS AUTO: ABNORMAL /HPF
RENAL EPI CELLS #/AREA URNS HPF: PRESENT /[HPF]
SP GR UR STRIP.AUTO: >=1.03 (ref 1–1.03)
TRANS CELLS #/AREA URNS HPF: PRESENT /[HPF]
UROBILINOGEN UR STRIP-ACNC: <2 MG/DL
WBC #/AREA URNS AUTO: ABNORMAL /HPF

## 2024-05-08 PROCEDURE — 36415 COLL VENOUS BLD VENIPUNCTURE: CPT

## 2024-05-08 PROCEDURE — 81001 URINALYSIS AUTO W/SCOPE: CPT

## 2024-05-08 PROCEDURE — 87086 URINE CULTURE/COLONY COUNT: CPT

## 2024-05-08 PROCEDURE — 87077 CULTURE AEROBIC IDENTIFY: CPT

## 2024-05-08 PROCEDURE — 87186 SC STD MICRODIL/AGAR DIL: CPT

## 2024-05-08 PROCEDURE — 99213 OFFICE O/P EST LOW 20 MIN: CPT

## 2024-05-08 RX ORDER — CEPHALEXIN 500 MG/1
500 CAPSULE ORAL 2 TIMES DAILY
Qty: 14 CAPSULE | Refills: 0 | Status: SHIPPED | OUTPATIENT
Start: 2024-05-08 | End: 2024-05-15

## 2024-05-08 NOTE — PROGRESS NOTES
INTERNAL MEDICINE FOLLOW-UP VISIT  [facility] - [facility] INTERNAL MEDICINE [address]    NAME: Mindy Truong  AGE: 53 y.o. SEX: female  : 1970     DATE: 2024     Assessment and Plan:   1. Dysuria  Will obtain UA and empirically treat UTI with Keflex 500 mg twice a day.  Discussed increasing fluid intake, drinking cranberry juice, and using Azo as needed for dysuria.  If you begin to experience any fevers greater than 103, chills, flank pain, worsening dysuria notify the office.  - UA w Reflex to Microscopic w Reflex to Culture; Future  - cephalexin (KEFLEX) 500 mg capsule; Take 1 capsule (500 mg total) by mouth 2 (two) times a day for 7 days  Dispense: 14 capsule; Refill: 0        No follow-ups on file.       Chief Complaint:     Chief Complaint   Patient presents with    Urinary Tract Infection      History of Present Illness:   Patient is a 53-year-old female that presents today for dysuria.  This started yesterday.  She admits to foul-smelling urine, dysuria, pelvic pain, and urinary frequency.  She denies any fevers, chills, flank pain, hematuria, nausea, vomiting, abdominal pain urgency.  She has been drinking lots of water and cranberry juice.  [place]    The following portions of the patient's history were reviewed and updated as appropriate: allergies, current medications, past family history, past medical history, past social history, past surgical history and problem list.     Review of Systems:     Review of Systems   Constitutional:  Negative for chills and fever.   Gastrointestinal:  Negative for abdominal pain, nausea and vomiting.   Genitourinary:  Positive for dysuria, frequency and pelvic pain. Negative for difficulty urinating, flank pain, hematuria, urgency, vaginal bleeding, vaginal discharge and vaginal pain.   Neurological:  Negative for dizziness, light-headedness and headaches.        Past Medical History:     Past Medical  History:   Diagnosis Date    Anxiety     Asthma     Depression     Disease of thyroid gland     Dizziness     Forgetfulness     Hashimoto's disease     Hashimoto's disease     Headache(784.0) 2002    History of fainting as a child     Hyperlipidemia     Hypertension     Numbness and tingling     MIRANDA (obstructive sleep apnea)     Post traumatic stress disorder 02/28/2019    Varicella         Current Medications:     Current Outpatient Medications:     albuterol (2.5 mg/3 mL) 0.083 % nebulizer solution, Take 3 mL (2.5 mg total) by nebulization every 6 (six) hours as needed for wheezing or shortness of breath, Disp: 180 mL, Rfl: 0    albuterol (PROVENTIL HFA,VENTOLIN HFA) 90 mcg/act inhaler, Inhale 2 puffs 4 (four) times a day, Disp: 6.7 g, Rfl: 6    amLODIPine (NORVASC) 2.5 mg tablet, Take 1 tablet (2.5 mg total) by mouth daily, Disp: 90 tablet, Rfl: 0    clonazePAM (KlonoPIN) 0.5 mg tablet, Take 1 tablet (0.5 mg total) by mouth daily at bedtime. May also take 1 tablet (0.5 mg total) daily as needed for anxiety., Disp: 60 tablet, Rfl: 0    cloNIDine (Catapres) 0.1 mg tablet, Take 1 tablet (0.1 mg total) by mouth daily at bedtime, Disp: 90 tablet, Rfl: 1    fluticasone (FLONASE) 50 mcg/act nasal spray, 2 sprays into each nostril daily, Disp: 16 g, Rfl: 0    levothyroxine 75 mcg tablet, Take 1 tablet (75 mcg total) by mouth daily, Disp: 90 tablet, Rfl: 3    metFORMIN (GLUCOPHAGE) 500 mg tablet, Take 500 mg by mouth 2 (two) times a day with meals, Disp: , Rfl:     mirtazapine (REMERON) 7.5 MG tablet, TAKE 1 TABLET (7.5 MG TOTAL) BY MOUTH DAILY AT BEDTIME, Disp: 90 tablet, Rfl: 3    rizatriptan (MAXALT-MLT) 10 mg disintegrating tablet, Take 1 tablet (10 mg total) by mouth once as needed for migraine for up to 1 dose May repeat in 2 hours if needed, Disp: 9 tablet, Rfl: 0    venlafaxine (EFFEXOR-XR) 150 mg 24 hr capsule, TAKE 1 CAPSULE (150 MG TOTAL) BY MOUTH DAILY WITH 75 MG CAPSULE (225 MG TOTAL DAILY), Disp: 90 capsule,  "Rfl: 3    venlafaxine (EFFEXOR-XR) 75 mg 24 hr capsule, Take 1 capsule (75 mg total) by mouth daily with 150 mg capsule (225 mg total daily), Disp: 90 capsule, Rfl: 0    atorvastatin (LIPITOR) 20 mg tablet, Take 1 tablet (20 mg total) by mouth daily, Disp: 90 tablet, Rfl: 3     Allergies:     Allergies   Allergen Reactions    Lisinopril-Hydrochlorothiazide Hives    Other Hives     States she has no allergies        Physical Exam:     /70   Pulse 72   Ht 5' 3\" (1.6 m)   Wt 76.7 kg (169 lb)   SpO2 99%   BMI 29.94 kg/m²     Physical Exam  Vitals and nursing note reviewed.   Constitutional:       General: She is awake. She is not in acute distress.     Appearance: Normal appearance. She is well-developed, well-groomed and overweight.   HENT:      Head: Normocephalic and atraumatic.      Right Ear: Hearing and external ear normal.      Left Ear: Hearing and external ear normal.      Nose: Nose normal.      Mouth/Throat:      Lips: Pink.      Mouth: Mucous membranes are moist.   Eyes:      General: Lids are normal. Vision grossly intact. Gaze aligned appropriately.      Conjunctiva/sclera: Conjunctivae normal.   Neck:      Vascular: No carotid bruit.      Trachea: Trachea and phonation normal.   Cardiovascular:      Rate and Rhythm: Normal rate and regular rhythm.      Heart sounds: Normal heart sounds, S1 normal and S2 normal. No murmur heard.     No friction rub. No gallop.   Pulmonary:      Effort: Pulmonary effort is normal. No respiratory distress.      Breath sounds: Normal breath sounds and air entry. No decreased breath sounds, wheezing, rhonchi or rales.   Abdominal:      General: Abdomen is flat.      Palpations: Abdomen is soft.      Tenderness: There is no right CVA tenderness or left CVA tenderness.   Musculoskeletal:         General: No swelling.      Cervical back: Neck supple.      Right lower leg: No edema.      Left lower leg: No edema.   Skin:     General: Skin is warm.      Capillary Refill: " Capillary refill takes less than 2 seconds.   Neurological:      Mental Status: She is alert.   Psychiatric:         Attention and Perception: Attention and perception normal.         Mood and Affect: Mood and affect normal.         Speech: Speech normal.         Behavior: Behavior normal. Behavior is cooperative.         Thought Content: Thought content normal.         Cognition and Memory: Cognition and memory normal.         Judgment: Judgment normal.           Data:     Laboratory Results: I have personally reviewed the pertinent laboratory results/reports   Radiology/Other Diagnostic Testing Results: I have personally reviewed pertinent reports.      Darling Warren PA-C  [facility] INTERNAL MEDICINE [address]

## 2024-05-10 LAB — BACTERIA UR CULT: ABNORMAL

## 2024-05-13 ENCOUNTER — CONSULT (OUTPATIENT)
Dept: RHEUMATOLOGY | Facility: CLINIC | Age: 54
End: 2024-05-13
Payer: COMMERCIAL

## 2024-05-13 VITALS
SYSTOLIC BLOOD PRESSURE: 122 MMHG | BODY MASS INDEX: 30.48 KG/M2 | WEIGHT: 172 LBS | HEIGHT: 63 IN | DIASTOLIC BLOOD PRESSURE: 76 MMHG

## 2024-05-13 DIAGNOSIS — R43.2 ALTERED TASTE: ICD-10-CM

## 2024-05-13 DIAGNOSIS — E06.3 HASHIMOTO'S DISEASE: ICD-10-CM

## 2024-05-13 DIAGNOSIS — R13.10 DYSPHAGIA, UNSPECIFIED TYPE: ICD-10-CM

## 2024-05-13 DIAGNOSIS — I73.00 RAYNAUD'S DISEASE WITHOUT GANGRENE: ICD-10-CM

## 2024-05-13 DIAGNOSIS — R76.8 ANA POSITIVE: Primary | ICD-10-CM

## 2024-05-13 DIAGNOSIS — Z11.59 NEED FOR HEPATITIS C SCREENING TEST: ICD-10-CM

## 2024-05-13 PROCEDURE — 99244 OFF/OP CNSLTJ NEW/EST MOD 40: CPT | Performed by: INTERNAL MEDICINE

## 2024-05-13 NOTE — PROGRESS NOTES
Assessment and Plan:   Ms. Truong is a 53-year-old female with history significant for Hashimoto's thyroiditis who presents for an evaluation of a positive LALITA 1:80 speckled pattern and altered taste/difficulty swallowing.  She is referred by Darling Warren PA-C for a rheumatology consult.    - Mindy presents today for an evaluation of a low titer positive LALITA that was initially detected in 2017 and has been evaluated by 2 rheumatologist thus far without concerns for a connective tissue disease.  The LALITA was thought to be secondary to her diagnosis of Hashimoto's thyroiditis.  She presents for a reevaluation of this as well as intermittent symptoms she has been experiencing over the past 2 years with dysphagia to both solids and liquids as well as altered taste/fearful anticipation of foods.  With the symptoms she is reporting I do not have a rheumatic explanation for this and recommend she follow-up with ENT.  She is not reporting any additional major complaints that would heighten my suspicion for a rheumatic disease but I will reevaluate the LALITA by completing subset serologies and determine if she needs a follow-up appointment based on this.      Plan:  Diagnoses and all orders for this visit:    LALITA positive  -     Ambulatory Referral to Rheumatology  -     Anti-DNA antibody, double-stranded; Future  -     Anti-Dalia 1 Antibody; Future  -     Anti-scleroderma antibody; Future  -     Centromere Antibody; Future  -     Histone Antibody; Future  -     Nuclear antigen antibody; Future  -     Sjogren's Antibodies; Future  -     Sedimentation rate, automated; Future  -     C-reactive protein; Future  -     C3 complement; Future  -     C4 complement; Future  -     Beta-2 glycoprotein antibodies; Future  -     Cardiolipin antibody; Future  -     Lupus anticoagulant; Future  -     Urinalysis with microscopic; Future  -     Protein / creatinine ratio, urine; Future  -     RF Screen w/ Reflex to Titer; Future  -     Cyclic  citrul peptide antibody, IgG; Future  -     CK; Future  -     Celiac Disease Panel; Future  -     Ferritin; Future    Dysphagia, unspecified type  -     Anti-DNA antibody, double-stranded; Future  -     Anti-Dalia 1 Antibody; Future  -     Anti-scleroderma antibody; Future  -     Centromere Antibody; Future  -     Histone Antibody; Future  -     Nuclear antigen antibody; Future  -     Sjogren's Antibodies; Future  -     Sedimentation rate, automated; Future  -     C-reactive protein; Future  -     C3 complement; Future  -     C4 complement; Future  -     Beta-2 glycoprotein antibodies; Future  -     Cardiolipin antibody; Future  -     Lupus anticoagulant; Future  -     Urinalysis with microscopic; Future  -     Protein / creatinine ratio, urine; Future  -     RF Screen w/ Reflex to Titer; Future  -     Cyclic citrul peptide antibody, IgG; Future  -     CK; Future  -     Celiac Disease Panel; Future  -     Ferritin; Future    Altered taste  -     Anti-DNA antibody, double-stranded; Future  -     Anti-Dalia 1 Antibody; Future  -     Anti-scleroderma antibody; Future  -     Centromere Antibody; Future  -     Histone Antibody; Future  -     Nuclear antigen antibody; Future  -     Sjogren's Antibodies; Future  -     Sedimentation rate, automated; Future  -     C-reactive protein; Future  -     C3 complement; Future  -     C4 complement; Future  -     Beta-2 glycoprotein antibodies; Future  -     Cardiolipin antibody; Future  -     Lupus anticoagulant; Future  -     Urinalysis with microscopic; Future  -     Protein / creatinine ratio, urine; Future  -     RF Screen w/ Reflex to Titer; Future  -     Cyclic citrul peptide antibody, IgG; Future  -     CK; Future  -     Celiac Disease Panel; Future  -     Ferritin; Future    Raynaud's disease without gangrene  -     Anti-DNA antibody, double-stranded; Future  -     Anti-Dalia 1 Antibody; Future  -     Anti-scleroderma antibody; Future  -     Centromere Antibody; Future  -     Histone  "Antibody; Future  -     Nuclear antigen antibody; Future  -     Sjogren's Antibodies; Future  -     Sedimentation rate, automated; Future  -     C-reactive protein; Future  -     C3 complement; Future  -     C4 complement; Future  -     Beta-2 glycoprotein antibodies; Future  -     Cardiolipin antibody; Future  -     Lupus anticoagulant; Future  -     Urinalysis with microscopic; Future  -     Protein / creatinine ratio, urine; Future  -     RF Screen w/ Reflex to Titer; Future  -     Cyclic citrul peptide antibody, IgG; Future  -     CK; Future  -     Celiac Disease Panel; Future  -     Ferritin; Future    Hashimoto's disease  -     Ambulatory Referral to Rheumatology    Need for hepatitis C screening test  -     Chronic Hepatitis Panel; Future      I have personally reviewed pertinent films in PACS of the left wrist XR which is normal.       Activities as tolerated.   Exercise: try to maintain a low impact exercise regimen as much as possible. Walk for 30 minutes a day for at least 3 days a week.   Continue other medications as prescribed by PCP and other specialists.       RTC PRN.       HPI  Ms. Truong is a 53-year-old female with history significant for Hashimoto's thyroiditis who presents for an evaluation of a positive LALITA 1:80 speckled pattern and altered taste/difficulty swallowing.  She is referred by Darling Warren PA-C for a rheumatology consult.    Patient reports for the past 2 years she has been experiencing intermittent issues with locking of the left side of her jaw, difficulty swallowing either solids or liquids where she feels like she needs to make an extra effort to swallow [at other times she will consume the same solid or liquid without any difficulty], as well as intermittently appreciating fear of certain foods where she will notice that the taste of her food \"shocks the body\".  She mentioned these symptoms to her endocrinologist and was advised that it would not be related to the " Hashimoto's thyroiditis so she was asked to schedule a rheumatology appointment.  She reports chronic issues with periodic spasms/locking of her hands and feet.  She reports chronic dry eyes and dry mouth.  She reports symptoms consistent with Raynaud's with color changes of all of her fingers to a gray/dusky discoloration with cold exposure and is associated with pain.  She reports a history of lupus and osteoporosis in her mother and her maternal grandmother and maternal aunts have rheumatoid arthritis.    She denies fevers, unintentional weight loss, alopecia, inflammatory eye disease, skin rash, psoriasis, photosensitivity, skin thickening/tightening, mouth/nose ulcers, swollen glands, pleuritic chest pain, shortness of breath, cough, GERD, inflammatory bowel disease, blood clots, miscarriages or focal joint pain/swelling/stiffness.    She was seen by rheumatology in 2017 [Dr. Nela Adams] and 2018 [Dr. Brittany Nichols] for an evaluation of the positive LALITA.  She was not diagnosed with a connective tissue disease and the LALITA was felt to be representative of the Hashimoto's thyroiditis.  She did have a low titer SCL 70 antibody of 1.7 without a diagnosis of scleroderma at that time.  Her extensive autoimmune serologies were otherwise normal.    The following portions of the patient's history were reviewed and updated as appropriate: allergies, current medications, past family history, past medical history, past social history, past surgical history and problem list.      Review of Systems  Constitutional: Negative for weight change, fevers, chills, night sweats, fatigue.  ENT/Mouth: Negative for hearing changes, ear pain, nasal congestion, sinus pain, hoarseness, sore throat, rhinorrhea.  Positive for intermittent swallowing difficulty.  Eyes: Negative for pain, redness, discharge, vision changes.   Cardiovascular: Negative for chest pain, SOB, palpitations.   Respiratory: Negative for cough, sputum, wheezing,  dyspnea.   Gastrointestinal: Negative for nausea, vomiting, diarrhea, constipation, pain, heartburn.  Genitourinary: Negative for dysuria, urinary frequency, hematuria.   Musculoskeletal: As per HPI.  Skin: Negative for skin rash.  Positive for color changes.  Neuro: Negative for weakness, numbness, tingling, loss of consciousness.   Psych: Negative for anxiety, depression.   Heme/Lymph: Negative for easy bruising, bleeding, lymphadenopathy.        Past Medical History:   Diagnosis Date    Anxiety     Asthma     Depression     Disease of thyroid gland     Dizziness     Forgetfulness     Hashimoto's disease     Hashimoto's disease     Headache(784.0) 2002    History of fainting as a child     Hyperlipidemia     Hypertension     Numbness and tingling     MIRANDA (obstructive sleep apnea)     Post traumatic stress disorder 02/28/2019    Varicella        Past Surgical History:   Procedure Laterality Date    BREAST BIOPSY Left 2020    BREAST SURGERY Bilateral 2002    reduction    COLONOSCOPY      HYSTERECTOMY  2005    CT COLONOSCOPY FLX DX W/COLLJ SPEC WHEN PFRMD N/A 10/16/2018    Procedure: EGD AND COLONOSCOPY;  Surgeon: Bunny Ruvalcaba MD;  Location: [facility];  Service: Gastroenterology    CT EXCISION RADIAL HEAD Left 10/09/2020    Procedure: RADIAL HEAD IMPLANT ARTHROPLASTY ;  Surgeon: Zackary Craig MD;  Location: [facility];  Service: Orthopedics    REDUCTION MAMMAPLASTY Bilateral 1997    REDUCTION MAMMAPLASTY Bilateral 2002    REDUCTION MAMMAPLASTY Bilateral 2020    SINUS SURGERY      TOTAL ABDOMINAL HYSTERECTOMY         Social History     Socioeconomic History    Marital status: /Civil Union     Spouse name: Not on file    Number of children: Not on file    Years of education: Not on file    Highest education level: Not on file   Occupational History    Not on file   Tobacco Use    Smoking status: Never    Smokeless tobacco: Never    Tobacco comments:     na   Vaping Use    Vaping status: Never Used    Substance and Sexual Activity    Alcohol use: Yes     Alcohol/week: 2.0 standard drinks of alcohol     Types: 2 Glasses of wine per week     Comment: socially    Drug use: No    Sexual activity: Not Currently     Partners: Female     Birth control/protection: None     Comment: JACKELIN   Other Topics Concern    Not on file   Social History Narrative    Lives [place], with her spouse.      Social Determinants of Health     Financial Resource Strain: Not on file   Food Insecurity: Not on file   Transportation Needs: Not on file   Physical Activity: Unknown (11/15/2022)    Exercise Vital Sign     Days of Exercise per Week: 0 days     Minutes of Exercise per Session: Not on file   Stress: No Stress Concern Present (9/1/2020)    [employer] - Occupational Stress Questionnaire     Feeling of Stress : Not at all   Social Connections: Not on file   Intimate Partner Violence: Not on file   Housing Stability: Not on file       Family History   Problem Relation Age of Onset    Hyperlipidemia Mother     Lupus Mother     Hypertension Mother     Anxiety disorder Mother     Psychiatric Illness Mother         unknown-lost memory for six months    Depression Father     Cervical cancer Paternal Grandmother     Ovarian cancer Paternal Grandmother 66    Uterine cancer Paternal Grandmother     No Known Problems Half-Sister     No Known Problems Half-Brother     No Known Problems Half-Brother     No Known Problems Half-Sister     No Known Problems Half-Sister     No Known Problems Maternal Aunt     No Known Problems Maternal Aunt     No Known Problems Maternal Aunt     No Known Problems Maternal Aunt     No Known Problems Paternal Aunt     No Known Problems Paternal Aunt     Bipolar disorder Cousin     Schizoaffective Disorder  Cousin     Schizophrenia Cousin     Self-Injury Cousin     Suicide Attempts Cousin     Psychosis Maternal Uncle         need his life    Schizoaffective Disorder  Maternal Uncle      Schizophrenia Maternal Uncle     Self-Injury Maternal Uncle     Suicide Attempts Maternal Uncle     Breast cancer Neg Hx     Colon cancer Neg Hx     Seizures Neg Hx        Allergies   Allergen Reactions    Lisinopril-Hydrochlorothiazide Hives    Other Hives     States she has no allergies       Current Outpatient Medications:     albuterol (2.5 mg/3 mL) 0.083 % nebulizer solution, Take 3 mL (2.5 mg total) by nebulization every 6 (six) hours as needed for wheezing or shortness of breath, Disp: 180 mL, Rfl: 0    albuterol (PROVENTIL HFA,VENTOLIN HFA) 90 mcg/act inhaler, Inhale 2 puffs 4 (four) times a day, Disp: 6.7 g, Rfl: 6    amLODIPine (NORVASC) 2.5 mg tablet, Take 1 tablet (2.5 mg total) by mouth daily, Disp: 90 tablet, Rfl: 0    cephalexin (KEFLEX) 500 mg capsule, Take 1 capsule (500 mg total) by mouth 2 (two) times a day for 7 days, Disp: 14 capsule, Rfl: 0    clonazePAM (KlonoPIN) 0.5 mg tablet, Take 1 tablet (0.5 mg total) by mouth daily at bedtime. May also take 1 tablet (0.5 mg total) daily as needed for anxiety., Disp: 60 tablet, Rfl: 0    cloNIDine (Catapres) 0.1 mg tablet, Take 1 tablet (0.1 mg total) by mouth daily at bedtime, Disp: 90 tablet, Rfl: 1    fluticasone (FLONASE) 50 mcg/act nasal spray, 2 sprays into each nostril daily, Disp: 16 g, Rfl: 0    levothyroxine 75 mcg tablet, Take 1 tablet (75 mcg total) by mouth daily, Disp: 90 tablet, Rfl: 3    metFORMIN (GLUCOPHAGE) 500 mg tablet, Take 500 mg by mouth 2 (two) times a day with meals, Disp: , Rfl:     mirtazapine (REMERON) 7.5 MG tablet, TAKE 1 TABLET (7.5 MG TOTAL) BY MOUTH DAILY AT BEDTIME, Disp: 90 tablet, Rfl: 3    rizatriptan (MAXALT-MLT) 10 mg disintegrating tablet, Take 1 tablet (10 mg total) by mouth once as needed for migraine for up to 1 dose May repeat in 2 hours if needed, Disp: 9 tablet, Rfl: 0    venlafaxine (EFFEXOR-XR) 150 mg 24 hr capsule, TAKE 1 CAPSULE (150 MG TOTAL) BY MOUTH DAILY WITH 75 MG CAPSULE (225 MG TOTAL DAILY), Disp:  "90 capsule, Rfl: 3    venlafaxine (EFFEXOR-XR) 75 mg 24 hr capsule, Take 1 capsule (75 mg total) by mouth daily with 150 mg capsule (225 mg total daily), Disp: 90 capsule, Rfl: 0    atorvastatin (LIPITOR) 20 mg tablet, Take 1 tablet (20 mg total) by mouth daily, Disp: 90 tablet, Rfl: 3      Objective:    Vitals:    05/13/24 1316   BP: 122/76   Weight: 78 kg (172 lb)   Height: 5' 3\" (1.6 m)       Physical Exam  General: Well appearing, well nourished, in no distress. Oriented x 3, normal mood and affect.  Ambulating without difficulty.  Skin: Good turgor, no rash, unusual bruising or prominent lesions.  Hair: Normal texture and distribution.  HEENT:  Head: Normocephalic, atraumatic.  Eyes: Conjunctiva clear, sclera non-icteric, EOM intact.  Extremities: No amputations or deformities, cyanosis, edema.  Neurologic: Alert and oriented. No focal neurological deficits appreciated.   Psychiatric: Normal mood and affect.       Suzanne Durand M.D.  Rheumatology  "

## 2024-05-16 ENCOUNTER — TELEMEDICINE (OUTPATIENT)
Dept: PSYCHIATRY | Facility: CLINIC | Age: 54
End: 2024-05-16
Payer: COMMERCIAL

## 2024-05-16 DIAGNOSIS — F33.1 MAJOR DEPRESSIVE DISORDER, RECURRENT, MODERATE (HCC): ICD-10-CM

## 2024-05-16 DIAGNOSIS — F43.10 POST TRAUMATIC STRESS DISORDER (PTSD): Primary | ICD-10-CM

## 2024-05-16 DIAGNOSIS — F41.1 GAD (GENERALIZED ANXIETY DISORDER): ICD-10-CM

## 2024-05-16 PROCEDURE — 90832 PSYTX W PT 30 MINUTES: CPT | Performed by: SOCIAL WORKER

## 2024-05-16 NOTE — PSYCH
DATA: Met with Mindy for scheduled individual session. Topics of discussion included mood regulation and symptoms. Client shows evidence of utilizing  medication management  to manage mental health symptoms. During this session, this clinician used the following therapeutic modalities: engagement strategies, supportive psychotherapy, and client-centered therapy.  Mindy reports that she has had ups and downs since her last session. She says she has been trying to complete insurance information and it has been a bit stressful. Today, Therapist and Mindy create her treatment plan and safety plan. She expresses that she would like to work toward letting go of past work experiences, and the anger associated with them. After completing, Mindy says she will begin thinking about where should would like to start for next week's session.    ASSESSMENT: Mindy presents with a normal mood. Her affect is normal range and intensity, appropriate. Mindy exhibits growing therapeutic rapport with this clinician. Mindy continues to exhibit willingness to work on treatment goals and objectives. Mindy presents with a minimal risk of suicide, minimal risk of self-harm, and minimal risk of harm to others.       PLAN: Mindy will return in 1 week for the next scheduled session. Between sessions, Mindy will review and sign treatment plan and safety plan and will report back during the next session re: successes and barriers. At the next session, this clinician will use engagement strategies, supportive psychotherapy, and client-centered therapy to address Mindy's mood regulation, in an effort to assist Mindy with meeting treatment goals. 2  Virtual Regular Visit    Verification of patient location:    Patient is located at Home in the following state in which I hold an active license [place]      Assessment/Plan:    Problem List Items Addressed This Visit       Post traumatic stress disorder (PTSD) - Primary    Major depressive disorder, recurrent, moderate  (HCC)    JUHI (generalized anxiety disorder)       Goals addressed in session: Goal 1          Reason for visit is No chief complaint on file.       Encounter provider Nesha Alejo LCSW      Recent Visits  No visits were found meeting these conditions.  Showing recent visits within past 7 days and meeting all other requirements  Future Appointments  No visits were found meeting these conditions.  Showing future appointments within next 150 days and meeting all other requirements       The patient was identified by name and date of birth. Mindy Truong was informed that this is a telemedicine visit and that the visit is being conducted throughthe Epic Embedded platform. She agrees to proceed..  My office door was closed. No one else was in the room.  She acknowledged consent and understanding of privacy and security of the video platform. The patient has agreed to participate and understands they can discontinue the visit at any time.    Patient is aware this is a billable service.     Subjective  Mindy Truong is a 53 y.o. female who presents for an individual therapy session today .      HPI     Past Medical History:   Diagnosis Date    Anxiety     Asthma     Depression     Disease of thyroid gland     Dizziness     Forgetfulness     Hashimoto's disease     Hashimoto's disease     Headache(784.0) 2002    History of fainting as a child     Hyperlipidemia     Hypertension     Numbness and tingling     MIRANDA (obstructive sleep apnea)     Post traumatic stress disorder 02/28/2019    Varicella        Past Surgical History:   Procedure Laterality Date    BREAST BIOPSY Left 2020    BREAST SURGERY Bilateral 2002    reduction    COLONOSCOPY      HYSTERECTOMY  2005    NE COLONOSCOPY FLX DX W/COLLJ SPEC WHEN PFRMD N/A 10/16/2018    Procedure: EGD AND COLONOSCOPY;  Surgeon: Bunny Ruvalcaba MD;  Location: [facility];  Service: Gastroenterology    NE EXCISION RADIAL HEAD Left 10/09/2020    Procedure: RADIAL HEAD IMPLANT  ARTHROPLASTY ;  Surgeon: Zackary Craig MD;  Location: [facility];  Service: Orthopedics    REDUCTION MAMMAPLASTY Bilateral 1997    REDUCTION MAMMAPLASTY Bilateral 2002    REDUCTION MAMMAPLASTY Bilateral 2020    SINUS SURGERY      TOTAL ABDOMINAL HYSTERECTOMY         Current Outpatient Medications   Medication Sig Dispense Refill    albuterol (2.5 mg/3 mL) 0.083 % nebulizer solution Take 3 mL (2.5 mg total) by nebulization every 6 (six) hours as needed for wheezing or shortness of breath 180 mL 0    albuterol (PROVENTIL HFA,VENTOLIN HFA) 90 mcg/act inhaler Inhale 2 puffs 4 (four) times a day 6.7 g 6    amLODIPine (NORVASC) 2.5 mg tablet Take 1 tablet (2.5 mg total) by mouth daily 90 tablet 0    atorvastatin (LIPITOR) 20 mg tablet Take 1 tablet (20 mg total) by mouth daily 90 tablet 3    clonazePAM (KlonoPIN) 0.5 mg tablet Take 1 tablet (0.5 mg total) by mouth daily at bedtime. May also take 1 tablet (0.5 mg total) daily as needed for anxiety. 60 tablet 0    cloNIDine (Catapres) 0.1 mg tablet Take 1 tablet (0.1 mg total) by mouth daily at bedtime 90 tablet 1    fluticasone (FLONASE) 50 mcg/act nasal spray 2 sprays into each nostril daily 16 g 0    levothyroxine 75 mcg tablet Take 1 tablet (75 mcg total) by mouth daily 90 tablet 3    metFORMIN (GLUCOPHAGE) 500 mg tablet Take 500 mg by mouth 2 (two) times a day with meals      mirtazapine (REMERON) 7.5 MG tablet TAKE 1 TABLET (7.5 MG TOTAL) BY MOUTH DAILY AT BEDTIME 90 tablet 3    rizatriptan (MAXALT-MLT) 10 mg disintegrating tablet Take 1 tablet (10 mg total) by mouth once as needed for migraine for up to 1 dose May repeat in 2 hours if needed 9 tablet 0    venlafaxine (EFFEXOR-XR) 150 mg 24 hr capsule TAKE 1 CAPSULE (150 MG TOTAL) BY MOUTH DAILY WITH 75 MG CAPSULE (225 MG TOTAL DAILY) 90 capsule 3    venlafaxine (EFFEXOR-XR) 75 mg 24 hr capsule Take 1 capsule (75 mg total) by mouth daily with 150 mg capsule (225 mg total daily) 90 capsule 0     No current  facility-administered medications for this visit.        Allergies   Allergen Reactions    Lisinopril-Hydrochlorothiazide Hives    Other Hives     States she has no allergies       Review of Systems    Video Exam    There were no vitals filed for this visit.    Physical Exam     Visit Time    05/16/24  Start Time: 1605  Stop Time: 1638  Total Visit Time: 33 minutes

## 2024-05-16 NOTE — BH TREATMENT PLAN
"Outpatient Behavioral Health Psychotherapy Treatment Plan    Mindy Truong  1970     Date of Initial Psychotherapy Assessment: 5/2/2024   Date of Current Treatment Plan: 05/16/24  Treatment Plan Target Date: 11/16/2024  Treatment Plan Expiration Date: 11/16/2024    Diagnosis:   1. Post traumatic stress disorder (PTSD)        2. JUHI (generalized anxiety disorder)        3. Major depressive disorder, recurrent, moderate (HCC)            Area(s) of Need: Mindy explains that she constantly dreams that she is employed and going through work-related conflict from the past. She says she wants to learn how to let go of the past in relation to past work experiences. She says this is the primary goal that she wants to work on. She also says she wants to work through the anger and self-blame she still experiences.     Long Term Goal 1 (in the client's own words): \"I want to let go of the past in relation to my employment.\"     Stage of Change: Preparation    Target Date for completion: 11/16/2024     Anticipated therapeutic modalities: Insight-oriented therapy, person-centered approach, talk therapy, supportive therapy, solution-focused approach, and DBT-informed skills.      People identified to complete this goal: Client and Therapist      Objective 1: (identify the means of measuring success in meeting the objective): Mindy will express feelings of anger that are associated with past work experiences.       Objective 2: (identify the means of measuring success in meeting the objective): Mindy will learn and implement problem-solving and/or conflict resolution skills to resolve interpersonal problems.     Objective 3: (identify the means of measuring success in meeting the objective): Mindy will identify and challenge cognitive thinking that is engaged in to support self-blame.      Objective 4: (identify the means of measuring success in meeting the objective): Mindy will report a decrease in nightmares.         I am " "currently under the care of a [facility] psychiatric provider: yes    My [facility] psychiatric provider is: Garima Rea PA-C    I am currently taking psychiatric medications: Yes, as prescribed    I feel that I will be ready for discharge from mental health care when I reach the following (measurable goal/objective): \"Once I'm able to express the history I went through without having negative emotions.\"    For children and adults who have a legal guardian:   Has there been any change to custody orders and/or guardianship status? NA. If yes, attach updated documentation.    I have created my Crisis Plan and have been offered a copy of this plan    Behavioral Health Treatment Plan [facility]: Diagnosis and Treatment Plan explained to Mindy Truong acknowledges an understanding of their diagnosis. Mindy Truong agrees to this treatment plan.    I have been offered a copy of this Treatment Plan. yes      Mindy Truong, 1970, actively participated in the creation of this treatment plan during a virtual session, using the Epic Embedded platform.   Mindy Truong  provided verbal consent on 5/16/2024 at 4:19 PM. The treatment plan was transcribed into the Electronic Health Record at a later time.                                                         "

## 2024-05-16 NOTE — BH CRISIS PLAN
Client Name: Mindy Truong       Client YOB: 1970    [facility]      Creation Date: 5/16/24 Update Date: 5/16/25   Created By: Nesha Alejo LCSW       Step 1: Warning Signs:   Warning Signs   Not practicing hygiene   Excessive crying            Step 2: Internal Coping Strategies:   Internal Coping Strategies   Yard work   Puzzles   Embroidery   Listen to music            Step 3: People and social settings that provide distraction:   Name Contact Information   Aleena Truong [phone]   Close family in phone    Places   Neighbors house           Step 4: People whom I can ask for help during a crisis:      Name Contact Information    Celia Truong [phone]    Close family in phone      Step 5: Professionals or agencies I can contact during a crisis:      Clinican/Agency Name Phone Emergency Contact    Nesha Alejo/[facility] [phone]     Garima Rea PA-C [phone]     Oneil Sanford [phone]       [facility] Emergency Department Emergency Department Phone Emergency Department Address    [facility] [phone] [address] [place] [id number]        Crisis Phone Numbers:   Suicide Prevention Lifeline: Call or Text  991 Crisis Text Line: Text HOME to 803-398   Please note: Some [facility] do not have a separate number for Child/Adolescent specific crisis. If your county is not listed under Child/Adolescent, please call the adult number for your county      Adult Crisis Numbers: Child/Adolescent Crisis Numbers   [facility]: [phone] [facility]: [phone]   [facility]: [phone] [facility]: [phone]   [facility]: [phone] [place]: [phone]   [facility]: [phone] [facility]: [phone]   [facility]: [phone]   [facility]: [phone]   [facility]: [phone]   [facility]: [phone] (daytime) [phone]  "(after hours, weekends, holidays)      Step 6: Making the environment safer (plan for lethal means safety):   Patient did not identify any lethal methods: Yes     Optional: What is most important to me and worth living for?   \"My spouse, my family, neighbors, friends, and myself.\"     [place] Safety Plan. Rosanna Gagnon and Josh Villavicencio. Used with permission of the authors.           "

## 2024-05-19 LAB
APOB+LDLR+PCSK9 GENE MUT ANL BLD/T: NOT DETECTED
BRCA1+BRCA2 DEL+DUP + FULL MUT ANL BLD/T: NOT DETECTED
MLH1+MSH2+MSH6+PMS2 GN DEL+DUP+FUL M: NOT DETECTED

## 2024-05-20 DIAGNOSIS — F41.1 GAD (GENERALIZED ANXIETY DISORDER): ICD-10-CM

## 2024-05-20 DIAGNOSIS — F43.10 POST TRAUMATIC STRESS DISORDER (PTSD): ICD-10-CM

## 2024-05-20 RX ORDER — CLONAZEPAM 0.5 MG/1
TABLET ORAL
Qty: 60 TABLET | Refills: 0 | Status: SHIPPED | OUTPATIENT
Start: 2024-05-20

## 2024-05-20 NOTE — TELEPHONE ENCOUNTER
Medication Refill Request     Name of Medication Klonopin 0.5 mg  Dose/Frequency 1qd hs may also take 1qd prn anxiety  Quantity 60  Verified pharmacy   [x]  Verified ordering Provider   [x]  Does patient have enough for the next 3 days? Yes [] No [x]  Does patient have a follow-up appointment scheduled? Yes [x] No []   If so when is appointment: 6/25/2024 Garima avila PA-c out of office right now

## 2024-05-23 ENCOUNTER — TELEMEDICINE (OUTPATIENT)
Dept: PSYCHIATRY | Facility: CLINIC | Age: 54
End: 2024-05-23

## 2024-05-23 DIAGNOSIS — Z91.199 NO-SHOW FOR APPOINTMENT: Primary | ICD-10-CM

## 2024-05-23 NOTE — PSYCH
No Call. No Show. No Charge    Mindy Truong no showed 05/23/24 appointment , staff called and left message to reschedule appointment     Treatment Plan not due at this session.

## 2024-05-24 ENCOUNTER — TELEPHONE (OUTPATIENT)
Dept: PSYCHIATRY | Facility: CLINIC | Age: 54
End: 2024-05-24

## 2024-05-30 ENCOUNTER — TELEMEDICINE (OUTPATIENT)
Dept: PSYCHIATRY | Facility: CLINIC | Age: 54
End: 2024-05-30
Payer: COMMERCIAL

## 2024-05-30 DIAGNOSIS — F41.1 GAD (GENERALIZED ANXIETY DISORDER): Primary | ICD-10-CM

## 2024-05-30 DIAGNOSIS — F43.10 POST TRAUMATIC STRESS DISORDER (PTSD): ICD-10-CM

## 2024-05-30 DIAGNOSIS — F33.1 MAJOR DEPRESSIVE DISORDER, RECURRENT, MODERATE (HCC): ICD-10-CM

## 2024-05-30 PROCEDURE — 90834 PSYTX W PT 45 MINUTES: CPT | Performed by: SOCIAL WORKER

## 2024-05-30 NOTE — PSYCH
DATA: Met with Mindy for scheduled individual session. Topics of discussion included work-related stress and trauma history. Client shows evidence of utilizing  medication management  to manage mental health symptoms. During this session, this clinician used the following therapeutic modalities: engagement strategies, supportive psychotherapy, and client-centered therapy.  Mindy reports that she has been doing pretty good since her last session. Today, Mindy details her previous work experiences and how they caused an array of problems. Mindy says she experienced physical health issues, as well as mental health issues. She also recalls the events of 9/11, and the impact it had on her and her co-workers at the time. After processing her experiences, Therapist is able to gain more insight into Mindy's anxiety. Therapist and Mindy will begin exploring her thinking patterns further.    ASSESSMENT: Mindy presents with a normal mood. Her affect is normal range and intensity, appropriate. Mindy exhibits good therapeutic rapport with this clinician. Mindy continues to exhibit willingness to work on treatment goals and objectives. Mindy presents with a minimal risk of suicide, minimal risk of self-harm, and minimal risk of harm to others.       PLAN: Mindy will return in 8 days for the next scheduled session. Between sessions, Mindy will continue monitoring thoughts and feelings and will report back during the next session re: successes and barriers. At the next session, this clinician will use engagement strategies, supportive psychotherapy, and client-centered therapy to address Mindy's mood management, in an effort to assist Mindy with meeting treatment goals.   Virtual Regular Visit    Verification of patient location:    Patient is located at Home in the following state in which I hold an active license [place]      Assessment/Plan:    Problem List Items Addressed This Visit       Post traumatic stress disorder (PTSD)    Major depressive  disorder, recurrent, moderate (HCC)    JUHI (generalized anxiety disorder) - Primary       Goals addressed in session: Goal 1          Reason for visit is No chief complaint on file.       Encounter provider Nesha Alejo LCSW      Recent Visits  Date Type Provider Dept   05/24/24 Telephone Nesha Alejo LCSW [facility]   Showing recent visits within past 7 days and meeting all other requirements  Future Appointments  No visits were found meeting these conditions.  Showing future appointments within next 150 days and meeting all other requirements       The patient was identified by name and date of birth. Mindy Truong was informed that this is a telemedicine visit and that the visit is being conducted throughthe Epic Embedded platform. She agrees to proceed..  My office door was closed. No one else was in the room.  She acknowledged consent and understanding of privacy and security of the video platform. The patient has agreed to participate and understands they can discontinue the visit at any time.    Patient is aware this is a billable service.     Subjective  Mindy Truong is a 53 y.o. female who presents for an individual therapy session today .      HPI     Past Medical History:   Diagnosis Date    Anxiety     Asthma     Depression     Disease of thyroid gland     Dizziness     Forgetfulness     Hashimoto's disease     Hashimoto's disease     Headache(784.0) 2002    History of fainting as a child     Hyperlipidemia     Hypertension     Numbness and tingling     MIRANDA (obstructive sleep apnea)     Post traumatic stress disorder 02/28/2019    Varicella        Past Surgical History:   Procedure Laterality Date    BREAST BIOPSY Left 2020    BREAST SURGERY Bilateral 2002    reduction    COLONOSCOPY      HYSTERECTOMY  2005    NC COLONOSCOPY FLX DX W/COLLJ SPEC WHEN PFRMD N/A 10/16/2018    Procedure: EGD AND COLONOSCOPY;  Surgeon: Bunny Ruvalcaba MD;  Location: [facility];  Service:  Gastroenterology    WY EXCISION RADIAL HEAD Left 10/09/2020    Procedure: RADIAL HEAD IMPLANT ARTHROPLASTY ;  Surgeon: Zackary Craig MD;  Location: [facility];  Service: Orthopedics    REDUCTION MAMMAPLASTY Bilateral 1997    REDUCTION MAMMAPLASTY Bilateral 2002    REDUCTION MAMMAPLASTY Bilateral 2020    SINUS SURGERY      TOTAL ABDOMINAL HYSTERECTOMY         Current Outpatient Medications   Medication Sig Dispense Refill    albuterol (2.5 mg/3 mL) 0.083 % nebulizer solution Take 3 mL (2.5 mg total) by nebulization every 6 (six) hours as needed for wheezing or shortness of breath 180 mL 0    albuterol (PROVENTIL HFA,VENTOLIN HFA) 90 mcg/act inhaler Inhale 2 puffs 4 (four) times a day 6.7 g 6    amLODIPine (NORVASC) 2.5 mg tablet Take 1 tablet (2.5 mg total) by mouth daily 90 tablet 0    atorvastatin (LIPITOR) 20 mg tablet Take 1 tablet (20 mg total) by mouth daily 90 tablet 3    clonazePAM (KlonoPIN) 0.5 mg tablet Take 1 tablet (0.5 mg total) by mouth daily at bedtime. May also take 1 tablet (0.5 mg total) daily as needed for anxiety. 60 tablet 0    cloNIDine (Catapres) 0.1 mg tablet Take 1 tablet (0.1 mg total) by mouth daily at bedtime 90 tablet 1    fluticasone (FLONASE) 50 mcg/act nasal spray 2 sprays into each nostril daily 16 g 0    levothyroxine 75 mcg tablet Take 1 tablet (75 mcg total) by mouth daily 90 tablet 3    metFORMIN (GLUCOPHAGE) 500 mg tablet Take 500 mg by mouth 2 (two) times a day with meals      mirtazapine (REMERON) 7.5 MG tablet TAKE 1 TABLET (7.5 MG TOTAL) BY MOUTH DAILY AT BEDTIME 90 tablet 3    rizatriptan (MAXALT-MLT) 10 mg disintegrating tablet Take 1 tablet (10 mg total) by mouth once as needed for migraine for up to 1 dose May repeat in 2 hours if needed 9 tablet 0    venlafaxine (EFFEXOR-XR) 150 mg 24 hr capsule TAKE 1 CAPSULE (150 MG TOTAL) BY MOUTH DAILY WITH 75 MG CAPSULE (225 MG TOTAL DAILY) 90 capsule 3    venlafaxine (EFFEXOR-XR) 75 mg 24 hr capsule Take 1 capsule (75 mg total)  by mouth daily with 150 mg capsule (225 mg total daily) 90 capsule 0     No current facility-administered medications for this visit.        Allergies   Allergen Reactions    Lisinopril-Hydrochlorothiazide Hives    Other Hives     States she has no allergies       Review of Systems    Video Exam    There were no vitals filed for this visit.    Physical Exam     Visit Time    05/30/24  Start Time: 1505  Stop Time: 1555  Total Visit Time: 50 minutes

## 2024-06-04 ENCOUNTER — TELEPHONE (OUTPATIENT)
Age: 54
End: 2024-06-04

## 2024-06-04 DIAGNOSIS — E03.9 HYPOTHYROIDISM, UNSPECIFIED TYPE: Primary | ICD-10-CM

## 2024-06-04 DIAGNOSIS — R73.01 IMPAIRED FASTING GLUCOSE: ICD-10-CM

## 2024-06-04 NOTE — TELEPHONE ENCOUNTER
Patient says she has lab orders for another doctor coming up and would like to add an A1C and all three thyroid test due to the fact her thyroid has been fluctuating over the last 7 years.    Please advise, thank you.

## 2024-06-07 ENCOUNTER — LAB (OUTPATIENT)
Dept: LAB | Facility: HOSPITAL | Age: 54
End: 2024-06-07
Attending: INTERNAL MEDICINE
Payer: COMMERCIAL

## 2024-06-07 DIAGNOSIS — I73.00 RAYNAUD'S DISEASE WITHOUT GANGRENE: ICD-10-CM

## 2024-06-07 DIAGNOSIS — F33.1 MAJOR DEPRESSIVE DISORDER, RECURRENT, MODERATE (HCC): ICD-10-CM

## 2024-06-07 DIAGNOSIS — R73.01 IMPAIRED FASTING GLUCOSE: ICD-10-CM

## 2024-06-07 DIAGNOSIS — Z11.59 NEED FOR HEPATITIS C SCREENING TEST: ICD-10-CM

## 2024-06-07 DIAGNOSIS — R76.8 ANA POSITIVE: ICD-10-CM

## 2024-06-07 DIAGNOSIS — R13.10 DYSPHAGIA, UNSPECIFIED TYPE: ICD-10-CM

## 2024-06-07 DIAGNOSIS — F43.10 POST TRAUMATIC STRESS DISORDER (PTSD): ICD-10-CM

## 2024-06-07 DIAGNOSIS — E03.9 HYPOTHYROIDISM, UNSPECIFIED TYPE: ICD-10-CM

## 2024-06-07 DIAGNOSIS — R43.2 ALTERED TASTE: ICD-10-CM

## 2024-06-07 LAB
C3 SERPL-MCNC: 176 MG/DL (ref 87–200)
C4 SERPL-MCNC: 65 MG/DL (ref 19–52)
CK SERPL-CCNC: 167 U/L (ref 26–192)
CRP SERPL QL: 7.9 MG/L
ERYTHROCYTE [SEDIMENTATION RATE] IN BLOOD: 72 MM/HOUR (ref 0–29)
EST. AVERAGE GLUCOSE BLD GHB EST-MCNC: 128 MG/DL
FERRITIN SERPL-MCNC: 32 NG/ML (ref 11–307)
GLIADIN PEPTIDE IGA SER-ACNC: 1 U/ML
GLIADIN PEPTIDE IGA SER-ACNC: NEGATIVE
GLIADIN PEPTIDE IGG SER-ACNC: 0.7 U/ML
GLIADIN PEPTIDE IGG SER-ACNC: NEGATIVE
HBA1C MFR BLD: 6.1 %
HBV CORE AB SER QL: NORMAL
HBV CORE IGM SER QL: NORMAL
HBV SURFACE AG SER QL: NORMAL
HCV AB SER QL: NORMAL
IGA SERPL-MCNC: 160 MG/DL (ref 66–433)
T3 SERPL-MCNC: 1.2 NG/ML
T4 FREE SERPL-MCNC: 0.61 NG/DL (ref 0.61–1.12)
T4 FREE SERPL-MCNC: 0.7 NG/DL (ref 0.61–1.12)
TSH SERPL DL<=0.05 MIU/L-ACNC: 0.28 UIU/ML (ref 0.45–4.5)
TTG IGA SER-ACNC: <0.5 U/ML
TTG IGA SER-ACNC: NEGATIVE
TTG IGG SER-ACNC: <0.8 U/ML
TTG IGG SER-ACNC: NEGATIVE

## 2024-06-07 PROCEDURE — 86146 BETA-2 GLYCOPROTEIN ANTIBODY: CPT

## 2024-06-07 PROCEDURE — 84480 ASSAY TRIIODOTHYRONINE (T3): CPT

## 2024-06-07 PROCEDURE — 86258 DGP ANTIBODY EACH IG CLASS: CPT

## 2024-06-07 PROCEDURE — 36415 COLL VENOUS BLD VENIPUNCTURE: CPT

## 2024-06-07 PROCEDURE — 86200 CCP ANTIBODY: CPT

## 2024-06-07 PROCEDURE — 84443 ASSAY THYROID STIM HORMONE: CPT

## 2024-06-07 PROCEDURE — 86235 NUCLEAR ANTIGEN ANTIBODY: CPT

## 2024-06-07 PROCEDURE — 86430 RHEUMATOID FACTOR TEST QUAL: CPT

## 2024-06-07 PROCEDURE — 85670 THROMBIN TIME PLASMA: CPT

## 2024-06-07 PROCEDURE — 83036 HEMOGLOBIN GLYCOSYLATED A1C: CPT

## 2024-06-07 PROCEDURE — 85732 THROMBOPLASTIN TIME PARTIAL: CPT

## 2024-06-07 PROCEDURE — 85652 RBC SED RATE AUTOMATED: CPT

## 2024-06-07 PROCEDURE — 86364 TISS TRNSGLTMNASE EA IG CLAS: CPT

## 2024-06-07 PROCEDURE — 86704 HEP B CORE ANTIBODY TOTAL: CPT

## 2024-06-07 PROCEDURE — 86705 HEP B CORE ANTIBODY IGM: CPT

## 2024-06-07 PROCEDURE — 87340 HEPATITIS B SURFACE AG IA: CPT

## 2024-06-07 PROCEDURE — 86803 HEPATITIS C AB TEST: CPT

## 2024-06-07 PROCEDURE — 86225 DNA ANTIBODY NATIVE: CPT

## 2024-06-07 PROCEDURE — 82728 ASSAY OF FERRITIN: CPT

## 2024-06-07 PROCEDURE — 86160 COMPLEMENT ANTIGEN: CPT

## 2024-06-07 PROCEDURE — 84439 ASSAY OF FREE THYROXINE: CPT

## 2024-06-07 PROCEDURE — 82550 ASSAY OF CK (CPK): CPT

## 2024-06-07 PROCEDURE — 82784 ASSAY IGA/IGD/IGG/IGM EACH: CPT

## 2024-06-07 PROCEDURE — 86147 CARDIOLIPIN ANTIBODY EA IG: CPT

## 2024-06-07 PROCEDURE — 85613 RUSSELL VIPER VENOM DILUTED: CPT

## 2024-06-07 PROCEDURE — 86140 C-REACTIVE PROTEIN: CPT

## 2024-06-07 PROCEDURE — 85705 THROMBOPLASTIN INHIBITION: CPT

## 2024-06-07 RX ORDER — VENLAFAXINE HYDROCHLORIDE 75 MG/1
CAPSULE, EXTENDED RELEASE ORAL
Qty: 90 CAPSULE | Refills: 1 | Status: SHIPPED | OUTPATIENT
Start: 2024-06-07

## 2024-06-08 LAB
CENTROMERE B AB SER-ACNC: <0.2 AI (ref 0–0.9)
DSDNA AB SER-ACNC: <1 IU/ML (ref 0–9)
ENA JO1 AB SER-ACNC: <0.2 AI (ref 0–0.9)
ENA RNP AB SER-ACNC: <0.2 AI (ref 0–0.9)
ENA SCL70 AB SER-ACNC: 0.2 AI (ref 0–0.9)
ENA SM AB SER-ACNC: <0.2 AI (ref 0–0.9)
ENA SS-A AB SER-ACNC: 2.3 AI (ref 0–0.9)
ENA SS-B AB SER-ACNC: <0.2 AI (ref 0–0.9)

## 2024-06-09 LAB
APTT SCREEN TO CONFIRM RATIO: 0.9 RATIO (ref 0–1.34)
CONFIRM APTT/NORMAL: 33.2 SEC (ref 0–47.6)
LA PPP-IMP: NORMAL
RHEUMATOID FACT SER QL LA: NEGATIVE
SCREEN APTT: 35.4 SEC (ref 0–43.5)
SCREEN DRVVT: 37.3 SEC (ref 0–47)
THROMBIN TIME: 16.4 SEC (ref 0–23)

## 2024-06-10 ENCOUNTER — TELEPHONE (OUTPATIENT)
Age: 54
End: 2024-06-10

## 2024-06-10 DIAGNOSIS — E06.3 HASHIMOTO'S DISEASE: Primary | ICD-10-CM

## 2024-06-10 NOTE — TELEPHONE ENCOUNTER
----- Message from Darling Warren PA-C sent at 6/10/2024  9:31 AM EDT -----  Please confirm her dosage of levothyroxine and that she is taking it daily.  Her TSH is still low, I want her to take her levothyroxine 6 days a week instead of 7.  So she can skip Sundays.  I am placing orders to repeat a TSH in 7 weeks to confirm i  t has not stabilized.  Her A1c is still elevated in the prediabetic range, watch the sugars and carbs like bread, rice, and Posta.

## 2024-06-11 LAB
B2 GLYCOPROT1 IGA SERPL IA-ACNC: 4
B2 GLYCOPROT1 IGG SERPL IA-ACNC: 0.8
B2 GLYCOPROT1 IGM SERPL IA-ACNC: 7.2
CARDIOLIPIN IGA SER IA-ACNC: 2.3
CARDIOLIPIN IGG SER IA-ACNC: 1.2
CARDIOLIPIN IGM SER IA-ACNC: 3.1
CCP AB SER IA-ACNC: 1.1
HISTONE IGG SER IA-ACNC: 0.7 UNITS (ref 0–0.9)

## 2024-06-12 ENCOUNTER — TELEPHONE (OUTPATIENT)
Dept: RHEUMATOLOGY | Facility: CLINIC | Age: 54
End: 2024-06-12

## 2024-06-12 NOTE — TELEPHONE ENCOUNTER
----- Message from Suzanne Durand MD sent at 6/11/2024  4:46 PM EDT -----  Can schedule her for a routine follow up visit to review results.

## 2024-06-13 ENCOUNTER — TELEPHONE (OUTPATIENT)
Dept: RHEUMATOLOGY | Facility: CLINIC | Age: 54
End: 2024-06-13

## 2024-06-13 ENCOUNTER — TELEMEDICINE (OUTPATIENT)
Dept: RHEUMATOLOGY | Facility: CLINIC | Age: 54
End: 2024-06-13
Payer: COMMERCIAL

## 2024-06-13 DIAGNOSIS — R43.2 ALTERED TASTE: ICD-10-CM

## 2024-06-13 DIAGNOSIS — R13.10 DYSPHAGIA, UNSPECIFIED TYPE: ICD-10-CM

## 2024-06-13 DIAGNOSIS — I73.00 RAYNAUD'S DISEASE WITHOUT GANGRENE: ICD-10-CM

## 2024-06-13 DIAGNOSIS — M25.50 DIFFUSE ARTHRALGIA: ICD-10-CM

## 2024-06-13 DIAGNOSIS — R70.0 ELEVATED SED RATE: ICD-10-CM

## 2024-06-13 DIAGNOSIS — E06.3 HASHIMOTO'S DISEASE: ICD-10-CM

## 2024-06-13 DIAGNOSIS — R76.8 ANA POSITIVE: Primary | ICD-10-CM

## 2024-06-13 DIAGNOSIS — R76.8 SS-A ANTIBODY POSITIVE: ICD-10-CM

## 2024-06-13 PROCEDURE — 99215 OFFICE O/P EST HI 40 MIN: CPT | Performed by: INTERNAL MEDICINE

## 2024-06-13 NOTE — PROGRESS NOTES
Virtual Regular Visit    Verification of patient location:    Patient is located at Home in the following state in which I hold an active license [place]      Assessment/Plan:    Problem List Items Addressed This Visit          Endocrine    Hashimoto's disease       Other    LALITA positive - Primary    Relevant Orders    C-reactive protein    Sedimentation rate, automated    Anti-neutrophilic cytoplasmic antibody    Protein electrophoresis, serum    Protein / creatinine ratio, urine    Urinalysis with microscopic    XR hand 3+ vw right    XR hand 3+ vw left    XR spine cervical complete 4 or 5 vw non injury    XR knee 3 vw left non injury     Other Visit Diagnoses       SS-A antibody positive        Relevant Orders    C-reactive protein    Sedimentation rate, automated    Anti-neutrophilic cytoplasmic antibody    Protein electrophoresis, serum    Protein / creatinine ratio, urine    Urinalysis with microscopic    XR hand 3+ vw right    XR hand 3+ vw left    XR spine cervical complete 4 or 5 vw non injury    XR knee 3 vw left non injury    Elevated sed rate        Relevant Orders    C-reactive protein    Sedimentation rate, automated    Anti-neutrophilic cytoplasmic antibody    Protein electrophoresis, serum    Protein / creatinine ratio, urine    Urinalysis with microscopic    XR hand 3+ vw right    XR hand 3+ vw left    XR spine cervical complete 4 or 5 vw non injury    XR knee 3 vw left non injury    Dysphagia, unspecified type        Relevant Orders    Ambulatory Referral to Gastroenterology    Altered taste        Relevant Orders    Ambulatory Referral to Gastroenterology    Diffuse arthralgia        Relevant Orders    XR hand 3+ vw right    XR hand 3+ vw left    XR spine cervical complete 4 or 5 vw non injury    XR knee 3 vw left non injury    Raynaud's disease without gangrene                     Reason for visit is follow up.      Chief Complaint   Patient presents with    Virtual Regular Visit          Encounter  "provider Suzanne Durand MD      Recent Visits  No visits were found meeting these conditions.  Showing recent visits within past 7 days and meeting all other requirements  Today's Visits  Date Type Provider Dept   06/13/24 Telephone Suzanne Durand MD [facility]   06/13/24 Telemedicine Suzanne Durand MD [facility]   Showing today's visits and meeting all other requirements  Future Appointments  No visits were found meeting these conditions.  Showing future appointments within next 150 days and meeting all other requirements       The patient was identified by name and date of birth. Mindy Truong was informed that this is a telemedicine visit and that the visit is being conducted through the Epic Embedded platform. She agrees to proceed..  My office door was closed. No one else was in the room.  She acknowledged consent and understanding of privacy and security of the video platform. The patient has agreed to participate and understands they can discontinue the visit at any time.    Patient is aware this is a billable service.       [facility]    HPI     INITIAL VISIT NOTE (5/2024):  Ms. Truong is a 53-year-old female with history significant for Hashimoto's thyroiditis who presents for an evaluation of a positive LALITA 1:80 speckled pattern and altered taste/difficulty swallowing.  She is referred by Darling Warren PA-C for a rheumatology consult.     Patient reports for the past 2 years she has been experiencing intermittent issues with locking of the left side of her jaw, difficulty swallowing either solids or liquids where she feels like she needs to make an extra effort to swallow [at other times she will consume the same solid or liquid without any difficulty], as well as intermittently appreciating fear of certain foods where she will notice that the taste of her food \"shocks the body\".  She mentioned these symptoms to her endocrinologist and was advised that it would " not be related to the Hashimoto's thyroiditis so she was asked to schedule a rheumatology appointment.  She reports chronic issues with periodic spasms/locking of her hands and feet.  She reports chronic dry eyes and dry mouth.  She reports symptoms consistent with Raynaud's with color changes of all of her fingers to a gray/dusky discoloration with cold exposure and is associated with pain.  She reports a history of lupus and osteoporosis in her mother and her maternal grandmother and maternal aunts have rheumatoid arthritis.     She denies fevers, unintentional weight loss, alopecia, inflammatory eye disease, skin rash, psoriasis, photosensitivity, skin thickening/tightening, mouth/nose ulcers, swollen glands, pleuritic chest pain, shortness of breath, cough, GERD, inflammatory bowel disease, blood clots, miscarriages or focal joint pain/swelling/stiffness.     She was seen by rheumatology in 2017 [Dr. Nela Adams] and 2018 [Dr. Brittany Nichols] for an evaluation of the positive LALITA.  She was not diagnosed with a connective tissue disease and the LALITA was felt to be representative of the Hashimoto's thyroiditis.  She did have a low titer SCL 70 antibody of 1.7 without a diagnosis of scleroderma at that time.  Her extensive autoimmune serologies were otherwise normal.      6/13/2024:  Patient presents for a follow-up appointment to review her results.  This shows a positive SSA antibody of 2.3.  An ESR was elevated at 72 with a C-reactive protein of 7.9.  The remainder of the LALITA specificity, C3, C4, anti-CCP antibody, rheumatoid factor, CK, antiphospholipid antibody panel, chronic hepatitis panel, ferritin and celiac disease antibody profile were unremarkable.    She reports new symptoms today that we did not discuss at the last visit pertaining to joint pains.  This mostly affects her hands, left knee and neck.  She has had chronic neck pain but states the knee pain started recently a few days ago.  She has noticed  swelling that can affect her fingers, her lower legs and feet.  She has been diagnosed with fluid retention and uses a diuretic as needed.  She experiences some degree of stiffness affecting her hands that persists throughout the day.  She has tried IcyHot patches without any benefit.  She has not tried any over-the-counter oral medications.    Otherwise she reports the symptoms that we discussed previously.  Of note she appreciates dry mouth but not really dry eyes.    She is up-to-date with a mammogram and colonoscopy.  She had an endoscopy done at the same time as her colonoscopy which was in approximately 2019.  These were screening tests.  She no longer obtains Pap smears as she had a total hysterectomy.      Past Medical History:   Diagnosis Date    Anxiety     Asthma     Depression     Disease of thyroid gland     Dizziness     Forgetfulness     Hashimoto's disease     Hashimoto's disease     Headache(784.0) 2002    History of fainting as a child     Hyperlipidemia     Hypertension     Numbness and tingling     MIRANDA (obstructive sleep apnea)     Post traumatic stress disorder 02/28/2019    Varicella        Past Surgical History:   Procedure Laterality Date    BREAST BIOPSY Left 2020    BREAST SURGERY Bilateral 2002    reduction    COLONOSCOPY      HYSTERECTOMY  2005    [place] COLONOSCOPY FLX DX W/COLLJ SPEC WHEN PFRMD N/A 10/16/2018    Procedure: EGD AND COLONOSCOPY;  Surgeon: Bunny Ruvalcaba MD;  Location: [facility];  Service: Gastroenterology    [place] EXCISION RADIAL HEAD Left 10/09/2020    Procedure: RADIAL HEAD IMPLANT ARTHROPLASTY ;  Surgeon: Zackary Craig MD;  Location: [facility];  Service: Orthopedics    REDUCTION MAMMAPLASTY Bilateral 1997    REDUCTION MAMMAPLASTY Bilateral 2002    REDUCTION MAMMAPLASTY Bilateral 2020    SINUS SURGERY      TOTAL ABDOMINAL HYSTERECTOMY         Current Outpatient Medications   Medication Sig Dispense Refill    albuterol (2.5 mg/3 mL) 0.083 % nebulizer solution Take 3 mL  (2.5 mg total) by nebulization every 6 (six) hours as needed for wheezing or shortness of breath 180 mL 0    albuterol (PROVENTIL HFA,VENTOLIN HFA) 90 mcg/act inhaler Inhale 2 puffs 4 (four) times a day 6.7 g 6    amLODIPine (NORVASC) 2.5 mg tablet Take 1 tablet (2.5 mg total) by mouth daily 90 tablet 0    atorvastatin (LIPITOR) 20 mg tablet Take 1 tablet (20 mg total) by mouth daily 90 tablet 3    clonazePAM (KlonoPIN) 0.5 mg tablet Take 1 tablet (0.5 mg total) by mouth daily at bedtime. May also take 1 tablet (0.5 mg total) daily as needed for anxiety. 60 tablet 0    cloNIDine (Catapres) 0.1 mg tablet Take 1 tablet (0.1 mg total) by mouth daily at bedtime 90 tablet 1    fluticasone (FLONASE) 50 mcg/act nasal spray 2 sprays into each nostril daily 16 g 0    levothyroxine 75 mcg tablet Take 1 tablet (75 mcg total) by mouth daily 90 tablet 3    metFORMIN (GLUCOPHAGE) 500 mg tablet Take 500 mg by mouth 2 (two) times a day with meals      mirtazapine (REMERON) 7.5 MG tablet TAKE 1 TABLET (7.5 MG TOTAL) BY MOUTH DAILY AT BEDTIME 90 tablet 3    rizatriptan (MAXALT-MLT) 10 mg disintegrating tablet Take 1 tablet (10 mg total) by mouth once as needed for migraine for up to 1 dose May repeat in 2 hours if needed 9 tablet 0    venlafaxine (EFFEXOR-XR) 150 mg 24 hr capsule TAKE 1 CAPSULE (150 MG TOTAL) BY MOUTH DAILY WITH 75 MG CAPSULE (225 MG TOTAL DAILY) 90 capsule 3    venlafaxine (EFFEXOR-XR) 75 mg 24 hr capsule TAKE 1 CAPSULE BY MOUTH EVERY DAY WITH 150 MG FOR TOTAL DOSE 225 MG DAILY 90 capsule 1     No current facility-administered medications for this visit.        Allergies   Allergen Reactions    Lisinopril-Hydrochlorothiazide Hives    Other Hives     States she has no allergies       Review of Systems  Constitutional: Negative for weight change, fevers, chills, night sweats, fatigue.  ENT/Mouth: Negative for hearing changes, ear pain, nasal congestion, sinus pain, hoarseness, sore throat, rhinorrhea.  Positive for  intermittent swallowing difficulty.  Eyes: Negative for pain, redness, discharge, vision changes.   Cardiovascular: Negative for chest pain, SOB, palpitations.   Respiratory: Negative for cough, sputum, wheezing, dyspnea.   Gastrointestinal: Negative for nausea, vomiting, diarrhea, constipation, pain, heartburn.  Genitourinary: Negative for dysuria, urinary frequency, hematuria.   Musculoskeletal: As per HPI.  Skin: Negative for skin rash.  Positive for color changes.  Neuro: Negative for weakness, numbness, tingling, loss of consciousness.   Psych: Negative for anxiety, depression.   Heme/Lymph: Negative for easy bruising, bleeding, lymphadenopathy.        Video Exam    There were no vitals filed for this visit.    Physical Exam   General: Well appearing, well nourished, in no distress. Oriented x 3, normal mood and affect.  Skin: Good turgor, no rash, unusual bruising or prominent lesions.  Hair: Normal texture and distribution.  HEENT:  Head: Normocephalic, atraumatic.  Eyes: Conjunctiva clear, sclera non-icteric, EOM intact.  Nose: No external lesions.  Neck: Supple.  Neurologic: Alert and oriented. No focal neurological deficits appreciated.   Psychiatric: Normal mood and affect.       Assessment and Plan:   Ms. Truong is a 53-year-old female with history significant for Hashimoto's thyroiditis who presents for a follow up of a positive LALITA 1:80 speckled pattern and altered taste/difficulty swallowing.       - Mindy presents today for a follow up of a low titer positive LALITA that was initially detected in 2017 and has been evaluated by 2 rheumatologists thus far without concerns for a connective tissue disease.  The LALITA was thought to be secondary to her diagnosis of Hashimoto's thyroiditis.  She presents for a reevaluation of this as well as intermittent symptoms she has been experiencing over the past 2 years with dysphagia to both solids and liquids as well as altered taste/fearful anticipation of foods.  I  updated a serological workup and in addition to the positive LALITA this shows a low titer SSA antibody as well as an elevated ESR.  It is unclear if these abnormalities are occurring in the context of her symptoms and if there may be a suggestion for Sjogren's syndrome.  At this time I do not have a clear explanation for her lab abnormalities or symptoms and recommend further workup.  I will refer her back to ENT for a minor salivary gland lip biopsy to further evaluate for Sjogren's syndrome and requested she update her inflammation markers in the next few weeks to assess if they are persistently elevated.  She will also be referred to gastroenterology to evaluate the symptoms she is experiencing.    - As she is now complaining of joint pains I will obtain x-rays of the most involved joints including her cervical spine, hands and left knee.  In the interim until the workup is complete I requested she try over-the-counter Tylenol arthritis 1 tablet 3 times daily or NSAIDs to at least see if these are beneficial to her.      Visit Time  Total Visit Duration: 21 minutes.

## 2024-06-14 ENCOUNTER — TELEMEDICINE (OUTPATIENT)
Dept: PSYCHIATRY | Facility: CLINIC | Age: 54
End: 2024-06-14
Payer: COMMERCIAL

## 2024-06-14 DIAGNOSIS — F33.1 MAJOR DEPRESSIVE DISORDER, RECURRENT, MODERATE (HCC): ICD-10-CM

## 2024-06-14 DIAGNOSIS — F41.1 GAD (GENERALIZED ANXIETY DISORDER): Primary | ICD-10-CM

## 2024-06-14 DIAGNOSIS — F43.10 POST TRAUMATIC STRESS DISORDER (PTSD): ICD-10-CM

## 2024-06-14 PROCEDURE — 90834 PSYTX W PT 45 MINUTES: CPT | Performed by: SOCIAL WORKER

## 2024-06-14 NOTE — PSYCH
DATA: Met with Mindy for scheduled individual session. Topics of discussion included mood regulation and symptoms. Client shows evidence of utilizing  problem-solving  skills to manage mental health symptoms. During this session, this clinician used the following therapeutic modalities: engagement strategies, supportive psychotherapy, and client-centered therapy.  Mindy reports that she has been doing pretty good since her last session. She tells Therapist that she has been speaking with her endocrinologist and rheumatoid provider about pain and blood work. She says she is in the process of trying to figure out what's causing differences in her thyroid levels. Mindy shares with Therapist that she has had a couple of bad dreams about her past work experiences. She says it's gotten to the point where she cannot necessarily avoid triggers associated with the things she experienced. Mindy expresses feeling angry about what she and her old co-workers experienced. Therapist gives Mindy an assignment to complete between sessions: create a list of feelings she experiences when she is triggered by things or people that remind her of her old job. She is receptive. Mindy also expresses interest in learning about how she can list her dog as an emotion support animal. Therapist and Mindy will discuss further during follow up session. At this time, Mindy does not present with any immediate concerns.    ASSESSMENT: Mindy presents with a normal mood. Her affect is normal range and intensity, appropriate. Mindy exhibits strong therapeutic rapport with this clinician. Mindy continues to exhibit willingness to work on treatment goals and objectives. Mindy presents with a minimal risk of suicide, minimal risk of self-harm, and minimal risk of harm to others.       PLAN: Mindy will return in 13 days for the next scheduled session. Between sessions, Mindy will create a list of feelings she experiences when triggered and will report back during the  next session re: successes and barriers. At the next session, this clinician will use engagement strategies, supportive psychotherapy, client-centered therapy, and DBT-informed skills to address Mindy's mood management, in an effort to assist Mindy with meeting treatment goals.   Virtual Regular Visit    Verification of patient location:    Patient is located at Home in the following state in which I hold an active license [place]      Assessment/Plan:    Problem List Items Addressed This Visit       Post traumatic stress disorder (PTSD)    Major depressive disorder, recurrent, moderate (HCC)    JUHI (generalized anxiety disorder) - Primary       Goals addressed in session: Goal 1          Reason for visit is No chief complaint on file.       Encounter provider Nesha Alejo LCSW      Recent Visits  No visits were found meeting these conditions.  Showing recent visits within past 7 days and meeting all other requirements  Future Appointments  No visits were found meeting these conditions.  Showing future appointments within next 150 days and meeting all other requirements       The patient was identified by name and date of birth. Mindy Truong was informed that this is a telemedicine visit and that the visit is being conducted throughthe MediTAP platform. She agrees to proceed..  My office door was closed. No one else was in the room.  She acknowledged consent and understanding of privacy and security of the video platform. The patient has agreed to participate and understands they can discontinue the visit at any time.    Patient is aware this is a billable service.     Subjective  Mindy Truong is a 53 y.o. female who presents for an individual therapy session today .      HPI     Past Medical History:   Diagnosis Date    Anxiety     Asthma     Depression     Disease of thyroid gland     Dizziness     Forgetfulness     Hashimoto's disease     Hashimoto's disease     Headache(784.0) 2002    History of fainting as a  child     Hyperlipidemia     Hypertension     Numbness and tingling     MIRANDA (obstructive sleep apnea)     Post traumatic stress disorder 02/28/2019    Varicella        Past Surgical History:   Procedure Laterality Date    BREAST BIOPSY Left 2020    BREAST SURGERY Bilateral 2002    reduction    COLONOSCOPY      HYSTERECTOMY  2005    [place] COLONOSCOPY FLX DX W/COLLJ SPEC WHEN PFRMD N/A 10/16/2018    Procedure: EGD AND COLONOSCOPY;  Surgeon: Bunny Ruvalcaba MD;  Location: [facility];  Service: Gastroenterology    [place] EXCISION RADIAL HEAD Left 10/09/2020    Procedure: RADIAL HEAD IMPLANT ARTHROPLASTY ;  Surgeon: Zackary Craig MD;  Location: [facility];  Service: Orthopedics    REDUCTION MAMMAPLASTY Bilateral 1997    REDUCTION MAMMAPLASTY Bilateral 2002    REDUCTION MAMMAPLASTY Bilateral 2020    SINUS SURGERY      TOTAL ABDOMINAL HYSTERECTOMY         Current Outpatient Medications   Medication Sig Dispense Refill    albuterol (2.5 mg/3 mL) 0.083 % nebulizer solution Take 3 mL (2.5 mg total) by nebulization every 6 (six) hours as needed for wheezing or shortness of breath 180 mL 0    albuterol (PROVENTIL HFA,VENTOLIN HFA) 90 mcg/act inhaler Inhale 2 puffs 4 (four) times a day 6.7 g 6    amLODIPine (NORVASC) 2.5 mg tablet Take 1 tablet (2.5 mg total) by mouth daily 90 tablet 0    atorvastatin (LIPITOR) 20 mg tablet Take 1 tablet (20 mg total) by mouth daily 90 tablet 3    clonazePAM (KlonoPIN) 0.5 mg tablet Take 1 tablet (0.5 mg total) by mouth daily at bedtime. May also take 1 tablet (0.5 mg total) daily as needed for anxiety. 60 tablet 0    cloNIDine (Catapres) 0.1 mg tablet Take 1 tablet (0.1 mg total) by mouth daily at bedtime 90 tablet 1    fluticasone (FLONASE) 50 mcg/act nasal spray 2 sprays into each nostril daily 16 g 0    levothyroxine 75 mcg tablet Take 1 tablet (75 mcg total) by mouth daily 90 tablet 3    metFORMIN (GLUCOPHAGE) 500 mg tablet Take 500 mg by mouth 2 (two) times a day with meals      mirtazapine  (REMERON) 7.5 MG tablet TAKE 1 TABLET (7.5 MG TOTAL) BY MOUTH DAILY AT BEDTIME 90 tablet 3    rizatriptan (MAXALT-MLT) 10 mg disintegrating tablet Take 1 tablet (10 mg total) by mouth once as needed for migraine for up to 1 dose May repeat in 2 hours if needed 9 tablet 0    venlafaxine (EFFEXOR-XR) 150 mg 24 hr capsule TAKE 1 CAPSULE (150 MG TOTAL) BY MOUTH DAILY WITH 75 MG CAPSULE (225 MG TOTAL DAILY) 90 capsule 3    venlafaxine (EFFEXOR-XR) 75 mg 24 hr capsule TAKE 1 CAPSULE BY MOUTH EVERY DAY WITH 150 MG FOR TOTAL DOSE 225 MG DAILY 90 capsule 1     No current facility-administered medications for this visit.        Allergies   Allergen Reactions    Lisinopril-Hydrochlorothiazide Hives    Other Hives     States she has no allergies       Review of Systems    Video Exam    There were no vitals filed for this visit.    Physical Exam     Visit Time    06/14/24  Start Time: 1312  Stop Time: 1355  Total Visit Time: 43 minutes

## 2024-06-19 DIAGNOSIS — G47.00 INSOMNIA, UNSPECIFIED TYPE: ICD-10-CM

## 2024-06-19 DIAGNOSIS — F33.1 MAJOR DEPRESSIVE DISORDER, RECURRENT, MODERATE (HCC): ICD-10-CM

## 2024-06-19 DIAGNOSIS — F43.10 POST TRAUMATIC STRESS DISORDER (PTSD): ICD-10-CM

## 2024-06-19 RX ORDER — VENLAFAXINE HYDROCHLORIDE 150 MG/1
150 CAPSULE, EXTENDED RELEASE ORAL DAILY
Qty: 90 CAPSULE | Refills: 1 | Status: SHIPPED | OUTPATIENT
Start: 2024-06-19

## 2024-06-19 RX ORDER — MIRTAZAPINE 7.5 MG/1
7.5 TABLET, FILM COATED ORAL
Qty: 90 TABLET | Refills: 1 | Status: SHIPPED | OUTPATIENT
Start: 2024-06-19 | End: 2024-06-25 | Stop reason: SDUPTHER

## 2024-06-19 NOTE — TELEPHONE ENCOUNTER
Reason for call:   [x] Refill   [] Prior Auth  [] Other:     Office:   [x] PCP/Provider - Darling Warren  [] Specialty/Provider -     Medication: Mirtazapine  Dose/Frequency: 7.5 mg HS  Quantity: 90      Medication: Venlafaxine XR  Dose/Frequency: 150 mg TAKE 1 CAPSULE (150 MG TOTAL) BY MOUTH DAILY WITH 75 MG CAPSULE (225 MG TOTAL DAILY)  Quantity: 90    Pharmacy: [facility]    Does the patient have enough for 3 days?   [x] Yes   [] No - Send as HP to POD

## 2024-06-21 ENCOUNTER — OFFICE VISIT (OUTPATIENT)
Age: 54
End: 2024-06-21
Payer: COMMERCIAL

## 2024-06-21 ENCOUNTER — PREP FOR PROCEDURE (OUTPATIENT)
Age: 54
End: 2024-06-21

## 2024-06-21 VITALS
SYSTOLIC BLOOD PRESSURE: 120 MMHG | DIASTOLIC BLOOD PRESSURE: 88 MMHG | HEART RATE: 75 BPM | BODY MASS INDEX: 30.48 KG/M2 | HEIGHT: 63 IN | WEIGHT: 172 LBS | OXYGEN SATURATION: 95 %

## 2024-06-21 DIAGNOSIS — K21.9 GASTROESOPHAGEAL REFLUX DISEASE, UNSPECIFIED WHETHER ESOPHAGITIS PRESENT: ICD-10-CM

## 2024-06-21 DIAGNOSIS — R13.14 PHARYNGOESOPHAGEAL DYSPHAGIA: Primary | ICD-10-CM

## 2024-06-21 DIAGNOSIS — Z86.19 HISTORY OF HELICOBACTER PYLORI INFECTION: ICD-10-CM

## 2024-06-21 DIAGNOSIS — R13.10 DYSPHAGIA, UNSPECIFIED TYPE: ICD-10-CM

## 2024-06-21 DIAGNOSIS — R43.2 ALTERED TASTE: ICD-10-CM

## 2024-06-21 DIAGNOSIS — R14.0 BLOATING: ICD-10-CM

## 2024-06-21 PROCEDURE — 99244 OFF/OP CNSLTJ NEW/EST MOD 40: CPT | Performed by: INTERNAL MEDICINE

## 2024-06-21 NOTE — H&P (VIEW-ONLY)
[facility]    Dear Dr. Jane,    I had the pleasure of seeing your patient Mindy Truong in the office today and I thank you for this kind referral.       Chief Complaint: Swallowing problems      HPI:  Mindy Truong is a 53 y.o. female who presents with upper GI issues which involve swallowing.  The patient has been worked up for rheumatologic disease.  She has elevated inflammatory markers.  Patient has been experiencing a sensation of a lot of saliva and altered taste.  She has what she describes as her jaw locking up on occasion.  When she does swallow she finds difficulty in initiating the swallow.  The only thing she actually feels stick when she does swallow her pills.  She does not have any dysphagia for solid foods or liquids.  Patient had similar symptomatology in 2018 and EGD was normal.  However at that time biopsy did reveal H. pylori.  She is not having any weight loss.  No change in the consistency of her skin.  No Raynaud's.  Most recent complete metabolic profile showed normal liver functions.  Patient is not having any nausea or vomiting.  When she bends over she does distinctly feel GERD.  No melena or hematochezia.  No other significant GI symptomatology.  She denies any chest pain or shortness of breath.  She has been found to have Hashimoto's disease with thyroid nodules.  Patient also describes a sensation of hunger even after eating but a sensation of concomitant bloating..      Review of Systems:   Constitutional: No fever or chills, feels well, no tiredness, no recent weight gain or weight loss.   HENT: No complaints of earache, no hearing loss, no nosebleeds, no nasal discharge, no sore throat, no hoarseness.    Eyes: No complaints of eye pain, no red eyes, no discharge from eyes, no itchy eyes.  Cardiovascular: No complaints of slow heart rate, no fast heart rate, no chest pain, no palpitations, no leg claudication, no lower extremity edema.   Respiratory:  No complaints of shortness of breath, no wheezing, no cough, no SOB on exertion, no orthopnea.   Gastrointestinal: As noted in HPI  Genitourinary: No complaints of dysuria, no incontinence, no hesitancy, no nocturia.   Musculoskeletal: No complaints of arthralgia, no myalgias, no joint swelling or stiffness, no limb pain or swelling.   Neurological: No complaints of headache, no confusion, no convulsions, no numbness or tingling, no dizziness or fainting, no limb weakness, no difficulty walking.    Skin: No complaints of skin rash or skin lesions, no itching, no skin wound, no dry skin.    Hematological/Lymphatic: No complaints of swollen glands, does not bleed easy.   Allergic/Immunologic: No immunocompromised state.  Endocrine:  No complaints of polyuria, no polydipsia.   Psychiatric/Behavioral: Positive anxiety, PTSD      Historical Information   Past Medical History:   Diagnosis Date    Anxiety     Asthma     Depression     Disease of thyroid gland     Dizziness     Forgetfulness     Hashimoto's disease     Hashimoto's disease     Headache(784.0) 2002    History of fainting as a child     Hyperlipidemia     Hypertension     Numbness and tingling     MIRANDA (obstructive sleep apnea)     Post traumatic stress disorder 02/28/2019    Varicella      Past Surgical History:   Procedure Laterality Date    BREAST BIOPSY Left 2020    BREAST SURGERY Bilateral 2002    reduction    COLONOSCOPY      HYSTERECTOMY  2005    DC COLONOSCOPY FLX DX W/COLLJ SPEC WHEN PFRMD N/A 10/16/2018    Procedure: EGD AND COLONOSCOPY;  Surgeon: Bunny Ruvalcaba MD;  Location: [facility];  Service: Gastroenterology    DC EXCISION RADIAL HEAD Left 10/09/2020    Procedure: RADIAL HEAD IMPLANT ARTHROPLASTY ;  Surgeon: Zackary Craig MD;  Location: [facility];  Service: Orthopedics    REDUCTION MAMMAPLASTY Bilateral 1997    REDUCTION MAMMAPLASTY Bilateral 2002    REDUCTION MAMMAPLASTY Bilateral 2020    SINUS SURGERY      TOTAL ABDOMINAL HYSTERECTOMY    "    Social History   Social History     Substance and Sexual Activity   Alcohol Use Yes    Alcohol/week: 2.0 standard drinks of alcohol    Types: 2 Glasses of wine per week    Comment: socially     Social History     Substance and Sexual Activity   Drug Use No     Social History     Tobacco Use   Smoking Status Never   Smokeless Tobacco Never   Tobacco Comments    na     Family History   Problem Relation Age of Onset    Hyperlipidemia Mother     Lupus Mother     Hypertension Mother     Anxiety disorder Mother     Psychiatric Illness Mother         unknown-lost memory for six months    Depression Father     Cervical cancer Paternal Grandmother     Ovarian cancer Paternal Grandmother 66    Uterine cancer Paternal Grandmother     No Known Problems Half-Sister     No Known Problems Half-Brother     No Known Problems Half-Brother     No Known Problems Half-Sister     No Known Problems Half-Sister     No Known Problems Maternal Aunt     No Known Problems Maternal Aunt     No Known Problems Maternal Aunt     No Known Problems Maternal Aunt     No Known Problems Paternal Aunt     No Known Problems Paternal Aunt     Bipolar disorder Cousin     Schizoaffective Disorder  Cousin     Schizophrenia Cousin     Self-Injury Cousin     Suicide Attempts Cousin     Psychosis Maternal Uncle         need his life    Schizoaffective Disorder  Maternal Uncle     Schizophrenia Maternal Uncle     Self-Injury Maternal Uncle     Suicide Attempts Maternal Uncle     Breast cancer Neg Hx     Colon cancer Neg Hx     Seizures Neg Hx          Current Medications: has a current medication list which includes the following prescription(s): albuterol, albuterol, amlodipine, atorvastatin, clonazepam, clonidine, levothyroxine, metformin, mirtazapine, rizatriptan, venlafaxine, and venlafaxine.       Vital Signs: /88   Pulse 75   Ht 5' 3\" (1.6 m)   Wt 78 kg (172 lb)   SpO2 95%   BMI 30.47 kg/m²     Physical Exam:   Constitutional  General " Appearance: No acute distress, well appearing and well nourished  Head  Normocephalic  Eyes  Conjunctivae and lids: No swelling, erythema, or discharge.    Pupils and irises: Equal, round and reactive to light.   Ears, Nose, Mouth, and Throat  External inspection of ears and nose: Normal  Nasal mucosa, septum and turbinates: Normal without edema or erythema/   Oropharynx: Normal with no erythema, edema, exudate or lesions.   Neck  Normal range of motion. Neck supple.   Cardiovascular  Auscultation of the heart: Normal rate and rhythm, normal S1 and S2 without murmurs.  Examination of the extremities for edema and/or varicosities: Normal  Pulmonary/Chest  Respiratory effort: No increased work of breathing or signs of respiratory distress.   Auscultation of lungs: Clear to auscultation, equal breath sounds bilaterally, no wheezes, rales, no rhonchi.   Abdomen  Abdomen: Non-tender, no masses.   Liver and spleen: No hepatomegaly or splenomegaly.   Musculoskeletal  Gait and station: normal.  Digits and Nails: normal without clubbing or cyanosis.  Inspection/palpation of joints, bones, and muscles: Normal  Neurological  No nystagmus or asterixis.   Skin  Skin and subcutaneous tissue: Normal without rashes or lesions.   Lymphatic  Palpation of the lymph nodes in neck: No lymphadenopathy.   Psychiatric  Orientation to person, place and time: Normal.  Mood and affect: Normal.         Labs:   Lab Results   Component Value Date    ALT 23 02/14/2024    AST 26 02/14/2024    BUN 15 02/14/2024    CALCIUM 9.8 02/14/2024     02/14/2024    CHOL 178 09/22/2017    CO2 30 02/14/2024    CREATININE 0.89 02/14/2024    HDL 62 02/14/2024    HCT 38.8 02/14/2024    HGB 12.6 02/14/2024    HGBA1C 6.1 (H) 06/07/2024    MG 1.9 09/28/2018     02/14/2024    K 4.5 02/14/2024     09/22/2017    TRIG 172 (H) 02/14/2024    WBC 5.43 02/14/2024         X-Rays & Procedures:   No orders to display          ______________________________________________________________________      Assessment & Plan:      Diagnoses and all orders for this visit:    Pharyngoesophageal dysphagia  -     EGD; Future    Dysphagia, unspecified type  -     Ambulatory Referral to Gastroenterology    Altered taste  -     Ambulatory Referral to Gastroenterology    Gastroesophageal reflux disease, unspecified whether esophagitis present  -     EGD; Future    Bloating  -     EGD; Future    History of Helicobacter pylori infection  -     EGD; Future        I have taken the liberty of scheduling the patient for EGD.  Her dysphagia usually involves pills and not food.  I have also asked that she take her pills with a bit of applesauce to make it easier to swallow.  I will be happy to inform you of her results and further recommendations.  Most of her problem really sounds pretty esophageal but she is seeing ENT in follow-up.  I would like to thank you for allowing me to participate in her care.      I obtained informed consent from the patient. The risks/benefits/alternatives of the procedure were discussed with the patient. Risks included, but not limited to, infection, bleeding, perforation, injury to organs in the abdomen, missed lesion and incomplete procedure were discussed. Patient was agreeable and electronic signature was obtained.        With warmest regards,    Bunny Ruvalcaba MD, FACG

## 2024-06-21 NOTE — LETTER
June 21, 2024     Suzanne Durand MD  [address] [address] [address]  [facility] [id number]    Patient: Mindy Truong   YOB: 1970   Date of Visit: 6/21/2024       Dear Dr. Durand:    Thank you for referring Mindy Truong to me for evaluation. Below are my notes for this consultation.    If you have questions, please do not hesitate to call me. I look forward to following your patient along with you.         Sincerely,        Bunny Ruvalcaba MD        CC: No Recipients    Bunny Ruvalcaba MD  6/21/2024  7:55 AM  Incomplete  [facility]    Dear Dr. Jane,    I had the pleasure of seeing your patient Mindy Truong in the office today and I thank you for this kind referral.       Chief Complaint: Swallowing problems      HPI:  Mindy Truong is a 53 y.o. female who presents with upper GI issues which involve swallowing.  The patient has been worked up for rheumatologic disease.  She has elevated inflammatory markers.  Patient has been experiencing a sensation of a lot of saliva and altered taste.  She has what she describes as her jaw locking up on occasion.  When she does swallow she finds difficulty in initiating the swallow.  The only thing she actually feels stick when she does swallow her pills.  She does not have any dysphagia for solid foods or liquids.  Patient had similar symptomatology in 2018 and EGD was normal.  However at that time biopsy did reveal H. pylori.  She is not having any weight loss.  No change in the consistency of her skin.  No Raynaud's.  Most recent complete metabolic profile showed normal liver functions.  Patient is not having any nausea or vomiting.  When she bends over she does distinctly feel GERD.  No melena or hematochezia.  No other significant GI symptomatology.  She denies any chest pain or shortness of breath.  She has been found to have Hashimoto's disease with thyroid nodules.  Patient also describes a sensation of  hunger even after eating but a sensation of concomitant bloating..      Review of Systems:   Constitutional: No fever or chills, feels well, no tiredness, no recent weight gain or weight loss.   HENT: No complaints of earache, no hearing loss, no nosebleeds, no nasal discharge, no sore throat, no hoarseness.    Eyes: No complaints of eye pain, no red eyes, no discharge from eyes, no itchy eyes.  Cardiovascular: No complaints of slow heart rate, no fast heart rate, no chest pain, no palpitations, no leg claudication, no lower extremity edema.   Respiratory: No complaints of shortness of breath, no wheezing, no cough, no SOB on exertion, no orthopnea.   Gastrointestinal: As noted in HPI  Genitourinary: No complaints of dysuria, no incontinence, no hesitancy, no nocturia.   Musculoskeletal: No complaints of arthralgia, no myalgias, no joint swelling or stiffness, no limb pain or swelling.   Neurological: No complaints of headache, no confusion, no convulsions, no numbness or tingling, no dizziness or fainting, no limb weakness, no difficulty walking.    Skin: No complaints of skin rash or skin lesions, no itching, no skin wound, no dry skin.    Hematological/Lymphatic: No complaints of swollen glands, does not bleed easy.   Allergic/Immunologic: No immunocompromised state.  Endocrine:  No complaints of polyuria, no polydipsia.   Psychiatric/Behavioral: Positive anxiety, PTSD      Historical Information  Past Medical History:   Diagnosis Date   • Anxiety    • Asthma    • Depression    • Disease of thyroid gland    • Dizziness    • Forgetfulness    • Hashimoto's disease    • Hashimoto's disease    • Headache(784.0) 2002   • History of fainting as a child    • Hyperlipidemia    • Hypertension    • Numbness and tingling    • MIRANDA (obstructive sleep apnea)    • Post traumatic stress disorder 02/28/2019   • Varicella      Past Surgical History:   Procedure Laterality Date   • BREAST BIOPSY Left 2020   • BREAST SURGERY  Bilateral 2002    reduction   • COLONOSCOPY     • HYSTERECTOMY  2005   • AK COLONOSCOPY FLX DX W/COLLJ SPEC WHEN PFRMD N/A 10/16/2018    Procedure: EGD AND COLONOSCOPY;  Surgeon: Bunny Ruvalcaba MD;  Location: [facility];  Service: Gastroenterology   • AK EXCISION RADIAL HEAD Left 10/09/2020    Procedure: RADIAL HEAD IMPLANT ARTHROPLASTY ;  Surgeon: Zackary Craig MD;  Location: [facility];  Service: Orthopedics   • REDUCTION MAMMAPLASTY Bilateral 1997   • REDUCTION MAMMAPLASTY Bilateral 2002   • REDUCTION MAMMAPLASTY Bilateral 2020   • SINUS SURGERY     • TOTAL ABDOMINAL HYSTERECTOMY       Social History  Social History     Substance and Sexual Activity   Alcohol Use Yes   • Alcohol/week: 2.0 standard drinks of alcohol   • Types: 2 Glasses of wine per week    Comment: socially     Social History     Substance and Sexual Activity   Drug Use No     Social History     Tobacco Use   Smoking Status Never   Smokeless Tobacco Never   Tobacco Comments    na     Family History   Problem Relation Age of Onset   • Hyperlipidemia Mother    • Lupus Mother    • Hypertension Mother    • Anxiety disorder Mother    • Psychiatric Illness Mother         unknown-lost memory for six months   • Depression Father    • Cervical cancer Paternal Grandmother    • Ovarian cancer Paternal Grandmother 66   • Uterine cancer Paternal Grandmother    • No Known Problems Half-Sister    • No Known Problems Half-Brother    • No Known Problems Half-Brother    • No Known Problems Half-Sister    • No Known Problems Half-Sister    • No Known Problems Maternal Aunt    • No Known Problems Maternal Aunt    • No Known Problems Maternal Aunt    • No Known Problems Maternal Aunt    • No Known Problems Paternal Aunt    • No Known Problems Paternal Aunt    • Bipolar disorder Cousin    • Schizoaffective Disorder  Cousin    • Schizophrenia Cousin    • Self-Injury Cousin    • Suicide Attempts Cousin    • Psychosis Maternal Uncle         need his life   •  "Schizoaffective Disorder  Maternal Uncle    • Schizophrenia Maternal Uncle    • Self-Injury Maternal Uncle    • Suicide Attempts Maternal Uncle    • Breast cancer Neg Hx    • Colon cancer Neg Hx    • Seizures Neg Hx          Current Medications: has a current medication list which includes the following prescription(s): albuterol, albuterol, amlodipine, atorvastatin, clonazepam, clonidine, levothyroxine, metformin, mirtazapine, rizatriptan, venlafaxine, and venlafaxine.       Vital Signs: /88   Pulse 75   Ht 5' 3\" (1.6 m)   Wt 78 kg (172 lb)   SpO2 95%   BMI 30.47 kg/m²     Physical Exam:   Constitutional  General Appearance: No acute distress, well appearing and well nourished  Head  Normocephalic  Eyes  Conjunctivae and lids: No swelling, erythema, or discharge.    Pupils and irises: Equal, round and reactive to light.   Ears, Nose, Mouth, and Throat  External inspection of ears and nose: Normal  Nasal mucosa, septum and turbinates: Normal without edema or erythema/   Oropharynx: Normal with no erythema, edema, exudate or lesions.   Neck  Normal range of motion. Neck supple.   Cardiovascular  Auscultation of the heart: Normal rate and rhythm, normal S1 and S2 without murmurs.  Examination of the extremities for edema and/or varicosities: Normal  Pulmonary/Chest  Respiratory effort: No increased work of breathing or signs of respiratory distress.   Auscultation of lungs: Clear to auscultation, equal breath sounds bilaterally, no wheezes, rales, no rhonchi.   Abdomen  Abdomen: Non-tender, no masses.   Liver and spleen: No hepatomegaly or splenomegaly.   Musculoskeletal  Gait and station: normal.  Digits and Nails: normal without clubbing or cyanosis.  Inspection/palpation of joints, bones, and muscles: Normal  Neurological  No nystagmus or asterixis.   Skin  Skin and subcutaneous tissue: Normal without rashes or lesions.   Lymphatic  Palpation of the lymph nodes in neck: No lymphadenopathy. "   Psychiatric  Orientation to person, place and time: Normal.  Mood and affect: Normal.         Labs:   Lab Results   Component Value Date    ALT 23 02/14/2024    AST 26 02/14/2024    BUN 15 02/14/2024    CALCIUM 9.8 02/14/2024     02/14/2024    CHOL 178 09/22/2017    CO2 30 02/14/2024    CREATININE 0.89 02/14/2024    HDL 62 02/14/2024    HCT 38.8 02/14/2024    HGB 12.6 02/14/2024    HGBA1C 6.1 (H) 06/07/2024    MG 1.9 09/28/2018     02/14/2024    K 4.5 02/14/2024     09/22/2017    TRIG 172 (H) 02/14/2024    WBC 5.43 02/14/2024         X-Rays & Procedures:   No orders to display         ______________________________________________________________________      Assessment & Plan:      Diagnoses and all orders for this visit:    Pharyngoesophageal dysphagia  -     EGD; Future    Dysphagia, unspecified type  -     Ambulatory Referral to Gastroenterology    Altered taste  -     Ambulatory Referral to Gastroenterology    Gastroesophageal reflux disease, unspecified whether esophagitis present  -     EGD; Future    Bloating  -     EGD; Future    History of Helicobacter pylori infection  -     EGD; Future        I have taken the liberty of scheduling the patient for EGD.  Her dysphagia usually involves pills and not food.  I have also asked that she take her pills with a bit of applesauce to make it easier to swallow.  I will be happy to inform you of her results and further recommendations.  Most of her problem really sounds pretty esophageal but she is seeing ENT in follow-up.  I would like to thank you for allowing me to participate in her care.            With warmest regards,    Bunny Ruvalcaba MD, FACG

## 2024-06-21 NOTE — PATIENT INSTRUCTIONS
Scheduled date of EGD(as of today): 7/15/24  Physician performing EGD: Peg  Location of EGD: [facility]  Instructions reviewed with patient by: Bailey ZELAYA  Clearances:

## 2024-06-21 NOTE — PROGRESS NOTES
[facility]    Dear Dr. Jane,    I had the pleasure of seeing your patient Mindy Truong in the office today and I thank you for this kind referral.       Chief Complaint: Swallowing problems      HPI:  Mindy Truong is a 53 y.o. female who presents with upper GI issues which involve swallowing.  The patient has been worked up for rheumatologic disease.  She has elevated inflammatory markers.  Patient has been experiencing a sensation of a lot of saliva and altered taste.  She has what she describes as her jaw locking up on occasion.  When she does swallow she finds difficulty in initiating the swallow.  The only thing she actually feels stick when she does swallow her pills.  She does not have any dysphagia for solid foods or liquids.  Patient had similar symptomatology in 2018 and EGD was normal.  However at that time biopsy did reveal H. pylori.  She is not having any weight loss.  No change in the consistency of her skin.  No Raynaud's.  Most recent complete metabolic profile showed normal liver functions.  Patient is not having any nausea or vomiting.  When she bends over she does distinctly feel GERD.  No melena or hematochezia.  No other significant GI symptomatology.  She denies any chest pain or shortness of breath.  She has been found to have Hashimoto's disease with thyroid nodules.  Patient also describes a sensation of hunger even after eating but a sensation of concomitant bloating..      Review of Systems:   Constitutional: No fever or chills, feels well, no tiredness, no recent weight gain or weight loss.   HENT: No complaints of earache, no hearing loss, no nosebleeds, no nasal discharge, no sore throat, no hoarseness.    Eyes: No complaints of eye pain, no red eyes, no discharge from eyes, no itchy eyes.  Cardiovascular: No complaints of slow heart rate, no fast heart rate, no chest pain, no palpitations, no leg claudication, no lower extremity edema.   Respiratory:  No complaints of shortness of breath, no wheezing, no cough, no SOB on exertion, no orthopnea.   Gastrointestinal: As noted in HPI  Genitourinary: No complaints of dysuria, no incontinence, no hesitancy, no nocturia.   Musculoskeletal: No complaints of arthralgia, no myalgias, no joint swelling or stiffness, no limb pain or swelling.   Neurological: No complaints of headache, no confusion, no convulsions, no numbness or tingling, no dizziness or fainting, no limb weakness, no difficulty walking.    Skin: No complaints of skin rash or skin lesions, no itching, no skin wound, no dry skin.    Hematological/Lymphatic: No complaints of swollen glands, does not bleed easy.   Allergic/Immunologic: No immunocompromised state.  Endocrine:  No complaints of polyuria, no polydipsia.   Psychiatric/Behavioral: Positive anxiety, PTSD      Historical Information   Past Medical History:   Diagnosis Date    Anxiety     Asthma     Depression     Disease of thyroid gland     Dizziness     Forgetfulness     Hashimoto's disease     Hashimoto's disease     Headache(784.0) 2002    History of fainting as a child     Hyperlipidemia     Hypertension     Numbness and tingling     MIRANDA (obstructive sleep apnea)     Post traumatic stress disorder 02/28/2019    Varicella      Past Surgical History:   Procedure Laterality Date    BREAST BIOPSY Left 2020    BREAST SURGERY Bilateral 2002    reduction    COLONOSCOPY      HYSTERECTOMY  2005    OR COLONOSCOPY FLX DX W/COLLJ SPEC WHEN PFRMD N/A 10/16/2018    Procedure: EGD AND COLONOSCOPY;  Surgeon: Bunny Ruvalcaba MD;  Location: [facility];  Service: Gastroenterology    OR EXCISION RADIAL HEAD Left 10/09/2020    Procedure: RADIAL HEAD IMPLANT ARTHROPLASTY ;  Surgeon: Zackary Craig MD;  Location: [facility];  Service: Orthopedics    REDUCTION MAMMAPLASTY Bilateral 1997    REDUCTION MAMMAPLASTY Bilateral 2002    REDUCTION MAMMAPLASTY Bilateral 2020    SINUS SURGERY      TOTAL ABDOMINAL HYSTERECTOMY    "    Social History   Social History     Substance and Sexual Activity   Alcohol Use Yes    Alcohol/week: 2.0 standard drinks of alcohol    Types: 2 Glasses of wine per week    Comment: socially     Social History     Substance and Sexual Activity   Drug Use No     Social History     Tobacco Use   Smoking Status Never   Smokeless Tobacco Never   Tobacco Comments    na     Family History   Problem Relation Age of Onset    Hyperlipidemia Mother     Lupus Mother     Hypertension Mother     Anxiety disorder Mother     Psychiatric Illness Mother         unknown-lost memory for six months    Depression Father     Cervical cancer Paternal Grandmother     Ovarian cancer Paternal Grandmother 66    Uterine cancer Paternal Grandmother     No Known Problems Half-Sister     No Known Problems Half-Brother     No Known Problems Half-Brother     No Known Problems Half-Sister     No Known Problems Half-Sister     No Known Problems Maternal Aunt     No Known Problems Maternal Aunt     No Known Problems Maternal Aunt     No Known Problems Maternal Aunt     No Known Problems Paternal Aunt     No Known Problems Paternal Aunt     Bipolar disorder Cousin     Schizoaffective Disorder  Cousin     Schizophrenia Cousin     Self-Injury Cousin     Suicide Attempts Cousin     Psychosis Maternal Uncle         need his life    Schizoaffective Disorder  Maternal Uncle     Schizophrenia Maternal Uncle     Self-Injury Maternal Uncle     Suicide Attempts Maternal Uncle     Breast cancer Neg Hx     Colon cancer Neg Hx     Seizures Neg Hx          Current Medications: has a current medication list which includes the following prescription(s): albuterol, albuterol, amlodipine, atorvastatin, clonazepam, clonidine, levothyroxine, metformin, mirtazapine, rizatriptan, venlafaxine, and venlafaxine.       Vital Signs: /88   Pulse 75   Ht 5' 3\" (1.6 m)   Wt 78 kg (172 lb)   SpO2 95%   BMI 30.47 kg/m²     Physical Exam:   Constitutional  General " Appearance: No acute distress, well appearing and well nourished  Head  Normocephalic  Eyes  Conjunctivae and lids: No swelling, erythema, or discharge.    Pupils and irises: Equal, round and reactive to light.   Ears, Nose, Mouth, and Throat  External inspection of ears and nose: Normal  Nasal mucosa, septum and turbinates: Normal without edema or erythema/   Oropharynx: Normal with no erythema, edema, exudate or lesions.   Neck  Normal range of motion. Neck supple.   Cardiovascular  Auscultation of the heart: Normal rate and rhythm, normal S1 and S2 without murmurs.  Examination of the extremities for edema and/or varicosities: Normal  Pulmonary/Chest  Respiratory effort: No increased work of breathing or signs of respiratory distress.   Auscultation of lungs: Clear to auscultation, equal breath sounds bilaterally, no wheezes, rales, no rhonchi.   Abdomen  Abdomen: Non-tender, no masses.   Liver and spleen: No hepatomegaly or splenomegaly.   Musculoskeletal  Gait and station: normal.  Digits and Nails: normal without clubbing or cyanosis.  Inspection/palpation of joints, bones, and muscles: Normal  Neurological  No nystagmus or asterixis.   Skin  Skin and subcutaneous tissue: Normal without rashes or lesions.   Lymphatic  Palpation of the lymph nodes in neck: No lymphadenopathy.   Psychiatric  Orientation to person, place and time: Normal.  Mood and affect: Normal.         Labs:   Lab Results   Component Value Date    ALT 23 02/14/2024    AST 26 02/14/2024    BUN 15 02/14/2024    CALCIUM 9.8 02/14/2024     02/14/2024    CHOL 178 09/22/2017    CO2 30 02/14/2024    CREATININE 0.89 02/14/2024    HDL 62 02/14/2024    HCT 38.8 02/14/2024    HGB 12.6 02/14/2024    HGBA1C 6.1 (H) 06/07/2024    MG 1.9 09/28/2018     02/14/2024    K 4.5 02/14/2024     09/22/2017    TRIG 172 (H) 02/14/2024    WBC 5.43 02/14/2024         X-Rays & Procedures:   No orders to display          ______________________________________________________________________      Assessment & Plan:      Diagnoses and all orders for this visit:    Pharyngoesophageal dysphagia  -     EGD; Future    Dysphagia, unspecified type  -     Ambulatory Referral to Gastroenterology    Altered taste  -     Ambulatory Referral to Gastroenterology    Gastroesophageal reflux disease, unspecified whether esophagitis present  -     EGD; Future    Bloating  -     EGD; Future    History of Helicobacter pylori infection  -     EGD; Future        I have taken the liberty of scheduling the patient for EGD.  Her dysphagia usually involves pills and not food.  I have also asked that she take her pills with a bit of applesauce to make it easier to swallow.  I will be happy to inform you of her results and further recommendations.  Most of her problem really sounds pretty esophageal but she is seeing ENT in follow-up.  I would like to thank you for allowing me to participate in her care.      I obtained informed consent from the patient. The risks/benefits/alternatives of the procedure were discussed with the patient. Risks included, but not limited to, infection, bleeding, perforation, injury to organs in the abdomen, missed lesion and incomplete procedure were discussed. Patient was agreeable and electronic signature was obtained.        With warmest regards,    Bunny Ruvalcaba MD, FACG

## 2024-06-24 NOTE — PSYCH
This note was not shared with the patient due to reasonable likelihood of causing patient harm    Virtual Regular Visit    Visit Date: 06/25/24     Verification of patient location:    Patient is located at Home in the following state in which I hold an active license [place]    Problem List Items Addressed This Visit       Post traumatic stress disorder (PTSD)    Major depressive disorder, recurrent, moderate (HCC) - Primary    JUHI (generalized anxiety disorder)     Reason for visit is   Chief Complaint   Patient presents with    Follow-up    Medication Management    Virtual Regular Visit     Encounter provider Garima Rea    Provider located at [address]  [address]  [facility] [id number] [phone]    Recent Visits  No visits were found meeting these conditions.  Showing recent visits within past 7 days and meeting all other requirements  Today's Visits  Date Type Provider Dept   06/25/24 Telemedicine Garima Rea [facility]   Showing today's visits and meeting all other requirements  Future Appointments  No visits were found meeting these conditions.  Showing future appointments within next 150 days and meeting all other requirements       The patient was identified by name and date of birth. Mindy Truong was informed that this is a telemedicine visit and that the visit is being conducted throughthe Epic Embedded platform. She agrees to proceed.  My office door was closed. No one else was in the room. She acknowledged consent and understanding of privacy and security of the video platform. The patient has agreed to participate and understands they can discontinue the visit at any time.    Patient is aware this is a billable service.     Insurance: Payor: BLUE CROSS / Plan: MISC BLUE CROSS / Product Type: Blue Fee for Service /      Subjective:    Mindy Truong is a drake 53 y.o. female with a  "history of Major Depressive Disorder, Generalized Anxiety Disorder, and PTSD who presents today for follow-up and medication management. Since her last visit she had been well until just the other day when she ran into an insurance issue with her medications and she has been out. She has this mostly figured out and will hopefully be able to get her meds ASAP. She also mentions multiple new health issues including \"increased inflammation\" and possible Sjogren's. She is also worried about a letter she got stating that our office will no longer be taking her insurance. Otherwise things had been going well and the medications had been working \"perfectly\". She has been able to work with Nesha Alejo [facility], for therapy. She also mentions that through her insurance she now has a nurse and a  that she is working with.     She denies any suicidal ideation, intent or plan at present; denies any homicidal ideation, intent or plan at present.    She denies any auditory hallucinations, denies any visual hallucinations, denies any delusions.    She denies any side effects from current psychiatric medications.    HPI ROS Appetite Changes and Sleep:     She reports adequate number of sleep hours, adequate appetite, adequate energy level    Current Rating Scores:     Current PHQ-9   PHQ-2/9 Depression Screening    Little interest or pleasure in doing things: 1 - several days  Feeling down, depressed, or hopeless: 1 - several days  Trouble falling or staying asleep, or sleeping too much: 1 - several days  Feeling tired or having little energy: 1 - several days  Poor appetite or overeatin - several days  Feeling bad about yourself - or that you are a failure or have let yourself or your family down: 1 - several days  Trouble concentrating on things, such as reading the newspaper or watching television: 0 - not at all  Moving or speaking so slowly that other people could have noticed. Or the opposite - being so " fidgety or restless that you have been moving around a lot more than usual: 0 - not at all  Thoughts that you would be better off dead, or of hurting yourself in some way: 0 - not at all  PHQ-9 Score: 6  PHQ-9 Interpretation: Mild depression         Review Of Systems:    Mood anxiety   Behavior appropriate, cooperative, and calm   Thought Content normal   General normal    Personality no change in personality   Other Psych Symptoms normal   Constitutional as noted in HPI   ENT as noted in HPI   Cardiovascular as noted in HPI   Respiratory as noted in HPI   Gastrointestinal as noted in HPI   Genitourinary as noted in HPI   Musculoskeletal as noted in HPI   Integumentary as noted in HPI   Neurological as noted in HPI   Endocrine negative   Other Symptoms none, all other systems are negative     Family Psychiatric History:     Family History   Problem Relation Age of Onset    Hyperlipidemia Mother     Lupus Mother     Hypertension Mother     Anxiety disorder Mother     Psychiatric Illness Mother         unknown-lost memory for six months    Depression Father     Cervical cancer Paternal Grandmother     Ovarian cancer Paternal Grandmother 66    Uterine cancer Paternal Grandmother     No Known Problems Half-Sister     No Known Problems Half-Brother     No Known Problems Half-Brother     No Known Problems Half-Sister     No Known Problems Half-Sister     No Known Problems Maternal Aunt     No Known Problems Maternal Aunt     No Known Problems Maternal Aunt     No Known Problems Maternal Aunt     No Known Problems Paternal Aunt     No Known Problems Paternal Aunt     Bipolar disorder Cousin     Schizoaffective Disorder  Cousin     Schizophrenia Cousin     Self-Injury Cousin     Suicide Attempts Cousin     Psychosis Maternal Uncle         need his life    Schizoaffective Disorder  Maternal Uncle     Schizophrenia Maternal Uncle     Self-Injury Maternal Uncle     Suicide Attempts Maternal Uncle     Breast cancer Neg Hx      Colon cancer Neg Hx     Seizures Neg Hx        Social/Substance Abuse History:    Social History     Socioeconomic History    Marital status: /Civil Union     Spouse name: Not on file    Number of children: Not on file    Years of education: Not on file    Highest education level: Not on file   Occupational History    Not on file   Tobacco Use    Smoking status: Never    Smokeless tobacco: Never    Tobacco comments:     na   Vaping Use    Vaping status: Never Used   Substance and Sexual Activity    Alcohol use: Yes     Alcohol/week: 2.0 standard drinks of alcohol     Types: 2 Glasses of wine per week     Comment: socially    Drug use: No    Sexual activity: Not Currently     Partners: Female     Birth control/protection: None     Comment: JACKELIN   Other Topics Concern    Not on file   Social History Narrative    Lives [place], with her spouse.      Social Determinants of Health     Financial Resource Strain: Not on file   Food Insecurity: Not on file   Transportation Needs: Not on file   Physical Activity: Unknown (11/15/2022)    Exercise Vital Sign     Days of Exercise per Week: 0 days     Minutes of Exercise per Session: Not on file   Stress: No Stress Concern Present (9/1/2020)    [employer] - Occupational Stress Questionnaire     Feeling of Stress : Not at all   Social Connections: Unknown (6/18/2024)    Received from [employer]     How often do you feel lonely or isolated from those around you? (Adult - for ages 18 years and over): Not on file   Intimate Partner Violence: Not on file   Housing Stability: Not on file       The following portions of the patient's history were reviewed and updated as appropriate: past family history, past medical history, past social history, past surgical history and problem list.    OBJECTIVE:     Mental Status Evaluation:  Appearance:  dressed appropriately, adequate grooming, looks stated age   Behavior:  pleasant,  cooperative, calm, interacts appropriately with this writer   Speech:  normal rate, normal volume, normal pitch   Mood:  anxious   Affect:  constricted   Thought Process:  organized, logical, coherent   Associations: intact associations   Thought Content:  no overt delusions, no paranoia noted on exam   Perceptual Disturbances: no auditory hallucinations, no visual hallucinations   Risk Potential: Suicidal ideation - None  Homicidal ideation - None  Potential for aggression - No   Sensorium:  oriented to person, place, and time/date   Memory:  recent and remote memory grossly intact   Consciousness:  alert and awake   Attention/Concentration: attention span and concentration are age appropriate   Insight:  age appropriate   Judgment: age appropriate   Gait/Station: Unable to assess today due to virtual visit   Motor Activity: unable to assess today due to virtual visit        Laboratory Results: I have personally reviewed all pertinent laboratory/tests results    Suicide/Homicide Risk Assessment:    Risk of Harm to Self:  The following ratings are based on assessment at the time of the interview  Based on today's assessment, Mindy presents the following risk of harm to self: none    Risk of Harm to Others:  The following ratings are based on assessment at the time of the interview  Based on today's assessment, Mindy presents the following risk of harm to others: none    The following interventions are recommended: no intervention changes needed     Assessment/Plan:      Generally she is doing well and feels the medications are working well for her. The last few days she has been more anxious because she is out of two of the meds due to insurance/pharmacy issues. She states this is figured out and she should be getting the deliveries soon. She now wants all of her meds sent to the local pharmacy ([facility]) for 30 days. She has been able to work with a  and RN through her insurance which she finds helpful. She  will continue with Nesha Alejo LCSW, for therapy. We have discussed her safety plan and she agrees that if she experience unsafe thoughts that she will reach out to her supports including this office, the suicide hotline, and emergency services if necessary. Mindy is aware of non-emergent and emergent mental health resources. They are able to contract for their own safety at this time.    Will follow up in 3 months. Patient is aware to call the office if questions or concerns arise sooner.      Diagnoses and all orders for this visit:    Major depressive disorder, recurrent, moderate (HCC)    Post traumatic stress disorder (PTSD)    JUHI (generalized anxiety disorder)        Treatment Recommendations/Precautions:    Continue current medications:     - venlafaxine 225 mg qd     - mirtazapine 7.5 mg qhs     - clonazepam 0.5 mg BID PRN     - clonidine 0.1 mg qhs    Medication management every 3 months  Aware of 24 hour and weekend coverage for urgent situations accessed by calling [facility] main practice number  I am scheduling this patient out for greater than 3 months: Yes - Patient's stability of symptoms warrant this length of time or no significant changes to treatment plan  If sooner appointment needed, patient will reach out    Medications Risks/Benefits      Risks, Benefits And Possible Side Effects Of Medications:    Risks, benefits, and possible side effects of medications explained to Mindy and she verbalizes understanding and agreement for treatment.    Controlled Medication Discussion:     Mindy has been filling controlled prescriptions on time as prescribed according to [place] Prescription Drug Monitoring Program  Discussed with Mindy the risks of sedation, respiratory depression, impairment of ability to drive and potential for abuse and addiction related to treatment with benzodiazepine medications. She understands risk of treatment with benzodiazepine medications, agrees to  not drive if feels impaired and agrees to take medications as prescribed    Psychotherapy Provided:     Individual psychotherapy provided: No     Treatment Plan:    Completed and signed during the session: Not applicable - Treatment Plan to be completed by  [facility] therapist     Visit Time    Visit Start Time:  1:00 PM  Visit End Time:  1:20 PM  Total Visit Duration:  20 minutes    Garima Rea 06/25/24      VIRTUAL VISIT DISCLAIMER    Mindy Truong verbally agrees to participate in Virtual Care Services. Pt is aware that Virtual Care Services could be limited without vital signs or the ability to perform a full hands-on physical exam. Mindy Truong understands she or the provider may request at any time to terminate the video visit and request the patient to seek care or treatment in person.

## 2024-06-25 ENCOUNTER — TELEMEDICINE (OUTPATIENT)
Dept: PSYCHIATRY | Facility: CLINIC | Age: 54
End: 2024-06-25
Payer: COMMERCIAL

## 2024-06-25 DIAGNOSIS — F41.1 GAD (GENERALIZED ANXIETY DISORDER): ICD-10-CM

## 2024-06-25 DIAGNOSIS — F33.1 MAJOR DEPRESSIVE DISORDER, RECURRENT, MODERATE (HCC): Primary | ICD-10-CM

## 2024-06-25 DIAGNOSIS — G47.00 INSOMNIA, UNSPECIFIED TYPE: ICD-10-CM

## 2024-06-25 DIAGNOSIS — F43.10 POST TRAUMATIC STRESS DISORDER (PTSD): ICD-10-CM

## 2024-06-25 DIAGNOSIS — J45.909 UNCOMPLICATED ASTHMA, UNSPECIFIED ASTHMA SEVERITY, UNSPECIFIED WHETHER PERSISTENT: ICD-10-CM

## 2024-06-25 PROCEDURE — 99214 OFFICE O/P EST MOD 30 MIN: CPT | Performed by: PHYSICIAN ASSISTANT

## 2024-06-25 RX ORDER — MIRTAZAPINE 7.5 MG/1
7.5 TABLET, FILM COATED ORAL
Qty: 7 TABLET | Refills: 1 | Status: SHIPPED | OUTPATIENT
Start: 2024-06-25

## 2024-06-25 RX ORDER — CLONAZEPAM 0.5 MG/1
TABLET ORAL
Qty: 60 TABLET | Refills: 0 | Status: SHIPPED | OUTPATIENT
Start: 2024-06-25

## 2024-06-25 RX ORDER — VENLAFAXINE HYDROCHLORIDE 75 MG/1
75 CAPSULE, EXTENDED RELEASE ORAL DAILY
Qty: 30 CAPSULE | Refills: 3 | Status: SHIPPED | OUTPATIENT
Start: 2024-06-25

## 2024-06-26 ENCOUNTER — NURSE TRIAGE (OUTPATIENT)
Age: 54
End: 2024-06-26

## 2024-06-26 ENCOUNTER — OFFICE VISIT (OUTPATIENT)
Dept: OBGYN CLINIC | Facility: CLINIC | Age: 54
End: 2024-06-26
Payer: COMMERCIAL

## 2024-06-26 VITALS
DIASTOLIC BLOOD PRESSURE: 80 MMHG | HEIGHT: 63 IN | WEIGHT: 171 LBS | BODY MASS INDEX: 30.3 KG/M2 | SYSTOLIC BLOOD PRESSURE: 120 MMHG

## 2024-06-26 DIAGNOSIS — R31.9 HEMATURIA, UNSPECIFIED TYPE: Primary | ICD-10-CM

## 2024-06-26 DIAGNOSIS — R82.90 CLOUDY URINE: ICD-10-CM

## 2024-06-26 DIAGNOSIS — R33.9 INCOMPLETE EMPTYING OF BLADDER: ICD-10-CM

## 2024-06-26 PROBLEM — Z91.199 NO-SHOW FOR APPOINTMENT: Status: RESOLVED | Noted: 2018-10-10 | Resolved: 2024-06-26

## 2024-06-26 LAB
BACTERIA UR QL AUTO: ABNORMAL /HPF
BILIRUB UR QL STRIP: NEGATIVE
CLARITY UR: ABNORMAL
COLOR UR: ABNORMAL
GLUCOSE UR STRIP-MCNC: NEGATIVE MG/DL
HGB UR QL STRIP.AUTO: ABNORMAL
KETONES UR STRIP-MCNC: NEGATIVE MG/DL
LEUKOCYTE ESTERASE UR QL STRIP: ABNORMAL
NITRITE UR QL STRIP: NEGATIVE
NON-SQ EPI CELLS URNS QL MICRO: ABNORMAL /HPF
PH UR STRIP.AUTO: 7 [PH]
PROT UR STRIP-MCNC: ABNORMAL MG/DL
RBC #/AREA URNS AUTO: ABNORMAL /HPF
SL AMB  POCT GLUCOSE, UA: ABNORMAL
SL AMB LEUKOCYTE ESTERASE,UA: ABNORMAL
SL AMB POCT BILIRUBIN,UA: ABNORMAL
SL AMB POCT BLOOD,UA: ABNORMAL
SL AMB POCT CLARITY,UA: ABNORMAL
SL AMB POCT COLOR,UA: ABNORMAL
SL AMB POCT KETONES,UA: ABNORMAL
SL AMB POCT NITRITE,UA: ABNORMAL
SL AMB POCT PH,UA: ABNORMAL
SL AMB POCT SPECIFIC GRAVITY,UA: ABNORMAL
SL AMB POCT URINE PROTEIN: ABNORMAL
SL AMB POCT UROBILINOGEN: ABNORMAL
SP GR UR STRIP.AUTO: 1.02 (ref 1–1.03)
UROBILINOGEN UR STRIP-ACNC: <2 MG/DL
WBC #/AREA URNS AUTO: ABNORMAL /HPF
WBC CLUMPS # UR AUTO: PRESENT /UL

## 2024-06-26 PROCEDURE — 81001 URINALYSIS AUTO W/SCOPE: CPT | Performed by: NURSE PRACTITIONER

## 2024-06-26 PROCEDURE — 81002 URINALYSIS NONAUTO W/O SCOPE: CPT | Performed by: NURSE PRACTITIONER

## 2024-06-26 PROCEDURE — 87086 URINE CULTURE/COLONY COUNT: CPT | Performed by: NURSE PRACTITIONER

## 2024-06-26 PROCEDURE — 99213 OFFICE O/P EST LOW 20 MIN: CPT | Performed by: NURSE PRACTITIONER

## 2024-06-26 RX ORDER — NITROFURANTOIN 25; 75 MG/1; MG/1
100 CAPSULE ORAL 2 TIMES DAILY
Qty: 10 CAPSULE | Refills: 0 | Status: SHIPPED | OUTPATIENT
Start: 2024-06-26 | End: 2024-07-01

## 2024-06-26 RX ORDER — ALBUTEROL SULFATE 90 UG/1
AEROSOL, METERED RESPIRATORY (INHALATION)
Qty: 6.7 G | Refills: 6 | Status: SHIPPED | OUTPATIENT
Start: 2024-06-26

## 2024-06-26 NOTE — PROGRESS NOTES
"    Diagnoses and all orders for this visit:    Hematuria, unspecified type  -     Urine culture; Future  -     Urinalysis with microscopic  -     POCT urine dip    Incomplete emptying of bladder  -     Urine culture; Future  -     Urinalysis with microscopic  -     POCT urine dip    Cloudy urine  -     nitrofurantoin (MACROBID) 100 mg capsule; Take 1 capsule (100 mg total) by mouth 2 (two) times a day for 5 days    Call if no symptom improvement, all questions answered, return for annual.          Pleasant 53 y.o. female patient here for urinary complaints. She states her symptoms are worsening . She had a Total hysterectomy in 2005. She had uti a month and a half ago treated with keflex and urinary issues have returned. She also has changed her pad twice today for vaginal bleeding. She denies fever, pelvic pain or dysparunia. She denies new sexual partners. Same sex wife. They do not use toys, there has been \"nothing in the vagina for years\". She denies vaginal complaints of itching, odor or discharge.    Past Medical History:   Diagnosis Date    Anxiety     Asthma     Depression     Disease of thyroid gland     Dizziness     Fibroid     Forgetfulness     Hashimoto's disease     Hashimoto's disease     Headache(784.0) 2002    History of fainting as a child     Hyperlipidemia     Hypertension     Migraine 2004    Numbness and tingling     MIRANDA (obstructive sleep apnea)     Polycystic ovary syndrome 2008    Post traumatic stress disorder 02/28/2019    Varicella      Past Surgical History:   Procedure Laterality Date    BREAST BIOPSY Left 2020    BREAST SURGERY Bilateral 2002    reduction    COLONOSCOPY      HYSTERECTOMY  2005    FL COLONOSCOPY FLX DX W/COLLJ SPEC WHEN PFRMD N/A 10/16/2018    Procedure: EGD AND COLONOSCOPY;  Surgeon: Bunny Ruvalcaba MD;  Location: [facility];  Service: Gastroenterology    FL EXCISION RADIAL HEAD Left 10/09/2020    Procedure: RADIAL HEAD IMPLANT ARTHROPLASTY ;  Surgeon: Zackary" MD Rafa;  Location: [facility];  Service: Orthopedics    REDUCTION MAMMAPLASTY Bilateral 1997    REDUCTION MAMMAPLASTY Bilateral 2002    REDUCTION MAMMAPLASTY Bilateral 2020    SINUS SURGERY      TOTAL ABDOMINAL HYSTERECTOMY       Social History     Tobacco Use    Smoking status: Never    Smokeless tobacco: Never    Tobacco comments:     na   Vaping Use    Vaping status: Never Used   Substance Use Topics    Alcohol use: Yes     Alcohol/week: 2.0 standard drinks of alcohol     Types: 2 Glasses of wine per week     Comment: socially    Drug use: No     Family History   Problem Relation Age of Onset    Hyperlipidemia Mother     Lupus Mother     Hypertension Mother     Anxiety disorder Mother     Psychiatric Illness Mother         unknown-lost memory for six months    Depression Father     Cervical cancer Paternal Grandmother     Ovarian cancer Paternal Grandmother 66        [place]    Uterine cancer Paternal Grandmother     No Known Problems Half-Sister     No Known Problems Half-Brother     No Known Problems Half-Brother     No Known Problems Half-Sister     No Known Problems Half-Sister     No Known Problems Maternal Aunt     No Known Problems Maternal Aunt     No Known Problems Maternal Aunt     No Known Problems Maternal Aunt     No Known Problems Paternal Aunt     No Known Problems Paternal Aunt     Bipolar disorder Cousin     Schizoaffective Disorder  Cousin     Schizophrenia Cousin     Self-Injury Cousin     Suicide Attempts Cousin     Psychosis Maternal Uncle         need his life    Schizoaffective Disorder  Maternal Uncle     Schizophrenia Maternal Uncle     Self-Injury Maternal Uncle     Suicide Attempts Maternal Uncle     Breast cancer Neg Hx     Colon cancer Neg Hx     Seizures Neg Hx        Current Outpatient Medications:     albuterol (2.5 mg/3 mL) 0.083 % nebulizer solution, Take 3 mL (2.5 mg total) by nebulization every 6 (six) hours as needed for wheezing or shortness of breath, Disp: 180 mL, Rfl:  "0    albuterol (PROVENTIL HFA,VENTOLIN HFA) 90 mcg/act inhaler, TAKE 2 PUFFS BY MOUTH 4 TIMES A DAY, Disp: 6.7 g, Rfl: 6    clonazePAM (KlonoPIN) 0.5 mg tablet, Take 1 tablet (0.5 mg total) by mouth daily at bedtime. May also take 1 tablet (0.5 mg total) daily as needed for anxiety., Disp: 60 tablet, Rfl: 0    cloNIDine (Catapres) 0.1 mg tablet, Take 1 tablet (0.1 mg total) by mouth daily at bedtime, Disp: 90 tablet, Rfl: 1    levothyroxine 75 mcg tablet, Take 1 tablet (75 mcg total) by mouth daily, Disp: 90 tablet, Rfl: 3    mirtazapine (REMERON) 7.5 MG tablet, Take 1 tablet (7.5 mg total) by mouth daily at bedtime, Disp: 7 tablet, Rfl: 1    nitrofurantoin (MACROBID) 100 mg capsule, Take 1 capsule (100 mg total) by mouth 2 (two) times a day for 5 days, Disp: 10 capsule, Rfl: 0    rizatriptan (MAXALT-MLT) 10 mg disintegrating tablet, Take 1 tablet (10 mg total) by mouth once as needed for migraine for up to 1 dose May repeat in 2 hours if needed, Disp: 9 tablet, Rfl: 0    venlafaxine (EFFEXOR-XR) 150 mg 24 hr capsule, Take 1 capsule (150 mg total) by mouth daily with 75 mg capsule (225 mg total daily), Disp: 90 capsule, Rfl: 1    venlafaxine (EFFEXOR-XR) 75 mg 24 hr capsule, Take 1 capsule (75 mg total) by mouth daily, Disp: 30 capsule, Rfl: 3    amLODIPine (NORVASC) 2.5 mg tablet, Take 1 tablet (2.5 mg total) by mouth daily, Disp: 90 tablet, Rfl: 0    atorvastatin (LIPITOR) 20 mg tablet, Take 1 tablet (20 mg total) by mouth daily, Disp: 90 tablet, Rfl: 3    metFORMIN (GLUCOPHAGE) 500 mg tablet, Take 500 mg by mouth 2 (two) times a day with meals, Disp: , Rfl:     Allergies   Allergen Reactions    Lisinopril-Hydrochlorothiazide Hives    Other Hives     States she has no allergies     OB History    Para Term  AB Living   0 0 0 0 0 0   SAB IAB Ectopic Multiple Live Births   0 0 0 0 0       Vitals:    24 1447   BP: 120/80   Weight: 77.6 kg (171 lb)   Height: 5' 3\" (1.6 m)     Body mass index is " 30.29 kg/m².  No LMP recorded. Patient has had a hysterectomy.    Review of Systems   Constitutional: Negative for chills, fatigue, fever and unexpected weight change.   Respiratory: Negative for shortness of breath.    Gastrointestinal: Negative for anal bleeding, blood in stool, constipation and diarrhea.   Genitourinary: Negative for dysuria.  Positive for urinary hesitancy, frequency and microscopic hematuria. Cloudy urine, possibly malodorous.     Physical Exam   Constitutional: She appears well-developed and well-nourished. No distress. Alert and oriented.  HENT: atraumatic, EOMI bilaterally  Head: Normocephalic.   Neck: Normal range of motion. Neck supple.   Pulmonary: Effort normal.  Abdominal: Soft.   Pelvic exam was performed with patient supine. No labial fusion. There is no rash, tenderness, lesion or injury on the right labia. There is no rash, tenderness, lesion or injury on the left labia. Urethral meatus does not show any tenderness, inflammation or discharge. Palpation of midline bladder without pain or discomfort. Uterus and Cervix absent. Right adnexum displays no mass, no tenderness and no fullness. Left adnexum displays no mass, no tenderness and no fullness. No erythema or tenderness in the vagina. No foreign body in the vagina. No signs of injury around the vagina. Vaginal discharge found. Perineum and anus without areas of injury. No lesions noted or swelling.  Lymphadenopathy:        Right: No inguinal adenopathy present.        Left: No inguinal adenopathy present.     Urine dip + for leuks and rbcs.

## 2024-06-26 NOTE — TELEPHONE ENCOUNTER
"Pt called in stating that she had a hysterectomy in 2005, pt reporting that she has a history of fibroids. Pt reporting that she has seen urology and pt stating that the patient exercises to strengthen pelvic floor. Pt reporting that she had just went to urinate and noticed clots and blood in the toilet. Pt reporting bleeding the size of a quarter, and clots the size of rice.     Pt reporting dull pelvic pain 1/10.       As per protocol pt needs to be seen in the office within two weeks. Patient was scheduled for today 6/26/24 for 2:30pm.     Reason for Disposition   Age > 40 years with irregular or excessive bleeding    Answer Assessment - Initial Assessment Questions  1. AMOUNT: \"Describe the bleeding that you are having.\"     - SPOTTING: spotting, or pinkish / brownish mucous discharge; does not fill panti-liner or pad     - MILD:  less than 1 pad / hour; less than patient's usual menstrual bleeding    - MODERATE: 1-2 pads / hour; 1 menstrual cup every 6 hours; small-medium blood clots (e.g., pea, grape, small coin)    - SEVERE: soaking 2 or more pads/hour for 2 or more hours; 1 menstrual cup every 2 hours; bleeding not contained by pads or continuous red blood from vagina; large blood clots (e.g., golf ball, large coin)       Pt reporting bleeding the size of a quarter, and clots the size of rice   2. ONSET: \"When did the bleeding begin?\" \"Is it continuing now?\"      Pt reporting today  3. MENSTRUAL PERIOD: \"When was the last normal menstrual period?\" \"How is this different than your period?\"      Pt had a hysterectomy   5. ABDOMINAL PAIN: \"Do you have any pain?\" \"How bad is the pain?\"  (e.g., Scale 1-10; mild, moderate, or severe)    - MILD (1-3): doesn't interfere with normal activities, abdomen soft and not tender to touch     - MODERATE (4-7): interferes with normal activities or awakens from sleep, tender to touch     - SEVERE (8-10): excruciating pain, doubled over, unable to do any normal activities       " "Pt reporting dull pelvic pain 1/10.   8. HORMONES: \"Are you taking any hormone medications, prescription or OTC?\" (e.g., birth control pills, estrogen)      Pt denies  9. BLOOD THINNERS: \"Do you take any blood thinners?\" (e.g., Coumadin/warfarin, Pradaxa/dabigatran, aspirin)      Pt denies   10. CAUSE: \"What do you think is causing the bleeding?\" (e.g., recent gyn surgery, recent gyn procedure; known bleeding disorder, cervical cancer, polycystic ovarian disease, fibroids)          Pt reporting that her bladder was affected with her fibroids and pt states she hasn't followed up since her surgery   11. HEMODYNAMIC STATUS: \"Are you weak or feeling lightheaded?\" If Yes, ask: \"Can you stand and walk normally?\"         Pt denies feeling feeling weak or lightheaded, pt reporting she can stand and walk normally   12. OTHER SYMPTOMS: \"What other symptoms are you having with the bleeding?\" (e.g., passed tissue, vaginal discharge, fever, menstrual-type cramps)        Pt reporting cramping, pt denies passed tissue, vaginal discharge, fever    Protocols used: Vaginal Bleeding - Abnormal-ADULT-[place]    "

## 2024-06-26 NOTE — PATIENT INSTRUCTIONS
"Patient Education     Urinary tract infections in adults   The Basics   Written by the doctors and editors at [facility]   What is the urinary tract? -- The urinary tract is the group of organs in the body that handle urine (figure 1). The urinary tract includes the:   Kidneys - These are 2 bean-shaped organs that filter the blood to make urine.   Bladder - This is a balloon-shaped organ that stores urine.   Ureters - These are 2 tubes that carry urine from the kidneys to the bladder.   Urethra - This is the tube that carries urine from the bladder to the outside of the body.  What are urinary tract infections? -- Urinary tract infections (\"UTIs\") are infections that affect either the bladder or the kidneys:   Bladder infections are more common than kidney infections. They happen when bacteria get into the urethra and travel up into the bladder. The medical term for bladder infection is \"cystitis.\" Most of the time, when people talk about a UTI, they mean a bladder infection.   Kidney infections happen when the bacteria travel even higher, up into the kidneys. The medical term for kidney infection is \"pyelonephritis.\" This is more serious than a bladder infection, and can lead to other serious problems if it is not treated properly.  Both bladder and kidney infections are more common in females than males.  The risk of UTIs is also higher in people who have a urinary catheter. A catheter is a thin, flexible tube that drains urine from the bladder. It might be used in people who are in the hospital and cannot urinate the normal way.  What are the symptoms of a bladder infection? -- The symptoms include:   Pain or a burning feeling when you urinate   The need to urinate often   The need to urinate suddenly or in a hurry   Blood in the urine  What are the symptoms of a kidney infection? -- The symptoms of a kidney infection can include the same urinary symptoms that happen with a bladder infection.  In addition, kidney " infections can cause:   Fever   Back pain   Nausea or vomiting  How do I find out if I have a UTI? -- If you think that you might have a UTI, call your doctor or nurse. Sometimes, they can tell if you have a UTI just by learning about your symptoms.  Your doctor or nurse might do a simple urine test. If they think that you might have a kidney infection or are unsure what is causing your symptoms, they might also do a more involved urine test to check for bacteria.  How are UTIs treated? -- Most UTIs are treated with antibiotic pills. These pills work by killing the germs that cause the infection.   If you have a bladder infection, you will probably need to take antibiotics for 3 to 7 days.   If you have a kidney infection, you will probably need to take antibiotics for longer, maybe for up to 10 days. If you have a kidney infection, it's also possible that you will need to be treated in the hospital.  Your symptoms should begin to improve within a day of starting antibiotics. But you should finish all of the antibiotic pills. Otherwise, the infection might come back.  If needed, you can also take a medicine to numb your bladder. This medicine eases the pain caused by UTIs. It also reduces the need to urinate.  What if I get bladder infections a lot? -- First, check with your doctor or nurse to make sure that you are really having bladder infections. The symptoms of bladder infection can be caused by other things. Your doctor or nurse will want to see if those problems might be causing your symptoms.  But if you are really having repeated infections, there are things that you can do to keep from getting more infections. These include:   Drinking more fluid - This can help prevent bladder infections.   Vaginal estrogen - If you have already been through menopause, your doctor might suggest this. Vaginal estrogen comes in a cream or a flexible ring that you put into your vagina. It can help prevent bladder  "infections.  Other things that might help:   Avoid spermicides (sperm-killing creams or gels) - Spermicide is a form of birth control. It seems to increase the risk of bladder infections in some females, especially when used with a diaphragm. If you use spermicide and get a lot of bladder infections, you might want to try switching to a different form of birth control.   Urinate right after sex - Some doctors think this helps, because it helps flush out germs that might get into the bladder during sex. There is no proof it works, but it also cannot hurt.  If you get a lot of bladder infections, and the above methods have not helped, your doctor might give you antibiotics to help prevent infection. But long-term use of antibiotics has downsides, so doctors usually suggest trying other things first.  Can cranberry juice or other products prevent bladder infections? -- People often wonder about \"natural\" products that claim to help prevent bladder infections. These include cranberry juice and other cranberry products, probiotics, vitamin C, and D-mannose. There is not good evidence that these things work. However, there is also no clear evidence that they are harmful. If you have questions about these or other products, talk with your doctor or nurse.  All topics are updated as new evidence becomes available and our peer review process is complete.  This topic retrieved from [employer] on: May 09, 2024.  Topic 18125 Version 24.0  Release: 32.4.3 - C32.128  © 2024 UpToDate, Inc. and/or its affiliates. All rights reserved.  figure 1: Anatomy of the urinary tract     Urine is made by the kidneys. It passes from the kidneys into the bladder through 2 tubes called the ureters. Then, it leaves the bladder through another tube called the urethra.  Graphic 20492 Version 8.0  Consumer Information Use and Disclaimer   Disclaimer: This generalized information is a limited summary of diagnosis, treatment, and/or medication " information. It is not meant to be comprehensive and should be used as a tool to help the user understand and/or assess potential diagnostic and treatment options. It does NOT include all information about conditions, treatments, medications, side effects, or risks that may apply to a specific patient. It is not intended to be medical advice or a substitute for the medical advice, diagnosis, or treatment of a health care provider based on the health care provider's examination and assessment of a patient's specific and unique circumstances. Patients must speak with a health care provider for complete information about their health, medical questions, and treatment options, including any risks or benefits regarding use of medications. This information does not endorse any treatments or medications as safe, effective, or approved for treating a specific patient. UpToDate, Inc. and its affiliates disclaim any warranty or liability relating to this information or the use thereof.The use of this information is governed by the Terms of Use, available at https://www.[employer].com/en/know/clinical-effectiveness-terms. 2024© UpToDate, Inc. and its affiliates and/or licensors. All rights reserved.  Copyright   © 2024 UpToDate, Inc. and/or its affiliates. All rights reserved.

## 2024-06-27 ENCOUNTER — TELEMEDICINE (OUTPATIENT)
Dept: PSYCHIATRY | Facility: CLINIC | Age: 54
End: 2024-06-27

## 2024-06-27 DIAGNOSIS — Z91.199 NO-SHOW FOR APPOINTMENT: Primary | ICD-10-CM

## 2024-06-27 LAB — BACTERIA UR CULT: NORMAL

## 2024-06-27 NOTE — PSYCH
No Call. No Show. No Charge    Mindy Truong no showed 06/27/24 appointment , staff called and left message to reschedule appointment     Treatment Plan not due at this session.

## 2024-06-28 ENCOUNTER — TELEPHONE (OUTPATIENT)
Dept: PSYCHIATRY | Facility: CLINIC | Age: 54
End: 2024-06-28

## 2024-07-01 ENCOUNTER — TELEPHONE (OUTPATIENT)
Age: 54
End: 2024-07-01

## 2024-07-01 NOTE — TELEPHONE ENCOUNTER
Pt called to schedule a follow up appointment due to urinary urgency symptoms. Pt scheduled for 7/12/24 at 10:45 with .

## 2024-07-03 ENCOUNTER — NURSE TRIAGE (OUTPATIENT)
Age: 54
End: 2024-07-03

## 2024-07-03 ENCOUNTER — TELEPHONE (OUTPATIENT)
Age: 54
End: 2024-07-03

## 2024-07-03 DIAGNOSIS — R30.0 DYSURIA: Primary | ICD-10-CM

## 2024-07-03 NOTE — TELEPHONE ENCOUNTER
Call to pt and reviewed recommendation to repeat urine culture prior to starting another course of antibiotics. Pt verbalized understanding and states she will try to go today.

## 2024-07-03 NOTE — TELEPHONE ENCOUNTER
"Patient reports finishing her Macrobid abx on Monday for urinary tract infection.  Patient states she is continuing to have urinary burning and faint pink when urinating.  She is wondering if she should be scheduled to come back in or if she needs a different antibiotic?  She has a urology appointment scheduled for 7/12/24 as well to be further evaluated. She denies any fevers.  Advised patient to report to the ER/or closest urgent care if symptoms worsen. Will forward to last seen provider for any further recommendations.        Reason for Disposition   Taking antibiotic < 3 days for UTI and painful urination not improved    Additional Information   Pain or burning with passing urine (urination) and female    Answer Assessment - Initial Assessment Questions  1. SYMPTOM: \"What's the main symptom you're concerned about?\" (e.g., frequency, incontinence)      Urinary burning, frequency   2. ONSET: \"When did the  burning  start?\"      This past May   3. PAIN: \"Is there any pain?\" If Yes, ask: \"How bad is it?\" (Scale: 1-10; mild, moderate, severe)      Burning-10/10  4. CAUSE: \"What do you think is causing the symptoms?\"      Urinary tract infection   5. OTHER SYMPTOMS: \"Do you have any other symptoms?\" (e.g., fever, flank pain, blood in urine, pain with urination)      Blood in urine   6. PREGNANCY: \"Is there any chance you are pregnant?\" \"When was your last menstrual period?\"      N/a    Protocols used: Urinary Symptoms-ADULT-[place], Urination Pain - Female-ADULT-[place]    "

## 2024-07-05 ENCOUNTER — TELEMEDICINE (OUTPATIENT)
Dept: PSYCHIATRY | Facility: CLINIC | Age: 54
End: 2024-07-05
Payer: COMMERCIAL

## 2024-07-05 ENCOUNTER — APPOINTMENT (OUTPATIENT)
Dept: LAB | Facility: HOSPITAL | Age: 54
End: 2024-07-05
Payer: COMMERCIAL

## 2024-07-05 DIAGNOSIS — R30.0 DYSURIA: ICD-10-CM

## 2024-07-05 DIAGNOSIS — F33.1 MAJOR DEPRESSIVE DISORDER, RECURRENT, MODERATE (HCC): Primary | ICD-10-CM

## 2024-07-05 DIAGNOSIS — F43.10 POST TRAUMATIC STRESS DISORDER (PTSD): ICD-10-CM

## 2024-07-05 DIAGNOSIS — F41.1 GAD (GENERALIZED ANXIETY DISORDER): ICD-10-CM

## 2024-07-05 PROCEDURE — 87086 URINE CULTURE/COLONY COUNT: CPT

## 2024-07-05 PROCEDURE — 90834 PSYTX W PT 45 MINUTES: CPT | Performed by: SOCIAL WORKER

## 2024-07-06 LAB — BACTERIA UR CULT: NORMAL

## 2024-07-06 NOTE — PSYCH
DATA: Met with Mindy for scheduled individual session. Topics of discussion included trauma history and mood regulation and symptoms. Client shows evidence of utilizing emotion regulation skills skills to manage mental health symptoms. During this session, this clinician used the following therapeutic modalities: engagement strategies, supportive psychotherapy, client-centered therapy, and DBT-informed skills.  Mindy reports that things have been a bit rough for her over the past week. She shares that she recently had a medical emergency, which led to her missing her last session. Mindy expresses that she felt bad about missing her appointment.Today, Mindy details her experiences and how she has been navigating health-related issues. After talking about her medical emergency, Therapist and Mindy then process unresolved feelings she's had about her past work experiences. Mindy is able to process her thoughts and feelings in a healthy manner. She expresses that she wants to get to a point where her thoughts don't influence her feelings as much. She does report trying to practice mindfulness techniques, and she also says she has been writing down her feelings in hopes of getting more out. Therapist assigns a pros a cons activity for Mindy to complete between sessions. Mindy reports that she believes she is headed in the direction regarding her treatment goals. She does not present with additional concerns at this time.    ASSESSMENT: Mindy presents with a normal mood. Her affect is normal range and intensity, appropriate. Mindy exhibits strong therapeutic rapport with this clinician. Mindy continues to exhibit willingness to work on treatment goals and objectives. Mindy presents with a minimal risk of suicide, minimal risk of self-harm, and minimal risk of harm to others.       PLAN: Mindy will return in 1 week for the next scheduled session. Between sessions, Mindy will complete pros and cons worksheet and will report back during  the next session re: successes and barriers. At the next session, this clinician will use engagement strategies, supportive psychotherapy, client-centered therapy, and DBT-informed skills to address Mindy's mood management, in an effort to assist Mindy with meeting treatment goals.   Virtual Regular Visit    Verification of patient location:    Patient is located at Home in the following state in which I hold an active license [place]      Assessment/Plan:    Problem List Items Addressed This Visit       Post traumatic stress disorder (PTSD)    Major depressive disorder, recurrent, moderate (HCC) - Primary    JUHI (generalized anxiety disorder)       Goals addressed in session: Goal 1          Reason for visit is   Chief Complaint   Patient presents with    Virtual Regular Visit          Encounter provider Nesha Alejo LCSW      Recent Visits  Date Type Provider Dept   06/28/24 Telephone Nesha Aeljo LCSW [facility]   Showing recent visits within past 7 days and meeting all other requirements  Today's Visits  Date Type Provider Dept   07/05/24 Telemedicine Nesha Alejo LCSW [facility]   Showing today's visits and meeting all other requirements  Future Appointments  No visits were found meeting these conditions.  Showing future appointments within next 150 days and meeting all other requirements       The patient was identified by name and date of birth. Mindy Truong was informed that this is a telemedicine visit and that the visit is being conducted throughthe Epic Embedded platform. She agrees to proceed..  My office door was closed. No one else was in the room.  She acknowledged consent and understanding of privacy and security of the video platform. The patient has agreed to participate and understands they can discontinue the visit at any time.    Patient is aware this is a billable service.     Subjective  Mindy Truong is a 53 y.o. female who presents for an  individual therapy session today .      HPI     Past Medical History:   Diagnosis Date    Anxiety     Asthma     Depression     Disease of thyroid gland     Dizziness     Fibroid     Forgetfulness     Hashimoto's disease     Hashimoto's disease     Headache(784.0) 2002    History of fainting as a child     Hyperlipidemia     Hypertension     Migraine 2004    Numbness and tingling     MIRANDA (obstructive sleep apnea)     Polycystic ovary syndrome 2008    Post traumatic stress disorder 02/28/2019    Varicella        Past Surgical History:   Procedure Laterality Date    BREAST BIOPSY Left 2020    BREAST SURGERY Bilateral 2002    reduction    COLONOSCOPY      HYSTERECTOMY  2005    [place] COLONOSCOPY FLX DX W/COLLJ SPEC WHEN PFRMD N/A 10/16/2018    Procedure: EGD AND COLONOSCOPY;  Surgeon: Bunny Ruvalcaba MD;  Location: [facility];  Service: Gastroenterology    RI EXCISION RADIAL HEAD Left 10/09/2020    Procedure: RADIAL HEAD IMPLANT ARTHROPLASTY ;  Surgeon: Zackary Craig MD;  Location: [facility];  Service: Orthopedics    REDUCTION MAMMAPLASTY Bilateral 1997    REDUCTION MAMMAPLASTY Bilateral 2002    REDUCTION MAMMAPLASTY Bilateral 2020    SINUS SURGERY      TOTAL ABDOMINAL HYSTERECTOMY         Current Outpatient Medications   Medication Sig Dispense Refill    albuterol (2.5 mg/3 mL) 0.083 % nebulizer solution Take 3 mL (2.5 mg total) by nebulization every 6 (six) hours as needed for wheezing or shortness of breath 180 mL 0    albuterol (PROVENTIL HFA,VENTOLIN HFA) 90 mcg/act inhaler TAKE 2 PUFFS BY MOUTH 4 TIMES A DAY (Patient taking differently: 1 puff every 4 (four) hours as needed) 6.7 g 6    atorvastatin (LIPITOR) 20 mg tablet Take 1 tablet (20 mg total) by mouth daily 90 tablet 3    clonazePAM (KlonoPIN) 0.5 mg tablet Take 1 tablet (0.5 mg total) by mouth daily at bedtime. May also take 1 tablet (0.5 mg total) daily as needed for anxiety. 60 tablet 0    cloNIDine (Catapres) 0.1 mg tablet Take 1 tablet (0.1 mg total)  by mouth daily at bedtime 90 tablet 1    levothyroxine 75 mcg tablet Take 1 tablet (75 mcg total) by mouth daily 90 tablet 3    mirtazapine (REMERON) 7.5 MG tablet Take 1 tablet (7.5 mg total) by mouth daily at bedtime 7 tablet 1    rizatriptan (MAXALT-MLT) 10 mg disintegrating tablet Take 1 tablet (10 mg total) by mouth once as needed for migraine for up to 1 dose May repeat in 2 hours if needed 9 tablet 0    venlafaxine (EFFEXOR-XR) 150 mg 24 hr capsule Take 1 capsule (150 mg total) by mouth daily with 75 mg capsule (225 mg total daily) 90 capsule 1    venlafaxine (EFFEXOR-XR) 75 mg 24 hr capsule Take 1 capsule (75 mg total) by mouth daily 30 capsule 3     No current facility-administered medications for this visit.        Allergies   Allergen Reactions    Lisinopril-Hydrochlorothiazide Hives    Other Hives     States she has no allergies       Review of Systems    Video Exam    There were no vitals filed for this visit.    Physical Exam     Visit Time    07/05/24  Start Time: 1405  Stop Time: 1451  Total Visit Time: 46 minutes

## 2024-07-08 ENCOUNTER — TELEPHONE (OUTPATIENT)
Age: 54
End: 2024-07-08

## 2024-07-08 DIAGNOSIS — R33.9 INCOMPLETE EMPTYING OF BLADDER: Primary | ICD-10-CM

## 2024-07-08 NOTE — TELEPHONE ENCOUNTER
----- Message from KAYLA Foy sent at 7/8/2024 10:49 AM EDT -----  Please advise patient her urine was essentially normal so if she still having issues she should see Urology.  I have placed a referral for her. Thanks.

## 2024-07-08 NOTE — RESULT ENCOUNTER NOTE
Please advise patient her urine was essentially normal so if she still having issues she should see Urology.  I have placed a referral for her. Thanks.

## 2024-07-10 PROCEDURE — 88305 TISSUE EXAM BY PATHOLOGIST: CPT | Performed by: STUDENT IN AN ORGANIZED HEALTH CARE EDUCATION/TRAINING PROGRAM

## 2024-07-12 ENCOUNTER — TELEMEDICINE (OUTPATIENT)
Dept: PSYCHIATRY | Facility: CLINIC | Age: 54
End: 2024-07-12
Payer: COMMERCIAL

## 2024-07-12 ENCOUNTER — OFFICE VISIT (OUTPATIENT)
Dept: UROLOGY | Facility: CLINIC | Age: 54
End: 2024-07-12
Payer: COMMERCIAL

## 2024-07-12 VITALS
WEIGHT: 172 LBS | OXYGEN SATURATION: 99 % | SYSTOLIC BLOOD PRESSURE: 122 MMHG | DIASTOLIC BLOOD PRESSURE: 76 MMHG | BODY MASS INDEX: 30.48 KG/M2 | TEMPERATURE: 97 F | HEART RATE: 69 BPM | HEIGHT: 63 IN

## 2024-07-12 DIAGNOSIS — R39.15 URINARY URGENCY: ICD-10-CM

## 2024-07-12 DIAGNOSIS — F33.1 MAJOR DEPRESSIVE DISORDER, RECURRENT, MODERATE (HCC): Primary | ICD-10-CM

## 2024-07-12 DIAGNOSIS — F41.1 GAD (GENERALIZED ANXIETY DISORDER): ICD-10-CM

## 2024-07-12 DIAGNOSIS — N32.81 OAB (OVERACTIVE BLADDER): Primary | ICD-10-CM

## 2024-07-12 LAB
POST-VOID RESIDUAL VOLUME, ML POC: 28 ML
SL AMB  POCT GLUCOSE, UA: NORMAL
SL AMB LEUKOCYTE ESTERASE,UA: NORMAL
SL AMB POCT BILIRUBIN,UA: NORMAL
SL AMB POCT BLOOD,UA: NORMAL
SL AMB POCT CLARITY,UA: CLEAR
SL AMB POCT COLOR,UA: YELLOW
SL AMB POCT KETONES,UA: NORMAL
SL AMB POCT NITRITE,UA: NORMAL
SL AMB POCT PH,UA: 5
SL AMB POCT SPECIFIC GRAVITY,UA: 1.01
SL AMB POCT URINE PROTEIN: NORMAL
SL AMB POCT UROBILINOGEN: 0.2

## 2024-07-12 PROCEDURE — 99215 OFFICE O/P EST HI 40 MIN: CPT | Performed by: UROLOGY

## 2024-07-12 PROCEDURE — 90832 PSYTX W PT 30 MINUTES: CPT | Performed by: SOCIAL WORKER

## 2024-07-12 PROCEDURE — 81002 URINALYSIS NONAUTO W/O SCOPE: CPT | Performed by: UROLOGY

## 2024-07-12 PROCEDURE — 51798 US URINE CAPACITY MEASURE: CPT | Performed by: UROLOGY

## 2024-07-12 NOTE — PSYCH
DATA: Met with Mindy for scheduled individual session. Topics of discussion included mood regulation and symptoms. Client shows evidence of utilizing emotion regulation skills skills to manage mental health symptoms. During this session, this clinician used the following therapeutic modalities: engagement strategies, supportive psychotherapy, client-centered therapy, and DBT-informed skills.  Mindy reports that she has been doing a little better, mentally, since her last session. She says she is still dealing with health issues. Mindy says she did review the pros and cons activity and it made sense, but did not have time to complete it. Therapist and Mindy review the concept of the activity again and she says she will complete is between sessions. Therapist reviews another DBT-informed skill (radical acceptance), which Mindy says she's already been using and it's been helpful. She says her anxiety and depression have been manageable over the past week. Mindy reports having mouth pain, so today's session concludes early. Between sessions, Mindy will complete pros and cons activity.    ASSESSMENT: Mindy presents with a normal mood. Her affect is normal range and intensity, appropriate. Mindy exhibits strong therapeutic rapport with this clinician. Mindy continues to exhibit willingness to work on treatment goals and objectives. Mindy presents with a minimal risk of suicide, minimal risk of self-harm, and minimal risk of harm to others.       PLAN: Mindy will return in 1 week for the next scheduled session. Between sessions, Mindy will complete pros and cons activity and will report back during the next session re: successes and barriers. At the next session, this clinician will use engagement strategies, supportive psychotherapy, client-centered therapy, and DBT-informed skills to address Mindy's mood management, in an effort to assist Mindy with meeting treatment goals.   Virtual Regular Visit    Verification of patient  location:    Patient is located at Home in the following state in which I hold an active license [place]      Assessment/Plan:    Problem List Items Addressed This Visit       Major depressive disorder, recurrent, moderate (HCC) - Primary    JUHI (generalized anxiety disorder)       Goals addressed in session: Goal 1          Reason for visit is No chief complaint on file.       Encounter provider Nesha Alejo LCSW      Recent Visits  Date Type Provider Dept   07/05/24 Telemedicine Nesha Alejo LCSW [facility]   Showing recent visits within past 7 days and meeting all other requirements  Future Appointments  No visits were found meeting these conditions.  Showing future appointments within next 150 days and meeting all other requirements       The patient was identified by name and date of birth. Mindy Truong was informed that this is a telemedicine visit and that the visit is being conducted throughthe Epic Embedded platform. She agrees to proceed..  My office door was closed. No one else was in the room.  She acknowledged consent and understanding of privacy and security of the video platform. The patient has agreed to participate and understands they can discontinue the visit at any time.    Patient is aware this is a billable service.     Subjective  Mindy Truong is a 53 y.o. female who presents for an individual therapy session today .      HPI     Past Medical History:   Diagnosis Date    Anxiety     Asthma     Depression     Disease of thyroid gland     Dizziness     Fibroid     Forgetfulness     Hashimoto's disease     Hashimoto's disease     Headache(784.0) 2002    History of fainting as a child     Hyperlipidemia     Hypertension     Migraine 2004    Numbness and tingling     MIRANDA (obstructive sleep apnea)     Polycystic ovary syndrome 2008    Post traumatic stress disorder 02/28/2019    Varicella        Past Surgical History:   Procedure Laterality Date    BREAST BIOPSY Left 2020     BREAST SURGERY Bilateral 2002    reduction    COLONOSCOPY      HYSTERECTOMY  2005    [place] COLONOSCOPY FLX DX W/COLLJ SPEC WHEN PFRMD N/A 10/16/2018    Procedure: EGD AND COLONOSCOPY;  Surgeon: Bunny Ruvalcaba MD;  Location: [facility];  Service: Gastroenterology    [place] EXCISION RADIAL HEAD Left 10/09/2020    Procedure: RADIAL HEAD IMPLANT ARTHROPLASTY ;  Surgeon: Zackary Craig MD;  Location: [facility];  Service: Orthopedics    REDUCTION MAMMAPLASTY Bilateral 1997    REDUCTION MAMMAPLASTY Bilateral 2002    REDUCTION MAMMAPLASTY Bilateral 2020    SINUS SURGERY      TOTAL ABDOMINAL HYSTERECTOMY         Current Outpatient Medications   Medication Sig Dispense Refill    albuterol (2.5 mg/3 mL) 0.083 % nebulizer solution Take 3 mL (2.5 mg total) by nebulization every 6 (six) hours as needed for wheezing or shortness of breath 180 mL 0    albuterol (PROVENTIL HFA,VENTOLIN HFA) 90 mcg/act inhaler TAKE 2 PUFFS BY MOUTH 4 TIMES A DAY (Patient taking differently: 1 puff every 4 (four) hours as needed) 6.7 g 6    atorvastatin (LIPITOR) 20 mg tablet Take 1 tablet (20 mg total) by mouth daily 90 tablet 3    clonazePAM (KlonoPIN) 0.5 mg tablet Take 1 tablet (0.5 mg total) by mouth daily at bedtime. May also take 1 tablet (0.5 mg total) daily as needed for anxiety. 60 tablet 0    cloNIDine (Catapres) 0.1 mg tablet Take 1 tablet (0.1 mg total) by mouth daily at bedtime 90 tablet 1    levothyroxine 75 mcg tablet Take 1 tablet (75 mcg total) by mouth daily 90 tablet 3    mirtazapine (REMERON) 7.5 MG tablet Take 1 tablet (7.5 mg total) by mouth daily at bedtime 7 tablet 1    rizatriptan (MAXALT-MLT) 10 mg disintegrating tablet Take 1 tablet (10 mg total) by mouth once as needed for migraine for up to 1 dose May repeat in 2 hours if needed 9 tablet 0    venlafaxine (EFFEXOR-XR) 150 mg 24 hr capsule Take 1 capsule (150 mg total) by mouth daily with 75 mg capsule (225 mg total daily) 90 capsule 1    venlafaxine (EFFEXOR-XR) 75 mg 24  hr capsule Take 1 capsule (75 mg total) by mouth daily 30 capsule 3     No current facility-administered medications for this visit.        Allergies   Allergen Reactions    Lisinopril-Hydrochlorothiazide Hives    Other Hives     States she has no allergies       Review of Systems    Video Exam    There were no vitals filed for this visit.    Physical Exam     Visit Time    07/12/24  Start Time: 1305  Stop Time: 1326  Total Visit Time: 21 minutes

## 2024-07-12 NOTE — PROGRESS NOTES
UROLOGY FOLLOW-UP ENCOUNTER    Mindy Truong is a 53 y.o. female with voiding issues    Pertinent non-urologic PMH: Asthma, Hashimoto's disease, HLD, HTN, migraines, MIRANDA, PCOS    Pertinent non-urologic PSH: JACKELIN for fibroids (spared BL ovaries and tubes) in 2005 (reportedly told that uterus was compressing bladder at time of surgery)    Anticoagulation: None    First establish care with [facility] urology in November 2023 for voiding symptoms.  At that time admitted to frequency, urgency, sensation of incomplete emptying.  Urine testing was negative at that time.  She was trialed on pelvic floor physical therapy.    Rarely drinks decaf coffee. Occ decaf soda. Eats a lot of spicy foods. Drinks 32 oz water per day.    PVR 56 cc on 11/28/2023    Did not do PT since Nov 2023 visit    Had UTI in May 2024 and was treated with oral abx. Seen by GYN in June for UTI with GH. Cleared up with abx.    Urine dip 7/12/2024: Negative    PVR 28 cc on 7/12/2024    Assessment and plan:     OAB, UTI, GH    Patient reports that since her hysterectomy in 2005, she has noted more bothersome urinary symptoms.  Urinary symptoms include urgency, frequency, sensation of incomplete emptying.  She was told at the time of her hysterectomy that the uterus was right up against the bladder.  She does not believe that the bladder had to be resected during surgery.    She was seen by [facility] urology office initially in November 2023 for her voiding symptoms.  At that time, conservative measures were recommended.    Since then, she continues to have urgency, frequency, sensation of incomplete emptying.  She does not really drink many caffeinated beverages.  She does eat a fair amount of spicy foods.    Additionally, she has had issues with UTIs since last visit.  She had a documented UTI in May 2024 that was treated with oral antibiotics.  She was initially seen by her gynecologist in June 2024.  She reportedly had gross hematuria at that time,  although there was a question of vaginal bleeding as well.  That urine testing actually came back inconclusive with mixed contaminants.  However, her symptoms cleared up with course of antibiotics.    I explained to the patient that with her symptoms of overactive bladder, these could potentially be due to UTI, however, I did explain that her urine dip was negative in the office, indicating that she not have infection.  I also explained that her PVR was 28 cc in the office today, indicating that she empties her bladder fairly well.    We did talk about cutting back on her spicy food intake, which can certainly contribute to overactive bladder symptoms.  She does not drink much caffeine, but I did explain that the spicy foods can certainly be contributing to the symptomatology.    Additionally, I explained that her episode of gross hematuria may warrant further treatment if it is recurrent.  I did explain that generally speaking if we had a clear cause for the gross hematuria, which in this case was UTI, we generally do not pursue the full workup.  However, given her symptomatology, her questionable surgical history, and the gross hematuria episode, I did tell her that I thought it was reasonable to perform cystoscopy in the future.  I got consent for the cystoscopy procedure.  We went over the steps of the procedure.  We went over the risks and benefits of the procedure.  She did want to proceed with the procedure in the office.    I asked her to reach out to the office if she had recurrent gross hematuria episodes or recurrent UTIs so that we can get proper urine culture testing before starting antibiotics.  She verbalized understanding.  I explained that based on the progression of her symptoms in the future, we could decide whether or not she would need a full CT urogram, which is a typical portion of the gross hematuria workup, but I did explain that this is typically in the setting of an unknown cause of the  "gross hematuria.          PLAN  -RTC next available cystoscopy  -Will hold off on OAB meds for now  -Cut back on spicy foods  -Poss CTU in the future based on symptoms leading up to cysto and cysto findings        Patient's spouse, Marleen, present in office today and assisted with history      Portions of the above record have been created with voice recognition software.  Occasional wrong word or \"sound alike\" substitution may have occurred due to the inherent limitations of voice recognition software.  Read the chart carefully and recognize, using context, where substitution may have occurred.      Nick Pimentel, DO        Chief Complaint     OAB    History of Present Illness     See summary above    No fevers or chills      The following portions of the patient's history were reviewed and updated as appropriate: allergies, current medications, past family history, past medical history, past social history, past surgical history and problem list.        Review of Systems     Review of Systems   Constitutional:  Negative for chills and fever.   Respiratory:  Negative for cough and shortness of breath.    Genitourinary:  Negative for dysuria and hematuria.   Neurological:  Negative for dizziness and headaches.   Psychiatric/Behavioral:  Negative for agitation and behavioral problems.        Allergies     Allergies   Allergen Reactions    Lisinopril-Hydrochlorothiazide Hives    Other Hives     States she has no allergies       Physical Exam     Physical Exam  Constitutional:       General: She is not in acute distress.  HENT:      Head: Normocephalic and atraumatic.   Pulmonary:      Effort: Pulmonary effort is normal. No respiratory distress.   Abdominal:      General: Abdomen is flat.      Palpations: Abdomen is soft.      Tenderness: There is no right CVA tenderness or left CVA tenderness.   Skin:     General: Skin is warm and dry.   Neurological:      General: No focal deficit present.      Mental Status: " "She is alert and oriented to person, place, and time.   Psychiatric:         Mood and Affect: Mood normal.         Behavior: Behavior normal.             Vital Signs  Vitals:    07/12/24 1053   BP: 122/76   Pulse: 69   Temp: (!) 97 °F (36.1 °C)   TempSrc: Temporal   SpO2: 99%   Weight: 78 kg (172 lb)   Height: 5' 3\" (1.6 m)         Current Medications       Current Outpatient Medications:     albuterol (2.5 mg/3 mL) 0.083 % nebulizer solution, Take 3 mL (2.5 mg total) by nebulization every 6 (six) hours as needed for wheezing or shortness of breath, Disp: 180 mL, Rfl: 0    albuterol (PROVENTIL HFA,VENTOLIN HFA) 90 mcg/act inhaler, TAKE 2 PUFFS BY MOUTH 4 TIMES A DAY (Patient taking differently: 1 puff every 4 (four) hours as needed), Disp: 6.7 g, Rfl: 6    atorvastatin (LIPITOR) 20 mg tablet, Take 1 tablet (20 mg total) by mouth daily, Disp: 90 tablet, Rfl: 3    clonazePAM (KlonoPIN) 0.5 mg tablet, Take 1 tablet (0.5 mg total) by mouth daily at bedtime. May also take 1 tablet (0.5 mg total) daily as needed for anxiety., Disp: 60 tablet, Rfl: 0    cloNIDine (Catapres) 0.1 mg tablet, Take 1 tablet (0.1 mg total) by mouth daily at bedtime, Disp: 90 tablet, Rfl: 1    levothyroxine 75 mcg tablet, Take 1 tablet (75 mcg total) by mouth daily, Disp: 90 tablet, Rfl: 3    mirtazapine (REMERON) 7.5 MG tablet, Take 1 tablet (7.5 mg total) by mouth daily at bedtime, Disp: 7 tablet, Rfl: 1    rizatriptan (MAXALT-MLT) 10 mg disintegrating tablet, Take 1 tablet (10 mg total) by mouth once as needed for migraine for up to 1 dose May repeat in 2 hours if needed, Disp: 9 tablet, Rfl: 0    venlafaxine (EFFEXOR-XR) 150 mg 24 hr capsule, Take 1 capsule (150 mg total) by mouth daily with 75 mg capsule (225 mg total daily), Disp: 90 capsule, Rfl: 1    venlafaxine (EFFEXOR-XR) 75 mg 24 hr capsule, Take 1 capsule (75 mg total) by mouth daily, Disp: 30 capsule, Rfl: 3    Active Problems     Patient Active Problem List   Diagnosis    LALITA " positive    Anxiety    Asthma    Hashimoto's disease    Pharyngoesophageal dysphagia    Essential hypertension    Spells of trembling    REM sleep behavior disorder    Post traumatic stress disorder (PTSD)    Insomnia    Major depressive disorder, recurrent, moderate (HCC)    Syncope    JUHI (generalized anxiety disorder)       Past Medical History     Past Medical History:   Diagnosis Date    Anxiety     Asthma     Depression     Disease of thyroid gland     Dizziness     Fibroid     Forgetfulness     Hashimoto's disease     Hashimoto's disease     Headache(784.0) 2002    History of fainting as a child     Hyperlipidemia     Hypertension     Migraine 2004    Numbness and tingling     MIRANDA (obstructive sleep apnea)     Polycystic ovary syndrome 2008    Post traumatic stress disorder 02/28/2019    Varicella        Surgical History     Past Surgical History:   Procedure Laterality Date    BREAST BIOPSY Left 2020    BREAST SURGERY Bilateral 2002    reduction    COLONOSCOPY      HYSTERECTOMY  2005    OK COLONOSCOPY FLX DX W/COLLJ SPEC WHEN PFRMD N/A 10/16/2018    Procedure: EGD AND COLONOSCOPY;  Surgeon: Bunny Ruvalcaba MD;  Location: [facility];  Service: Gastroenterology    OK EXCISION RADIAL HEAD Left 10/09/2020    Procedure: RADIAL HEAD IMPLANT ARTHROPLASTY ;  Surgeon: Zackary Craig MD;  Location: [facility];  Service: Orthopedics    REDUCTION MAMMAPLASTY Bilateral 1997    REDUCTION MAMMAPLASTY Bilateral 2002    REDUCTION MAMMAPLASTY Bilateral 2020    SINUS SURGERY      TOTAL ABDOMINAL HYSTERECTOMY           Family History     Family History   Problem Relation Age of Onset    Hyperlipidemia Mother     Lupus Mother     Hypertension Mother     Anxiety disorder Mother     Psychiatric Illness Mother         unknown-lost memory for six months    Depression Father     Cervical cancer Paternal Grandmother     Ovarian cancer Paternal Grandmother 66        Na    Uterine cancer Paternal Grandmother     No Known Problems  "Half-Sister     No Known Problems Half-Brother     No Known Problems Half-Brother     No Known Problems Half-Sister     No Known Problems Half-Sister     No Known Problems Maternal Aunt     No Known Problems Maternal Aunt     No Known Problems Maternal Aunt     No Known Problems Maternal Aunt     No Known Problems Paternal Aunt     No Known Problems Paternal Aunt     Bipolar disorder Cousin     Schizoaffective Disorder  Cousin     Schizophrenia Cousin     Self-Injury Cousin     Suicide Attempts Cousin     Psychosis Maternal Uncle         need his life    Schizoaffective Disorder  Maternal Uncle     Schizophrenia Maternal Uncle     Self-Injury Maternal Uncle     Suicide Attempts Maternal Uncle     Breast cancer Neg Hx     Colon cancer Neg Hx     Seizures Neg Hx        Social History     Social History     Social History     Tobacco Use   Smoking Status Never    Passive exposure: Past   Smokeless Tobacco Never   Tobacco Comments    na       Pertinent Lab Values     Lab Results   Component Value Date    CREATININE 0.89 02/14/2024       No results found for: \"PSA\"            Pertinent Imaging     N/A      Pertinent Pathology     N/A        I have spent 40 minutes with Patient and family today in which greater than 50% of this time was spent in counseling/coordination of care regarding Diagnostic results, Prognosis, Risks and benefits of tx options, Instructions for management, Patient and family education, Importance of tx compliance, Impressions, Counseling / Coordination of care, Documenting in the medical record, Reviewing / ordering tests, medicine, procedures  , and Obtaining or reviewing history  .    Please note this time includes cumulative time on the day of encounter, including reviewing medical records and/or coordinating care among the patient's other specialists.      "

## 2024-07-15 ENCOUNTER — ANESTHESIA (OUTPATIENT)
Dept: GASTROENTEROLOGY | Facility: HOSPITAL | Age: 54
End: 2024-07-15

## 2024-07-15 ENCOUNTER — HOSPITAL ENCOUNTER (OUTPATIENT)
Dept: GASTROENTEROLOGY | Facility: HOSPITAL | Age: 54
Setting detail: OUTPATIENT SURGERY
Discharge: HOME | End: 2024-07-15
Attending: INTERNAL MEDICINE
Payer: COMMERCIAL

## 2024-07-15 ENCOUNTER — ANESTHESIA EVENT (OUTPATIENT)
Dept: GASTROENTEROLOGY | Facility: HOSPITAL | Age: 54
End: 2024-07-15

## 2024-07-15 VITALS
SYSTOLIC BLOOD PRESSURE: 142 MMHG | TEMPERATURE: 98 F | DIASTOLIC BLOOD PRESSURE: 98 MMHG | OXYGEN SATURATION: 98 % | RESPIRATION RATE: 19 BRPM | WEIGHT: 175.4 LBS | HEART RATE: 66 BPM | BODY MASS INDEX: 32.28 KG/M2 | HEIGHT: 62 IN

## 2024-07-15 DIAGNOSIS — R14.0 BLOATING: ICD-10-CM

## 2024-07-15 DIAGNOSIS — Z86.19 HISTORY OF HELICOBACTER PYLORI INFECTION: ICD-10-CM

## 2024-07-15 DIAGNOSIS — R13.14 PHARYNGOESOPHAGEAL DYSPHAGIA: ICD-10-CM

## 2024-07-15 DIAGNOSIS — K21.9 GASTROESOPHAGEAL REFLUX DISEASE, UNSPECIFIED WHETHER ESOPHAGITIS PRESENT: ICD-10-CM

## 2024-07-15 PROCEDURE — 43239 EGD BIOPSY SINGLE/MULTIPLE: CPT | Performed by: INTERNAL MEDICINE

## 2024-07-15 PROCEDURE — 88305 TISSUE EXAM BY PATHOLOGIST: CPT | Performed by: PATHOLOGY

## 2024-07-15 PROCEDURE — 43248 EGD GUIDE WIRE INSERTION: CPT | Performed by: INTERNAL MEDICINE

## 2024-07-15 PROCEDURE — 88342 IMHCHEM/IMCYTCHM 1ST ANTB: CPT | Performed by: PATHOLOGY

## 2024-07-15 PROCEDURE — 88305 TISSUE EXAM BY PATHOLOGIST: CPT | Performed by: STUDENT IN AN ORGANIZED HEALTH CARE EDUCATION/TRAINING PROGRAM

## 2024-07-15 RX ORDER — LIDOCAINE HYDROCHLORIDE 20 MG/ML
INJECTION, SOLUTION EPIDURAL; INFILTRATION; INTRACAUDAL; PERINEURAL AS NEEDED
Status: DISCONTINUED | OUTPATIENT
Start: 2024-07-15 | End: 2024-07-15

## 2024-07-15 RX ORDER — SODIUM CHLORIDE, SODIUM LACTATE, POTASSIUM CHLORIDE, CALCIUM CHLORIDE 600; 310; 30; 20 MG/100ML; MG/100ML; MG/100ML; MG/100ML
50 INJECTION, SOLUTION INTRAVENOUS CONTINUOUS
Status: DISCONTINUED | OUTPATIENT
Start: 2024-07-15 | End: 2024-07-19 | Stop reason: HOSPADM

## 2024-07-15 RX ORDER — PROPOFOL 10 MG/ML
INJECTION, EMULSION INTRAVENOUS AS NEEDED
Status: DISCONTINUED | OUTPATIENT
Start: 2024-07-15 | End: 2024-07-15

## 2024-07-15 RX ORDER — SODIUM CHLORIDE, SODIUM LACTATE, POTASSIUM CHLORIDE, CALCIUM CHLORIDE 600; 310; 30; 20 MG/100ML; MG/100ML; MG/100ML; MG/100ML
INJECTION, SOLUTION INTRAVENOUS CONTINUOUS PRN
Status: DISCONTINUED | OUTPATIENT
Start: 2024-07-15 | End: 2024-07-15

## 2024-07-15 RX ADMIN — LIDOCAINE HYDROCHLORIDE 100 MG: 20 INJECTION, SOLUTION EPIDURAL; INFILTRATION; INTRACAUDAL; PERINEURAL at 10:18

## 2024-07-15 RX ADMIN — PROPOFOL 50 MG: 10 INJECTION, EMULSION INTRAVENOUS at 10:19

## 2024-07-15 RX ADMIN — PROPOFOL 50 MG: 10 INJECTION, EMULSION INTRAVENOUS at 10:22

## 2024-07-15 RX ADMIN — SODIUM CHLORIDE, SODIUM LACTATE, POTASSIUM CHLORIDE, AND CALCIUM CHLORIDE: .6; .31; .03; .02 INJECTION, SOLUTION INTRAVENOUS at 10:15

## 2024-07-15 RX ADMIN — SODIUM CHLORIDE, SODIUM LACTATE, POTASSIUM CHLORIDE, AND CALCIUM CHLORIDE 50 ML/HR: .6; .31; .03; .02 INJECTION, SOLUTION INTRAVENOUS at 09:42

## 2024-07-15 RX ADMIN — PROPOFOL 200 MG: 10 INJECTION, EMULSION INTRAVENOUS at 10:18

## 2024-07-15 NOTE — INTERVAL H&P NOTE
H&P reviewed. After examining the patient I find no changes in the patients condition since the H&P had been written.    Vitals:    07/15/24 0936   BP: 130/76   Pulse: 78   Resp: 16   Temp: 97.5 °F (36.4 °C)   SpO2: 98%

## 2024-07-15 NOTE — ANESTHESIA PREPROCEDURE EVALUATION
Procedure:  EGD    Relevant Problems   CARDIO   (+) Essential hypertension      GI/HEPATIC   (+) Pharyngoesophageal dysphagia      NEURO/PSYCH   (+) Anxiety   (+) JUHI (generalized anxiety disorder)   (+) Major depressive disorder, recurrent, moderate (HCC)   (+) Post traumatic stress disorder (PTSD)      PULMONARY   (+) Asthma        Physical Exam    Airway    Mallampati score: I  TM Distance: >3 FB  Neck ROM: full     Dental       Cardiovascular  Cardiovascular exam normal    Pulmonary  Pulmonary exam normal     Other Findings  post-pubertal.      Anesthesia Plan  ASA Score- 2     Anesthesia Type- IV sedation with anesthesia with ASA Monitors.         Additional Monitors:     Airway Plan:            Plan Factors-Exercise tolerance (METS): >4 METS.    Chart reviewed. EKG reviewed. Imaging results reviewed. Existing labs reviewed. Patient summary reviewed.                  Induction- intravenous.    Postoperative Plan- Plan for postoperative opioid use. Planned trial extubation        Informed Consent- Anesthetic plan and risks discussed with patient.  I personally reviewed this patient with the CRNA. Discussed and agreed on the Anesthesia Plan with the CRNA..

## 2024-07-15 NOTE — ANESTHESIA POSTPROCEDURE EVALUATION
Post-Op Assessment Note    CV Status:  Stable  Pain Score: 0    Pain management: adequate       Mental Status:  Sleepy   Hydration Status:  Euvolemic   PONV Controlled:  None   Airway Patency:  Patent     Post Op Vitals Reviewed: Yes    No anethesia notable event occurred.    Staff: CRNA               /70 (07/15/24 1031)    Temp 98 °F (36.7 °C) (07/15/24 1031)    Pulse 56 (07/15/24 1031)   Resp 15 (07/15/24 1031)    SpO2 96 % (07/15/24 1031)

## 2024-07-18 ENCOUNTER — PATIENT MESSAGE (OUTPATIENT)
Age: 54
End: 2024-07-18

## 2024-07-18 DIAGNOSIS — F41.1 GAD (GENERALIZED ANXIETY DISORDER): ICD-10-CM

## 2024-07-18 DIAGNOSIS — F43.10 POST TRAUMATIC STRESS DISORDER (PTSD): ICD-10-CM

## 2024-07-18 PROCEDURE — 88342 IMHCHEM/IMCYTCHM 1ST ANTB: CPT | Performed by: PATHOLOGY

## 2024-07-18 PROCEDURE — 88305 TISSUE EXAM BY PATHOLOGIST: CPT | Performed by: PATHOLOGY

## 2024-07-18 RX ORDER — CLONAZEPAM 0.5 MG/1
TABLET ORAL
Qty: 60 TABLET | Refills: 0 | Status: SHIPPED | OUTPATIENT
Start: 2024-07-18

## 2024-07-19 ENCOUNTER — TELEMEDICINE (OUTPATIENT)
Dept: PSYCHIATRY | Facility: CLINIC | Age: 54
End: 2024-07-19
Payer: COMMERCIAL

## 2024-07-19 DIAGNOSIS — F43.10 POST TRAUMATIC STRESS DISORDER (PTSD): ICD-10-CM

## 2024-07-19 DIAGNOSIS — F41.1 GAD (GENERALIZED ANXIETY DISORDER): Primary | ICD-10-CM

## 2024-07-19 PROCEDURE — 90832 PSYTX W PT 30 MINUTES: CPT | Performed by: SOCIAL WORKER

## 2024-07-19 NOTE — PSYCH
DATA: Met with Mindy for scheduled individual session. Topics of discussion included mood regulation and symptoms. Client shows evidence of utilizing weighing pros and cons and emotion regulation skills skills to manage mental health symptoms. During this session, this clinician used the following therapeutic modalities: engagement strategies, supportive psychotherapy, client-centered therapy, and DBT-informed skills.  Mindy reports that she has been doing okay since her last session. She says some of her health issues have been improving slowly. Today, Therapist and Mindy begin by reviewing the pros and cons activity she was assigned. Mindy is able to identify the situation that is causing her distress, as well as the pros and cons to tolerating and not tolerating distress. She demonstrates an understanding of this approach and says she believes it will help her navigate future problems she may have. Mindy also says she feels like she is getting to a place where she can challenge herself to do things that have triggered her PTSD symptoms in the past. Between sessions, she says she will attempt to complete a specific task and see how she does. Therapist and Mindy will discuss her attempts to do so during follow up session. Mindy does not present with any immediate concerns at this time.    ASSESSMENT: Mindy presents with a normal mood. Her affect is normal range and intensity, appropriate. Mindy exhibits strong therapeutic rapport with this clinician. Mindy continues to exhibit willingness to work on treatment goals and objectives. Mindy presents with a minimal risk of suicide, minimal risk of self-harm, and minimal risk of harm to others.       PLAN: Mindy will return in 1 weeks for the next scheduled session. Between sessions, Mindy will continue utilizing coping skills and will report back during the next session re: successes and barriers. At the next session, this clinician will use engagement strategies, supportive  psychotherapy, client-centered therapy, mindfulness-based strategies, and DBT-informed skills to address Mindy's mood management, in an effort to assist Mindy with meeting treatment goals.   Virtual Regular Visit    Verification of patient location:    Patient is located at Home in the following state in which I hold an active license [place]      Assessment/Plan:    Problem List Items Addressed This Visit       Post traumatic stress disorder (PTSD)    JUHI (generalized anxiety disorder) - Primary       Goals addressed in session: Goal 1          Reason for visit is No chief complaint on file.       Encounter provider Nesha Alejo LCSW      Recent Visits  Date Type Provider Dept   07/12/24 Telemedicine Nesha Alejo LCSW [facility]   Showing recent visits within past 7 days and meeting all other requirements  Future Appointments  No visits were found meeting these conditions.  Showing future appointments within next 150 days and meeting all other requirements       The patient was identified by name and date of birth. Mindy Truong was informed that this is a telemedicine visit and that the visit is being conducted throughthe Epic Embedded platform. She agrees to proceed..  My office door was closed. No one else was in the room.  She acknowledged consent and understanding of privacy and security of the video platform. The patient has agreed to participate and understands they can discontinue the visit at any time.    Patient is aware this is a billable service.     Subjective  Mindy Truong is a 53 y.o. female who presents for an individual therapy session today .      HPI     Past Medical History:   Diagnosis Date    Anxiety     Asthma     Depression     Disease of thyroid gland     Dizziness     Fibroid     Forgetfulness     Hashimoto's disease     Hashimoto's disease     Headache(784.0) 2002    History of fainting as a child     Hyperlipidemia     Hypertension     Migraine 2004    Numbness  and tingling     Polycystic ovary syndrome 2008    Post traumatic stress disorder 02/28/2019    Varicella        Past Surgical History:   Procedure Laterality Date    BREAST BIOPSY Left 2020    BREAST SURGERY Bilateral 2002    reduction    COLONOSCOPY      HYSTERECTOMY  2005    AL COLONOSCOPY FLX DX W/COLLJ SPEC WHEN PFRMD N/A 10/16/2018    Procedure: EGD AND COLONOSCOPY;  Surgeon: Bunny Ruvalcaba MD;  Location: [facility];  Service: Gastroenterology    AL EXCISION RADIAL HEAD Left 10/09/2020    Procedure: RADIAL HEAD IMPLANT ARTHROPLASTY ;  Surgeon: Zackary Craig MD;  Location: [facility];  Service: Orthopedics    REDUCTION MAMMAPLASTY Bilateral 1997    REDUCTION MAMMAPLASTY Bilateral 2002    REDUCTION MAMMAPLASTY Bilateral 2020    SINUS SURGERY      TOTAL ABDOMINAL HYSTERECTOMY         Current Outpatient Medications   Medication Sig Dispense Refill    albuterol (2.5 mg/3 mL) 0.083 % nebulizer solution Take 3 mL (2.5 mg total) by nebulization every 6 (six) hours as needed for wheezing or shortness of breath 180 mL 0    albuterol (PROVENTIL HFA,VENTOLIN HFA) 90 mcg/act inhaler TAKE 2 PUFFS BY MOUTH 4 TIMES A DAY (Patient taking differently: 1 puff every 4 (four) hours as needed) 6.7 g 6    atorvastatin (LIPITOR) 20 mg tablet Take 1 tablet (20 mg total) by mouth daily 90 tablet 3    clonazePAM (KlonoPIN) 0.5 mg tablet Take 1 tablet (0.5 mg total) by mouth daily at bedtime. May also take 1 tablet (0.5 mg total) daily as needed for anxiety. 60 tablet 0    cloNIDine (Catapres) 0.1 mg tablet Take 1 tablet (0.1 mg total) by mouth daily at bedtime 90 tablet 1    levothyroxine 75 mcg tablet Take 1 tablet (75 mcg total) by mouth daily 90 tablet 3    mirtazapine (REMERON) 7.5 MG tablet Take 1 tablet (7.5 mg total) by mouth daily at bedtime 7 tablet 1    rizatriptan (MAXALT-MLT) 10 mg disintegrating tablet Take 1 tablet (10 mg total) by mouth once as needed for migraine for up to 1 dose May repeat in 2 hours if needed 9  tablet 0    venlafaxine (EFFEXOR-XR) 150 mg 24 hr capsule Take 1 capsule (150 mg total) by mouth daily with 75 mg capsule (225 mg total daily) 90 capsule 1    venlafaxine (EFFEXOR-XR) 75 mg 24 hr capsule Take 1 capsule (75 mg total) by mouth daily 30 capsule 3     No current facility-administered medications for this visit.        Allergies   Allergen Reactions    Lisinopril-Hydrochlorothiazide Hives    Other Hives     States she has no allergies       Review of Systems    Video Exam    There were no vitals filed for this visit.    Physical Exam     Visit Time    07/19/24  Start Time: 1405  Stop Time: 1436  Total Visit Time: 31 minutes

## 2024-07-26 ENCOUNTER — TELEMEDICINE (OUTPATIENT)
Dept: PSYCHIATRY | Facility: CLINIC | Age: 54
End: 2024-07-26

## 2024-07-26 DIAGNOSIS — F41.1 GAD (GENERALIZED ANXIETY DISORDER): Primary | ICD-10-CM

## 2024-07-26 DIAGNOSIS — F33.1 MAJOR DEPRESSIVE DISORDER, RECURRENT, MODERATE (HCC): ICD-10-CM

## 2024-07-26 DIAGNOSIS — F43.10 POST TRAUMATIC STRESS DISORDER (PTSD): ICD-10-CM

## 2024-07-26 NOTE — TELEPHONE ENCOUNTER
----- Message from KAYLA Main sent at 2/16/2024  9:36 AM EST -----  Can you see if the lab will report the T4 from yesterday also add in a FSH. Pretty please     [Dear  ___] : Dear  [unfilled], [Courtesy Letter:] : I had the pleasure of seeing your patient, [unfilled], in my office today. [Please see my note below.] : Please see my note below. [Consult Closing:] : Thank you very much for allowing me to participate in the care of this patient.  If you have any questions, please do not hesitate to contact me. [Sincerely,] : Sincerely, [FreeTextEntry3] : Keyon Clemente MD MS [facility] [employer] [facility]

## 2024-07-26 NOTE — PSYCH
DATA: Met with Mindy for scheduled individual session. Topics of discussion included family stressors, relationships with family, and mood regulation and symptoms. Client shows evidence of utilizing emotion regulation skills skills to manage mental health symptoms. During this session, this clinician used the following therapeutic modalities: engagement strategies, supportive psychotherapy, and client-centered therapy.  Mindy reports that she has been doing pretty well since her last session. She tells Therapist that she has been approaching how she deals with negative thoughts and feelings, differently. Mindy explains that she has been re-framing her thoughts and re-directing her anger towards more positive things. She reports that she has been able to complete some important paperwork that she has been putting off due to the thoughts the information triggers. Therapist observes that Mindy is using healthy coping skills to manage her thoughts occur. Overall, Mindy reports that her anxiety and depression have been manageable. She expresses that she has been trying to practice more patience and build tolerance for stressful family members, especially her mother. She details her relationship with her mother and why she does not talk to her or see her much. Therapist and Mindy explore healthy ways for her to navigate interactions with her mother. She is receptive. Therapist encourages Mindy to utilize skills between sessions.    ASSESSMENT: Mindy presents with a improved mood. Her affect is brighter. Mindy exhibits strong therapeutic rapport with this clinician. Mindy continues to exhibit willingness to work on treatment goals and objectives. Mindy presents with a minimal risk of suicide, minimal risk of self-harm, and minimal risk of harm to others.       PLAN: Mindy will return in 1 week for the next scheduled session. Between sessions, Mindy will continue utilizing skills and will report back during the next session re:  successes and barriers. At the next session, this clinician will use engagement strategies, supportive psychotherapy, and client-centered therapy to address Mindy's mood management, in an effort to assist Mindy with meeting treatment goals.   Virtual Regular Visit    Verification of patient location:    Patient is located at Home in the following state in which I hold an active license [place]      Assessment/Plan:    Problem List Items Addressed This Visit       Post traumatic stress disorder (PTSD)    Major depressive disorder, recurrent, moderate (HCC)    JUHI (generalized anxiety disorder) - Primary       Goals addressed in session: Goal 1          Reason for visit is No chief complaint on file.       Encounter provider Nesha Alejo LCSW      Recent Visits  Date Type Provider Dept   07/19/24 Telemedicine Nesha Alejo LCSW [facility]   Showing recent visits within past 7 days and meeting all other requirements  Future Appointments  No visits were found meeting these conditions.  Showing future appointments within next 150 days and meeting all other requirements       The patient was identified by name and date of birth. Mindy Truong was informed that this is a telemedicine visit and that the visit is being conducted throughthe Epic Embedded platform. She agrees to proceed..  My office door was closed. No one else was in the room.  She acknowledged consent and understanding of privacy and security of the video platform. The patient has agreed to participate and understands they can discontinue the visit at any time.    Patient is aware this is a billable service.     Subjective  Mindy Truong is a 53 y.o. female who presents for an individual therapy session today .      HPI     Past Medical History:   Diagnosis Date    Anxiety     Asthma     Depression     Disease of thyroid gland     Dizziness     Fibroid     Forgetfulness     Hashimoto's disease     Hashimoto's disease     Headache(784.0)  2002    History of fainting as a child     Hyperlipidemia     Hypertension     Migraine 2004    Numbness and tingling     Polycystic ovary syndrome 2008    Post traumatic stress disorder 02/28/2019    Varicella        Past Surgical History:   Procedure Laterality Date    BREAST BIOPSY Left 2020    BREAST SURGERY Bilateral 2002    reduction    COLONOSCOPY      HYSTERECTOMY  2005    [place] COLONOSCOPY FLX DX W/COLLJ SPEC WHEN PFRMD N/A 10/16/2018    Procedure: EGD AND COLONOSCOPY;  Surgeon: Bunny Ruvalcaba MD;  Location: [facility];  Service: Gastroenterology    RI EXCISION RADIAL HEAD Left 10/09/2020    Procedure: RADIAL HEAD IMPLANT ARTHROPLASTY ;  Surgeon: Zackary Craig MD;  Location: [facility];  Service: Orthopedics    REDUCTION MAMMAPLASTY Bilateral 1997    REDUCTION MAMMAPLASTY Bilateral 2002    REDUCTION MAMMAPLASTY Bilateral 2020    SINUS SURGERY      TOTAL ABDOMINAL HYSTERECTOMY         Current Outpatient Medications   Medication Sig Dispense Refill    albuterol (2.5 mg/3 mL) 0.083 % nebulizer solution Take 3 mL (2.5 mg total) by nebulization every 6 (six) hours as needed for wheezing or shortness of breath 180 mL 0    albuterol (PROVENTIL HFA,VENTOLIN HFA) 90 mcg/act inhaler TAKE 2 PUFFS BY MOUTH 4 TIMES A DAY (Patient taking differently: 1 puff every 4 (four) hours as needed) 6.7 g 6    atorvastatin (LIPITOR) 20 mg tablet Take 1 tablet (20 mg total) by mouth daily 90 tablet 3    clonazePAM (KlonoPIN) 0.5 mg tablet Take 1 tablet (0.5 mg total) by mouth daily at bedtime. May also take 1 tablet (0.5 mg total) daily as needed for anxiety. 60 tablet 0    cloNIDine (Catapres) 0.1 mg tablet Take 1 tablet (0.1 mg total) by mouth daily at bedtime 90 tablet 1    levothyroxine 75 mcg tablet Take 1 tablet (75 mcg total) by mouth daily 90 tablet 3    mirtazapine (REMERON) 7.5 MG tablet Take 1 tablet (7.5 mg total) by mouth daily at bedtime 7 tablet 1    rizatriptan (MAXALT-MLT) 10 mg disintegrating tablet Take 1 tablet  (10 mg total) by mouth once as needed for migraine for up to 1 dose May repeat in 2 hours if needed 9 tablet 0    venlafaxine (EFFEXOR-XR) 150 mg 24 hr capsule Take 1 capsule (150 mg total) by mouth daily with 75 mg capsule (225 mg total daily) 90 capsule 1    venlafaxine (EFFEXOR-XR) 75 mg 24 hr capsule Take 1 capsule (75 mg total) by mouth daily 30 capsule 3     No current facility-administered medications for this visit.        Allergies   Allergen Reactions    Lisinopril-Hydrochlorothiazide Hives    Other Hives     States she has no allergies       Review of Systems    Video Exam    There were no vitals filed for this visit.    Physical Exam     Visit Time    07/26/24  Start Time: 1405  Stop Time: 1440  Total Visit Time: 35 minutes

## 2024-07-30 ENCOUNTER — TELEPHONE (OUTPATIENT)
Age: 54
End: 2024-07-30

## 2024-07-30 NOTE — TELEPHONE ENCOUNTER
Patients GI provider:  Dr. WALTERS    Number to return call: [phone]     Reason for call: Pt calling requesting pathology results. Please review results and contact patient.

## 2024-08-02 ENCOUNTER — TELEMEDICINE (OUTPATIENT)
Dept: PSYCHIATRY | Facility: CLINIC | Age: 54
End: 2024-08-02
Payer: COMMERCIAL

## 2024-08-02 DIAGNOSIS — F43.10 POST TRAUMATIC STRESS DISORDER (PTSD): ICD-10-CM

## 2024-08-02 DIAGNOSIS — F41.1 GAD (GENERALIZED ANXIETY DISORDER): Primary | ICD-10-CM

## 2024-08-02 DIAGNOSIS — F33.1 MAJOR DEPRESSIVE DISORDER, RECURRENT, MODERATE (HCC): ICD-10-CM

## 2024-08-02 PROCEDURE — 90832 PSYTX W PT 30 MINUTES: CPT | Performed by: SOCIAL WORKER

## 2024-08-02 NOTE — PSYCH
"DATA: Met with Mindy for scheduled individual session. Topics of discussion included mood regulation and symptoms. Client shows evidence of utilizing emotion regulation skills skills to manage mental health symptoms. During this session, this clinician used the following therapeutic modalities: engagement strategies, supportive psychotherapy, and client-centered therapy.  Mindy reports that she has been doing \"very good\" since her last session. She reports that she was able to complete all of the paperwork she had been putting off. Mindy says she feels like she let her emotions get in the way of her completing the paperwork before. She explains that it was easier than she thought it would be. Mindy says she felt proud of herself and went for a walk after doing all of the paperwork. Therapist observes that Mindy is starting to get better with navigating triggering emotions and experiences. Mindy was also able to detail what she went through when 9/11/ occurred during today's session. After processing these events, Mindy tells Therapist that it is getting a little easier for her to actually talk about those experiences. Overall, Mindy appears brighter during today's session and she does not present with immediate concerns.    ASSESSMENT: Mindy presents with a improved mood. Her affect is brighter. Mindy exhibits strong therapeutic rapport with this clinician. Mindy continues to exhibit willingness to work on treatment goals and objectives. Mindy presents with a minimal risk of suicide, minimal risk of self-harm, and minimal risk of harm to others.       PLAN: Mindy will return in 1 week for the next scheduled session. Between sessions, Mindy will continue utilizing skills and will report back during the next session re: successes and barriers. At the next session, this clinician will use engagement strategies, supportive psychotherapy, and client-centered therapy to address Mindy's mood management, in an effort to assist Mindy with " meeting treatment goals.   Virtual Regular Visit    Verification of patient location:    Patient is located at Home in the following state in which I hold an active license [place]      Assessment/Plan:    Problem List Items Addressed This Visit       Post traumatic stress disorder (PTSD)    Major depressive disorder, recurrent, moderate (HCC)    JUHI (generalized anxiety disorder) - Primary       Goals addressed in session: Goal 1          Reason for visit is No chief complaint on file.       Encounter provider Nesha Alejo LCSW      Recent Visits  Date Type Provider Dept   07/26/24 Telemedicine Nesha Alejo LCSW [facility]   Showing recent visits within past 7 days and meeting all other requirements  Future Appointments  No visits were found meeting these conditions.  Showing future appointments within next 150 days and meeting all other requirements       The patient was identified by name and date of birth. Mindy Truong was informed that this is a telemedicine visit and that the visit is being conducted throughthe Epic Embedded platform. She agrees to proceed..  My office door was closed. No one else was in the room.  She acknowledged consent and understanding of privacy and security of the video platform. The patient has agreed to participate and understands they can discontinue the visit at any time.    Patient is aware this is a billable service.     Subjective  Mindy Truong is a 53 y.o. female who presents for an individual therapy session today .      HPI     Past Medical History:   Diagnosis Date    Anxiety     Asthma     Depression     Disease of thyroid gland     Dizziness     Fibroid     Forgetfulness     Hashimoto's disease     Hashimoto's disease     Headache(784.0) 2002    History of fainting as a child     Hyperlipidemia     Hypertension     Migraine 2004    Numbness and tingling     Polycystic ovary syndrome 2008    Post traumatic stress disorder 02/28/2019    Varicella         Past Surgical History:   Procedure Laterality Date    BREAST BIOPSY Left 2020    BREAST SURGERY Bilateral 2002    reduction    COLONOSCOPY      HYSTERECTOMY  2005    [place] COLONOSCOPY FLX DX W/COLLJ SPEC WHEN PFRMD N/A 10/16/2018    Procedure: EGD AND COLONOSCOPY;  Surgeon: Bunny Ruvalcaba MD;  Location: [facility];  Service: Gastroenterology    TN EXCISION RADIAL HEAD Left 10/09/2020    Procedure: RADIAL HEAD IMPLANT ARTHROPLASTY ;  Surgeon: Zackary Craig MD;  Location: [facility];  Service: Orthopedics    REDUCTION MAMMAPLASTY Bilateral 1997    REDUCTION MAMMAPLASTY Bilateral 2002    REDUCTION MAMMAPLASTY Bilateral 2020    SINUS SURGERY      TOTAL ABDOMINAL HYSTERECTOMY         Current Outpatient Medications   Medication Sig Dispense Refill    albuterol (2.5 mg/3 mL) 0.083 % nebulizer solution Take 3 mL (2.5 mg total) by nebulization every 6 (six) hours as needed for wheezing or shortness of breath 180 mL 0    albuterol (PROVENTIL HFA,VENTOLIN HFA) 90 mcg/act inhaler TAKE 2 PUFFS BY MOUTH 4 TIMES A DAY (Patient taking differently: 1 puff every 4 (four) hours as needed) 6.7 g 6    atorvastatin (LIPITOR) 20 mg tablet Take 1 tablet (20 mg total) by mouth daily 90 tablet 3    clonazePAM (KlonoPIN) 0.5 mg tablet Take 1 tablet (0.5 mg total) by mouth daily at bedtime. May also take 1 tablet (0.5 mg total) daily as needed for anxiety. 60 tablet 0    cloNIDine (Catapres) 0.1 mg tablet Take 1 tablet (0.1 mg total) by mouth daily at bedtime 90 tablet 1    levothyroxine 75 mcg tablet Take 1 tablet (75 mcg total) by mouth daily 90 tablet 3    mirtazapine (REMERON) 7.5 MG tablet Take 1 tablet (7.5 mg total) by mouth daily at bedtime 7 tablet 1    rizatriptan (MAXALT-MLT) 10 mg disintegrating tablet Take 1 tablet (10 mg total) by mouth once as needed for migraine for up to 1 dose May repeat in 2 hours if needed 9 tablet 0    venlafaxine (EFFEXOR-XR) 150 mg 24 hr capsule Take 1 capsule (150 mg total) by mouth daily with 75  mg capsule (225 mg total daily) 90 capsule 1    venlafaxine (EFFEXOR-XR) 75 mg 24 hr capsule Take 1 capsule (75 mg total) by mouth daily 30 capsule 3     No current facility-administered medications for this visit.        Allergies   Allergen Reactions    Lisinopril-Hydrochlorothiazide Hives    Other Hives     States she has no allergies       Review of Systems    Video Exam    There were no vitals filed for this visit.    Physical Exam     Visit Time    08/02/24  Start Time: 1405  Stop Time: 1434  Total Visit Time: 29 minutes

## 2024-08-07 ENCOUNTER — CLINICAL SUPPORT (OUTPATIENT)
Dept: BARIATRICS | Facility: CLINIC | Age: 54
End: 2024-08-07

## 2024-08-07 VITALS — BODY MASS INDEX: 32.2 KG/M2 | HEIGHT: 62 IN | WEIGHT: 175 LBS

## 2024-08-07 DIAGNOSIS — R63.5 WEIGHT GAIN: ICD-10-CM

## 2024-08-07 PROCEDURE — RECHECK

## 2024-08-07 NOTE — PROGRESS NOTES
Weight Management Medical Nutrition Assessment  Mindy was here today for medical meal planning.  Today she weighs 175 lbs and has a goal of 130 - 135 lbs.  She has Hashimoto and some other immune issue and is being followed by an endocrinologist.  She asked about possibly discussing surgery - she does not qualify.  Offered for her to see a medical provider to discuss medication.  She decided not to as this time because she will be seeing her endocrinologist in a few weeks and will discussing Monjouro.  She may see a provider a a later time.  She skips multiple meals and typically eats most calories at night.  Recommended that she not skip meals, and practice interval eating.  Recommend food logging.  Reviewed her calorie carb and protein needs as well as carb manager goran. Provider her with sample meal plans, carb list, heathy snack ideas and a protein list.     Patient seen by Medical Provider in past 6 months:  yes  Requested to schedule appointment with Medical Provider: No      Anthropometric Measurements  Start Weight (#): 175  Current Weight (#): 175  TBW % Change from start weight:0%  Ideal Body Weight (#):110  Goal Weight (#):130  - 135  Highest: 178  Lowest: 125    Weight Loss History  Previous weight loss attempts: Self Created Diets (Portion Control, Healthy Food Choices, etc.)    Food and Nutrition Related History  Wake up: 10 am   Bed Time:11 pm    Food Recall  Breakfast:skips    Snack:none  Lunch:skip  Snack:none  Dinner:chicken with tomoatoe sause, salad and rice   Snack:fruit with sugar, ice cream, cookies      Beverages: water  Volume of beverage intake: 60+    Weekends: Same  Cravings: sweets  Trouble area of day:nitht    Frequency of Eating out: irregularly  Food restrictions:none  Cooking: self   Food Shopping: self    Physical Activity Intake  Activity:yard work,   Frequency:infrequently  Physical limitations/barriers to exercise: none    Estimated Needs  EnergyMisalima Escobar Energy Needs: BMR :  1375    .5-1 # loss weekly sedentary:  1128 - 1378             1-2# loss weekly lightly active:1866 - 1366  Maintenance calories for sedentary activity level: 1628  Protein:60 - 75      (1.2-1.5g/kg IBW)  Fluid: 58     (35mL/kg IBW)    Nutrition Diagnosis  Yes;    Overweight/obesity  related to Excess energy intake as evidenced by  BMI more than normative standard for age and sex (obesity-grade I 30-34.9)       Nutrition Intervention    Nutrition Prescription  Calories:1200  Protein:60 - 75  Fluid:56    :    Nutrition Education:    Calorie controlled menu  Lean protein food choices  Healthy snack options  Food journaling tips      Nutrition Counseling:  Strategies: meal planning, portion sizes, healthy snack choices, hydration, fiber intake, protein intake, exercise, food journal      Monitoring and Evaluation:  Evaluation criteria:  Energy Intake  Meet protein needs  Maintain adequate hydration  Monitor weekly weight  Meal planning/preparation  Food journal   Decreased portions at mealtimes and snacks  Physical activity     Barriers to learning:none  Readiness to change: Action:  (Changing behavior)  Comprehension: fair  Expected Compliance: fair

## 2024-08-09 ENCOUNTER — TELEMEDICINE (OUTPATIENT)
Dept: PSYCHIATRY | Facility: CLINIC | Age: 54
End: 2024-08-09
Payer: COMMERCIAL

## 2024-08-09 DIAGNOSIS — F33.1 MAJOR DEPRESSIVE DISORDER, RECURRENT, MODERATE (HCC): ICD-10-CM

## 2024-08-09 DIAGNOSIS — F41.1 GAD (GENERALIZED ANXIETY DISORDER): Primary | ICD-10-CM

## 2024-08-09 PROCEDURE — 90832 PSYTX W PT 30 MINUTES: CPT | Performed by: SOCIAL WORKER

## 2024-08-09 NOTE — PSYCH
DATA: Met with Mindy for scheduled individual session. Topics of discussion included relationships with family and mood regulation and symptoms. Client shows evidence of utilizing emotion regulation skills and effective communication skills skills to manage mental health symptoms. During this session, this clinician used the following therapeutic modalities: engagement strategies, supportive psychotherapy, and client-centered therapy.  Mindy reports that she has been doing well since her last session. Today, Mindy details recent interactions with her mother and how she has been navigating her feelings. Mindy says she is getting to the point where she does not allow her emotions to consume her when it comes to her mother's strong-willed behavior. She says she has also been setting boundaries for herself when it comes to visiting her mother. Therapist and Mindy also explore healthy ways for her to continue navigating her relationship with her mother and she is receptive. Overall, Mindy reports that her anxiety and depression have not been much of an issue for her. She says bad weather tends to trigger her depression, but she has not experienced this feeling recently. Mindy does not present with any immediate concerns at this time.    ASSESSMENT: Mindy presents with a improved mood. Her affect is brighter. Mindy exhibits strong therapeutic rapport with this clinician. Mindy continues to exhibit willingness to work on treatment goals and objectives. Mindy presents with a minimal risk of suicide, minimal risk of self-harm, and minimal risk of harm to others.       PLAN: Mindy will return in 1 week for the next scheduled session. Between sessions, Mindy will continue utilizing skills and will report back during the next session re: successes and barriers. At the next session, this clinician will use engagement strategies, supportive psychotherapy, and client-centered therapy to address Mindy's mood management, in an effort to assist  Mindy with meeting treatment goals.   Virtual Regular Visit    Verification of patient location:    Patient is located at Home in the following state in which I hold an active license [place]      Assessment/Plan:    Problem List Items Addressed This Visit       Major depressive disorder, recurrent, moderate (HCC)    JUHI (generalized anxiety disorder) - Primary       Goals addressed in session: Goal 1          Reason for visit is No chief complaint on file.       Encounter provider Nesha Alejo LCSW      Recent Visits  Date Type Provider Dept   08/02/24 Telemedicine Nesha Alejo LCSW [facility]   Showing recent visits within past 7 days and meeting all other requirements  Future Appointments  No visits were found meeting these conditions.  Showing future appointments within next 150 days and meeting all other requirements       The patient was identified by name and date of birth. Mindy Truong was informed that this is a telemedicine visit and that the visit is being conducted throughthe Epic Embedded platform. She agrees to proceed..  My office door was closed. No one else was in the room.  She acknowledged consent and understanding of privacy and security of the video platform. The patient has agreed to participate and understands they can discontinue the visit at any time.    Patient is aware this is a billable service.     Subjective  Mindy Truong is a 53 y.o. female who presents for an individual therapy session today .      HPI     Past Medical History:   Diagnosis Date    Anxiety     Asthma     Depression     Disease of thyroid gland     Dizziness     Fibroid     Forgetfulness     Hashimoto's disease     Hashimoto's disease     Headache(784.0) 2002    History of fainting as a child     Hyperlipidemia     Hypertension     Migraine 2004    Numbness and tingling     Polycystic ovary syndrome 2008    Post traumatic stress disorder 02/28/2019    Varicella        Past Surgical History:    Procedure Laterality Date    BREAST BIOPSY Left 2020    BREAST SURGERY Bilateral 2002    reduction    COLONOSCOPY      HYSTERECTOMY  2005    [place] COLONOSCOPY FLX DX W/COLLJ SPEC WHEN PFRMD N/A 10/16/2018    Procedure: EGD AND COLONOSCOPY;  Surgeon: Bunny Ruvalcaba MD;  Location: [facility];  Service: Gastroenterology    [place] EXCISION RADIAL HEAD Left 10/09/2020    Procedure: RADIAL HEAD IMPLANT ARTHROPLASTY ;  Surgeon: Zackary Craig MD;  Location: [facility];  Service: Orthopedics    REDUCTION MAMMAPLASTY Bilateral 1997    REDUCTION MAMMAPLASTY Bilateral 2002    REDUCTION MAMMAPLASTY Bilateral 2020    SINUS SURGERY      TOTAL ABDOMINAL HYSTERECTOMY         Current Outpatient Medications   Medication Sig Dispense Refill    albuterol (2.5 mg/3 mL) 0.083 % nebulizer solution Take 3 mL (2.5 mg total) by nebulization every 6 (six) hours as needed for wheezing or shortness of breath 180 mL 0    albuterol (PROVENTIL HFA,VENTOLIN HFA) 90 mcg/act inhaler TAKE 2 PUFFS BY MOUTH 4 TIMES A DAY (Patient taking differently: 1 puff every 4 (four) hours as needed) 6.7 g 6    atorvastatin (LIPITOR) 20 mg tablet Take 1 tablet (20 mg total) by mouth daily 90 tablet 3    clonazePAM (KlonoPIN) 0.5 mg tablet Take 1 tablet (0.5 mg total) by mouth daily at bedtime. May also take 1 tablet (0.5 mg total) daily as needed for anxiety. 60 tablet 0    cloNIDine (Catapres) 0.1 mg tablet Take 1 tablet (0.1 mg total) by mouth daily at bedtime 90 tablet 1    levothyroxine 75 mcg tablet Take 1 tablet (75 mcg total) by mouth daily 90 tablet 3    mirtazapine (REMERON) 7.5 MG tablet Take 1 tablet (7.5 mg total) by mouth daily at bedtime 7 tablet 1    rizatriptan (MAXALT-MLT) 10 mg disintegrating tablet Take 1 tablet (10 mg total) by mouth once as needed for migraine for up to 1 dose May repeat in 2 hours if needed 9 tablet 0    venlafaxine (EFFEXOR-XR) 150 mg 24 hr capsule Take 1 capsule (150 mg total) by mouth daily with 75 mg capsule (225 mg total  daily) 90 capsule 1    venlafaxine (EFFEXOR-XR) 75 mg 24 hr capsule Take 1 capsule (75 mg total) by mouth daily 30 capsule 3     No current facility-administered medications for this visit.        Allergies   Allergen Reactions    Lisinopril-Hydrochlorothiazide Hives    Other Hives     States she has no allergies       Review of Systems    Video Exam    There were no vitals filed for this visit.    Physical Exam     Visit Time    08/09/24  Start Time: 1307  Stop Time: 1337  Total Visit Time: 30 minutes

## 2024-08-16 ENCOUNTER — TELEMEDICINE (OUTPATIENT)
Dept: PSYCHIATRY | Facility: CLINIC | Age: 54
End: 2024-08-16
Payer: COMMERCIAL

## 2024-08-16 DIAGNOSIS — F41.1 GAD (GENERALIZED ANXIETY DISORDER): Primary | ICD-10-CM

## 2024-08-16 DIAGNOSIS — F43.10 POST TRAUMATIC STRESS DISORDER (PTSD): ICD-10-CM

## 2024-08-16 PROCEDURE — 90837 PSYTX W PT 60 MINUTES: CPT | Performed by: SOCIAL WORKER

## 2024-08-16 NOTE — PSYCH
DATA: Met with Mindy for scheduled individual session. Topics of discussion included family stressors, relationships with family, and mood regulation and symptoms. Client shows evidence of utilizing emotion regulation skills and boundary-setting  skills to manage mental health symptoms. During this session, this clinician used the following therapeutic modalities: engagement strategies, supportive psychotherapy, and client-centered therapy.  Mindy reports that she has been feeling pretty good since her last session. Today, she begins by detailing her relationship with her in-laws and how she's been navigating her relationships with them. She then talks more about her relationship with her mother's family, as well as the trauma she's experienced. Mindy explains that she isn't that close with her mother because of how she's treated her in the past, and because of her mother's marriage. She reports that she keeps her distance because it's good for her mental health to do so. However, she reports that she does support her mother when necessary. Mindy is able to process her thoughts and feelings without difficulty during today's session. Overall, she reports feeling good about where she is in treatment.    ASSESSMENT: Mindy presents with a improved mood. Her affect is normal range and intensity, appropriate. Mindy exhibits strong therapeutic rapport with this clinician. Mindy continues to exhibit willingness to work on treatment goals and objectives. Mindy presents with a minimal risk of suicide, minimal risk of self-harm, and minimal risk of harm to others.       PLAN: Mindy will return in 3 weeks for the next scheduled session. Between sessions, Mindy will continue utilizing skills and will report back during the next session re: successes and barriers. At the next session, this clinician will use engagement strategies, supportive psychotherapy, and client-centered therapy to address Mindy's mood management , in an effort to  assist Mindy with meeting treatment goals.   Virtual Regular Visit    Verification of patient location:    Patient is located at Home in the following state in which I hold an active license [place]      Assessment/Plan:    Problem List Items Addressed This Visit       Post traumatic stress disorder (PTSD)    JUHI (generalized anxiety disorder) - Primary       Goals addressed in session: Goal 1          Reason for visit is No chief complaint on file.       Encounter provider Nesha Alejo LCSW      Recent Visits  Date Type Provider Dept   08/09/24 Telemedicine Nesha Alejo LCSW [facility]   Showing recent visits within past 7 days and meeting all other requirements  Future Appointments  No visits were found meeting these conditions.  Showing future appointments within next 150 days and meeting all other requirements       The patient was identified by name and date of birth. Mindy Truong was informed that this is a telemedicine visit and that the visit is being conducted throughthe Epic Embedded platform. She agrees to proceed..  My office door was closed. No one else was in the room.  She acknowledged consent and understanding of privacy and security of the video platform. The patient has agreed to participate and understands they can discontinue the visit at any time.    Patient is aware this is a billable service.     Subjective  Mindy Truong is a 53 y.o. female who presents for an individual therapy session today .      HPI     Past Medical History:   Diagnosis Date    Anxiety     Asthma     Depression     Disease of thyroid gland     Dizziness     Fibroid     Forgetfulness     Hashimoto's disease     Hashimoto's disease     Headache(784.0) 2002    History of fainting as a child     Hyperlipidemia     Hypertension     Migraine 2004    Numbness and tingling     Polycystic ovary syndrome 2008    Post traumatic stress disorder 02/28/2019    Varicella        Past Surgical History:   Procedure  Laterality Date    BREAST BIOPSY Left 2020    BREAST SURGERY Bilateral 2002    reduction    COLONOSCOPY      HYSTERECTOMY  2005    [place] COLONOSCOPY FLX DX W/COLLJ SPEC WHEN PFRMD N/A 10/16/2018    Procedure: EGD AND COLONOSCOPY;  Surgeon: Bunny Ruvalcaba MD;  Location: [facility];  Service: Gastroenterology    [place] EXCISION RADIAL HEAD Left 10/09/2020    Procedure: RADIAL HEAD IMPLANT ARTHROPLASTY ;  Surgeon: Zackary Craig MD;  Location: [facility];  Service: Orthopedics    REDUCTION MAMMAPLASTY Bilateral 1997    REDUCTION MAMMAPLASTY Bilateral 2002    REDUCTION MAMMAPLASTY Bilateral 2020    SINUS SURGERY      TOTAL ABDOMINAL HYSTERECTOMY         Current Outpatient Medications   Medication Sig Dispense Refill    albuterol (2.5 mg/3 mL) 0.083 % nebulizer solution Take 3 mL (2.5 mg total) by nebulization every 6 (six) hours as needed for wheezing or shortness of breath 180 mL 0    albuterol (PROVENTIL HFA,VENTOLIN HFA) 90 mcg/act inhaler TAKE 2 PUFFS BY MOUTH 4 TIMES A DAY (Patient taking differently: 1 puff every 4 (four) hours as needed) 6.7 g 6    atorvastatin (LIPITOR) 20 mg tablet Take 1 tablet (20 mg total) by mouth daily 90 tablet 3    clonazePAM (KlonoPIN) 0.5 mg tablet Take 1 tablet (0.5 mg total) by mouth daily at bedtime. May also take 1 tablet (0.5 mg total) daily as needed for anxiety. 60 tablet 0    cloNIDine (Catapres) 0.1 mg tablet Take 1 tablet (0.1 mg total) by mouth daily at bedtime 90 tablet 1    levothyroxine 75 mcg tablet Take 1 tablet (75 mcg total) by mouth daily 90 tablet 3    mirtazapine (REMERON) 7.5 MG tablet Take 1 tablet (7.5 mg total) by mouth daily at bedtime 7 tablet 1    rizatriptan (MAXALT-MLT) 10 mg disintegrating tablet Take 1 tablet (10 mg total) by mouth once as needed for migraine for up to 1 dose May repeat in 2 hours if needed 9 tablet 0    venlafaxine (EFFEXOR-XR) 150 mg 24 hr capsule Take 1 capsule (150 mg total) by mouth daily with 75 mg capsule (225 mg total daily) 90  capsule 1    venlafaxine (EFFEXOR-XR) 75 mg 24 hr capsule Take 1 capsule (75 mg total) by mouth daily 30 capsule 3     No current facility-administered medications for this visit.        Allergies   Allergen Reactions    Lisinopril-Hydrochlorothiazide Hives    Other Hives     States she has no allergies       Review of Systems    Video Exam    There were no vitals filed for this visit.    Physical Exam     Visit Time    08/16/24  Start Time: 1405  Stop Time: 1500  Total Visit Time: 55 minutes

## 2024-09-04 ENCOUNTER — TELEPHONE (OUTPATIENT)
Age: 54
End: 2024-09-04

## 2024-09-04 NOTE — TELEPHONE ENCOUNTER
Patient contacted the office to schedule a follow up visit with provider. Patient is now scheduled for 9/25/2024  at 1 pm virtually.

## 2024-09-06 ENCOUNTER — TELEMEDICINE (OUTPATIENT)
Dept: PSYCHIATRY | Facility: CLINIC | Age: 54
End: 2024-09-06
Payer: COMMERCIAL

## 2024-09-06 DIAGNOSIS — F41.1 GAD (GENERALIZED ANXIETY DISORDER): Primary | ICD-10-CM

## 2024-09-06 PROCEDURE — 90832 PSYTX W PT 30 MINUTES: CPT | Performed by: SOCIAL WORKER

## 2024-09-06 NOTE — PSYCH
DATA: Met with Mindy for scheduled individual session. Topics of discussion included relationships with friends and mood regulation and symptoms. Client shows evidence of utilizing emotion regulation skills and effective communication skills skills to manage mental health symptoms. During this session, this clinician used the following therapeutic modalities: engagement strategies, supportive psychotherapy, and client-centered therapy.  Mindy reports that a lot has happened since her last session. Today, she details her recent experiences and talks about how she and her wife have been helping their neighbors. Mindy says she continues to set boundaries for herself when it comes to strained family relationships. Overall, Mindy reports that she has been feeling good, engaging in self-care, and staying active. She does not present with immediate concerns today.    ASSESSMENT: Mindy presents with a improved mood. Her affect is brighter. Mindy exhibits strong therapeutic rapport with this clinician. Mindy continues to exhibit willingness to work on treatment goals and objectives. Mindy presents with a minimal risk of suicide, minimal risk of self-harm, and minimal risk of harm to others.       PLAN: Mindy will return in 2 weeks for the next scheduled session. Between sessions, Mindy will continue utilizing skills and will report back during the next session re: successes and barriers. At the next session, this clinician will use engagement strategies, supportive psychotherapy, and client-centered therapy to address Mindy's mood management, in an effort to assist Mindy with meeting treatment goals.   Virtual Regular Visit    Verification of patient location:    Patient is located at Home in the following state in which I hold an active license [place]      Assessment/Plan:    Problem List Items Addressed This Visit       JUHI (generalized anxiety disorder) - Primary       Goals addressed in session: Goal 1          Reason for visit is    Chief Complaint   Patient presents with    Virtual Regular Visit          Encounter provider Nesha Alejo LCSW      Recent Visits  No visits were found meeting these conditions.  Showing recent visits within past 7 days and meeting all other requirements  Today's Visits  Date Type Provider Dept   09/06/24 Telemedicine Nesha Alejo LCSW [facility]   Showing today's visits and meeting all other requirements  Future Appointments  No visits were found meeting these conditions.  Showing future appointments within next 150 days and meeting all other requirements       The patient was identified by name and date of birth. Mindy Truong was informed that this is a telemedicine visit and that the visit is being conducted throughthe Epic Embedded platform. She agrees to proceed..  My office door was closed. No one else was in the room.  She acknowledged consent and understanding of privacy and security of the video platform. The patient has agreed to participate and understands they can discontinue the visit at any time.    Patient is aware this is a billable service.     Subjective  Mindy Truong is a 53 y.o. female who presents for an individual therapy session today .      HPI     Past Medical History:   Diagnosis Date    Anxiety     Asthma     Depression     Disease of thyroid gland     Dizziness     Fibroid     Forgetfulness     Hashimoto's disease     Hashimoto's disease     Headache(784.0) 2002    History of fainting as a child     Hyperlipidemia     Hypertension     Migraine 2004    Numbness and tingling     Polycystic ovary syndrome 2008    Post traumatic stress disorder 02/28/2019    Varicella        Past Surgical History:   Procedure Laterality Date    BREAST BIOPSY Left 2020    BREAST SURGERY Bilateral 2002    reduction    COLONOSCOPY      HYSTERECTOMY  2005    [place] COLONOSCOPY FLX DX W/COLLJ SPEC WHEN PFRMD N/A 10/16/2018    Procedure: EGD AND COLONOSCOPY;  Surgeon: Bunny  MD Peg;  Location: [facility];  Service: Gastroenterology    [place] EXCISION RADIAL HEAD Left 10/09/2020    Procedure: RADIAL HEAD IMPLANT ARTHROPLASTY ;  Surgeon: Zackary Craig MD;  Location: [facility];  Service: Orthopedics    REDUCTION MAMMAPLASTY Bilateral 1997    REDUCTION MAMMAPLASTY Bilateral 2002    REDUCTION MAMMAPLASTY Bilateral 2020    SINUS SURGERY      TOTAL ABDOMINAL HYSTERECTOMY         Current Outpatient Medications   Medication Sig Dispense Refill    albuterol (2.5 mg/3 mL) 0.083 % nebulizer solution Take 3 mL (2.5 mg total) by nebulization every 6 (six) hours as needed for wheezing or shortness of breath 180 mL 0    albuterol (PROVENTIL HFA,VENTOLIN HFA) 90 mcg/act inhaler TAKE 2 PUFFS BY MOUTH 4 TIMES A DAY (Patient taking differently: 1 puff every 4 (four) hours as needed) 6.7 g 6    atorvastatin (LIPITOR) 20 mg tablet Take 1 tablet (20 mg total) by mouth daily 90 tablet 3    clonazePAM (KlonoPIN) 0.5 mg tablet Take 1 tablet (0.5 mg total) by mouth daily at bedtime. May also take 1 tablet (0.5 mg total) daily as needed for anxiety. 60 tablet 0    cloNIDine (Catapres) 0.1 mg tablet Take 1 tablet (0.1 mg total) by mouth daily at bedtime 90 tablet 1    levothyroxine 75 mcg tablet Take 1 tablet (75 mcg total) by mouth daily 90 tablet 3    mirtazapine (REMERON) 7.5 MG tablet Take 1 tablet (7.5 mg total) by mouth daily at bedtime 7 tablet 1    rizatriptan (MAXALT-MLT) 10 mg disintegrating tablet Take 1 tablet (10 mg total) by mouth once as needed for migraine for up to 1 dose May repeat in 2 hours if needed 9 tablet 0    venlafaxine (EFFEXOR-XR) 150 mg 24 hr capsule Take 1 capsule (150 mg total) by mouth daily with 75 mg capsule (225 mg total daily) 90 capsule 1    venlafaxine (EFFEXOR-XR) 75 mg 24 hr capsule Take 1 capsule (75 mg total) by mouth daily 30 capsule 3     No current facility-administered medications for this visit.        Allergies   Allergen Reactions     Lisinopril-Hydrochlorothiazide Hives    Other Hives     States she has no allergies       Review of Systems    Video Exam    There were no vitals filed for this visit.    Physical Exam     Visit Time    09/06/24  Start Time: 1107  Stop Time: 1141  Total Visit Time: 34 minutes

## 2024-09-13 ENCOUNTER — HOSPITAL ENCOUNTER (OUTPATIENT)
Dept: MAMMOGRAPHY | Facility: CLINIC | Age: 54
End: 2024-09-13
Payer: COMMERCIAL

## 2024-09-13 VITALS — WEIGHT: 175 LBS | BODY MASS INDEX: 32.2 KG/M2 | HEIGHT: 62 IN

## 2024-09-13 DIAGNOSIS — Z12.31 ENCOUNTER FOR SCREENING MAMMOGRAM FOR MALIGNANT NEOPLASM OF BREAST: ICD-10-CM

## 2024-09-13 PROCEDURE — 77067 SCR MAMMO BI INCL CAD: CPT

## 2024-09-13 PROCEDURE — 77063 BREAST TOMOSYNTHESIS BI: CPT

## 2024-09-18 ENCOUNTER — OFFICE VISIT (OUTPATIENT)
Dept: ENDOCRINOLOGY | Facility: CLINIC | Age: 54
End: 2024-09-18
Payer: COMMERCIAL

## 2024-09-18 VITALS
DIASTOLIC BLOOD PRESSURE: 70 MMHG | WEIGHT: 173.4 LBS | HEIGHT: 62 IN | OXYGEN SATURATION: 99 % | HEART RATE: 96 BPM | SYSTOLIC BLOOD PRESSURE: 132 MMHG | TEMPERATURE: 97.8 F | BODY MASS INDEX: 31.91 KG/M2

## 2024-09-18 DIAGNOSIS — R73.03 PRE-DIABETES: ICD-10-CM

## 2024-09-18 DIAGNOSIS — E66.09 CLASS 1 OBESITY DUE TO EXCESS CALORIES WITH SERIOUS COMORBIDITY AND BODY MASS INDEX (BMI) OF 31.0 TO 31.9 IN ADULT: ICD-10-CM

## 2024-09-18 DIAGNOSIS — E06.3 HASHIMOTO'S DISEASE: Primary | ICD-10-CM

## 2024-09-18 PROBLEM — E66.811 CLASS 1 OBESITY DUE TO EXCESS CALORIES WITH SERIOUS COMORBIDITY AND BODY MASS INDEX (BMI) OF 31.0 TO 31.9 IN ADULT: Status: ACTIVE | Noted: 2024-09-18

## 2024-09-18 PROCEDURE — 99204 OFFICE O/P NEW MOD 45 MIN: CPT | Performed by: STUDENT IN AN ORGANIZED HEALTH CARE EDUCATION/TRAINING PROGRAM

## 2024-09-18 RX ORDER — TIRZEPATIDE 2.5 MG/.5ML
2.5 INJECTION, SOLUTION SUBCUTANEOUS WEEKLY
Qty: 2 ML | Refills: 0 | Status: SHIPPED | OUTPATIENT
Start: 2024-09-18 | End: 2024-10-16

## 2024-09-18 NOTE — ASSESSMENT & PLAN NOTE
Hypothyroidism due to Hashimoto's thyroiditis, currently on levothyroxine 75 mcg once daily, 6 days a week which was decreased in June as TSH was suppressed.  She is clinically euthyroid.  I repeated TFT to do further adjustment if necessary.

## 2024-09-18 NOTE — ASSESSMENT & PLAN NOTE
She was advised to continue lifestyle changes that she made including regular daily activity and balanced diet.  She could not tolerate metformin.  She is willing to try GLP-1-GIP dual agonist, given recent weight gain and not able to lose when despite lifestyle modifications that she made for last 3 to 6 months.  counseled on adverse side effects including nausea, vomiting, pancreatitis, medullary thyroid cancer,   Zepbound 2.5 mg weekly started which will be increased as she tolerates.

## 2024-09-18 NOTE — PROGRESS NOTES
Mindy Truong 53 y.o. female MRN: [id number]    Encounter: [id number]      Assessment & Plan     Assessment & Plan  Pre-diabetes  She was advised to continue lifestyle changes that she made including regular daily activity and balanced diet.  She could not tolerate metformin.  She is willing to try GLP-1-GIP dual agonist, given recent weight gain and not able to lose when despite lifestyle modifications that she made for last 3 to 6 months.  counseled on adverse side effects including nausea, vomiting, pancreatitis, medullary thyroid cancer,   Zepbound 2.5 mg weekly started which will be increased as she tolerates.  Class 1 obesity due to excess calories with serious comorbidity and body mass index (BMI) of 31.0 to 31.9 in adult  See plan for prediabetes.  Hashimoto's disease  Hypothyroidism due to Hashimoto's thyroiditis, currently on levothyroxine 75 mcg once daily, 6 days a week which was decreased in June as TSH was suppressed.  She is clinically euthyroid.  I repeated TFT to do further adjustment if necessary.          CC:     hypothyroidism    History of Present Illness     HPI:  Mindy Truong is a 53 year - old female who presents to establish care with us.    She has long history of hypothyroidism due to Hashimoto's thyroiditis, she was following with [facility] endocrinology, currently on levothyroxine 75 mcg daily x 6 days a week which was decreased in June     She endorses weight gain despite lifestyle changes that she made after meeting with weight management, being so active, 10,000 to 20,000 steps a day.  Denies appetite changes.    She reports fatigue, lack of energy, and hot flashes.    She constipation or diarrhea, palpitations or tremor,    She is suffering from general anxiety and major depressive disorder, following with psychiatry team.    She has been evaluated by rheumatology team for Sjogren, and reportedly lip biopsy was negative for Sjogren.      Hx of total hysterectomy in 2008, for fibroid,  ovaries left  She has history of thyroid nodule, most recent ultrasound in February 2024 showed no nodules other than a colloid cyst in isthmus measuring 0.8 cm.        Family history of thyroid disease: no    Review of Systems :  Negative except as mentioned in HPI,      Historical Information   Past Medical History:   Diagnosis Date    Anxiety     Asthma     Depression     Disease of thyroid gland     Dizziness     Fibroid     Forgetfulness     Hashimoto's disease     Hashimoto's disease     Headache(784.0) 2002    History of fainting as a child     Hyperlipidemia     Hypertension     Migraine 2004    Numbness and tingling     Polycystic ovary syndrome 2008    Post traumatic stress disorder 02/28/2019    Varicella      Past Surgical History:   Procedure Laterality Date    BREAST BIOPSY Left 2020    BREAST SURGERY Bilateral 2002    reduction    COLONOSCOPY      HYSTERECTOMY  2005    OK COLONOSCOPY FLX DX W/COLLJ SPEC WHEN PFRMD N/A 10/16/2018    Procedure: EGD AND COLONOSCOPY;  Surgeon: Bunny Ruvalcaba MD;  Location: [facility];  Service: Gastroenterology    OK EXCISION RADIAL HEAD Left 10/09/2020    Procedure: RADIAL HEAD IMPLANT ARTHROPLASTY ;  Surgeon: Zackary Craig MD;  Location: [facility];  Service: Orthopedics    REDUCTION MAMMAPLASTY Bilateral 1997    REDUCTION MAMMAPLASTY Bilateral 2002    REDUCTION MAMMAPLASTY Bilateral 2020    SINUS SURGERY      TOTAL ABDOMINAL HYSTERECTOMY       Social History   Social History     Substance and Sexual Activity   Alcohol Use Yes    Alcohol/week: 2.0 standard drinks of alcohol    Types: 2 Glasses of wine per week    Comment: socially     Social History     Substance and Sexual Activity   Drug Use No     Social History     Tobacco Use   Smoking Status Never    Passive exposure: Past   Smokeless Tobacco Never   Tobacco Comments    na     Family History:   Family History   Problem Relation Age of Onset    Hyperlipidemia Mother     Lupus Mother     Hypertension Mother      Anxiety disorder Mother     Psychiatric Illness Mother         unknown-lost memory for six months    Depression Father     Cervical cancer Paternal Grandmother     Ovarian cancer Paternal Grandmother 66        Na    Uterine cancer Paternal Grandmother     No Known Problems Half-Sister     No Known Problems Half-Brother     No Known Problems Half-Brother     No Known Problems Half-Sister     No Known Problems Half-Sister     No Known Problems Maternal Aunt     No Known Problems Maternal Aunt     No Known Problems Maternal Aunt     No Known Problems Maternal Aunt     No Known Problems Paternal Aunt     No Known Problems Paternal Aunt     Bipolar disorder Cousin     Schizoaffective Disorder  Cousin     Schizophrenia Cousin     Self-Injury Cousin     Suicide Attempts Cousin     Psychosis Maternal Uncle         need his life    Schizoaffective Disorder  Maternal Uncle     Schizophrenia Maternal Uncle     Self-Injury Maternal Uncle     Suicide Attempts Maternal Uncle     Breast cancer Neg Hx     Colon cancer Neg Hx     Seizures Neg Hx        Meds/Allergies   Current Outpatient Medications   Medication Sig Dispense Refill    albuterol (2.5 mg/3 mL) 0.083 % nebulizer solution Take 3 mL (2.5 mg total) by nebulization every 6 (six) hours as needed for wheezing or shortness of breath 180 mL 0    albuterol (PROVENTIL HFA,VENTOLIN HFA) 90 mcg/act inhaler TAKE 2 PUFFS BY MOUTH 4 TIMES A DAY (Patient taking differently: 1 puff every 4 (four) hours as needed) 6.7 g 6    atorvastatin (LIPITOR) 20 mg tablet Take 1 tablet (20 mg total) by mouth daily 90 tablet 3    clonazePAM (KlonoPIN) 0.5 mg tablet Take 1 tablet (0.5 mg total) by mouth daily at bedtime. May also take 1 tablet (0.5 mg total) daily as needed for anxiety. 60 tablet 0    cloNIDine (Catapres) 0.1 mg tablet Take 1 tablet (0.1 mg total) by mouth daily at bedtime 90 tablet 1    levothyroxine 75 mcg tablet Take 1 tablet (75 mcg total) by mouth daily 90 tablet 3     mirtazapine (REMERON) 7.5 MG tablet Take 1 tablet (7.5 mg total) by mouth daily at bedtime 7 tablet 1    rizatriptan (MAXALT-MLT) 10 mg disintegrating tablet Take 1 tablet (10 mg total) by mouth once as needed for migraine for up to 1 dose May repeat in 2 hours if needed 9 tablet 0    venlafaxine (EFFEXOR-XR) 150 mg 24 hr capsule Take 1 capsule (150 mg total) by mouth daily with 75 mg capsule (225 mg total daily) 90 capsule 1    venlafaxine (EFFEXOR-XR) 75 mg 24 hr capsule Take 1 capsule (75 mg total) by mouth daily 30 capsule 3     No current facility-administered medications for this visit.     Allergies   Allergen Reactions    Lisinopril-Hydrochlorothiazide Hives    Other Hives     States she has no allergies       Objective   Vitals: not currently breastfeeding.    Physical Exam  Constitutional:       Appearance: She is well-developed.   HENT:      Head: Normocephalic and atraumatic.      Nose: Nose normal.   Eyes:      Extraocular Movements: EOM normal.      Pupils: Pupils are equal, round, and reactive to light.   Neck:      Thyroid: No thyromegaly.      Vascular: No JVD.   Cardiovascular:      Rate and Rhythm: Normal rate and regular rhythm.      Heart sounds: Normal heart sounds. No murmur heard.     No friction rub. No gallop.   Pulmonary:      Effort: Pulmonary effort is normal. No respiratory distress.      Breath sounds: Normal breath sounds. No stridor. No wheezing or rales.   Chest:      Chest wall: No tenderness.   Abdominal:      General: Bowel sounds are normal. There is no distension.      Palpations: Abdomen is soft. There is no mass.      Tenderness: There is no abdominal tenderness. There is no guarding.   Musculoskeletal:         General: No deformity or edema. Normal range of motion.      Cervical back: Normal range of motion.   Skin:     General: Skin is warm.   Neurological:      Mental Status: She is alert and oriented to person, place, and time.         The history was obtained from the  review of the chart, patient.    Lab Results:   Lab Results   Component Value Date/Time    TSH 3RD GENERATON 0.277 (L) 06/07/2024 10:21 AM    TSH 3RD GENERATON 0.222 (L) 04/24/2024 10:05 AM    TSH 3RD GENERATON 0.244 (L) 02/15/2024 12:00 PM    Free T4 0.70 06/07/2024 10:21 AM    Free T4 0.61 06/07/2024 10:21 AM    Free T4 0.72 04/24/2024 10:05 AM       Imaging Studies:   Results for orders placed during the hospital encounter of 02/28/24    US thyroid    Impression  No nodule meets current ACR criteria for requiring biopsy or follow-up ultrasounds.      Reference: ACR Thyroid Imaging, Reporting and Data System (TI-RADS): White Paper of the ACR TI-RADS Committee. J AM Mary Lou Radiol 2017;14:587-595. (additional recommendations based on American Thyroid Association 2015 guidelines.)        Resident: RICARDO Adams I, the attending radiologist, have reviewed the images and agree with the final report above.    Workstation performed: [id number]    THYROID ULTRASOUND     INDICATION: E06.3: Autoimmune thyroiditis  R63.5: Abnormal weight gain  E04.1: Nontoxic single thyroid nodule  R53.83: Other fatigue.     COMPARISON: Thyroid ultrasound 9/17/2021     TECHNIQUE: Ultrasound of the thyroid was performed with a high frequency linear transducer in transverse and sagittal planes including volumetric imaging sweeps as well as traditional still imaging technique.     FINDINGS: Thyroid parenchyma is mildly heterogeneous in echotexture.     Right lobe: 4.0 x 1.2 x 1.3 cm. Volume 2.9 mL  Left lobe: 4.6 x 1.3 x 1.4 cm. Volume 3.8 mL  Isthmus: 0.3 cm.     There are no dominant nodules that meet current ACR criteria for biopsy or follow-up.     Previously identified hyperechoic area in the right upper pole measures 0.5 x 0.3 x 0.4 cm (previously 0.7 x 0.4 x 0.7 cm), likely to represent heterogeneous thyroid tissue.     Colloid cyst in the isthmus measuring 0.8 x 0.2 x 0.5 cm.     IMPRESSION:     No nodule meets current ACR criteria for  "requiring biopsy or follow-up ultrasounds.        Reference: ACR Thyroid Imaging, Reporting and Data System (TI-RADS): White Paper of the ACR TI-RADS Committee. J AM Mary Lou Radiol 2017;14:587-595. (additional recommendations based on American Thyroid Association 2015 guidelines.)        Reviewed radiology reports from this admission including: Ultrasound(s).    Portions of the record may have been created with voice recognition software. Occasional wrong word or \"sound a like\" substitutions may have occurred due to the inherent limitations of voice recognition software. Read the chart carefully and recognize, using context, where substitutions have occurred.    I have spent a total time of 40 minutes in caring for this patient on the day of the visit/encounter including Diagnostic results, Prognosis, Risks and benefits of tx options, Instructions for management, Patient and family education, Importance of tx compliance, Risk factor reductions, Impressions, Counseling / Coordination of care, Documenting in the medical record, Reviewing / ordering tests, medicine, procedures  , and Obtaining or reviewing history  .  "

## 2024-09-19 DIAGNOSIS — F41.1 GAD (GENERALIZED ANXIETY DISORDER): ICD-10-CM

## 2024-09-19 DIAGNOSIS — F43.10 POST TRAUMATIC STRESS DISORDER (PTSD): ICD-10-CM

## 2024-09-19 RX ORDER — CLONAZEPAM 0.5 MG/1
TABLET ORAL
Qty: 60 TABLET | Refills: 0 | Status: SHIPPED | OUTPATIENT
Start: 2024-09-19

## 2024-09-20 ENCOUNTER — APPOINTMENT (OUTPATIENT)
Dept: LAB | Facility: HOSPITAL | Age: 54
End: 2024-09-20
Payer: COMMERCIAL

## 2024-09-20 ENCOUNTER — TELEMEDICINE (OUTPATIENT)
Dept: PSYCHIATRY | Facility: CLINIC | Age: 54
End: 2024-09-20
Payer: COMMERCIAL

## 2024-09-20 ENCOUNTER — TELEPHONE (OUTPATIENT)
Dept: ENDOCRINOLOGY | Facility: CLINIC | Age: 54
End: 2024-09-20

## 2024-09-20 DIAGNOSIS — F41.1 GAD (GENERALIZED ANXIETY DISORDER): Primary | ICD-10-CM

## 2024-09-20 DIAGNOSIS — F33.1 MAJOR DEPRESSIVE DISORDER, RECURRENT, MODERATE (HCC): ICD-10-CM

## 2024-09-20 LAB
T4 FREE SERPL-MCNC: 0.62 NG/DL (ref 0.61–1.12)
TSH SERPL DL<=0.05 MIU/L-ACNC: 1.95 UIU/ML (ref 0.45–4.5)

## 2024-09-20 PROCEDURE — 84439 ASSAY OF FREE THYROXINE: CPT | Performed by: STUDENT IN AN ORGANIZED HEALTH CARE EDUCATION/TRAINING PROGRAM

## 2024-09-20 PROCEDURE — 90834 PSYTX W PT 45 MINUTES: CPT | Performed by: SOCIAL WORKER

## 2024-09-20 PROCEDURE — 84443 ASSAY THYROID STIM HORMONE: CPT | Performed by: STUDENT IN AN ORGANIZED HEALTH CARE EDUCATION/TRAINING PROGRAM

## 2024-09-20 PROCEDURE — 36415 COLL VENOUS BLD VENIPUNCTURE: CPT | Performed by: STUDENT IN AN ORGANIZED HEALTH CARE EDUCATION/TRAINING PROGRAM

## 2024-09-20 NOTE — TELEPHONE ENCOUNTER
----- Message from Barbara Andrade MD sent at 9/20/2024  2:01 PM EDT -----  TSH is normal, we will continue levothyroxine at current dose.

## 2024-09-20 NOTE — PSYCH
DATA: Met with Mindy for scheduled individual session. Topics of discussion included mood regulation and symptoms. Client shows evidence of utilizing emotion regulation skills skills to manage mental health symptoms. During this session, this clinician used the following therapeutic modalities: engagement strategies, supportive psychotherapy, and client-centered therapy.  Mindy reports that she has been doing well since her last session. Today, Mindy details her recent experiences and how she has been managing her symptoms. She shares that she took up a new hobby, which has been distracting her from food cravings and therapeutic. She processes how she has been feeling about her body image and how she plans to address her problems. Mindy also talks about boundaries she's setting for herself regarding her friendships. She reports that her anxiety and depression have been good, and she has been managing her symptoms well. Mindy continues to show progress towards her goals and does not present with immediate concerns today.     ASSESSMENT: Mindy presents with a improved mood. Her affect is brighter. Mindy exhibits strong therapeutic rapport with this clinician. Mindy continues to exhibit willingness to work on treatment goals and objectives. Mindy presents with a minimal risk of suicide, minimal risk of self-harm, and minimal risk of harm to others.       PLAN: Mindy will return in 1 week for the next scheduled session. Between sessions, Mindy will continue utilizing skills and will report back during the next session re: successes and barriers. At the next session, this clinician will use engagement strategies, supportive psychotherapy, and client-centered therapy to address Mindy's mood management, in an effort to assist Mindy with meeting treatment goals.   Virtual Regular Visit    Verification of patient location:    Patient is located at Home in the following state in which I hold an active license  [place]      Assessment/Plan:    Problem List Items Addressed This Visit       Major depressive disorder, recurrent, moderate (HCC)    JUHI (generalized anxiety disorder) - Primary       Goals addressed in session: Goal 1          Reason for visit is No chief complaint on file.       Encounter provider Nesha Alejo LCSW      Recent Visits  No visits were found meeting these conditions.  Showing recent visits within past 7 days and meeting all other requirements  Future Appointments  No visits were found meeting these conditions.  Showing future appointments within next 150 days and meeting all other requirements       The patient was identified by name and date of birth. Mindy Truong was informed that this is a telemedicine visit and that the visit is being conducted throughthe Epic Embedded platform. She agrees to proceed..  My office door was closed. No one else was in the room.  She acknowledged consent and understanding of privacy and security of the video platform. The patient has agreed to participate and understands they can discontinue the visit at any time.    Patient is aware this is a billable service.     Subjective  Mindy Truong is a 53 y.o. female who presents for an individual therapy session today .      HPI     Past Medical History:   Diagnosis Date    Anxiety     Asthma     Depression     Disease of thyroid gland     Dizziness     Fibroid     Forgetfulness     Hashimoto's disease     Hashimoto's disease     Headache(784.0) 2002    History of fainting as a child     Hyperlipidemia     Hypertension     Migraine 2004    Numbness and tingling     Polycystic ovary syndrome 2008    Post traumatic stress disorder 02/28/2019    Varicella        Past Surgical History:   Procedure Laterality Date    BREAST BIOPSY Left 2020    BREAST SURGERY Bilateral 2002    reduction    COLONOSCOPY      HYSTERECTOMY  2005    [place] COLONOSCOPY FLX DX W/COLLJ SPEC WHEN PFRMD N/A 10/16/2018    Procedure: EGD AND COLONOSCOPY;   Surgeon: Bunny Ruvalcaba MD;  Location: [facility];  Service: Gastroenterology    SD EXCISION RADIAL HEAD Left 10/09/2020    Procedure: RADIAL HEAD IMPLANT ARTHROPLASTY ;  Surgeon: Zackary Craig MD;  Location: [facility];  Service: Orthopedics    REDUCTION MAMMAPLASTY Bilateral 1997    REDUCTION MAMMAPLASTY Bilateral 2002    REDUCTION MAMMAPLASTY Bilateral 2020    SINUS SURGERY      TOTAL ABDOMINAL HYSTERECTOMY         Current Outpatient Medications   Medication Sig Dispense Refill    albuterol (2.5 mg/3 mL) 0.083 % nebulizer solution Take 3 mL (2.5 mg total) by nebulization every 6 (six) hours as needed for wheezing or shortness of breath 180 mL 0    albuterol (PROVENTIL HFA,VENTOLIN HFA) 90 mcg/act inhaler TAKE 2 PUFFS BY MOUTH 4 TIMES A DAY (Patient taking differently: 1 puff every 4 (four) hours as needed) 6.7 g 6    atorvastatin (LIPITOR) 20 mg tablet Take 1 tablet (20 mg total) by mouth daily (Patient not taking: Reported on 9/18/2024) 90 tablet 3    clonazePAM (KlonoPIN) 0.5 mg tablet TAKE 1 TABLET BY MOUTH AT BEDTIME & MAY ALSO TAKE 1 EXTRA TABLET DAILY AS NEEDED FOR ANXIETY 60 tablet 0    cloNIDine (Catapres) 0.1 mg tablet Take 1 tablet (0.1 mg total) by mouth daily at bedtime 90 tablet 1    levothyroxine 75 mcg tablet Take 1 tablet (75 mcg total) by mouth daily 90 tablet 3    mirtazapine (REMERON) 7.5 MG tablet Take 1 tablet (7.5 mg total) by mouth daily at bedtime 7 tablet 1    rizatriptan (MAXALT-MLT) 10 mg disintegrating tablet Take 1 tablet (10 mg total) by mouth once as needed for migraine for up to 1 dose May repeat in 2 hours if needed 9 tablet 0    tirzepatide (Zepbound) 2.5 mg/0.5 mL auto-injector Inject 0.5 mL (2.5 mg total) under the skin once a week for 28 days 2 mL 0    venlafaxine (EFFEXOR-XR) 150 mg 24 hr capsule Take 1 capsule (150 mg total) by mouth daily with 75 mg capsule (225 mg total daily) 90 capsule 1    venlafaxine (EFFEXOR-XR) 75 mg 24 hr capsule Take 1 capsule (75 mg total) by  mouth daily 30 capsule 3     No current facility-administered medications for this visit.        Allergies   Allergen Reactions    Lisinopril-Hydrochlorothiazide Hives    Other Hives     States she has no allergies       Review of Systems    Video Exam    There were no vitals filed for this visit.    Physical Exam     Visit Time    09/20/24  Start Time: 1405  Stop Time: 1449  Total Visit Time: 44 minutes

## 2024-09-20 NOTE — TELEPHONE ENCOUNTER
Patient returning call. Made aware:    - Message from Barbara Andrade MD sent at 9/20/2024  2:01 PM EDT -----  TSH is normal, we will continue levothyroxine at current dose.    Patient verbalized understanding. States T3 was low last time. Asking if this can be added on to labs from today or can be ordered. Also, asking ofr a HgBA1C. Please advise, thank you.

## 2024-09-23 DIAGNOSIS — F33.1 MAJOR DEPRESSIVE DISORDER, RECURRENT, MODERATE (HCC): ICD-10-CM

## 2024-09-23 DIAGNOSIS — F43.10 POST TRAUMATIC STRESS DISORDER (PTSD): ICD-10-CM

## 2024-09-24 RX ORDER — VENLAFAXINE HYDROCHLORIDE 150 MG/1
150 CAPSULE, EXTENDED RELEASE ORAL DAILY
Qty: 90 CAPSULE | Refills: 1 | Status: SHIPPED | OUTPATIENT
Start: 2024-09-24

## 2024-09-25 ENCOUNTER — TELEMEDICINE (OUTPATIENT)
Dept: PSYCHIATRY | Facility: CLINIC | Age: 54
End: 2024-09-25
Payer: COMMERCIAL

## 2024-09-25 DIAGNOSIS — F33.1 MAJOR DEPRESSIVE DISORDER, RECURRENT, MODERATE (HCC): Primary | ICD-10-CM

## 2024-09-25 DIAGNOSIS — F51.01 PRIMARY INSOMNIA: ICD-10-CM

## 2024-09-25 DIAGNOSIS — F41.1 GAD (GENERALIZED ANXIETY DISORDER): ICD-10-CM

## 2024-09-25 DIAGNOSIS — F43.10 POST TRAUMATIC STRESS DISORDER (PTSD): ICD-10-CM

## 2024-09-25 PROCEDURE — 99214 OFFICE O/P EST MOD 30 MIN: CPT | Performed by: PHYSICIAN ASSISTANT

## 2024-09-25 RX ORDER — CLONIDINE HYDROCHLORIDE 0.1 MG/1
0.1 TABLET ORAL
Qty: 30 TABLET | Refills: 5 | Status: SHIPPED | OUTPATIENT
Start: 2024-09-25 | End: 2025-03-24

## 2024-09-25 RX ORDER — MIRTAZAPINE 7.5 MG/1
7.5 TABLET, FILM COATED ORAL
Qty: 30 TABLET | Refills: 5 | Status: SHIPPED | OUTPATIENT
Start: 2024-09-25 | End: 2025-03-24

## 2024-09-25 NOTE — PSYCH
This note was not shared with the patient due to reasonable likelihood of causing patient harm    Virtual Regular Visit    Visit Date: 09/25/24     Verification of patient location:    Patient is located at Home in the following state in which I hold an active license [place]    Problem List Items Addressed This Visit       Post traumatic stress disorder (PTSD)    Major depressive disorder, recurrent, moderate (HCC) - Primary    JUHI (generalized anxiety disorder)     Reason for visit is   Chief Complaint   Patient presents with    Follow-up    Medication Management    Virtual Regular Visit     Encounter provider Garima Rea    Provider located at [address]  [address]  [facility] [address] [phone]    Recent Visits  No visits were found meeting these conditions.  Showing recent visits within past 7 days and meeting all other requirements  Today's Visits  Date Type Provider Dept   09/25/24 Telemedicine Garima Rea [facility]   Showing today's visits and meeting all other requirements  Future Appointments  No visits were found meeting these conditions.  Showing future appointments within next 150 days and meeting all other requirements       The patient was identified by name and date of birth. Mindy Truong was informed that this is a telemedicine visit and that the visit is being conducted throughthe Epic Embedded platform. She agrees to proceed.  My office door was closed. No one else was in the room. She acknowledged consent and understanding of privacy and security of the video platform. The patient has agreed to participate and understands they can discontinue the visit at any time.    Patient is aware this is a billable service.     Insurance: Payor: BLUE CROSS / Plan: MISC BLUE CROSS / Product Type: Blue Fee for Service /      Subjective:    Mindy Truong is a drake 53 y.o. female with a  history of Major Depressive Disorder, Generalized Anxiety Disorder, and PTSD who presents today for follow-up and medication management. She is doing much better than she has been previously and feels that working with Nesha Alejo LCSW, has been very helpful. She feels the meds are going well and the sleep is better as well, though she does usually take the clonazepam most nights.     She denies any suicidal ideation, intent or plan at present; denies any homicidal ideation, intent or plan at present.    She denies any auditory hallucinations, denies any visual hallucinations, denies any delusions.    She denies any side effects from current psychiatric medications.    HPI ROS Appetite Changes and Sleep:     She reports adequate number of sleep hours, adequate appetite, adequate energy level    Current Rating Scores:     Current PHQ-9   PHQ-2/9 Depression Screening    Little interest or pleasure in doing things: 2 - more than half the days  Feeling down, depressed, or hopeless: 2 - more than half the days  Trouble falling or staying asleep, or sleeping too much: 2 - more than half the days  Feeling tired or having little energy: 2 - more than half the days  Poor appetite or overeating: 3 - nearly every day  Feeling bad about yourself - or that you are a failure or have let yourself or your family down: 3 - nearly every day  Trouble concentrating on things, such as reading the newspaper or watching television: 2 - more than half the days  Moving or speaking so slowly that other people could have noticed. Or the opposite - being so fidgety or restless that you have been moving around a lot more than usual: 0 - not at all       Current JUHI-7 is   JUHI-7 Flowsheet Screening      Flowsheet Row Most Recent Value   Over the last two weeks, how often have you been bothered by the following problems?     Feeling nervous, anxious, or on edge 2    Not being able to stop or control worrying 2    Worrying too much about  different things 2    Trouble relaxing  2    Being so restless that it's hard to sit still 0    Becoming easily annoyed or irritable  3    Feeling afraid as if something awful might happen 3    How difficult have these problems made it for you to do your work, take care of things at home, or get along with other people?  Extremely difficult    JUHI Score  14           Review Of Systems:    Mood euthymic   Behavior appropriate, cooperative, and calm   Thought Content normal   General normal    Personality no change in personality   Other Psych Symptoms normal   Constitutional as noted in HPI   ENT as noted in HPI   Cardiovascular as noted in HPI   Respiratory as noted in HPI   Gastrointestinal as noted in HPI   Genitourinary as noted in HPI   Musculoskeletal as noted in HPI   Integumentary as noted in HPI   Neurological as noted in HPI   Endocrine negative   Other Symptoms none, all other systems are negative     Family Psychiatric History:     Family History   Problem Relation Age of Onset    Hyperlipidemia Mother     Lupus Mother     Hypertension Mother     Anxiety disorder Mother     Psychiatric Illness Mother         unknown-lost memory for six months    Depression Father     Cervical cancer Paternal Grandmother     Ovarian cancer Paternal Grandmother 66        [place]    Uterine cancer Paternal Grandmother     No Known Problems Half-Sister     No Known Problems Half-Brother     No Known Problems Half-Brother     No Known Problems Half-Sister     No Known Problems Half-Sister     No Known Problems Maternal Aunt     No Known Problems Maternal Aunt     No Known Problems Maternal Aunt     No Known Problems Maternal Aunt     No Known Problems Paternal Aunt     No Known Problems Paternal Aunt     Bipolar disorder Cousin     Schizoaffective Disorder  Cousin     Schizophrenia Cousin     Self-Injury Cousin     Suicide Attempts Cousin     Psychosis Maternal Uncle         need his life    Schizoaffective Disorder  Maternal Uncle      Schizophrenia Maternal Uncle     Self-Injury Maternal Uncle     Suicide Attempts Maternal Uncle     Breast cancer Neg Hx     Colon cancer Neg Hx     Seizures Neg Hx      Social/Substance Abuse History:    Social History     Socioeconomic History    Marital status: /Civil Union     Spouse name: Not on file    Number of children: Not on file    Years of education: Not on file    Highest education level: Not on file   Occupational History    Not on file   Tobacco Use    Smoking status: Never     Passive exposure: Past    Smokeless tobacco: Never    Tobacco comments:     na   Vaping Use    Vaping status: Never Used   Substance and Sexual Activity    Alcohol use: Yes     Alcohol/week: 2.0 standard drinks of alcohol     Types: 2 Glasses of wine per week     Comment: socially    Drug use: No    Sexual activity: Yes     Partners: Female     Birth control/protection: None     Comment: JACKELIN   Other Topics Concern    Not on file   Social History Narrative    Lives [place], with her spouse.      Social Determinants of Health     Financial Resource Strain: Not on file   Food Insecurity: Not on file   Transportation Needs: Not on file   Physical Activity: Unknown (11/15/2022)    Exercise Vital Sign     Days of Exercise per Week: 0 days     Minutes of Exercise per Session: Not on file   Stress: No Stress Concern Present (9/1/2020)    [employer] - Occupational Stress Questionnaire     Feeling of Stress : Not at all   Social Connections: Unknown (6/18/2024)    Received from [employer]     How often do you feel lonely or isolated from those around you? (Adult - for ages 18 years and over): Not on file   Intimate Partner Violence: Not on file   Housing Stability: Not on file     The following portions of the patient's history were reviewed and updated as appropriate: past family history, past medical history, past social history, past surgical history and problem  list.    OBJECTIVE:     Mental Status Evaluation:  Appearance:  dressed appropriately, adequate grooming, looks stated age   Behavior:  pleasant, cooperative, calm, interacts appropriately with this writer   Speech:  normal rate, normal volume, normal pitch   Mood:  euthymic   Affect:  mood-congruent   Thought Process:  organized, logical, coherent   Associations: intact associations   Thought Content:  no overt delusions, no paranoia noted on exam   Perceptual Disturbances: no auditory hallucinations, no visual hallucinations   Risk Potential: Suicidal ideation - None  Homicidal ideation - None  Potential for aggression - No   Sensorium:  oriented to person, place, and time/date   Memory:  recent and remote memory grossly intact   Consciousness:  alert and awake   Attention/Concentration: attention span and concentration are age appropriate   Insight:  age appropriate   Judgment: age appropriate   Gait/Station: Unable to assess today due to virtual visit   Motor Activity: unable to assess today due to virtual visit        Laboratory Results: I have personally reviewed all pertinent laboratory/tests results    Suicide/Homicide Risk Assessment:    Risk of Harm to Self:  The following ratings are based on assessment at the time of the interview  Based on today's assessment, Mindy presents the following risk of harm to self: none    Risk of Harm to Others:  The following ratings are based on assessment at the time of the interview  Based on today's assessment, Mindy presents the following risk of harm to others: none    The following interventions are recommended: no intervention changes needed     Assessment/Plan:      She is doing well on her current regimen so no changes are advised at this time. She will continue with Nesha Alejo LCSW, for therapy. We have discussed her safety plan and she agrees that if she experience unsafe thoughts that she will reach out to her supports including this office, the suicide  hotline, and emergency services if necessary. Mindy is aware of non-emergent and emergent mental health resources. They are able to contract for their own safety at this time.    Will follow up in 3 months. Patient is aware to call the office if questions or concerns arise sooner.      Diagnoses and all orders for this visit:    Major depressive disorder, recurrent, moderate (HCC)    JUHI (generalized anxiety disorder)    Post traumatic stress disorder (PTSD)        Treatment Recommendations/Precautions:    Continue current medications:     - venlafaxine 225 mg qd     - mirtazapine 7.5 mg qhs     - clonazepam 0.5 mg BID PRN     - clonidine 0.1 mg qhs    Aware of 24 hour and weekend coverage for urgent situations accessed by calling [facility] main practice number  Medication management every 3 months  Continue psychotherapy with [facility] therapist Nesha Alejo  I am scheduling this patient out for greater than 3 months: Yes - Patient's stability of symptoms warrant this length of time or no significant changes to treatment plan  If sooner appointment needed, patient will reach out    Medications Risks/Benefits      Risks, Benefits And Possible Side Effects Of Medications:    Risks, benefits, and possible side effects of medications explained to Mindy and she verbalizes understanding and agreement for treatment.    Controlled Medication Discussion:     Mindy has been filling controlled prescriptions on time as prescribed according to [place] Prescription Drug Monitoring Program  Discussed with Mindy the risks of sedation, respiratory depression, impairment of ability to drive and potential for abuse and addiction related to treatment with benzodiazepine medications. She understands risk of treatment with benzodiazepine medications, agrees to not drive if feels impaired and agrees to take medications as prescribed    Psychotherapy Provided:     Individual psychotherapy provided: No     Treatment  Plan:    Completed and signed during the session: Not applicable - Treatment Plan to be completed by  [facility] therapist     Visit Time    Visit Start Time:  1:00 PM  Visit End Time:  1:20 PM  Total Visit Duration:  20 minutes    Garima Durantramaine 09/25/24      VIRTUAL VISIT DISCLAIMER    Mindy Truong verbally agrees to participate in Virtual Care Services. Pt is aware that Virtual Care Services could be limited without vital signs or the ability to perform a full hands-on physical exam. Mindy Truong understands she or the provider may request at any time to terminate the video visit and request the patient to seek care or treatment in person.

## 2024-09-27 ENCOUNTER — TELEMEDICINE (OUTPATIENT)
Dept: PSYCHIATRY | Facility: CLINIC | Age: 54
End: 2024-09-27
Payer: COMMERCIAL

## 2024-09-27 DIAGNOSIS — F33.1 MAJOR DEPRESSIVE DISORDER, RECURRENT, MODERATE (HCC): Primary | ICD-10-CM

## 2024-09-27 DIAGNOSIS — F41.1 GAD (GENERALIZED ANXIETY DISORDER): ICD-10-CM

## 2024-09-27 PROCEDURE — 90834 PSYTX W PT 45 MINUTES: CPT | Performed by: SOCIAL WORKER

## 2024-09-27 NOTE — PSYCH
DATA: Met with Mindy for scheduled individual session. Topics of discussion included family stressors, relationships with family, and mood regulation and symptoms. Client shows evidence of utilizing emotion regulation skills skills to manage mental health symptoms. During this session, this clinician used the following therapeutic modalities: engagement strategies, supportive psychotherapy, and client-centered therapy.  Mindy reports that she has been doing well since her last session. Today, she details a recent interaction she had with her mother and step-father. Mindy processed how she felt about this experience and how she navigated the stressors present. She says she feels like she was able to maintain, but also explains that it may be a while before she sees her mother and step-father again. Overall, Mindy reports that she continues to manage her depression and anxiety symptoms despite the weather change, which she says normally affects her.    ASSESSMENT: Mindy presents with a improved mood. Her affect is brighter. Mindy exhibits strong therapeutic rapport with this clinician. Mindy continues to exhibit willingness to work on treatment goals and objectives. Mindy presents with a minimal risk of suicide, minimal risk of self-harm, and minimal risk of harm to others.       PLAN: Mindy will return in 2 weeks for the next scheduled session. Between sessions, Mindy will continue utilizing skills and will report back during the next session re: successes and barriers. At the next session, this clinician will use engagement strategies, supportive psychotherapy, and client-centered therapy to address Mindy's mood management, in an effort to assist Mindy with meeting treatment goals.   Virtual Regular Visit    Verification of patient location:    Patient is located at Home in the following state in which I hold an active license [place]      Assessment/Plan:    Problem List Items Addressed This Visit       Major depressive disorder,  recurrent, moderate (HCC) - Primary    JUHI (generalized anxiety disorder)       Goals addressed in session: Goal 1          Reason for visit is   Chief Complaint   Patient presents with    Virtual Regular Visit          Encounter provider Nesha Alejo LCSW      Recent Visits  Date Type Provider Dept   09/20/24 Telemedicine Nesha Alejo LCSW [facility]   Showing recent visits within past 7 days and meeting all other requirements  Today's Visits  Date Type Provider Dept   09/27/24 Telemedicine Nesha Alejo LCSW [facility]   Showing today's visits and meeting all other requirements  Future Appointments  No visits were found meeting these conditions.  Showing future appointments within next 150 days and meeting all other requirements       The patient was identified by name and date of birth. Mindy Truong was informed that this is a telemedicine visit and that the visit is being conducted throughthe Epic Embedded platform. She agrees to proceed..  My office door was closed. No one else was in the room.  She acknowledged consent and understanding of privacy and security of the video platform. The patient has agreed to participate and understands they can discontinue the visit at any time.    Patient is aware this is a billable service.     Subjective  Mindy Truong is a 53 y.o. female who presents for an individual therapy session today .      HPI     Past Medical History:   Diagnosis Date    Anxiety     Asthma     Depression     Disease of thyroid gland     Dizziness     Fibroid     Forgetfulness     Hashimoto's disease     Hashimoto's disease     Headache(784.0) 2002    History of fainting as a child     Hyperlipidemia     Hypertension     Migraine 2004    Numbness and tingling     Polycystic ovary syndrome 2008    Post traumatic stress disorder 02/28/2019    Varicella        Past Surgical History:   Procedure Laterality Date    BREAST BIOPSY Left 2020     BREAST SURGERY Bilateral 2002    reduction    COLONOSCOPY      HYSTERECTOMY  2005    CO COLONOSCOPY FLX DX W/COLLJ SPEC WHEN PFRMD N/A 10/16/2018    Procedure: EGD AND COLONOSCOPY;  Surgeon: Bunyn Ruvalcaba MD;  Location: [facility];  Service: Gastroenterology    CO EXCISION RADIAL HEAD Left 10/09/2020    Procedure: RADIAL HEAD IMPLANT ARTHROPLASTY ;  Surgeon: Zackary Craig MD;  Location: [facility];  Service: Orthopedics    REDUCTION MAMMAPLASTY Bilateral 1997    REDUCTION MAMMAPLASTY Bilateral 2002    REDUCTION MAMMAPLASTY Bilateral 2020    SINUS SURGERY      TOTAL ABDOMINAL HYSTERECTOMY         Current Outpatient Medications   Medication Sig Dispense Refill    albuterol (2.5 mg/3 mL) 0.083 % nebulizer solution Take 3 mL (2.5 mg total) by nebulization every 6 (six) hours as needed for wheezing or shortness of breath 180 mL 0    albuterol (PROVENTIL HFA,VENTOLIN HFA) 90 mcg/act inhaler TAKE 2 PUFFS BY MOUTH 4 TIMES A DAY (Patient taking differently: 1 puff every 4 (four) hours as needed) 6.7 g 6    atorvastatin (LIPITOR) 20 mg tablet Take 1 tablet (20 mg total) by mouth daily 90 tablet 3    clonazePAM (KlonoPIN) 0.5 mg tablet TAKE 1 TABLET BY MOUTH AT BEDTIME & MAY ALSO TAKE 1 EXTRA TABLET DAILY AS NEEDED FOR ANXIETY 60 tablet 0    cloNIDine (Catapres) 0.1 mg tablet Take 1 tablet (0.1 mg total) by mouth daily at bedtime 30 tablet 5    levothyroxine 75 mcg tablet Take 1 tablet (75 mcg total) by mouth daily 90 tablet 3    mirtazapine (REMERON) 7.5 MG tablet Take 1 tablet (7.5 mg total) by mouth daily at bedtime 30 tablet 5    rizatriptan (MAXALT-MLT) 10 mg disintegrating tablet Take 1 tablet (10 mg total) by mouth once as needed for migraine for up to 1 dose May repeat in 2 hours if needed 9 tablet 0    tirzepatide (Zepbound) 2.5 mg/0.5 mL auto-injector Inject 0.5 mL (2.5 mg total) under the skin once a week for 28 days 2 mL 0    venlafaxine (EFFEXOR-XR) 150 mg 24 hr capsule Take 1 capsule (150 mg total) by mouth  daily with 75 mg capsule (225 mg total daily) 90 capsule 1    venlafaxine (EFFEXOR-XR) 75 mg 24 hr capsule Take 1 capsule (75 mg total) by mouth daily 30 capsule 3     No current facility-administered medications for this visit.        Allergies   Allergen Reactions    Lisinopril-Hydrochlorothiazide Hives    Other Hives     States she has no allergies       Review of Systems    Video Exam    There were no vitals filed for this visit.    Physical Exam     Visit Time    09/27/24  Start Time: 1405  Stop Time: 1447  Total Visit Time: 42 minutes

## 2024-09-27 NOTE — TELEPHONE ENCOUNTER
Patient calling back, relayed the above message and she expressed understanding. She is asking if the doctor can order an A1C for her? She also states that she spoke with the pharmacy and Zepbound requires a prior authorization.  Please advise

## 2024-09-27 NOTE — TELEPHONE ENCOUNTER
PA for Zepbound 2.5 EXCLUDED    Reason:        Message sent to office clinical pool Yes    Denial letter scanned into Media No      Appeal started No (Provider will need to decide if appeal is warranted and send clinical documentation to Prior Authorization Team for initiation.)    **Please follow up with your patient regarding denial and next steps**

## 2024-10-01 ENCOUNTER — OFFICE VISIT (OUTPATIENT)
Age: 54
End: 2024-10-01
Payer: COMMERCIAL

## 2024-10-01 VITALS
OXYGEN SATURATION: 98 % | HEART RATE: 86 BPM | WEIGHT: 176 LBS | HEIGHT: 62 IN | RESPIRATION RATE: 16 BRPM | DIASTOLIC BLOOD PRESSURE: 70 MMHG | BODY MASS INDEX: 32.39 KG/M2 | SYSTOLIC BLOOD PRESSURE: 110 MMHG

## 2024-10-01 DIAGNOSIS — I10 ESSENTIAL HYPERTENSION: Primary | ICD-10-CM

## 2024-10-01 DIAGNOSIS — E66.09 CLASS 1 OBESITY DUE TO EXCESS CALORIES WITH SERIOUS COMORBIDITY AND BODY MASS INDEX (BMI) OF 32.0 TO 32.9 IN ADULT: ICD-10-CM

## 2024-10-01 DIAGNOSIS — R73.03 PRE-DIABETES: ICD-10-CM

## 2024-10-01 DIAGNOSIS — F41.1 GAD (GENERALIZED ANXIETY DISORDER): ICD-10-CM

## 2024-10-01 DIAGNOSIS — E55.9 VITAMIN D DEFICIENCY: ICD-10-CM

## 2024-10-01 DIAGNOSIS — F33.1 MAJOR DEPRESSIVE DISORDER, RECURRENT, MODERATE (HCC): ICD-10-CM

## 2024-10-01 DIAGNOSIS — E66.811 CLASS 1 OBESITY DUE TO EXCESS CALORIES WITH SERIOUS COMORBIDITY AND BODY MASS INDEX (BMI) OF 32.0 TO 32.9 IN ADULT: ICD-10-CM

## 2024-10-01 DIAGNOSIS — E06.3 HASHIMOTO'S DISEASE: ICD-10-CM

## 2024-10-01 PROCEDURE — 99214 OFFICE O/P EST MOD 30 MIN: CPT

## 2024-10-01 NOTE — PATIENT INSTRUCTIONS
Complete lab work at earliest convenience.     Hyperlipidemia - has not taken Atorvastatin 20 mg for past 4 weeks to see how she does without it. She will complete lab work and then restart Atorvastatin.     Pre-diabetes. Continue following with endo and weight loss. Let us know if ZepBound is approved by insurance.     Lip numbness - after biopsy. Discussed with her that it can improve in a few months, but also has possibility of not improving after 1 year. Will continue rheumatology f/u.    Depression/anxiety - continue on current medication regimen. Continue with psychiatry and weekly therapy.     Vit. D insufficiency - Continue Vit. D 2000 IU daily.    Recommend walking for 30 minutes a day for 5 days a week. Consider a mediterranean diet that is rich of fibers.     Health Maintenance: Current mammogram was negative for malignancy. Next mammogram in 1 year. Colonoscopy UTD, next in 2028.

## 2024-10-01 NOTE — PROGRESS NOTES
"Assessment/Plan:    Diagnoses and all orders for this visit:    Essential hypertension  -     Hemoglobin A1C; Future  -     Comprehensive metabolic panel; Future  -     Lipid panel; Future  -     CBC and differential; Future    Hashimoto's disease    Major depressive disorder, recurrent, moderate (HCC)    JUHI (generalized anxiety disorder)    Pre-diabetes  -     Hemoglobin A1C; Future  -     Comprehensive metabolic panel; Future  -     Lipid panel; Future    Class 1 obesity due to excess calories with serious comorbidity and body mass index (BMI) of 32.0 to 32.9 in adult  -     Hemoglobin A1C; Future  -     Comprehensive metabolic panel; Future  -     Lipid panel; Future  -     CBC and differential; Future    Vitamin D deficiency              Patient Instructions   Complete lab work at earliest convenience.     Hyperlipidemia - has not taken Atorvastatin 20 mg for past 4 weeks to see how she does without it. She will complete lab work and then restart Atorvastatin.     Pre-diabetes. Continue following with endo and weight loss. Let us know if ZepBound is approved by insurance.     Lip numbness - after biopsy. Discussed with her that it can improve in a few months, but also has possibility of not improving after 1 year. Will continue rheumatology f/u.    Depression/anxiety - continue on current medication regimen. Continue with psychiatry and weekly therapy.     Vit. D insufficiency - Continue Vit. D 2000 IU daily.    Recommend walking for 30 minutes a day for 5 days a week. Consider a mediterranean diet that is rich of fibers.     Health Maintenance: Current mammogram was negative for malignancy. Next mammogram in 1 year. Colonoscopy UTD, next in 2028.     Subjective:      Patient ID: Mindy Truong is a 53 y.o. female    Hyperlipidemia: Has not been taking her Atorvastatin 20 mg for past month to \"see what her levels are without the medication\".    Her lip still feels \"numb\" after her lip biopsy 10 weeks ago. "     Prediabetes: A1c increased to 6.1. Endocrinologist put her on Metformin. She also is following with weight loss management. Endo also has now started her on ZepBound but her insurance is not covering it.     Patient denies any depressive symptoms today and thinks her depression is controlled at this point. She had previous hospitalizations in a hospital and [facility] due to depression. Currently seeing psychiatry and weekly therapist.    Vitamin D insufficieny: Last lab result was 39.5 in Feb 2024. Currently taking 2000 IU daily.     Patient's diet consists of both proteins and vegetables, but states she does not watch her diet closely. Low carb diet. No exercise, but does yardwork and is active on her 3 acre house. 10k steps daily.            Current Outpatient Medications:     albuterol (2.5 mg/3 mL) 0.083 % nebulizer solution, Take 3 mL (2.5 mg total) by nebulization every 6 (six) hours as needed for wheezing or shortness of breath, Disp: 180 mL, Rfl: 0    albuterol (PROVENTIL HFA,VENTOLIN HFA) 90 mcg/act inhaler, TAKE 2 PUFFS BY MOUTH 4 TIMES A DAY (Patient taking differently: 1 puff every 4 (four) hours as needed), Disp: 6.7 g, Rfl: 6    atorvastatin (LIPITOR) 20 mg tablet, Take 1 tablet (20 mg total) by mouth daily, Disp: 90 tablet, Rfl: 3    clonazePAM (KlonoPIN) 0.5 mg tablet, TAKE 1 TABLET BY MOUTH AT BEDTIME & MAY ALSO TAKE 1 EXTRA TABLET DAILY AS NEEDED FOR ANXIETY, Disp: 60 tablet, Rfl: 0    cloNIDine (Catapres) 0.1 mg tablet, Take 1 tablet (0.1 mg total) by mouth daily at bedtime, Disp: 30 tablet, Rfl: 5    levothyroxine 75 mcg tablet, Take 1 tablet (75 mcg total) by mouth daily, Disp: 90 tablet, Rfl: 3    mirtazapine (REMERON) 7.5 MG tablet, Take 1 tablet (7.5 mg total) by mouth daily at bedtime, Disp: 30 tablet, Rfl: 5    rizatriptan (MAXALT-MLT) 10 mg disintegrating tablet, Take 1 tablet (10 mg total) by mouth once as needed for migraine for up to 1 dose May repeat in 2 hours if needed, Disp: 9 tablet,  Rfl: 0    venlafaxine (EFFEXOR-XR) 150 mg 24 hr capsule, Take 1 capsule (150 mg total) by mouth daily with 75 mg capsule (225 mg total daily), Disp: 90 capsule, Rfl: 1    venlafaxine (EFFEXOR-XR) 75 mg 24 hr capsule, Take 1 capsule (75 mg total) by mouth daily, Disp: 30 capsule, Rfl: 3    tirzepatide (Zepbound) 2.5 mg/0.5 mL auto-injector, Inject 0.5 mL (2.5 mg total) under the skin once a week for 28 days, Disp: 2 mL, Rfl: 0    Recent Results (from the past 1008 hour(s))   T4, free    Collection Time: 09/20/24 11:14 AM   Result Value Ref Range    Free T4 0.62 0.61 - 1.12 ng/dL   TSH, 3rd generation    Collection Time: 09/20/24 11:14 AM   Result Value Ref Range    TSH 3RD GENERATON 1.946 0.450 - 4.500 uIU/mL       The following portions of the patient's history were reviewed and updated as appropriate: allergies, current medications, past family history, past medical history, past social history, past surgical history and problem list.     Review of Systems   Constitutional:  Negative for chills, fatigue and fever.   HENT:  Negative for congestion.    Respiratory:  Positive for wheezing (occasionally). Negative for cough and shortness of breath.    Cardiovascular:  Negative for chest pain, palpitations and leg swelling.   Gastrointestinal:  Negative for abdominal pain, constipation, diarrhea, nausea and vomiting.   Endocrine: Positive for heat intolerance (hot flash).   Genitourinary:  Negative for difficulty urinating, dysuria, frequency and urgency.   Musculoskeletal:  Negative for arthralgias, back pain and myalgias.   Skin:  Negative for rash.   Neurological:  Negative for dizziness, weakness, light-headedness and headaches.   Psychiatric/Behavioral:  Negative for dysphoric mood. The patient is not nervous/anxious.          Objective:      Vitals:    10/01/24 1333   BP: 110/70   Pulse: 86   Resp: 16   SpO2: 98%          Physical Exam  Vitals reviewed.   Constitutional:       General: She is not in acute  distress.     Appearance: Normal appearance. She is not ill-appearing, toxic-appearing or diaphoretic.   HENT:      Head: Normocephalic and atraumatic.   Eyes:      General: No scleral icterus.     Extraocular Movements: Extraocular movements intact.      Conjunctiva/sclera: Conjunctivae normal.   Cardiovascular:      Rate and Rhythm: Normal rate and regular rhythm.      Pulses: Normal pulses.      Heart sounds: Normal heart sounds. No murmur heard.     No friction rub. No gallop.   Pulmonary:      Effort: Pulmonary effort is normal. No respiratory distress.      Breath sounds: Normal breath sounds. No wheezing, rhonchi or rales.   Abdominal:      General: There is no distension.      Tenderness: There is no abdominal tenderness. There is no guarding or rebound.   Musculoskeletal:         General: Normal range of motion.      Cervical back: Normal range of motion.      Right lower leg: No edema.      Left lower leg: No edema.   Skin:     General: Skin is warm.      Coloration: Skin is not jaundiced.   Neurological:      General: No focal deficit present.      Mental Status: She is alert and oriented to person, place, and time.   Psychiatric:         Mood and Affect: Mood normal.         Behavior: Behavior normal.         Thought Content: Thought content normal.         Judgment: Judgment normal.

## 2024-10-02 DIAGNOSIS — E66.09 CLASS 1 OBESITY DUE TO EXCESS CALORIES WITH SERIOUS COMORBIDITY AND BODY MASS INDEX (BMI) OF 32.0 TO 32.9 IN ADULT: Primary | ICD-10-CM

## 2024-10-02 DIAGNOSIS — E66.811 CLASS 1 OBESITY DUE TO EXCESS CALORIES WITH SERIOUS COMORBIDITY AND BODY MASS INDEX (BMI) OF 32.0 TO 32.9 IN ADULT: Primary | ICD-10-CM

## 2024-10-02 DIAGNOSIS — E03.9 HYPOTHYROIDISM, UNSPECIFIED TYPE: ICD-10-CM

## 2024-10-02 RX ORDER — SEMAGLUTIDE 0.25 MG/.5ML
0.25 INJECTION, SOLUTION SUBCUTANEOUS WEEKLY
Qty: 2 ML | Refills: 0 | Status: SHIPPED | OUTPATIENT
Start: 2024-10-02 | End: 2024-11-01

## 2024-10-02 RX ORDER — LEVOTHYROXINE SODIUM 75 UG/1
75 TABLET ORAL DAILY
Qty: 90 TABLET | Refills: 3 | Status: SHIPPED | OUTPATIENT
Start: 2024-10-02 | End: 2024-10-02

## 2024-10-02 RX ORDER — LEVOTHYROXINE SODIUM 75 UG/1
75 TABLET ORAL DAILY
Qty: 90 TABLET | Refills: 3 | Status: SHIPPED | OUTPATIENT
Start: 2024-10-02

## 2024-10-11 ENCOUNTER — TELEMEDICINE (OUTPATIENT)
Dept: PSYCHIATRY | Facility: CLINIC | Age: 54
End: 2024-10-11
Payer: COMMERCIAL

## 2024-10-11 DIAGNOSIS — F41.1 GAD (GENERALIZED ANXIETY DISORDER): Primary | ICD-10-CM

## 2024-10-11 PROCEDURE — 90834 PSYTX W PT 45 MINUTES: CPT | Performed by: SOCIAL WORKER

## 2024-10-11 NOTE — PSYCH
DATA: Met with Mindy for scheduled individual session. Topics of discussion included mood regulation and symptoms. Client shows evidence of utilizing emotion regulation skills skills to manage mental health symptoms. During this session, this clinician used the following therapeutic modalities: engagement strategies, supportive psychotherapy, and client-centered therapy.  Mindy reports that she has been doing well since her last session. She tells Therapist that today is the 7 year anniversary of losing her job. Today, Mindy details memories she has from her previous jobs. She provides insight into the things she experienced while working in an unhealthy environment. Mindy is able to do so in a healthy manner. Therapist observes that Mindy is able to process this aspect of her life without being triggered. Mindy also acknowledges that she's gotten better about talking about this experience. At this time, Mindy does not present with any immediate concerns.    ASSESSMENT: Mindy presents with a normal mood. Her affect is normal range and intensity, appropriate. Mindy exhibits strong therapeutic rapport with this clinician. Mindy continues to exhibit willingness to work on treatment goals and objectives. Mindy presents with a minimal risk of suicide, minimal risk of self-harm, and minimal risk of harm to others.       PLAN: Mindy will return in 2 weeks for the next scheduled session. Between sessions, Mindy will continue utilizing skills and will report back during the next session re: successes and barriers. At the next session, this clinician will use engagement strategies, supportive psychotherapy, and client-centered therapy to address Mindy's mood management, in an effort to assist Mindy with meeting treatment goals.   Virtual Regular Visit    Verification of patient location:    Patient is located at Home in the following state in which I hold an active license [place]      Assessment/Plan:    Problem List Items Addressed This Visit        JUHI (generalized anxiety disorder) - Primary       Goals addressed in session: Goal 1          Reason for visit is No chief complaint on file.       Encounter provider Nesha Alejo LCSW      Recent Visits  No visits were found meeting these conditions.  Showing recent visits within past 7 days and meeting all other requirements  Future Appointments  No visits were found meeting these conditions.  Showing future appointments within next 150 days and meeting all other requirements       The patient was identified by name and date of birth. Mindy Truong was informed that this is a telemedicine visit and that the visit is being conducted throughthe Epic Embedded platform. She agrees to proceed..  My office door was closed. No one else was in the room.  She acknowledged consent and understanding of privacy and security of the video platform. The patient has agreed to participate and understands they can discontinue the visit at any time.    Patient is aware this is a billable service.     Subjective  Mindy Truong is a 53 y.o. female who presents for an individual therapy session today .      HPI     Past Medical History:   Diagnosis Date    Anxiety     Asthma     Depression     Disease of thyroid gland     Dizziness     Fibroid     Forgetfulness     Hashimoto's disease     Hashimoto's disease     Headache(784.0) 2002    History of fainting as a child     Hyperlipidemia     Hypertension     Migraine 2004    Numbness and tingling     Polycystic ovary syndrome 2008    Post traumatic stress disorder 02/28/2019    Varicella        Past Surgical History:   Procedure Laterality Date    BREAST BIOPSY Left 2020    BREAST SURGERY Bilateral 2002    reduction    COLONOSCOPY      HYSTERECTOMY  2005    [place] COLONOSCOPY FLX DX W/COLLJ SPEC WHEN PFRMD N/A 10/16/2018    Procedure: EGD AND COLONOSCOPY;  Surgeon: Bunny Ruvalcaba MD;  Location: [facility];  Service: Gastroenterology    [place] EXCISION RADIAL HEAD Left 10/09/2020     Procedure: RADIAL HEAD IMPLANT ARTHROPLASTY ;  Surgeon: Zackary Craig MD;  Location: [facility];  Service: Orthopedics    REDUCTION MAMMAPLASTY Bilateral 1997    REDUCTION MAMMAPLASTY Bilateral 2002    REDUCTION MAMMAPLASTY Bilateral 2020    SINUS SURGERY      TOTAL ABDOMINAL HYSTERECTOMY         Current Outpatient Medications   Medication Sig Dispense Refill    albuterol (2.5 mg/3 mL) 0.083 % nebulizer solution Take 3 mL (2.5 mg total) by nebulization every 6 (six) hours as needed for wheezing or shortness of breath 180 mL 0    albuterol (PROVENTIL HFA,VENTOLIN HFA) 90 mcg/act inhaler TAKE 2 PUFFS BY MOUTH 4 TIMES A DAY (Patient taking differently: 1 puff every 4 (four) hours as needed) 6.7 g 6    atorvastatin (LIPITOR) 20 mg tablet Take 1 tablet (20 mg total) by mouth daily 90 tablet 3    clonazePAM (KlonoPIN) 0.5 mg tablet TAKE 1 TABLET BY MOUTH AT BEDTIME & MAY ALSO TAKE 1 EXTRA TABLET DAILY AS NEEDED FOR ANXIETY 60 tablet 0    cloNIDine (Catapres) 0.1 mg tablet Take 1 tablet (0.1 mg total) by mouth daily at bedtime 30 tablet 5    levothyroxine 75 mcg tablet Take 1 tablet (75 mcg total) by mouth daily 90 tablet 3    mirtazapine (REMERON) 7.5 MG tablet Take 1 tablet (7.5 mg total) by mouth daily at bedtime 30 tablet 5    rizatriptan (MAXALT-MLT) 10 mg disintegrating tablet Take 1 tablet (10 mg total) by mouth once as needed for migraine for up to 1 dose May repeat in 2 hours if needed 9 tablet 0    Semaglutide-Weight Management (Wegovy) 0.25 MG/0.5ML Inject 0.5 mL (0.25 mg total) under the skin once a week 2 mL 0    venlafaxine (EFFEXOR-XR) 150 mg 24 hr capsule Take 1 capsule (150 mg total) by mouth daily with 75 mg capsule (225 mg total daily) 90 capsule 1    venlafaxine (EFFEXOR-XR) 75 mg 24 hr capsule Take 1 capsule (75 mg total) by mouth daily 30 capsule 3     No current facility-administered medications for this visit.        Allergies   Allergen Reactions    Lisinopril-Hydrochlorothiazide Hives    Other  Hives     States she has no allergies       Review of Systems    Video Exam    There were no vitals filed for this visit.    Physical Exam     Visit Time    10/11/24  Start Time: 1505  Stop Time: 1544  Total Visit Time: 39 minutes

## 2024-10-15 ENCOUNTER — TELEPHONE (OUTPATIENT)
Age: 54
End: 2024-10-15

## 2024-10-15 NOTE — TELEPHONE ENCOUNTER
Patient calling to see if 3/27 appointment can be a virtual appointment.     Patient requesting a call back

## 2024-10-18 ENCOUNTER — APPOINTMENT (OUTPATIENT)
Dept: LAB | Facility: HOSPITAL | Age: 54
End: 2024-10-18
Payer: COMMERCIAL

## 2024-10-18 ENCOUNTER — HOSPITAL ENCOUNTER (OUTPATIENT)
Dept: RADIOLOGY | Facility: HOSPITAL | Age: 54
End: 2024-10-18
Payer: COMMERCIAL

## 2024-10-18 DIAGNOSIS — R76.8 SS-A ANTIBODY POSITIVE: ICD-10-CM

## 2024-10-18 DIAGNOSIS — M25.50 DIFFUSE ARTHRALGIA: ICD-10-CM

## 2024-10-18 DIAGNOSIS — E66.09 CLASS 1 OBESITY DUE TO EXCESS CALORIES WITH SERIOUS COMORBIDITY AND BODY MASS INDEX (BMI) OF 32.0 TO 32.9 IN ADULT: ICD-10-CM

## 2024-10-18 DIAGNOSIS — E66.811 CLASS 1 OBESITY DUE TO EXCESS CALORIES WITH SERIOUS COMORBIDITY AND BODY MASS INDEX (BMI) OF 32.0 TO 32.9 IN ADULT: ICD-10-CM

## 2024-10-18 DIAGNOSIS — R73.03 PRE-DIABETES: ICD-10-CM

## 2024-10-18 DIAGNOSIS — I10 ESSENTIAL HYPERTENSION: ICD-10-CM

## 2024-10-18 DIAGNOSIS — R70.0 ELEVATED SED RATE: ICD-10-CM

## 2024-10-18 DIAGNOSIS — R76.8 ANA POSITIVE: ICD-10-CM

## 2024-10-18 LAB
ALBUMIN SERPL BCG-MCNC: 4.4 G/DL (ref 3.5–5)
ALP SERPL-CCNC: 73 U/L (ref 34–104)
ALT SERPL W P-5'-P-CCNC: 17 U/L (ref 7–52)
ANION GAP SERPL CALCULATED.3IONS-SCNC: 9 MMOL/L (ref 4–13)
AST SERPL W P-5'-P-CCNC: 22 U/L (ref 13–39)
BASOPHILS # BLD AUTO: 0.02 THOUSANDS/ΜL (ref 0–0.1)
BASOPHILS NFR BLD AUTO: 0 % (ref 0–1)
BILIRUB SERPL-MCNC: 0.45 MG/DL (ref 0.2–1)
BUN SERPL-MCNC: 14 MG/DL (ref 5–25)
CALCIUM SERPL-MCNC: 9.6 MG/DL (ref 8.4–10.2)
CHLORIDE SERPL-SCNC: 102 MMOL/L (ref 96–108)
CHOLEST SERPL-MCNC: 334 MG/DL
CO2 SERPL-SCNC: 30 MMOL/L (ref 21–32)
CREAT SERPL-MCNC: 0.87 MG/DL (ref 0.6–1.3)
EOSINOPHIL # BLD AUTO: 0.19 THOUSAND/ΜL (ref 0–0.61)
EOSINOPHIL NFR BLD AUTO: 4 % (ref 0–6)
ERYTHROCYTE [DISTWIDTH] IN BLOOD BY AUTOMATED COUNT: 13.1 % (ref 11.6–15.1)
EST. AVERAGE GLUCOSE BLD GHB EST-MCNC: 137 MG/DL
GFR SERPL CREATININE-BSD FRML MDRD: 76 ML/MIN/1.73SQ M
GLUCOSE P FAST SERPL-MCNC: 103 MG/DL (ref 65–99)
HBA1C MFR BLD: 6.4 %
HCT VFR BLD AUTO: 39.1 % (ref 34.8–46.1)
HDLC SERPL-MCNC: 64 MG/DL
HGB BLD-MCNC: 12.4 G/DL (ref 11.5–15.4)
IMM GRANULOCYTES # BLD AUTO: 0 THOUSAND/UL (ref 0–0.2)
IMM GRANULOCYTES NFR BLD AUTO: 0 % (ref 0–2)
LDLC SERPL CALC-MCNC: 236 MG/DL (ref 0–100)
LYMPHOCYTES # BLD AUTO: 1.71 THOUSANDS/ΜL (ref 0.6–4.47)
LYMPHOCYTES NFR BLD AUTO: 31 % (ref 14–44)
MCH RBC QN AUTO: 29.8 PG (ref 26.8–34.3)
MCHC RBC AUTO-ENTMCNC: 31.7 G/DL (ref 31.4–37.4)
MCV RBC AUTO: 94 FL (ref 82–98)
MONOCYTES # BLD AUTO: 0.47 THOUSAND/ΜL (ref 0.17–1.22)
MONOCYTES NFR BLD AUTO: 9 % (ref 4–12)
NEUTROPHILS # BLD AUTO: 3.09 THOUSANDS/ΜL (ref 1.85–7.62)
NEUTS SEG NFR BLD AUTO: 56 % (ref 43–75)
NONHDLC SERPL-MCNC: 270 MG/DL
NRBC BLD AUTO-RTO: 0 /100 WBCS
PLATELET # BLD AUTO: 304 THOUSANDS/UL (ref 149–390)
PMV BLD AUTO: 9.9 FL (ref 8.9–12.7)
POTASSIUM SERPL-SCNC: 4.4 MMOL/L (ref 3.5–5.3)
PROT SERPL-MCNC: 7.9 G/DL (ref 6.4–8.4)
RBC # BLD AUTO: 4.16 MILLION/UL (ref 3.81–5.12)
SODIUM SERPL-SCNC: 141 MMOL/L (ref 135–147)
TRIGL SERPL-MCNC: 169 MG/DL
WBC # BLD AUTO: 5.48 THOUSAND/UL (ref 4.31–10.16)

## 2024-10-18 PROCEDURE — 73130 X-RAY EXAM OF HAND: CPT

## 2024-10-18 PROCEDURE — 72050 X-RAY EXAM NECK SPINE 4/5VWS: CPT

## 2024-10-18 PROCEDURE — 80053 COMPREHEN METABOLIC PANEL: CPT

## 2024-10-18 PROCEDURE — 73562 X-RAY EXAM OF KNEE 3: CPT

## 2024-10-18 PROCEDURE — 36415 COLL VENOUS BLD VENIPUNCTURE: CPT

## 2024-10-18 PROCEDURE — 83036 HEMOGLOBIN GLYCOSYLATED A1C: CPT

## 2024-10-18 PROCEDURE — 80061 LIPID PANEL: CPT

## 2024-10-18 PROCEDURE — 85025 COMPLETE CBC W/AUTO DIFF WBC: CPT

## 2024-10-22 DIAGNOSIS — F33.1 MAJOR DEPRESSIVE DISORDER, RECURRENT, MODERATE (HCC): ICD-10-CM

## 2024-10-22 DIAGNOSIS — F51.01 PRIMARY INSOMNIA: ICD-10-CM

## 2024-10-22 RX ORDER — MIRTAZAPINE 7.5 MG/1
7.5 TABLET, FILM COATED ORAL
Qty: 90 TABLET | Refills: 1 | Status: SHIPPED | OUTPATIENT
Start: 2024-10-22 | End: 2025-04-20

## 2024-10-26 DIAGNOSIS — F41.1 GAD (GENERALIZED ANXIETY DISORDER): ICD-10-CM

## 2024-10-26 DIAGNOSIS — F43.10 POST TRAUMATIC STRESS DISORDER (PTSD): ICD-10-CM

## 2024-10-28 RX ORDER — CLONAZEPAM 0.5 MG/1
TABLET ORAL
Qty: 60 TABLET | Refills: 0 | Status: SHIPPED | OUTPATIENT
Start: 2024-10-28

## 2024-11-13 ENCOUNTER — TELEPHONE (OUTPATIENT)
Age: 54
End: 2024-11-13

## 2024-11-13 DIAGNOSIS — E66.09 CLASS 1 OBESITY DUE TO EXCESS CALORIES WITH SERIOUS COMORBIDITY AND BODY MASS INDEX (BMI) OF 32.0 TO 32.9 IN ADULT: Primary | ICD-10-CM

## 2024-11-13 DIAGNOSIS — E66.811 CLASS 1 OBESITY DUE TO EXCESS CALORIES WITH SERIOUS COMORBIDITY AND BODY MASS INDEX (BMI) OF 32.0 TO 32.9 IN ADULT: Primary | ICD-10-CM

## 2024-11-13 NOTE — TELEPHONE ENCOUNTER
Patient called the RX Refill Line. Message is being forwarded to the office.     Patient is requesting a prescription for wegovy. Not on active medication list. She would like this sent to [facility]    Please contact patient at [phone]

## 2024-11-13 NOTE — TELEPHONE ENCOUNTER
LVM for patient - Dr. Andrade did send rx for Wegovy to her pharm - [facility] [place].    0.5 mg weekly for 4 weeks then 1 mg weekly for 4 weeks and then 1.7 mg weekly

## 2024-11-14 ENCOUNTER — TELEPHONE (OUTPATIENT)
Age: 54
End: 2024-11-14

## 2024-11-14 NOTE — TELEPHONE ENCOUNTER
Attached documents include sinusitis, asthma and mental health which do not qualify for a handicap parking.

## 2024-11-14 NOTE — TELEPHONE ENCOUNTER
Patient gave in two documents to support why she needs the parking placard     Place in your inbox

## 2024-11-20 DIAGNOSIS — F41.1 GAD (GENERALIZED ANXIETY DISORDER): ICD-10-CM

## 2024-11-20 DIAGNOSIS — F43.10 POST TRAUMATIC STRESS DISORDER (PTSD): ICD-10-CM

## 2024-11-20 RX ORDER — CLONIDINE HYDROCHLORIDE 0.1 MG/1
0.1 TABLET ORAL
Qty: 90 TABLET | Refills: 1 | Status: SHIPPED | OUTPATIENT
Start: 2024-11-20 | End: 2025-05-19

## 2024-12-06 ENCOUNTER — TELEMEDICINE (OUTPATIENT)
Dept: PSYCHIATRY | Facility: CLINIC | Age: 54
End: 2024-12-06
Payer: COMMERCIAL

## 2024-12-06 DIAGNOSIS — F33.42 MAJOR DEPRESSIVE DISORDER, RECURRENT, IN FULL REMISSION (HCC): ICD-10-CM

## 2024-12-06 DIAGNOSIS — F41.1 GAD (GENERALIZED ANXIETY DISORDER): Primary | ICD-10-CM

## 2024-12-06 PROCEDURE — 90832 PSYTX W PT 30 MINUTES: CPT | Performed by: SOCIAL WORKER

## 2024-12-06 NOTE — PSYCH
DATA: Met with Mindy for scheduled individual session. Topics of discussion included mood regulation and symptoms. Client shows evidence of utilizing emotion regulation skills skills to manage mental health symptoms. During this session, this clinician used the following therapeutic modalities: engagement strategies, supportive psychotherapy, and client-centered therapy.  Mindy reports that she has been doing well since her last session. She tells Therapist that she had a good Thanksgiving, but had to set a boundary with her mother because she could not be around her. Today, Mindy details her ongoing progress as she continues to cope with the bad experiences she went through at her old job. She tells Therapist that she is no longer feeling the anger and sadness she would feel when thinking or talking about the situation. Mindy says she has been re-directing her energy to learning and her art, which she says is therapeutic. Mindy expresses that she feels like she has met her goal and would like to maintain where she is with her progress. She tells Therapist that the monthly sessions have still been working for her. Before today's session concludes, Therapist and Mindy review and update her treatment plan.    ASSESSMENT: Mindy presents with a improved mood. Her affect is brighter. Mindy exhibits strong therapeutic rapport with this clinician. Mindy continues to exhibit willingness to work on treatment goals and objectives. Mindy presents with a minimal risk of suicide, minimal risk of self-harm, and minimal risk of harm to others.       PLAN: Mindy will return in 4 weeks for the next scheduled session. Between sessions, Mnidy will review and sign treatment plan and will report back during the next session re: successes and barriers. At the next session, this clinician will use engagement strategies, supportive psychotherapy, and client-centered therapy to address Mindy's mood management, in an effort to assist Mindy with meeting  treatment goals.   Virtual Regular Visit    Verification of patient location:    Patient is located at Home in the following state in which I hold an active license [place]      Assessment/Plan:    Problem List Items Addressed This Visit       JUHI (generalized anxiety disorder) - Primary    Major depressive disorder, recurrent, in full remission (HCC)       Goals addressed in session: Goal 1          Reason for visit is No chief complaint on file.       Encounter provider Nesha Alejo LCSW      Recent Visits  No visits were found meeting these conditions.  Showing recent visits within past 7 days and meeting all other requirements  Future Appointments  No visits were found meeting these conditions.  Showing future appointments within next 150 days and meeting all other requirements       The patient was identified by name and date of birth. Mindy Truong was informed that this is a telemedicine visit and that the visit is being conducted throughthe Epic Embedded platform. She agrees to proceed..  My office door was closed. No one else was in the room.  She acknowledged consent and understanding of privacy and security of the video platform. The patient has agreed to participate and understands they can discontinue the visit at any time.    Patient is aware this is a billable service.     Subjective  Mindy Truong is a 54 y.o. female who presents for an individual therapy session today .      HPI     Past Medical History:   Diagnosis Date    Anxiety     Asthma     Depression     Disease of thyroid gland     Dizziness     Fibroid     Forgetfulness     Hashimoto's disease     Hashimoto's disease     Headache(784.0) 2002    History of fainting as a child     Hyperlipidemia     Hypertension     Migraine 2004    Numbness and tingling     Polycystic ovary syndrome 2008    Post traumatic stress disorder 02/28/2019    Varicella        Past Surgical History:   Procedure Laterality Date    BREAST BIOPSY Left 2020    BREAST  SURGERY Bilateral 2002    reduction    COLONOSCOPY      HYSTERECTOMY  2005    [place] COLONOSCOPY FLX DX W/COLLJ SPEC WHEN PFRMD N/A 10/16/2018    Procedure: EGD AND COLONOSCOPY;  Surgeon: Bunny Ruvalcaba MD;  Location: [facility];  Service: Gastroenterology    [place] EXCISION RADIAL HEAD Left 10/09/2020    Procedure: RADIAL HEAD IMPLANT ARTHROPLASTY ;  Surgeon: Zackary Craig MD;  Location: [facility];  Service: Orthopedics    REDUCTION MAMMAPLASTY Bilateral 1997    REDUCTION MAMMAPLASTY Bilateral 2002    REDUCTION MAMMAPLASTY Bilateral 2020    SINUS SURGERY      TOTAL ABDOMINAL HYSTERECTOMY         Current Outpatient Medications   Medication Sig Dispense Refill    albuterol (2.5 mg/3 mL) 0.083 % nebulizer solution Take 3 mL (2.5 mg total) by nebulization every 6 (six) hours as needed for wheezing or shortness of breath 180 mL 0    albuterol (PROVENTIL HFA,VENTOLIN HFA) 90 mcg/act inhaler TAKE 2 PUFFS BY MOUTH 4 TIMES A DAY (Patient taking differently: 1 puff every 4 (four) hours as needed) 6.7 g 6    atorvastatin (LIPITOR) 20 mg tablet Take 1 tablet (20 mg total) by mouth daily 90 tablet 3    clonazePAM (KlonoPIN) 0.5 mg tablet TAKE 1 TABLET BY MOUTH AT BEDTIME & MAY ALSO TAKE 1 EXTRA TABLET DAILY AS NEEDED FOR ANXIETY 60 tablet 0    cloNIDine (CATAPRES) 0.1 mg tablet TAKE 1 TABLET (0.1 MG TOTAL) BY MOUTH DAILY AT BEDTIME 90 tablet 1    levothyroxine 75 mcg tablet Take 1 tablet (75 mcg total) by mouth daily 90 tablet 3    mirtazapine (REMERON) 7.5 MG tablet TAKE 1 TABLET (7.5 MG TOTAL) BY MOUTH DAILY AT BEDTIME 90 tablet 1    rizatriptan (MAXALT-MLT) 10 mg disintegrating tablet Take 1 tablet (10 mg total) by mouth once as needed for migraine for up to 1 dose May repeat in 2 hours if needed 9 tablet 0    Semaglutide-Weight Management (WEGOVY) 0.5 MG/0.5ML Inject 0.5 mL (0.5 mg total) under the skin once a week 2 mL 0    [START ON 12/13/2024] Semaglutide-Weight Management (WEGOVY) 1 MG/0.5ML Inject 0.5 mL (1 mg total)  under the skin once a week Do not start before December 13, 2024. 2 mL 0    [START ON 1/10/2025] Semaglutide-Weight Management (WEGOVY) 1.7 MG/0.75ML Inject 0.75 mL (1.7 mg total) under the skin once a week Do not start before January 10, 2025. 3 mL 0    venlafaxine (EFFEXOR-XR) 150 mg 24 hr capsule Take 1 capsule (150 mg total) by mouth daily with 75 mg capsule (225 mg total daily) 90 capsule 1    venlafaxine (EFFEXOR-XR) 75 mg 24 hr capsule Take 1 capsule (75 mg total) by mouth daily 30 capsule 3     No current facility-administered medications for this visit.        Allergies   Allergen Reactions    Lisinopril-Hydrochlorothiazide Hives    Other Hives     States she has no allergies       Review of Systems    Video Exam    There were no vitals filed for this visit.    Physical Exam     Visit Time    12/06/24  Start Time: 1405  Stop Time: 1440  Total Visit Time: 35 minutes

## 2024-12-06 NOTE — BH TREATMENT PLAN
"Outpatient Behavioral Health Psychotherapy Treatment Plan    Mindy Truong  1970     Date of Initial Psychotherapy Assessment: 5/2/2024   Date of Current Treatment Plan: 12/06/24  Treatment Plan Target Date: TBD  Treatment Plan Expiration Date: 6/6/2025    Diagnosis:   1. JUHI (generalized anxiety disorder)        2. Major depressive disorder, recurrent, in full remission (HCC)            Area(s) of Need: Mindy reports that she feels like she achieved her goal to work through her past work experiences and the its impact on her. She explains that she was able to work through her feelings of anger and accept things. She says she does not feel angry and sad whenever she talks about the situation now. Mindy says she would like to maintain where she is. Now, she says she is learning more and educating herself about laws and regulations regarding the workplace. She explains that she is re-directing her energy to working towards change.     Long Term Goal 1 (in the client's own words): \"I want to maintain where I am.\"    Stage of Change: Maintenance    Target Date for completion: TBD     Anticipated therapeutic modalities: Client-centered approach, talk therapy, and supportive therapy.     People identified to complete this goal: Client and Therapist      Objective 1: (identify the means of measuring success in meeting the objective): Mindy will return for monthly follow-up sessions to track progress, reinforce gains, and problem-solve barriers.      Objective 2: (identify the means of measuring success in meeting the objective): N/A      I am currently under the care of a [facility] psychiatric provider: yes    My [facility] psychiatric provider is: Garima Rea PA-C     I am currently taking psychiatric medications: Yes, as prescribed    I feel that I will be ready for discharge from mental health care when I reach the following (measurable goal/objective): \"I think about February. Just because the holidays are " "coming.\"    For children and adults who have a legal guardian:   Has there been any change to custody orders and/or guardianship status? NA. If yes, attach updated documentation.    I have created my Crisis Plan and have been offered a copy of this plan    Behavioral Health Treatment Plan [facility]: Diagnosis and Treatment Plan explained to Mindy Truong acknowledges an understanding of their diagnosis. Mindy Truong agrees to this treatment plan.    I have been offered a copy of this Treatment Plan. yes      Mindy Truong, 1970, actively participated in the review and update of this treatment plan during a virtual session, using the Epic Embedded platform.   Mindy Truong  provided verbal consent on 12/6/2024 at 2:38 PM. The treatment plan was transcribed into the Electronic Health Record at a later time.                                                         "

## 2024-12-10 ENCOUNTER — TELEPHONE (OUTPATIENT)
Dept: ENDOCRINOLOGY | Facility: CLINIC | Age: 54
End: 2024-12-10

## 2024-12-10 NOTE — TELEPHONE ENCOUNTER
Patient called the RX Refill Line. Message is being forwarded to the office.     Patient is requesting a call from the office to review what to expect from taking Wegovy and how long till she sees results of this medication     Please contact patient at [phone]

## 2024-12-12 DIAGNOSIS — E66.09 CLASS 1 OBESITY DUE TO EXCESS CALORIES WITH SERIOUS COMORBIDITY AND BODY MASS INDEX (BMI) OF 32.0 TO 32.9 IN ADULT: Primary | ICD-10-CM

## 2024-12-12 DIAGNOSIS — E66.811 CLASS 1 OBESITY DUE TO EXCESS CALORIES WITH SERIOUS COMORBIDITY AND BODY MASS INDEX (BMI) OF 32.0 TO 32.9 IN ADULT: Primary | ICD-10-CM

## 2024-12-12 NOTE — TELEPHONE ENCOUNTER
Patient called Rx refill line requesting her dosage should be Wegovy 2.4mg, I dont see this on her list. Please contact patient to see which dose she is to be taking next 1.7mg or 2.4mg. She will also need a PA on a new dosage sent through to her [employer] insurance, not her other insurance.

## 2024-12-12 NOTE — TELEPHONE ENCOUNTER
Called patient, she only completed the .5 dose. She doesn't have any left and needs a script sent in.  Patient wondering if she should go from .5 to 1.7?

## 2024-12-20 DIAGNOSIS — F33.1 MAJOR DEPRESSIVE DISORDER, RECURRENT, MODERATE (HCC): ICD-10-CM

## 2024-12-20 DIAGNOSIS — F43.10 POST TRAUMATIC STRESS DISORDER (PTSD): ICD-10-CM

## 2024-12-20 RX ORDER — VENLAFAXINE HYDROCHLORIDE 75 MG/1
75 CAPSULE, EXTENDED RELEASE ORAL DAILY
Qty: 30 CAPSULE | Refills: 3 | Status: SHIPPED | OUTPATIENT
Start: 2024-12-20

## 2024-12-29 DIAGNOSIS — E78.49 OTHER HYPERLIPIDEMIA: ICD-10-CM

## 2024-12-29 DIAGNOSIS — G47.00 INSOMNIA, UNSPECIFIED TYPE: ICD-10-CM

## 2024-12-30 RX ORDER — ATORVASTATIN CALCIUM 20 MG/1
20 TABLET, FILM COATED ORAL DAILY
Qty: 90 TABLET | Refills: 1 | Status: SHIPPED | OUTPATIENT
Start: 2024-12-30 | End: 2025-01-29

## 2024-12-30 RX ORDER — RIZATRIPTAN BENZOATE 10 MG/1
10 TABLET, ORALLY DISINTEGRATING ORAL ONCE AS NEEDED
Qty: 9 TABLET | Refills: 2 | Status: SHIPPED | OUTPATIENT
Start: 2024-12-30

## 2025-01-03 ENCOUNTER — TELEMEDICINE (OUTPATIENT)
Dept: PSYCHIATRY | Facility: CLINIC | Age: 55
End: 2025-01-03
Payer: COMMERCIAL

## 2025-01-03 DIAGNOSIS — F41.1 GAD (GENERALIZED ANXIETY DISORDER): Primary | ICD-10-CM

## 2025-01-03 DIAGNOSIS — F33.42 MAJOR DEPRESSIVE DISORDER, RECURRENT, IN FULL REMISSION (HCC): ICD-10-CM

## 2025-01-03 PROCEDURE — 90834 PSYTX W PT 45 MINUTES: CPT | Performed by: SOCIAL WORKER

## 2025-01-04 NOTE — PSYCH
DATA: Met with Mindy for scheduled individual session. Topics of discussion included mood regulation and symptoms. Client shows evidence of utilizing emotion regulation skills skills to manage mental health symptoms. During this session, this clinician used the following therapeutic modalities: supportive psychotherapy and client-centered therapy. Mindy reports that she has been doing well since her last session. She tells Therapist that she had a good, yet quiet, holiday with her wife and family. Today, Mindy details the activities she's been engaging in to keep herself busy. She says she has been doing research on her family history and it's been insightful. Mindy reports that she has still been managing her anxiety and depression well. She expresses that she feels like herself again. Mindy continues to report improvement in her symptoms and does not present with immediate concerns.    ASSESSMENT: Mindy presents with a normal mood. Her affect is normal range and intensity, appropriate. Mindy exhibits strong therapeutic rapport with this clinician. Mindy continues to exhibit willingness to work on treatment goals and objectives. Mindy presents with a minimal risk of suicide, minimal risk of self-harm, and minimal risk of harm to others.       PLAN: Mindy will return in 7 weeks for the next scheduled session. Between sessions, Mindy will continue utilizing skills and will report back during the next session re: successes and barriers. At the next session, this clinician will use engagement strategies, supportive psychotherapy, and client-centered therapy to address Mindy's mood management, in an effort to assist Mindy with meeting treatment goals.   Virtual Regular Visit    Verification of patient location:    Patient is located at Home in the following state in which I hold an active license [place]      Assessment/Plan:    Problem List Items Addressed This Visit       JUHI (generalized anxiety disorder) - Primary    Major depressive  disorder, recurrent, in full remission (HCC)       Goals addressed in session: Goal 1     Depression Follow-up Plan Completed: Not applicable    Reason for visit is   Chief Complaint   Patient presents with    Virtual Regular Visit          Encounter provider Nesha Alejo LCSW      Recent Visits  No visits were found meeting these conditions.  Showing recent visits within past 7 days and meeting all other requirements  Today's Visits  Date Type Provider Dept   01/03/25 Telemedicine Nesha Alejo LCSW [facility]   Showing today's visits and meeting all other requirements  Future Appointments  No visits were found meeting these conditions.  Showing future appointments within next 150 days and meeting all other requirements       The patient was identified by name and date of birth. Mindy Truong was informed that this is a telemedicine visit and that the visit is being conducted throughthe Epic Embedded platform. She agrees to proceed..  My office door was closed. No one else was in the room.  She acknowledged consent and understanding of privacy and security of the video platform. The patient has agreed to participate and understands they can discontinue the visit at any time.    Patient is aware this is a billable service.     Subjective  Mindy Truong is a 54 y.o. female who presents for an individual therapy session today .      HPI     Past Medical History:   Diagnosis Date    Anxiety     Asthma     Depression     Disease of thyroid gland     Dizziness     Fibroid     Forgetfulness     Hashimoto's disease     Hashimoto's disease     Headache(784.0) 2002    History of fainting as a child     Hyperlipidemia     Hypertension     Migraine 2004    Numbness and tingling     Polycystic ovary syndrome 2008    Post traumatic stress disorder 02/28/2019    Varicella        Past Surgical History:   Procedure Laterality Date    BREAST BIOPSY Left 2020    BREAST SURGERY Bilateral 2002    reduction     COLONOSCOPY      HYSTERECTOMY  2005    [place] COLONOSCOPY FLX DX W/COLLJ SPEC WHEN PFRMD N/A 10/16/2018    Procedure: EGD AND COLONOSCOPY;  Surgeon: Bunny Ruvalcaba MD;  Location: [facility];  Service: Gastroenterology    [place] EXCISION RADIAL HEAD Left 10/09/2020    Procedure: RADIAL HEAD IMPLANT ARTHROPLASTY ;  Surgeon: Zackary Craig MD;  Location: [facility];  Service: Orthopedics    REDUCTION MAMMAPLASTY Bilateral 1997    REDUCTION MAMMAPLASTY Bilateral 2002    REDUCTION MAMMAPLASTY Bilateral 2020    SINUS SURGERY      TOTAL ABDOMINAL HYSTERECTOMY         Current Outpatient Medications   Medication Sig Dispense Refill    albuterol (2.5 mg/3 mL) 0.083 % nebulizer solution Take 3 mL (2.5 mg total) by nebulization every 6 (six) hours as needed for wheezing or shortness of breath 180 mL 0    albuterol (PROVENTIL HFA,VENTOLIN HFA) 90 mcg/act inhaler TAKE 2 PUFFS BY MOUTH 4 TIMES A DAY (Patient taking differently: 1 puff every 4 (four) hours as needed) 6.7 g 6    atorvastatin (LIPITOR) 20 mg tablet Take 1 tablet (20 mg total) by mouth daily 90 tablet 1    clonazePAM (KlonoPIN) 0.5 mg tablet TAKE 1 TABLET BY MOUTH AT BEDTIME & MAY ALSO TAKE 1 EXTRA TABLET DAILY AS NEEDED FOR ANXIETY 60 tablet 0    cloNIDine (CATAPRES) 0.1 mg tablet TAKE 1 TABLET (0.1 MG TOTAL) BY MOUTH DAILY AT BEDTIME 90 tablet 1    levothyroxine 75 mcg tablet Take 1 tablet (75 mcg total) by mouth daily 90 tablet 3    mirtazapine (REMERON) 7.5 MG tablet TAKE 1 TABLET (7.5 MG TOTAL) BY MOUTH DAILY AT BEDTIME 90 tablet 1    rizatriptan (MAXALT-MLT) 10 mg disintegrating tablet Take 1 tablet (10 mg total) by mouth once as needed for migraine for up to 1 dose May repeat in 2 hours if needed 9 tablet 2    Semaglutide-Weight Management (WEGOVY) 1 MG/0.5ML Inject 0.5 mL (1 mg total) under the skin once a week 2 mL 0    venlafaxine (EFFEXOR-XR) 150 mg 24 hr capsule Take 1 capsule (150 mg total) by mouth daily with 75 mg capsule (225 mg total daily) 90 capsule 1     venlafaxine (EFFEXOR-XR) 75 mg 24 hr capsule TAKE 1 CAPSULE BY MOUTH EVERY DAY 30 capsule 3     No current facility-administered medications for this visit.        Allergies   Allergen Reactions    Lisinopril-Hydrochlorothiazide Hives    Other Hives     States she has no allergies       Review of Systems    Video Exam    There were no vitals filed for this visit.    Physical Exam     Visit Time    01/03/25  Start Time: 1405  Stop Time: 1444  Total Visit Time: 39 minutes

## 2025-01-07 DIAGNOSIS — J45.909 UNCOMPLICATED ASTHMA, UNSPECIFIED ASTHMA SEVERITY, UNSPECIFIED WHETHER PERSISTENT: ICD-10-CM

## 2025-01-07 RX ORDER — ALBUTEROL SULFATE 90 UG/1
2 INHALANT RESPIRATORY (INHALATION) EVERY 4 HOURS PRN
Qty: 6.7 G | Refills: 5 | Status: SHIPPED | OUTPATIENT
Start: 2025-01-07

## 2025-01-20 ENCOUNTER — TELEPHONE (OUTPATIENT)
Age: 55
End: 2025-01-20

## 2025-01-20 NOTE — TELEPHONE ENCOUNTER
Mindy Truong and/or Parent/Guardian is requesting a letter/form for Emotional Support dog to be completed.     Date due: 2 weeks    Special notes: patient stated she will need a letter for her emotional support service dog, Mynor Truong, he is a Yorkie.       Informed Mindy Truong and/or Parent/Guardian that a HERMILA must be completed in order for letter/form to be provided. Patient asked if an HERMILA can be emailed to email on file and she will return with copy of ID.

## 2025-01-29 ENCOUNTER — TELEPHONE (OUTPATIENT)
Age: 55
End: 2025-01-29

## 2025-01-29 DIAGNOSIS — E66.09 CLASS 1 OBESITY DUE TO EXCESS CALORIES WITH SERIOUS COMORBIDITY AND BODY MASS INDEX (BMI) OF 32.0 TO 32.9 IN ADULT: Primary | ICD-10-CM

## 2025-01-29 DIAGNOSIS — E66.811 CLASS 1 OBESITY DUE TO EXCESS CALORIES WITH SERIOUS COMORBIDITY AND BODY MASS INDEX (BMI) OF 32.0 TO 32.9 IN ADULT: Primary | ICD-10-CM

## 2025-01-29 NOTE — TELEPHONE ENCOUNTER
Patient called the RX Refill Line. Message is being forwarded to the office.     Patient is requesting the next dose of wegovy. She is currently on 1.7 mg. She would like this sent to [facility] in [place].    Please contact patient at [phone]

## 2025-02-07 DIAGNOSIS — F41.1 GAD (GENERALIZED ANXIETY DISORDER): ICD-10-CM

## 2025-02-07 DIAGNOSIS — F43.10 POST TRAUMATIC STRESS DISORDER (PTSD): ICD-10-CM

## 2025-02-07 NOTE — TELEPHONE ENCOUNTER
10/28/2024 10/28/2024 clonazePAM (Tablet) 60.0 30 0.5 MG NA [facility] Commercial [place] 0 / 0 [place]   1 [id number] 09/19/2024 09/19/2024 clonazePAM (Tablet) 60.0 30 0.5 MG NA [facility] Commercial [place] / 0 [place]   1 [id number] 07/22/2024 07/18/2024 clonazePAM (Tablet) 60.0 30 0.5 MG NA DANELLE TOMLINSON [facility] Commercial [place] / 0 [place]   1 [id number] 06/25/2024 06/25/2024 clonazePAM (Tablet) 60.0 30 0.5 MG NA [facility] Commercial [place] 0 / 0 [place]   1 [id number] 05/20/2024 05/20/2024 clonazePAM (Tablet) 60.0 30 0.5 MG NA RYAN JOHNSON [facility] Commercial [place] 0 / 0 [place]   1 [id number] 03/21/2024 03/15/2024 clonazePAM (Tablet) 60.0 30 0.5 MG NA [facility] Commercial [place] / 0 [place]   1 [id number] 02/23/2024 02/23/2024 clonazePAM (Tablet) 30.0 30 0.5 M

## 2025-02-07 NOTE — TELEPHONE ENCOUNTER
Reason for call:   [x] Refill   [] Prior Auth  [] Other:     Office:   [] PCP/Provider -   [x] Specialty/Provider - Darling Warren,     Medication: clonazePAM (KlonoPIN) 0.5 mg     Dose/Frequency: TAKE 1 TABLET BY MOUTH AT BEDTIME & MAY ALSO TAKE 1 EXTRA TABLET DAILY AS NEEDED FOR ANXIETY     Quantity: 60    Pharmacy: [facility]    Does the patient have enough for 3 days?   [x] Yes   [] No - Send as HP to POD

## 2025-02-08 DIAGNOSIS — F43.10 POST TRAUMATIC STRESS DISORDER (PTSD): ICD-10-CM

## 2025-02-08 DIAGNOSIS — F33.1 MAJOR DEPRESSIVE DISORDER, RECURRENT, MODERATE (HCC): ICD-10-CM

## 2025-02-10 DIAGNOSIS — E66.09 CLASS 1 OBESITY DUE TO EXCESS CALORIES WITH SERIOUS COMORBIDITY AND BODY MASS INDEX (BMI) OF 32.0 TO 32.9 IN ADULT: ICD-10-CM

## 2025-02-10 DIAGNOSIS — E66.811 CLASS 1 OBESITY DUE TO EXCESS CALORIES WITH SERIOUS COMORBIDITY AND BODY MASS INDEX (BMI) OF 32.0 TO 32.9 IN ADULT: ICD-10-CM

## 2025-02-10 RX ORDER — VENLAFAXINE HYDROCHLORIDE 150 MG/1
150 CAPSULE, EXTENDED RELEASE ORAL DAILY
Qty: 90 CAPSULE | Refills: 1 | Status: SHIPPED | OUTPATIENT
Start: 2025-02-10

## 2025-02-10 NOTE — TELEPHONE ENCOUNTER
Reason for call: NOT A DUPLICATE. Patient is on vacation and forgot to take her Wegovy.  She contacted her insurance and they will cover a month supply as a vacation override but new script needs to be sent to Location       [x] Refill   [] Prior Auth  [] Other:     Office:   [] PCP/Provider -   [x] Specialty/Provider - ENDOCRINOLOGY JOSE     Medication: Semaglutide-Weight Management (WEGOVY) 2.4 MG/0.75ML     Dose/Frequency: Inject 0.75 mL (2.4 mg total) under the skin once a week     Quantity: 3 ml    Pharmacy: [facility]/pharmacy #[id number] [place], [place] - [address] [phone]    Does the patient have enough for 3 days?   [] Yes   [x] No - Send as HP to POD

## 2025-02-12 RX ORDER — CLONAZEPAM 0.5 MG/1
TABLET ORAL
Qty: 60 TABLET | Refills: 0 | Status: SHIPPED | OUTPATIENT
Start: 2025-02-12

## 2025-02-13 DIAGNOSIS — F43.10 POST TRAUMATIC STRESS DISORDER (PTSD): ICD-10-CM

## 2025-02-13 DIAGNOSIS — F33.1 MAJOR DEPRESSIVE DISORDER, RECURRENT, MODERATE (HCC): ICD-10-CM

## 2025-02-13 RX ORDER — VENLAFAXINE HYDROCHLORIDE 75 MG/1
75 CAPSULE, EXTENDED RELEASE ORAL DAILY
Qty: 90 CAPSULE | Refills: 1 | Status: SHIPPED | OUTPATIENT
Start: 2025-02-13

## 2025-02-21 ENCOUNTER — TELEMEDICINE (OUTPATIENT)
Dept: PSYCHIATRY | Facility: CLINIC | Age: 55
End: 2025-02-21
Payer: COMMERCIAL

## 2025-02-21 DIAGNOSIS — F33.42 MAJOR DEPRESSIVE DISORDER, RECURRENT, IN FULL REMISSION (HCC): Primary | ICD-10-CM

## 2025-02-21 PROCEDURE — 90834 PSYTX W PT 45 MINUTES: CPT | Performed by: SOCIAL WORKER

## 2025-02-21 NOTE — PSYCH
Virtual Regular VisitName: Mindy Truong      : 1970      MRN: [id number]  Encounter Provider: Nesha Alejo LCSW  Encounter Date: 2025   Encounter department: [facility]  :  Assessment & Plan  Major depressive disorder, recurrent, in full remission (HCC)    DATA: Met with Mindy for scheduled individual session. Topics of discussion included mood regulation and symptoms. Client shows evidence of utilizing emotion regulation skills skills to manage mental health symptoms. During this session, this clinician used the following therapeutic modalities: engagement strategies, supportive psychotherapy, and client-centered therapy. Mindy reports that she has been doing well since her last session. She tells Therapist that she and her wife went on vacation, and they had a good time. Today, Mindy details how she has been maintaining her progress. She reports that she's been reading, engaging in hobbies, and thinking positively about things in her life. Mindy explains that she does not like the wintertime because she cannot be outside but says her coping skills have been helping her manage. Mindy continues to implement skills to manage her symptoms. She reports feeling good about her progress. Mindy does not present with immediate concerns and is stable.    ASSESSMENT: Mindy presents with a improved mood. Her affect is brighter. Mindy exhibits strong therapeutic rapport with this clinician. Mindy continues to exhibit willingness to work on treatment goals and objectives. Mindy presents with a minimal risk of suicide, minimal risk of self-harm, and minimal risk of harm to others.       PLAN: Mindy will return in 1 month for the next scheduled session. Between sessions, Mindy will continue utilizing skills and will report back during the next session re: successes and barriers. At the next session, this clinician will use engagement strategies, supportive psychotherapy, and  client-centered therapy to address Josiass mood management, in an effort to assist Mindy with meeting treatment goals.       Goals addressed in session: Goal 1     Depression Follow-up Plan Completed: Not applicable    Reason for visit is No chief complaint on file.     Recent Visits  No visits were found meeting these conditions.  Showing recent visits within past 7 days and meeting all other requirements  Future Appointments  No visits were found meeting these conditions.  Showing future appointments within next 150 days and meeting all other requirements     History of Present Illness     Mindy Truong is a 54 y.o. female who presents for an individual therapy session today .  HPI    Past Medical History:   Diagnosis Date    Anxiety     Asthma     Depression     Disease of thyroid gland     Dizziness     Fibroid     Forgetfulness     Hashimoto's disease     Hashimoto's disease     Headache(784.0) 2002    History of fainting as a child     Hyperlipidemia     Hypertension     Migraine 2004    Numbness and tingling     Polycystic ovary syndrome 2008    Post traumatic stress disorder 02/28/2019    Varicella      Past Surgical History:   Procedure Laterality Date    BREAST BIOPSY Left 2020    BREAST SURGERY Bilateral 2002    reduction    COLONOSCOPY      HYSTERECTOMY  2005    [place] COLONOSCOPY FLX DX W/COLLJ SPEC WHEN PFRMD N/A 10/16/2018    Procedure: EGD AND COLONOSCOPY;  Surgeon: Bunny Ruvalcaba MD;  Location: [facility];  Service: Gastroenterology    [place] EXCISION RADIAL HEAD Left 10/09/2020    Procedure: RADIAL HEAD IMPLANT ARTHROPLASTY ;  Surgeon: Zackary Craig MD;  Location: [facility];  Service: Orthopedics    REDUCTION MAMMAPLASTY Bilateral 1997    REDUCTION MAMMAPLASTY Bilateral 2002    REDUCTION MAMMAPLASTY Bilateral 2020    SINUS SURGERY      TOTAL ABDOMINAL HYSTERECTOMY       Current Outpatient Medications   Medication Instructions    albuterol (PROVENTIL HFA,VENTOLIN HFA) 90 mcg/act inhaler 2 puffs,  Inhalation, Every 4 hours PRN    albuterol 2.5 mg, Nebulization, Every 6 hours PRN    atorvastatin (LIPITOR) 20 mg, Oral, Daily    clonazePAM (KlonoPIN) 0.5 mg tablet Take 1 tablet (0.5 mg total) by mouth daily at bedtime. May also take 1 tablet (0.5 mg total) daily as needed for anxiety.    cloNIDine (CATAPRES) 0.1 mg, Oral, Daily at bedtime    levothyroxine 75 mcg, Oral, Daily    mirtazapine (REMERON) 7.5 mg, Oral, Daily at bedtime    rizatriptan (MAXALT-MLT) 10 mg, Oral, Once as needed, May repeat in 2 hours if needed    Semaglutide-Weight Management (WEGOVY) 2.4 mg, Subcutaneous, Weekly    venlafaxine (EFFEXOR-XR) 150 mg, Oral, Daily, with 75 mg capsule (225 mg total daily)    venlafaxine (EFFEXOR-XR) 75 mg, Oral, Daily     Allergies   Allergen Reactions    Lisinopril-Hydrochlorothiazide Hives    Other Hives     States she has no allergies       Review of Systems    Objective   There were no vitals taken for this visit.    Video Exam  Physical Exam     Administrative Statements   Encounter provider Nesha Alejo LCSW    The Patient is located at Home and in the following state in which I hold an active license [place].    The patient was identified by name and date of birth. Mindy Truong was informed that this is a telemedicine visit and that the visit is being conducted through the Epic Embedded platform. She agrees to proceed..  My office door was closed. No one else was in the room.  She acknowledged consent and understanding of privacy and security of the video platform. The patient has agreed to participate and understands they can discontinue the visit at any time.      Visit Time    02/21/25  Start Time: 1405  Stop Time: 1448  Total Visit Time: 43 minutes

## 2025-02-22 DIAGNOSIS — F43.10 POST TRAUMATIC STRESS DISORDER (PTSD): ICD-10-CM

## 2025-02-22 DIAGNOSIS — F41.1 GAD (GENERALIZED ANXIETY DISORDER): ICD-10-CM

## 2025-02-25 RX ORDER — CLONIDINE HYDROCHLORIDE 0.1 MG/1
0.1 TABLET ORAL
Qty: 90 TABLET | Refills: 1 | Status: SHIPPED | OUTPATIENT
Start: 2025-02-25 | End: 2025-08-24

## 2025-02-27 DIAGNOSIS — F33.1 MAJOR DEPRESSIVE DISORDER, RECURRENT, MODERATE (HCC): ICD-10-CM

## 2025-02-27 DIAGNOSIS — E03.9 HYPOTHYROIDISM, UNSPECIFIED TYPE: ICD-10-CM

## 2025-02-27 DIAGNOSIS — F51.01 PRIMARY INSOMNIA: ICD-10-CM

## 2025-02-27 RX ORDER — MIRTAZAPINE 7.5 MG/1
7.5 TABLET, FILM COATED ORAL
Qty: 90 TABLET | Refills: 1 | Status: SHIPPED | OUTPATIENT
Start: 2025-02-27 | End: 2025-08-26

## 2025-02-27 RX ORDER — LEVOTHYROXINE SODIUM 75 UG/1
75 TABLET ORAL DAILY
Qty: 90 TABLET | Refills: 1 | Status: SHIPPED | OUTPATIENT
Start: 2025-02-27

## 2025-03-12 DIAGNOSIS — E66.811 CLASS 1 OBESITY DUE TO EXCESS CALORIES WITH SERIOUS COMORBIDITY AND BODY MASS INDEX (BMI) OF 32.0 TO 32.9 IN ADULT: ICD-10-CM

## 2025-03-12 DIAGNOSIS — E66.09 CLASS 1 OBESITY DUE TO EXCESS CALORIES WITH SERIOUS COMORBIDITY AND BODY MASS INDEX (BMI) OF 32.0 TO 32.9 IN ADULT: ICD-10-CM

## 2025-03-12 RX ORDER — SEMAGLUTIDE 2.4 MG/.75ML
2.4 INJECTION, SOLUTION SUBCUTANEOUS WEEKLY
Qty: 9 ML | Refills: 1 | Status: SHIPPED | OUTPATIENT
Start: 2025-03-12

## 2025-03-17 ENCOUNTER — TELEMEDICINE (OUTPATIENT)
Dept: ENDOCRINOLOGY | Facility: CLINIC | Age: 55
End: 2025-03-17
Payer: COMMERCIAL

## 2025-03-17 ENCOUNTER — TELEPHONE (OUTPATIENT)
Dept: ENDOCRINOLOGY | Facility: CLINIC | Age: 55
End: 2025-03-17

## 2025-03-17 DIAGNOSIS — E55.9 VITAMIN D DEFICIENCY: Primary | ICD-10-CM

## 2025-03-17 DIAGNOSIS — R73.03 PRE-DIABETES: ICD-10-CM

## 2025-03-17 DIAGNOSIS — E03.9 HYPOTHYROIDISM, UNSPECIFIED TYPE: ICD-10-CM

## 2025-03-17 DIAGNOSIS — E66.09 CLASS 1 OBESITY DUE TO EXCESS CALORIES WITH SERIOUS COMORBIDITY AND BODY MASS INDEX (BMI) OF 32.0 TO 32.9 IN ADULT: ICD-10-CM

## 2025-03-17 DIAGNOSIS — E66.811 CLASS 1 OBESITY DUE TO EXCESS CALORIES WITH SERIOUS COMORBIDITY AND BODY MASS INDEX (BMI) OF 32.0 TO 32.9 IN ADULT: ICD-10-CM

## 2025-03-17 PROCEDURE — 99214 OFFICE O/P EST MOD 30 MIN: CPT | Performed by: STUDENT IN AN ORGANIZED HEALTH CARE EDUCATION/TRAINING PROGRAM

## 2025-03-17 RX ORDER — LEVOTHYROXINE SODIUM 75 UG/1
75 TABLET ORAL DAILY
Qty: 90 TABLET | Refills: 1 | Status: SHIPPED | OUTPATIENT
Start: 2025-03-17

## 2025-03-17 NOTE — ASSESSMENT & PLAN NOTE
She is on Wegovy 2.4 mg and has reported minor side effects such as nausea. She has noticed a reduction in inches around her waist. An A1c test has been ordered to monitor her blood sugar levels. She will continue on Wegovy 2.4 mg for another 6 months.  Orders:    Hemoglobin A1C

## 2025-03-17 NOTE — PROGRESS NOTES
Name: Mindy Truong      : 1970      MRN: [id number]  Encounter Provider: Barbara Andrade MD  Encounter Date: 3/17/2025   Encounter department: [facility] [place]    Chief Complaint   Patient presents with    Follow-up    Virtual Regular Visit   :  Assessment & Plan  Class 1 obesity due to excess calories with serious comorbidity and body mass index (BMI) of 32.0 to 32.9 in adult    Orders:    Hemoglobin A1C    Hypothyroidism, unspecified type    She is currently taking levothyroxine 75 mcg daily. A thyroid function test has been ordered to monitor her condition. She will continue her current dose of levothyroxine.    Follow-up  The patient will follow up in 6 months.    Orders:    levothyroxine 75 mcg tablet; Take 1 tablet (75 mcg total) by mouth daily    TSH, 3rd generation with Free T4 reflex    Vitamin D deficiency  She is currently taking 4460-5365 IU of vitamin D gummies daily. A vitamin D level test has been ordered. She will continue her current dose until the results are available, at which point adjustments may be made.  Orders:    Vitamin D 25 hydroxy    Pre-diabetes  She is on Wegovy 2.4 mg and has reported minor side effects such as nausea. She has noticed a reduction in inches around her waist. An A1c test has been ordered to monitor her blood sugar levels. She will continue on Wegovy 2.4 mg for another 6 months.  Orders:    Hemoglobin A1C        History of Present Illness   History of Present Illness  Mindy Truong is a 54 year-old female who presents via virtual visit for Hashimoto's thyroiditis, prediabetes, and vitamin D deficiency.    She is currently on a daily regimen of levothyroxine 75 mcg for the management of her Hashimoto's thyroiditis. She reports no adverse effects from this medication.    She has been on Wegovy 2.4 mg for approximately one month, which has resulted in minor side effects such as occasional nausea. Despite not observing  significant weight loss, she notes a reduction in abdominal girth. She does not monitor her blood glucose levels at home.    She has a known vitamin D deficiency and is currently supplementing with vitamin D gummies, although she is uncertain about the exact dosage.          Pertinent Medical History           Review of Systems as per HPI       Medical History Reviewed by provider this encounter:     .    Objective   There were no vitals taken for this visit.     There is no height or weight on file to calculate BMI.  Wt Readings from Last 3 Encounters:   10/01/24 79.8 kg (176 lb)   09/18/24 78.7 kg (173 lb 6.4 oz)   09/13/24 79.4 kg (175 lb)     Physical Exam  Vitals and nursing note reviewed.   Constitutional:       General: She is not in acute distress.     Appearance: She is well-developed.   HENT:      Head: Normocephalic and atraumatic.   Pulmonary:      Effort: Pulmonary effort is normal. No respiratory distress.   Neurological:      Mental Status: She is alert.       Physical Exam  Vital Signs  Weight is 176 pounds.    Results    Labs:   Lab Results   Component Value Date    HGBA1C 6.4 (H) 10/18/2024    HGBA1C 6.1 (H) 06/07/2024    HGBA1C 6.2 (H) 02/14/2024     Lab Results   Component Value Date    CREATININE 0.87 10/18/2024    CREATININE 0.89 02/14/2024    CREATININE 0.81 08/11/2023    BUN 14 10/18/2024     09/22/2017    K 4.4 10/18/2024     10/18/2024    CO2 30 10/18/2024     eGFR   Date Value Ref Range Status   10/18/2024 76 ml/min/1.73sq m Final     Lab Results   Component Value Date    CHOL 178 09/22/2017    HDL 64 10/18/2024    TRIG 169 (H) 10/18/2024     Lab Results   Component Value Date    ALT 17 10/18/2024    AST 22 10/18/2024    ALKPHOS 73 10/18/2024    BILITOT 0.6 09/22/2017     Lab Results   Component Value Date    AVT4VMLFZCPG 1.946 09/20/2024    HZK9ICNQYAAA 0.277 (L) 06/07/2024    WEZ0ACYGSTXQ 0.222 (L) 04/24/2024     Lab Results   Component Value Date    FREET4 0.62 09/20/2024        There are no Patient Instructions on file for this visit.    Discussed with the patient and all questioned fully answered. She will call me if any problems arise.      The patient was identified by name and date of birth. Mindy RADHA Franko was informed that this is a telemedicine visit and that the visit is being conducted through the Epic Embedded platform. She agrees to proceed..  My office door was closed. No one else was in the room.  She acknowledged consent and understanding of privacy and security of the video platform. The patient has agreed to participate and understands they can discontinue the visit at any time.

## 2025-03-21 ENCOUNTER — TELEMEDICINE (OUTPATIENT)
Dept: PSYCHIATRY | Facility: CLINIC | Age: 55
End: 2025-03-21

## 2025-03-21 DIAGNOSIS — Z91.199 NO-SHOW FOR APPOINTMENT: Primary | ICD-10-CM

## 2025-03-21 PROCEDURE — NOSHOW: Performed by: SOCIAL WORKER

## 2025-03-25 ENCOUNTER — TELEMEDICINE (OUTPATIENT)
Dept: PSYCHIATRY | Facility: CLINIC | Age: 55
End: 2025-03-25
Payer: COMMERCIAL

## 2025-03-25 ENCOUNTER — TELEPHONE (OUTPATIENT)
Dept: PSYCHIATRY | Facility: CLINIC | Age: 55
End: 2025-03-25

## 2025-03-25 DIAGNOSIS — F41.1 GAD (GENERALIZED ANXIETY DISORDER): ICD-10-CM

## 2025-03-25 DIAGNOSIS — F43.10 POST TRAUMATIC STRESS DISORDER: ICD-10-CM

## 2025-03-25 DIAGNOSIS — F33.1 MAJOR DEPRESSIVE DISORDER, RECURRENT, MODERATE (HCC): Primary | ICD-10-CM

## 2025-03-25 PROCEDURE — 99214 OFFICE O/P EST MOD 30 MIN: CPT | Performed by: PHYSICIAN ASSISTANT

## 2025-03-25 NOTE — TELEPHONE ENCOUNTER
Called patient but had to leave voicemail message stating that  all consents need to be signed including virtual consent.  Patient doesn't have a consent to treat consent (out pt gen consent) signed.  On voicemail message stated where to locate thru [facility] for signatures. Stated appointment can  be cancelled without signatures on consents.  Left call back # [phone].

## 2025-03-25 NOTE — ASSESSMENT & PLAN NOTE
Stable - continue venlafaxine 225 mg qd, mirtazapine 7.5 mg qhs; continue talk therapy; f/u in 6 months

## 2025-03-25 NOTE — PSYCH
MEDICATION MANAGEMENT NOTE    Name: Mindy Truong      : 1970      MRN: [id number]  Encounter Provider: Graima Rea  Encounter Date: 3/25/2025   Encounter department: [facility]    Insurance: Payor: BLUE CROSS / Plan: MISC BLUE CROSS / Product Type: Blue Fee for Service /      Reason for Visit:   Chief Complaint   Patient presents with    Virtual Regular Visit    Follow-up    Medication Management    Depression    Anxiety   :  Assessment & Plan  Major depressive disorder, recurrent, moderate (HCC)  Stable - continue venlafaxine 225 mg qd, mirtazapine 7.5 mg qhs; continue talk therapy; f/u in 6 months         JUHI (generalized anxiety disorder)  Stable - continue venlafaxine 225 mg qd, mirtazapine 7.5 mg qhs, clonidine 0.1 mg qhs, clonazepam 0.5 mg BID PRN; continue talk therapy; f/u in 6 months         Post traumatic stress disorder  Stable - continue venlafaxine 225 mg qd, mirtazapine 7.5 mg qhs, clonidine 0.1 mg qhs, clonazepam 0.5 mg BID PRN; continue talk therapy; f/u in 6 months           Thankfully she reports she is doing very well on her current regimen with mood, anxiety, and PTSD sx under good control. She does not want any changes at this time.     Treatment Recommendations:    Continue current medications:     - venlafaxine 225 mg qd     - mirtazapine 7.5 mg qhs     - clonazepam 0.5 mg BID PRN     - clonidine 0.1 mg qhs    Educated about diagnosis and treatment modalities. Verbalizes understanding and agreement with the treatment plan.  Discussed self monitoring of symptoms, and symptom monitoring tools.  Discussed medications and if treatment adjustment was needed or desired.  Medication management every 6 months  Does not want any medication changes  Aware of 24 hour and weekend coverage for urgent situations accessed by calling [facility] main practice number  Continue psychotherapy with [facility] therapist Nesha Alejo  I am  "scheduling this patient out for greater than 3 months: Yes - Patient's stability of symptoms warrant this length of time or no significant changes to treatment plan  If sooner appointment needed, patient will reach out    Medications Risks/Benefits:      Risks, Benefits And Possible Side Effects Of Medications:    Risks, benefits, and possible side effects of medications explained to Mindy and she (or legal representative) verbalizes understanding and agreement for treatment.    Controlled Medication Discussion:     Mindy has been filling controlled prescriptions on time as prescribed according to [place] Prescription Drug Monitoring Program  Discussed with Mindy the risks of sedation, respiratory depression, impairment of ability to drive and potential for abuse and addiction related to treatment with benzodiazepine medications. She understands risk of treatment with benzodiazepine medications, agrees to not drive if feels impaired and agrees to take medications as prescribed  Mindy is using medication appropriately      History of Present Illness     Mindy is seen today for a follow up for Virtual Regular Visit, Follow-up, Medication Management, Depression, and Anxiety and PTSD. Since her last visit she reports things are going well. She reports that she rarely has nightmares that distress her or she \"acts on\" anymore. She reports the mood and anxiety have been under good control. She feels the medications are working well and that life has settled down some and she has few situational stressors now which is helpful. She now is doing therapy quarterly as she is doing so well.    Past medication trials:  Lexapro, Abilify     She denies any suicidal ideation, intent or plan at present; denies any homicidal ideation, intent or plan at present.    She denies any auditory hallucinations, denies any visual hallucinations, denies any delusions.    She denies any side effects from current psychiatric medications.    HPI " ROS Appetite Changes and Sleep:     She reports normal sleep, normal appetite, normal energy level    Review Of Systems: A review of systems is obtained and is negative except for the pertinent positives listed in HPI/Subjective above.      Current Rating Scores:     None completed today.    Areas of Improvement: reviewed in HPI/Subjective Section and reviewed in Assessment and Plan Section      Past Medical History:   Diagnosis Date    Anxiety     Asthma     Depression     Disease of thyroid gland     Dizziness     Fibroid     Forgetfulness     Hashimoto's disease     Hashimoto's disease     Headache(784.0) 2002    History of fainting as a child     Hyperlipidemia     Hypertension     Migraine 2004    Numbness and tingling     Polycystic ovary syndrome 2008    Post traumatic stress disorder 02/28/2019    Varicella         Past Surgical History:   Procedure Laterality Date    BREAST BIOPSY Left 2020    BREAST SURGERY Bilateral 2002    reduction    COLONOSCOPY      HYSTERECTOMY  2005    OH COLONOSCOPY FLX DX W/COLLJ SPEC WHEN PFRMD N/A 10/16/2018    Procedure: EGD AND COLONOSCOPY;  Surgeon: Bunny Ruvalcaba MD;  Location: [facility];  Service: Gastroenterology    OH EXCISION RADIAL HEAD Left 10/09/2020    Procedure: RADIAL HEAD IMPLANT ARTHROPLASTY ;  Surgeon: Zackary Craig MD;  Location: [facility];  Service: Orthopedics    REDUCTION MAMMAPLASTY Bilateral 1997    REDUCTION MAMMAPLASTY Bilateral 2002    REDUCTION MAMMAPLASTY Bilateral 2020    SINUS SURGERY      TOTAL ABDOMINAL HYSTERECTOMY       Allergies:   Allergies   Allergen Reactions    Lisinopril-Hydrochlorothiazide Hives    Other Hives     States she has no allergies       Current Outpatient Medications   Medication Sig Dispense Refill    albuterol (2.5 mg/3 mL) 0.083 % nebulizer solution Take 3 mL (2.5 mg total) by nebulization every 6 (six) hours as needed for wheezing or shortness of breath 180 mL 0    albuterol (PROVENTIL HFA,VENTOLIN HFA) 90 mcg/act  inhaler Inhale 2 puffs every 4 (four) hours as needed for wheezing or shortness of breath 6.7 g 5    atorvastatin (LIPITOR) 20 mg tablet Take 1 tablet (20 mg total) by mouth daily 90 tablet 1    clonazePAM (KlonoPIN) 0.5 mg tablet Take 1 tablet (0.5 mg total) by mouth daily at bedtime. May also take 1 tablet (0.5 mg total) daily as needed for anxiety. 60 tablet 0    cloNIDine (CATAPRES) 0.1 mg tablet TAKE 1 TABLET (0.1 MG TOTAL) BY MOUTH DAILY AT BEDTIME 90 tablet 1    levothyroxine 75 mcg tablet Take 1 tablet (75 mcg total) by mouth daily 90 tablet 1    mirtazapine (REMERON) 7.5 MG tablet Take 1 tablet (7.5 mg total) by mouth daily at bedtime 90 tablet 1    rizatriptan (MAXALT-MLT) 10 mg disintegrating tablet Take 1 tablet (10 mg total) by mouth once as needed for migraine for up to 1 dose May repeat in 2 hours if needed 9 tablet 2    Semaglutide-Weight Management (Wegovy) 2.4 MG/0.75ML INJECT 0.75 ML (2.4 MG TOTAL) UNDER THE SKIN ONCE A WEEK 9 mL 1    venlafaxine (EFFEXOR-XR) 150 mg 24 hr capsule TAKE 1 CAPSULE (150 MG TOTAL) BY MOUTH DAILY WITH 75 MG CAPSULE (225 MG TOTAL DAILY) 90 capsule 1    venlafaxine (EFFEXOR-XR) 75 mg 24 hr capsule TAKE 1 CAPSULE BY MOUTH EVERY DAY 90 capsule 1     No current facility-administered medications for this visit.       Substance Abuse History:    Social History     Substance and Sexual Activity   Alcohol Use Yes    Alcohol/week: 2.0 standard drinks of alcohol    Types: 2 Glasses of wine per week    Comment: socially     Social History     Substance and Sexual Activity   Drug Use No       Social History:    Social History     Socioeconomic History    Marital status: /Civil Union     Spouse name: Not on file    Number of children: Not on file    Years of education: Not on file    Highest education level: Not on file   Occupational History    Not on file   Tobacco Use    Smoking status: Never     Passive exposure: Past    Smokeless tobacco: Never    Tobacco comments:     na    Vaping Use    Vaping status: Never Used   Substance and Sexual Activity    Alcohol use: Yes     Alcohol/week: 2.0 standard drinks of alcohol     Types: 2 Glasses of wine per week     Comment: socially    Drug use: No    Sexual activity: Yes     Partners: Female     Birth control/protection: None     Comment: JACKELIN   Other Topics Concern    Not on file   Social History Narrative    Lives [place], with her spouse.      Social Drivers of Health     Financial Resource Strain: Not on file   Food Insecurity: Not on file   Transportation Needs: Not on file   Physical Activity: Unknown (11/15/2022)    Exercise Vital Sign     Days of Exercise per Week: 0 days     Minutes of Exercise per Session: Not on file   Stress: No Stress Concern Present (9/1/2020)    [employer] [place] [employer] - Occupational Stress Questionnaire     Feeling of Stress : Not at all   Social Connections: Unknown (6/18/2024)    Received from [employer]     How often do you feel lonely or isolated from those around you? (Adult - for ages 18 years and over): Not on file   Intimate Partner Violence: Not on file   Housing Stability: Not on file       Family Psychiatric History:     Family History   Problem Relation Age of Onset    Hyperlipidemia Mother     Lupus Mother     Hypertension Mother     Anxiety disorder Mother     Psychiatric Illness Mother         unknown-lost memory for six months    Depression Father     Cervical cancer Paternal Grandmother     Ovarian cancer Paternal Grandmother 66        [place]    Uterine cancer Paternal Grandmother     No Known Problems Half-Sister     No Known Problems Half-Brother     No Known Problems Half-Brother     No Known Problems Half-Sister     No Known Problems Half-Sister     No Known Problems Maternal Aunt     No Known Problems Maternal Aunt     No Known Problems Maternal Aunt     No Known Problems Maternal Aunt     No Known Problems Paternal Aunt     No Known Problems Paternal  Aunt     Bipolar disorder Cousin     Schizoaffective Disorder  Cousin     Schizophrenia Cousin     Self-Injury Cousin     Suicide Attempts Cousin     Psychosis Maternal Uncle         need his life    Schizoaffective Disorder  Maternal Uncle     Schizophrenia Maternal Uncle     Self-Injury Maternal Uncle     Suicide Attempts Maternal Uncle     Breast cancer Neg Hx     Colon cancer Neg Hx     Seizures Neg Hx        Medical History Reviewed by provider this encounter:  Tobacco  Allergies  Meds  Problems  Med Hx  Surg Hx  Fam Hx          Objective   There were no vitals taken for this visit.     Mental Status Evaluation:    Appearance age appropriate, casually dressed, looks stated age   Behavior cooperative, calm   Speech normal rate, normal volume, normal pitch, spontaneous   Mood euthymic   Affect normal range and intensity, appropriate   Thought Processes organized, goal directed   Thought Content no overt delusions   Perceptual Disturbances: no auditory hallucinations, no visual hallucinations   Abnormal Thoughts  Risk Potential Suicidal ideation - None  Homicidal ideation - None  Potential for aggression - No   Orientation oriented to person, place, time/date, and situation   Memory recent and remote memory grossly intact   Consciousness alert and awake   Attention Span Concentration Span attention span and concentration are age appropriate   Intellect appears to be of average intelligence   Insight intact   Judgement intact   Muscle Strength and  Gait unable to assess today due to virtual visit   Motor activity unable to assess today due to virtual visit   Language no difficulty naming common objects, no difficulty repeating a phrase, no difficulty writing a sentence   Fund of Knowledge adequate knowledge of current events  adequate fund of knowledge regarding past history  adequate fund of knowledge regarding vocabulary    Pain none   Pain Scale 0   *lost video partway through, audio maintained  throughout    Laboratory Results: I have personally reviewed all pertinent laboratory/tests results    Suicide/Homicide Risk Assessment:    Risk of Harm to Self:  The following ratings are based on assessment at the time of the interview  Based on today's assessment, Mindy presents the following risk of harm to self: none    Risk of Harm to Others:  The following ratings are based on assessment at the time of the interview  Based on today's assessment, Mindy presents the following risk of harm to others: none    The following interventions are recommended: Continue medication management. No other intervention changes indicated at this time.    Psychotherapy Provided:     Individual psychotherapy provided: No    Treatment Plan:    Completed and signed during the session: Not applicable - Treatment Plan to be completed by [facility] therapist    Goals: Progress towards Treatment Plan goals - Yes, progressing, as evidenced by subjective findings in HPI/Subjective Section and in Assessment and Plan Section    Depression Follow-up Plan Completed: Not applicable    Note Share:    This note was not shared with the patient due to reasonable likelihood of causing patient harm    Administrative Statements   Encounter provider Garima Rea    The Patient is located at Home and in the following state in which I hold an active license [place].    The patient was identified by name and date of birth. Mindy Truong was informed that this is a telemedicine visit and that the visit is being conducted through the Epic Embedded platform. She agrees to proceed..  My office door was closed. No one else was in the room.  She acknowledged consent and understanding of privacy and security of the video platform. The patient has agreed to participate and understands they can discontinue the visit at any time.    I have spent a total time of 15 minutes in caring for this patient on the day of the visit/encounter including  Prognosis, Risks and benefits of tx options, Instructions for management, Patient and family education, Importance of tx compliance, Impressions, Counseling / Coordination of care, and Obtaining or reviewing history  , not including the time spent for establishing the audio/video connection.    Visit Time  Visit Start Time: 1:30 PM  Visit Stop Time: 1:45 PM  Total Visit Duration:  15 minutes    Garima Rea 03/25/25

## 2025-03-25 NOTE — ASSESSMENT & PLAN NOTE
Stable - continue venlafaxine 225 mg qd, mirtazapine 7.5 mg qhs, clonidine 0.1 mg qhs, clonazepam 0.5 mg BID PRN; continue talk therapy; f/u in 6 months

## 2025-03-26 PROBLEM — I73.00 RAYNAUD'S DISEASE WITHOUT GANGRENE: Status: ACTIVE | Noted: 2025-03-26

## 2025-03-26 PROBLEM — R70.0 ELEVATED SED RATE: Status: ACTIVE | Noted: 2025-03-26

## 2025-03-26 PROBLEM — R76.8 SS-A ANTIBODY POSITIVE: Status: ACTIVE | Noted: 2025-03-26

## 2025-03-27 ENCOUNTER — TELEMEDICINE (OUTPATIENT)
Dept: RHEUMATOLOGY | Facility: CLINIC | Age: 55
End: 2025-03-27
Payer: COMMERCIAL

## 2025-03-27 ENCOUNTER — TELEPHONE (OUTPATIENT)
Dept: RHEUMATOLOGY | Facility: CLINIC | Age: 55
End: 2025-03-27

## 2025-03-27 DIAGNOSIS — R70.0 ELEVATED SED RATE: ICD-10-CM

## 2025-03-27 DIAGNOSIS — I73.00 RAYNAUD'S DISEASE WITHOUT GANGRENE: ICD-10-CM

## 2025-03-27 DIAGNOSIS — R76.8 SS-A ANTIBODY POSITIVE: ICD-10-CM

## 2025-03-27 DIAGNOSIS — M25.50 DIFFUSE ARTHRALGIA: ICD-10-CM

## 2025-03-27 DIAGNOSIS — E06.3 HASHIMOTO'S DISEASE: ICD-10-CM

## 2025-03-27 DIAGNOSIS — R76.8 ANA POSITIVE: Primary | ICD-10-CM

## 2025-03-27 PROCEDURE — 99214 OFFICE O/P EST MOD 30 MIN: CPT | Performed by: INTERNAL MEDICINE

## 2025-03-27 NOTE — PROGRESS NOTES
Virtual Regular VisitName: Mindy Truong      : 1970      MRN: [id number]  Encounter Provider: Suzanne Durand MD  Encounter Date: 3/27/2025   Encounter department: [facility]  :  Assessment & Plan  LALITA positive  Ms. Truong is a 54-year-old female with history significant for Hashimoto's thyroiditis who presents for a follow up of a positive LALITA 1:80 speckled pattern.     - Minyd presents today for a follow up of a low titer positive LALITA that was initially detected in 2017 and has been evaluated by 2 rheumatologists thus far without concerns for a connective tissue disease.  The LALITA was thought to be secondary to her diagnosis of Hashimoto's thyroiditis.  She presents for a reevaluation of this as well as intermittent symptoms she has been experiencing over the past 2 years with dysphagia to both solids and liquids as well as altered taste/fearful anticipation of foods.  I updated a serological workup and in addition to the positive LALITA this shows a low titer SSA antibody as well as an elevated ESR.  It is unclear if these abnormalities are occurring in the context of her symptoms and if there may be a suggestion for Sjogren's syndrome.  At this time I do not have a clear explanation for her lab abnormalities or symptoms, but reassuringly since our last visit she mentions most of her symptoms have spontaneously improved.  In the interim she did also see ENT for a minor salivary gland lip biopsy and this was not able to include or exclude the possibility of Sjogren's syndrome.    - Overall as she is currently stable I recommend a monitoring approach and she is agreeable to this.    Orders:    C-reactive protein; Future    Sedimentation rate, automated; Future    Anti-neutrophilic cytoplasmic antibody; Future    Protein electrophoresis, serum; Future    Urinalysis with microscopic; Future    Protein / creatinine ratio, urine; Future    MyoMarker 3 Plus Profile (RDL); Future     Sjogren's Antibodies; Future    C4 complement; Future    C3 complement; Future    IgG, IgA, IgM; Future    Cryoglobulin; Future    SS-A antibody positive    Orders:    C-reactive protein; Future    Sedimentation rate, automated; Future    Anti-neutrophilic cytoplasmic antibody; Future    Protein electrophoresis, serum; Future    Urinalysis with microscopic; Future    Protein / creatinine ratio, urine; Future    MyoMarker 3 Plus Profile (RDL); Future    Sjogren's Antibodies; Future    C4 complement; Future    C3 complement; Future    IgG, IgA, IgM; Future    Cryoglobulin; Future    Elevated sed rate    Orders:    C-reactive protein; Future    Sedimentation rate, automated; Future    Anti-neutrophilic cytoplasmic antibody; Future    Protein electrophoresis, serum; Future    Urinalysis with microscopic; Future    Protein / creatinine ratio, urine; Future    MyoMarker 3 Plus Profile (RDL); Future    Sjogren's Antibodies; Future    C4 complement; Future    C3 complement; Future    IgG, IgA, IgM; Future    Cryoglobulin; Future    Diffuse arthralgia  - Most recently she has been experiencing intermittent occurrences of bilateral foot pain and stiffness which is transient.  The etiology of this is unclear and it does not seem representative of a neuropathy/arthritic/plantar fasciitis condition.  I recommend a monitoring approach and requested she make me aware if her symptoms become persistent or bothersome.    Orders:    C-reactive protein; Future    Sedimentation rate, automated; Future    Anti-neutrophilic cytoplasmic antibody; Future    Protein electrophoresis, serum; Future    Urinalysis with microscopic; Future    Protein / creatinine ratio, urine; Future    MyoMarker 3 Plus Profile (RDL); Future    Sjogren's Antibodies; Future    C4 complement; Future    C3 complement; Future    IgG, IgA, IgM; Future    Cryoglobulin; Future    Raynaud's disease without gangrene    Orders:    C-reactive protein; Future     "Sedimentation rate, automated; Future    Anti-neutrophilic cytoplasmic antibody; Future    Protein electrophoresis, serum; Future    Urinalysis with microscopic; Future    Protein / creatinine ratio, urine; Future    MyoMarker 3 Plus Profile (RDL); Future    Sjogren's Antibodies; Future    C4 complement; Future    C3 complement; Future    IgG, IgA, IgM; Future    Cryoglobulin; Future    Hashimoto's disease         LALITA positive         SS-A antibody positive         Elevated sed rate         Diffuse arthralgia         Raynaud's disease without gangrene         Hashimoto's disease               History of Present Illness     HPI    INITIAL VISIT NOTE (5/2024):  Ms. Truong is a 53-year-old female with history significant for Hashimoto's thyroiditis who presents for an evaluation of a positive LALITA 1:80 speckled pattern and altered taste/difficulty swallowing.  She is referred by Darling Warren PA-C for a rheumatology consult.     Patient reports for the past 2 years she has been experiencing intermittent issues with locking of the left side of her jaw, difficulty swallowing either solids or liquids where she feels like she needs to make an extra effort to swallow [at other times she will consume the same solid or liquid without any difficulty], as well as intermittently appreciating fear of certain foods where she will notice that the taste of her food \"shocks the body\".  She mentioned these symptoms to her endocrinologist and was advised that it would not be related to the Hashimoto's thyroiditis so she was asked to schedule a rheumatology appointment.  She reports chronic issues with periodic spasms/locking of her hands and feet.  She reports chronic dry eyes and dry mouth.  She reports symptoms consistent with Raynaud's with color changes of all of her fingers to a gray/dusky discoloration with cold exposure and is associated with pain.  She reports a history of lupus and osteoporosis in her mother and her " maternal grandmother and maternal aunts have rheumatoid arthritis.     She denies fevers, unintentional weight loss, alopecia, inflammatory eye disease, skin rash, psoriasis, photosensitivity, skin thickening/tightening, mouth/nose ulcers, swollen glands, pleuritic chest pain, shortness of breath, cough, GERD, inflammatory bowel disease, blood clots, miscarriages or focal joint pain/swelling/stiffness.     She was seen by rheumatology in 2017 [Dr. Nela Adams] and 2018 [Dr. Brittany Nichols] for an evaluation of the positive LALITA.  She was not diagnosed with a connective tissue disease and the LALITA was felt to be representative of the Hashimoto's thyroiditis.  She did have a low titer SCL 70 antibody of 1.7 without a diagnosis of scleroderma at that time.  Her extensive autoimmune serologies were otherwise normal.        6/13/2024:  Patient presents for a follow-up appointment to review her results.  This shows a positive SSA antibody of 2.3.  An ESR was elevated at 72 with a C-reactive protein of 7.9.  The remainder of the LALITA specificity, C3, C4, anti-CCP antibody, rheumatoid factor, CK, antiphospholipid antibody panel, chronic hepatitis panel, ferritin and celiac disease antibody profile were unremarkable.     She reports new symptoms today that we did not discuss at the last visit pertaining to joint pains.  This mostly affects her hands, left knee and neck.  She has had chronic neck pain but states the knee pain started recently a few days ago.  She has noticed swelling that can affect her fingers, her lower legs and feet.  She has been diagnosed with fluid retention and uses a diuretic as needed.  She experiences some degree of stiffness affecting her hands that persists throughout the day.  She has tried IcyHot patches without any benefit.  She has not tried any over-the-counter oral medications.     Otherwise she reports the symptoms that we discussed previously.  Of note she appreciates dry mouth but not really  dry eyes.     She is up-to-date with a mammogram and colonoscopy.  She had an endoscopy done at the same time as her colonoscopy which was in approximately 2019.  These were screening tests.  She no longer obtains Pap smears as she had a total hysterectomy.      3/27/2025:  Patient presents for a follow-up.  In the interim she did have an upper endoscopy done which did not show any concerning abnormalities.  She also had a minor salivary gland lip biopsy done which showed focal lymphoid focus identified with scattered areas of mixed chronic inflammation present, with focus score <1, not suggestive of Sjogren's syndrome, but with the inability to exclude this possibility.  I reviewed the x-rays of her hands and left knee which were normal.  The x-ray of the cervical spine showed mild degenerative arthritis.    She mentions since our last visit she has actually been feeling quite stable.  The altered sensation in her mouth and difficulty swallowing have actually improved spontaneously.  The bloating has also improved.  The main concern she brings up today is intermittent diffuse foot pain and stiffness that occurs without any triggering factors anytime during the day and resolves within a few minutes spontaneously.  It does not happen on a daily basis.  No other concerning joint pains, swelling or stiffness.  At times she has noticed finger and toe spasms.      Review of Systems  Constitutional: Negative for weight change, fevers, chills, night sweats, fatigue.  ENT/Mouth: Negative for hearing changes, ear pain, nasal congestion, sinus pain, hoarseness, sore throat, rhinorrhea, swallowing difficulty.   Eyes: Negative for pain, redness, discharge, vision changes.   Cardiovascular: Negative for chest pain, SOB, palpitations.   Respiratory: Negative for cough, sputum, wheezing, dyspnea.   Gastrointestinal: Negative for nausea, vomiting, diarrhea, constipation, pain, heartburn.  Genitourinary: Negative for dysuria,  urinary frequency, hematuria.   Musculoskeletal: As per HPI.  Skin: Negative for skin rash, color changes.   Neuro: Negative for weakness, numbness, tingling, loss of consciousness.   Psych: Negative for anxiety, depression.   Heme/Lymph: Negative for easy bruising, bleeding, lymphadenopathy.      Objective   There were no vitals taken for this visit.    Physical Exam  General: Well appearing, well nourished, in no distress. Oriented x 3, normal mood and affect.  Skin: Good turgor, no rash, unusual bruising or prominent lesions.  Hair: Normal texture and distribution.  HEENT:  Head: Normocephalic, atraumatic.  Eyes: Conjunctiva clear, sclera non-icteric, EOM intact.  Nose: No external lesions.  Neck: Supple.  Neurologic: Alert and oriented. No focal neurological deficits appreciated.   Psychiatric: Normal mood and affect.     Administrative Statements   Encounter provider Suzanne Durand MD    The Patient is located at Home and in the following state in which I hold an active license [place].    The patient was identified by name and date of birth. Mindy Truong was informed that this is a telemedicine visit and that the visit is being conducted through the Epic Embedded platform. She agrees to proceed..  My office door was closed. No one else was in the room.  She acknowledged consent and understanding of privacy and security of the video platform. The patient has agreed to participate and understands they can discontinue the visit at any time.    I have spent a total time of 21 minutes in caring for this patient on the day of the visit/encounter including Diagnostic results, Patient and family education, Impressions, Documenting in the medical record, Reviewing/placing orders in the medical record (including tests, medications, and/or procedures), and Obtaining or reviewing history  , not including the time spent for establishing the audio/video connection.

## 2025-03-27 NOTE — ASSESSMENT & PLAN NOTE
Orders:    C-reactive protein; Future    Sedimentation rate, automated; Future    Anti-neutrophilic cytoplasmic antibody; Future    Protein electrophoresis, serum; Future    Urinalysis with microscopic; Future    Protein / creatinine ratio, urine; Future    MyoMarker 3 Plus Profile (RDL); Future    Sjogren's Antibodies; Future    C4 complement; Future    C3 complement; Future    IgG, IgA, IgM; Future    Cryoglobulin; Future

## 2025-03-27 NOTE — ASSESSMENT & PLAN NOTE
Ms. Truong is a 54-year-old female with history significant for Hashimoto's thyroiditis who presents for a follow up of a positive LALITA 1:80 speckled pattern.     - Mindy presents today for a follow up of a low titer positive LALITA that was initially detected in 2017 and has been evaluated by 2 rheumatologists thus far without concerns for a connective tissue disease.  The LALITA was thought to be secondary to her diagnosis of Hashimoto's thyroiditis.  She presents for a reevaluation of this as well as intermittent symptoms she has been experiencing over the past 2 years with dysphagia to both solids and liquids as well as altered taste/fearful anticipation of foods.  I updated a serological workup and in addition to the positive LALITA this shows a low titer SSA antibody as well as an elevated ESR.  It is unclear if these abnormalities are occurring in the context of her symptoms and if there may be a suggestion for Sjogren's syndrome.  At this time I do not have a clear explanation for her lab abnormalities or symptoms, but reassuringly since our last visit she mentions most of her symptoms have spontaneously improved.  In the interim she did also see ENT for a minor salivary gland lip biopsy and this was not able to include or exclude the possibility of Sjogren's syndrome.    - Overall as she is currently stable I recommend a monitoring approach and she is agreeable to this.    Orders:    C-reactive protein; Future    Sedimentation rate, automated; Future    Anti-neutrophilic cytoplasmic antibody; Future    Protein electrophoresis, serum; Future    Urinalysis with microscopic; Future    Protein / creatinine ratio, urine; Future    MyoMarker 3 Plus Profile (RDL); Future    Sjogren's Antibodies; Future    C4 complement; Future    C3 complement; Future    IgG, IgA, IgM; Future    Cryoglobulin; Future

## 2025-04-02 NOTE — TELEPHONE ENCOUNTER
Pt would like a refill on the fluconazole 150 tablet sent to [facility] in [place], [place]  Please advise  Subjective:      Patient ID: Royer Castillo is a 65 y.o. male.    Chief Complaint: Shoulder Injury (DOI 04/02/2025 LT Shoulder BARRIE)    Patient's place of employment - NO  Patient's job title -   Date of injury - 04/02/2025  Body part injured including left or right -  LT Shoulder  Injury Mechanism -  Lifting   What they were doing when they got hurt -  Pt was lifting a cart when it fell over pulling his left shoulder causing injury.  What they did immediately after -  [facility]  Pain scale right now - 5/10  BARRIE      Provider note:  65-year-old right-hand dominant male  presents with acute left shoulder pain that occurred earlier today while on duty.  Patient reports trying to pull a cart when it got caught and fell over pulling his left upper extremity down.  Says he felt a pop in his shoulder with immediate pain.  Patient denies previous left shoulder injury.  He denies any neck pain, numbness or tingling of the left upper extremity.  Patient did not self treat prior to arrival. MEB    Shoulder Injury   The incident occurred at work. The quality of the pain is described as stabbing. The pain does not radiate. The pain is at a severity of 5/10. The pain is moderate. Pertinent negatives include no chest pain or numbness. The symptoms are aggravated by movement. The treatment provided moderate relief.       Constitution: Positive for activity change.   HENT:  Negative for facial trauma.    Neck: Negative for neck pain.   Cardiovascular:  Negative for chest trauma and chest pain.   Eyes:  Negative for eye trauma.   Respiratory:  Negative for shortness of breath.    Gastrointestinal:  Negative for abdominal trauma.   Musculoskeletal:  Positive for trauma, joint pain, joint swelling and abnormal ROM of joint.   Skin:  Negative for wound and bruising.   Neurological:  Negative for numbness and tingling.     Objective:     Physical Exam  Vitals and nursing note reviewed.   Constitutional:        General: He is not in acute distress.     Appearance: He is well-developed. He is not diaphoretic.   HENT:      Head: Normocephalic and atraumatic.      Right Ear: Hearing and external ear normal.      Left Ear: Hearing and external ear normal.      Nose: Nose normal. No nasal deformity.   Eyes:      General: Lids are normal. No scleral icterus.     Conjunctiva/sclera: Conjunctivae normal.   Neck:      Trachea: Trachea normal.   Cardiovascular:      Pulses: Normal pulses.   Pulmonary:      Effort: Pulmonary effort is normal. No respiratory distress.      Breath sounds: No stridor.   Musculoskeletal:      Left shoulder: Tenderness present. No swelling or deformity. Decreased range of motion. Normal pulse.      Cervical back: Normal range of motion.      Comments: Left shoulder exam:   AROM flexion 90°, abduction 90°, ER 30°, IR to L5 (R to T 10)  Tenderness over the anterior and lateral aspects.  No E/E/D.       Skin:     General: Skin is warm and dry.      Capillary Refill: Capillary refill takes less than 2 seconds.   Neurological:      Mental Status: He is alert. He is not disoriented.      GCS: GCS eye subscore is 4. GCS verbal subscore is 5. GCS motor subscore is 6.      Sensory: No sensory deficit.   Psychiatric:         Attention and Perception: He is attentive.         Speech: Speech normal.         Behavior: Behavior normal.        X-Ray Shoulder 2 or More Views Left  Result Date: 4/2/2025  EXAMINATION: XR SHOULDER COMPLETE 2 OR MORE VIEWS LEFT CLINICAL HISTORY: Pain in left shoulder TECHNIQUE: Two or three views of the left shoulder were performed. COMPARISON: Chest radiograph 07/09/2022 FINDINGS: Bones are well mineralized. Overall alignment is within normal limits. No displaced fracture, dislocation or destructive osseous process.  Minimal degenerative change at the AC joint.  Mild degenerative change at the inferior glenoid.  Prominent osseous marginal osteophyte along the inferior and medial  aspect of the humeral head.  No subcutaneous emphysema or radiopaque foreign body.  Left lung apex is clear.  Calcification at the aortic knob.     No acute displaced fracture-dislocation identified. Electronically signed by: Wilbert Mccollum MD Date:    04/02/2025 Time:    15:49        Assessment:      1. Sprain of left shoulder, unspecified shoulder sprain type, initial encounter    2. Encounter related to worker's compensation claim    3. Acute pain of left shoulder    4. Work related injury      Plan:     Patient was given a sling.  Advised ice for the 1st 24-48 hours, then switch to heat.  Advised active and passive range of motion exercises several times daily.  Ibuprofen every 6 hours as needed for pain.  Return to clinic in 1 week or sooner as needed.  Patient verbalized understanding and agreement.      Medications Ordered This Encounter   Medications    acetaminophen tablet 1,000 mg    ibuprofen (ADVIL,MOTRIN) 800 MG tablet     Sig: Take 1 tablet (800 mg total) by mouth every 6 (six) hours as needed for Pain. Take with meals.     Dispense:  30 tablet     Refill:  0    ibuprofen tablet 800 mg     Patient Instructions: Apply ice 24-48 hours then apply heat/warm soaks   Restrictions: Disabled until next office visit  Follow up in about 1 week (around 4/9/2025) for Reassessment.

## 2025-04-10 DIAGNOSIS — E78.49 OTHER HYPERLIPIDEMIA: ICD-10-CM

## 2025-04-10 RX ORDER — ATORVASTATIN CALCIUM 20 MG/1
20 TABLET, FILM COATED ORAL DAILY
Qty: 90 TABLET | Refills: 0 | Status: SHIPPED | OUTPATIENT
Start: 2025-04-10 | End: 2025-05-10

## 2025-04-13 NOTE — PROGRESS NOTES
"54-year-old female with voiding issues, gross hematuria    Pertinent non-urologic PMH: Asthma, Hashimoto's disease, HLD, HTN, migraines, MIRANDA, PCOS     Pertinent non-urologic PSH: JACKELIN for fibroids (spared BL ovaries and tubes) in 2005 (reportedly told that uterus was compressing bladder at time of surgery)     Anticoagulation: None     First establish care with [facility] urology in November 2023 for voiding symptoms.  At that time admitted to frequency, urgency, sensation of incomplete emptying.  Urine testing was negative at that time.  She was trialed on pelvic floor physical therapy.     Rarely drinks decaf coffee. Occ decaf soda. Eats a lot of spicy foods. Drinks 32 oz water per day.     PVR 56 cc on 11/28/2023     Did not do PT since Nov 2023 visit     Had UTI in May 2024 and was treated with oral abx. Seen by GYN in June for UTI with GH. Cleared up with abx.     Urine dip 7/12/2024: Negative     PVR 28 cc on 7/12/2024    Urine dip negative on 4/15/2025    PVR 0 cc 4/15/2025    Office 4/15/25: no interval infections, voiding symptoms         Cystoscopy     Date/Time  4/15/2025 9:00 AM     Performed by  Nick Pimentel DO   Authorized by  Nick Pimentel DO     Universal Protocol:  procedure performed by consultantConsent: Verbal consent obtained. Written consent obtained.  Risks and benefits: risks, benefits and alternatives were discussed  Consent given by: patient  Time out: Immediately prior to procedure a \"time out\" was called to verify the correct patient, procedure, equipment, support staff and site/side marked as required.  Timeout called at: 4/15/2025 9:29 AM.  Patient understanding: patient states understanding of the procedure being performed  Patient consent: the patient's understanding of the procedure matches consent given  Procedure consent: procedure consent matches procedure scheduled  Relevant documents: relevant documents present and verified  Required items: required blood " products, implants, devices, and special equipment available  Patient identity confirmed: verbally with patient      Procedure Details:  Procedure type: cystoscopy    Patient tolerance: Patient tolerated the procedure well with no immediate complications    Additional Procedure Details: Office Cystoscopy Procedure Note    Indication:    Hematuria    Informed consent   The risks, benefits, complications, treatment options, and expected outcomes were discussed with the patient. The patient concurred with the proposed plan and provided informed consent.    Anesthesia  Lidocaine jelly 2%    Antibiotic prophylaxis   None    Procedure  In the presence of a female nurse, the patient was placed in the supine frog-legged position, was prepped and draped in the usual manner using sterile technique, and 2% lidocaine jelly instilled into the urethra.     Pelvic exam with female chaperone   Negative tender points  Stage 0-1 cystocele  Stage 0 apical excursion  Stage 0 rectocele    Findings:  Urethra:  Normal  Bladder:  Normal  Ureteral orifices:  Normal  Other findings:  None, retroflexed view confirms    Specimens: None                 Complications:    None; patient tolerated the procedure well           Disposition: To home            Condition: Stable              PLAN  -Fu u cyto  -Admits to very rare dribbling leak after voiding. Overall not bothersome. Understands that if this gets worse or if she becomes more bothered she can try PFPT.  -Symptoms are overall improved. Can fu PRN.

## 2025-04-15 ENCOUNTER — PROCEDURE VISIT (OUTPATIENT)
Dept: UROLOGY | Facility: CLINIC | Age: 55
End: 2025-04-15
Payer: COMMERCIAL

## 2025-04-15 VITALS
BODY MASS INDEX: 32.2 KG/M2 | DIASTOLIC BLOOD PRESSURE: 70 MMHG | OXYGEN SATURATION: 98 % | HEART RATE: 101 BPM | SYSTOLIC BLOOD PRESSURE: 120 MMHG | WEIGHT: 175 LBS | HEIGHT: 62 IN

## 2025-04-15 DIAGNOSIS — R39.15 URINARY URGENCY: Primary | ICD-10-CM

## 2025-04-15 DIAGNOSIS — R31.0 GROSS HEMATURIA: ICD-10-CM

## 2025-04-15 DIAGNOSIS — N32.81 OAB (OVERACTIVE BLADDER): ICD-10-CM

## 2025-04-15 LAB
POST-VOID RESIDUAL VOLUME, ML POC: 0 ML
SL AMB  POCT GLUCOSE, UA: NORMAL
SL AMB LEUKOCYTE ESTERASE,UA: NORMAL
SL AMB POCT BILIRUBIN,UA: NORMAL
SL AMB POCT BLOOD,UA: NORMAL
SL AMB POCT CLARITY,UA: CLEAR
SL AMB POCT COLOR,UA: YELLOW
SL AMB POCT KETONES,UA: NORMAL
SL AMB POCT NITRITE,UA: NORMAL
SL AMB POCT PH,UA: 5
SL AMB POCT SPECIFIC GRAVITY,UA: 1.02
SL AMB POCT URINE PROTEIN: NORMAL
SL AMB POCT UROBILINOGEN: NORMAL

## 2025-04-15 PROCEDURE — 52000 CYSTOURETHROSCOPY: CPT | Performed by: UROLOGY

## 2025-04-15 PROCEDURE — 51798 US URINE CAPACITY MEASURE: CPT | Performed by: UROLOGY

## 2025-04-15 PROCEDURE — 81002 URINALYSIS NONAUTO W/O SCOPE: CPT | Performed by: UROLOGY

## 2025-04-15 PROCEDURE — 88112 CYTOPATH CELL ENHANCE TECH: CPT | Performed by: PATHOLOGY

## 2025-04-15 NOTE — PATIENT INSTRUCTIONS
You had cystoscopy done in the office today. This means that we looked inside your urethra and bladder with a camera.    You may see some blood in your urine for the next few days. This is normal. Please drink plenty of fluids. Call the office if you are passing large blood clots in your urine or if you are not able to urinate.    It may burn when you urinate for the next few days. This is normal.    Please call the office if you have fevers or chills in the next few days.    You will return to clinic as needed

## 2025-04-17 PROCEDURE — 88112 CYTOPATH CELL ENHANCE TECH: CPT | Performed by: PATHOLOGY

## 2025-04-18 ENCOUNTER — TELEPHONE (OUTPATIENT)
Dept: PSYCHIATRY | Facility: CLINIC | Age: 55
End: 2025-04-18

## 2025-04-23 NOTE — PROGRESS NOTES
Name: Mindy Truong      : 1970      MRN: [id number]  Encounter Provider: KAYLA Foy  Encounter Date: 2025   Encounter department: [facility]  :  Assessment & Plan  Encounter for gynecological examination without abnormal finding  Exercise 150-300 minutes per week minimum including weight bearing exercises.   Pap with high risk HPV Q 5 years, if normal.    Call your insurance company to verify coverage prior to completing any ordered tests.   Annual mammogram ordered and monthly breast self exam recommended.    Kegels 20 times twice daily.   Vaginal moisturizers such as coconut oil as needed.   Return to office in one year or sooner, if needed.          Asymptomatic postmenopausal status  Calcium 1200-1500mg (in divided doses-max 600 mg at one time) + 600-1000 IU Vit D daily.        Vaginal dryness    Orders:    estradiol (ESTRACE VAGINAL) 0.1 mg/g vaginal cream; Use pea size amount to the vaginal opening 1-2 times per week or .5gm into the vagina twice weekly    Encounter for screening mammogram for breast cancer    Orders:    Mammo screening bilateral w 3d and cad; Future        History of Present Illness   HPI  Mindy Truong is a 54 y.o. female who presents annual for examination.  She still has hot flashes.  She would like to try herbals for now.  She denies any issues with bleeding, Total Hysterectomy for fibroids in  by Dr Martinez. Denies history of abnormal pap smears. Last Pap yrs ago, no further paps indicated. Denies vaginal issues. Denies pelvic pain. She is not sexually active d/t dryness. Same sex wife of over 30 years..     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  Lots of yardwork, owns 3 acres  They have pets          Menopause  - total hysterectomy for a big fibroid   Vasomotor symptoms Yes  Sexually active no (same sex)  Birth control  Yes - Total hysterectomy   History of abnormal pap: No  Last pap 10/02/2015 , Findings NILM/HPV neg ,  Next pap: not indicated  Self breast exam no  Mammogram 09/13/2024  Lifetime Tyrer-Cuzick: 2.88% entirely fatty BI-RAD 2 Next Mammogram  9/2025 - order placed  Colon Cancer screening: 10/16/2018 , type colonoscopy , follow up 10 years - 2028  HPV vaccine series completed: No    Hereditary Cancer Screening  FH Breast/Ovarian cancer: Paternal GM/ovarian Ca  FH Uterine cancer: denies  FH Colon cancer: denies  Cancer-related family history includes Cervical cancer in her paternal grandmother; Ovarian cancer (age of onset: 66) in her paternal grandmother; Uterine cancer in her paternal grandmother. There is no history of Breast cancer or Colon cancer.      Review of Systems   Constitutional:  Negative for chills, fatigue, fever and unexpected weight change.   Respiratory:  Negative for shortness of breath.    Gastrointestinal:  Negative for anal bleeding, blood in stool, constipation and diarrhea.   Genitourinary:  Positive for hematuria. Negative for difficulty urinating and dysuria.        Sees Urology   Psychiatric/Behavioral:  Negative for agitation, behavioral problems and confusion.           Objective   /78 (BP Location: Left arm, Patient Position: Sitting, Cuff Size: Standard)   Wt 80 kg (176 lb 6.4 oz)   BMI 32.26 kg/m²      Physical Exam  Constitutional:       General: She is not in acute distress.     Appearance: She is well-developed.   HENT:      Head: Normocephalic.   Pulmonary:      Effort: Pulmonary effort is normal.   Chest:   Breasts:     Breasts are symmetrical.      Right: No inverted nipple, mass, nipple discharge, skin change or tenderness.      Left: No inverted nipple, mass, nipple discharge, skin change or tenderness.   Abdominal:      Palpations: Abdomen is soft.   Genitourinary:     Exam position: Supine.      Labia:         Right: No rash, tenderness, lesion or injury.         Left: No rash, tenderness, lesion or injury.       Vagina: No signs of injury and foreign body. No vaginal  discharge, erythema or tenderness.      Adnexa:         Right: No mass, tenderness or fullness.          Left: No mass, tenderness or fullness.        Comments: Cx/uterus absent  Musculoskeletal:      Cervical back: Normal range of motion and neck supple.

## 2025-04-28 ENCOUNTER — ANNUAL EXAM (OUTPATIENT)
Dept: OBGYN CLINIC | Facility: CLINIC | Age: 55
End: 2025-04-28
Payer: COMMERCIAL

## 2025-04-28 VITALS — SYSTOLIC BLOOD PRESSURE: 126 MMHG | DIASTOLIC BLOOD PRESSURE: 78 MMHG | BODY MASS INDEX: 32.26 KG/M2 | WEIGHT: 176.4 LBS

## 2025-04-28 DIAGNOSIS — Z12.31 ENCOUNTER FOR SCREENING MAMMOGRAM FOR BREAST CANCER: ICD-10-CM

## 2025-04-28 DIAGNOSIS — F41.1 GAD (GENERALIZED ANXIETY DISORDER): ICD-10-CM

## 2025-04-28 DIAGNOSIS — E66.811 CLASS 1 OBESITY DUE TO EXCESS CALORIES WITH SERIOUS COMORBIDITY AND BODY MASS INDEX (BMI) OF 32.0 TO 32.9 IN ADULT: ICD-10-CM

## 2025-04-28 DIAGNOSIS — F43.10 POST TRAUMATIC STRESS DISORDER (PTSD): ICD-10-CM

## 2025-04-28 DIAGNOSIS — N89.8 VAGINAL DRYNESS: ICD-10-CM

## 2025-04-28 DIAGNOSIS — Z78.0 ASYMPTOMATIC POSTMENOPAUSAL STATUS: ICD-10-CM

## 2025-04-28 DIAGNOSIS — Z01.419 ENCOUNTER FOR GYNECOLOGICAL EXAMINATION WITHOUT ABNORMAL FINDING: Primary | ICD-10-CM

## 2025-04-28 DIAGNOSIS — E66.09 CLASS 1 OBESITY DUE TO EXCESS CALORIES WITH SERIOUS COMORBIDITY AND BODY MASS INDEX (BMI) OF 32.0 TO 32.9 IN ADULT: ICD-10-CM

## 2025-04-28 PROCEDURE — S0612 ANNUAL GYNECOLOGICAL EXAMINA: HCPCS | Performed by: NURSE PRACTITIONER

## 2025-04-28 RX ORDER — ESTRADIOL 0.1 MG/G
CREAM VAGINAL
Qty: 42.5 G | Refills: 1 | Status: SHIPPED | OUTPATIENT
Start: 2025-04-28

## 2025-04-28 NOTE — TELEPHONE ENCOUNTER
Reason for call:   [] Refill   [] Prior Auth  [] Other:     Office:   [] PCP/Provider -   [x] Specialty/Provider - Garima Rea     Medication: clonazePAM (KlonoPIN) 0.5 mg tablet     Dose/Frequency: Take 1 tablet (0.5 mg total) by mouth daily at bedtime. May also take 1 tablet (0.5 mg total) daily as needed for anxiety.     Quantity: 60    Pharmacy: [id number]     Local Pharmacy   Does the patient have enough for 3 days?   [] Yes   [x] No - Send as HP to [facility]

## 2025-04-28 NOTE — TELEPHONE ENCOUNTER
02/12/2025 02/12/2025 clonazePAM (Tablet) 60.0 30 0.5 MG NA [facility] Commercial Insurance 0 / 0 PA   1 [id number] 10/28/2024 10/28/2024 clonazePAM (Tablet) 60.0 30 0.5 MG NA [facility] Commercial Insurance 0 / 0 PA   1 [id number] 09/19/2024 09/19/2024 clonazePAM (Tablet) 60.0 30 0.5 MG NA [facility] Commercial Insurance 0 / 0 PA   1 [id number] 07/22/2024 07/18/2024 clonazePAM (Tablet) 60.0 30 0.5 MG NA [facility] Commercial Insurance 0 / 0 PA   1 [id number] 06/25/2024 06/25/2024 clonazePAM (Tablet) 60.0 30 0.5 MG NA [facility] Commercial Insurance 0 / 0 PA   1 [id number] 05/20/2024 05/20/2024 clonazePAM (Tablet) 60.0 3

## 2025-04-28 NOTE — PATIENT INSTRUCTIONS
Patient Education     Lowering Your Risk of Breast Cancer   About this topic   Breast cancer is a serious illness. Breast cancer is when abnormal cells grow and divide more quickly in your breast. These cells form a growth or tumor. The abnormal cells may enter nearby tissue and spread to other parts of the body. It is the type of cancer most often seen in women. Men can have breast cancer, but it is a rare condition.  General   Some things in your life may increase your risk of breast cancer. You may not be able to change some of these. Others you can control.  You are more likely to get breast cancer if you:  Have a mother, sister, or daughter who has had breast cancer  Have used hormones for menopause for more than 5 years  Have had radiation therapy to the breast or chest in the past  Are overweight or do not exercise  Had your first menstrual period before you were 11 years old  Went through menopause after age 56  Have never been pregnant or had your first child after age 35  Have had breast cancer before  Drink alcohol in any form  Have dense breasts  Are older in age  There is no certain way to prevent breast cancer. There are things you can do to lower your chances of having breast cancer.  Keep a healthy weight. Lose weight if you are overweight. Being overweight raises your chances of having breast cancer.  Eat a healthy diet to maintain a healthy weight, such as more fruits, vegetables, and lean cuts of meat. Decrease the amount of saturated fat in your diet.  Exercise. Being active helps you keep a healthy weight.  Limit your alcohol intake or do not drink alcohol. The more alcohol you drink, the higher your risk.  Do not smoke cigarettes. Smoking can increase your risk of many types of cancer.  Breastfeed your baby. This may help protect you. The longer you breastfeed, the more protection you have.  Talk with your doctor about:  Limiting or stopping hormone therapy.  Taking certain drugs to prevent  breast cancer. For women at high risk of having breast cancer, there are a few drugs that may lower your risk.  Surgery to prevent you from having breast cancer if you are very high risk.  When do I need to call the doctor?   Changes in your breasts  A lump or area in your breast that feels different  Discharge from your nipple  Skin on your breast is dimpled or indented  You have questions or concerns about your breasts  Helpful tips   Talk to your doctor about the best kind of breast cancer screening for you.  If you want to do self breast exams, have your doctor show you the right way to do them.  Tell your doctor of any abnormal finding.  Last Reviewed Date   2021-10-04  Consumer Information Use and Disclaimer   This generalized information is a limited summary of diagnosis, treatment, and/or medication information. It is not meant to be comprehensive and should be used as a tool to help the user understand and/or assess potential diagnostic and treatment options. It does NOT include all information about conditions, treatments, medications, side effects, or risks that may apply to a specific patient. It is not intended to be medical advice or a substitute for the medical advice, diagnosis, or treatment of a health care provider based on the health care provider's examination and assessment of a patient’s specific and unique circumstances. Patients must speak with a health care provider for complete information about their health, medical questions, and treatment options, including any risks or benefits regarding use of medications. This information does not endorse any treatments or medications as safe, effective, or approved for treating a specific patient. UpToDate, Inc. and its affiliates disclaim any warranty or liability relating to this information or the use thereof. The use of this information is governed by the Terms of Use, available at  https://www.woltersAddressReportuwer.com/en/know/clinical-effectiveness-terms   Copyright   Copyright © 2024 UpToDate, Inc. and its affiliates and/or licensors. All rights reserved.

## 2025-04-29 RX ORDER — CLONAZEPAM 0.5 MG/1
TABLET ORAL
Qty: 60 TABLET | Refills: 0 | Status: SHIPPED | OUTPATIENT
Start: 2025-04-29

## 2025-04-29 RX ORDER — SEMAGLUTIDE 2.4 MG/.75ML
2.4 INJECTION, SOLUTION SUBCUTANEOUS WEEKLY
Qty: 9 ML | Refills: 0 | Status: SHIPPED | OUTPATIENT
Start: 2025-04-29

## 2025-05-05 ENCOUNTER — OFFICE VISIT (OUTPATIENT)
Dept: URGENT CARE | Facility: CLINIC | Age: 55
End: 2025-05-05
Payer: COMMERCIAL

## 2025-05-05 VITALS
HEART RATE: 89 BPM | TEMPERATURE: 98.3 F | DIASTOLIC BLOOD PRESSURE: 86 MMHG | RESPIRATION RATE: 20 BRPM | WEIGHT: 176 LBS | OXYGEN SATURATION: 97 % | BODY MASS INDEX: 32.19 KG/M2 | SYSTOLIC BLOOD PRESSURE: 126 MMHG

## 2025-05-05 DIAGNOSIS — R30.0 DYSURIA: ICD-10-CM

## 2025-05-05 DIAGNOSIS — N30.00 ACUTE CYSTITIS WITHOUT HEMATURIA: Primary | ICD-10-CM

## 2025-05-05 LAB
SL AMB  POCT GLUCOSE, UA: NEGATIVE
SL AMB LEUKOCYTE ESTERASE,UA: ABNORMAL
SL AMB POCT BILIRUBIN,UA: NEGATIVE
SL AMB POCT BLOOD,UA: ABNORMAL
SL AMB POCT CLARITY,UA: ABNORMAL
SL AMB POCT COLOR,UA: YELLOW
SL AMB POCT KETONES,UA: NEGATIVE
SL AMB POCT NITRITE,UA: NEGATIVE
SL AMB POCT PH,UA: 6
SL AMB POCT SPECIFIC GRAVITY,UA: 1.02
SL AMB POCT URINE PROTEIN: 300
SL AMB POCT UROBILINOGEN: 0.2

## 2025-05-05 PROCEDURE — G0382 LEV 3 HOSP TYPE B ED VISIT: HCPCS | Performed by: PHYSICIAN ASSISTANT

## 2025-05-05 PROCEDURE — 87186 SC STD MICRODIL/AGAR DIL: CPT | Performed by: PHYSICIAN ASSISTANT

## 2025-05-05 PROCEDURE — S9083 URGENT CARE CENTER GLOBAL: HCPCS | Performed by: PHYSICIAN ASSISTANT

## 2025-05-05 PROCEDURE — 87086 URINE CULTURE/COLONY COUNT: CPT | Performed by: PHYSICIAN ASSISTANT

## 2025-05-05 PROCEDURE — 87077 CULTURE AEROBIC IDENTIFY: CPT | Performed by: PHYSICIAN ASSISTANT

## 2025-05-05 PROCEDURE — 81002 URINALYSIS NONAUTO W/O SCOPE: CPT | Performed by: PHYSICIAN ASSISTANT

## 2025-05-05 RX ORDER — CEPHALEXIN 500 MG/1
500 CAPSULE ORAL EVERY 8 HOURS SCHEDULED
Qty: 15 CAPSULE | Refills: 0 | Status: SHIPPED | OUTPATIENT
Start: 2025-05-05 | End: 2025-05-10

## 2025-05-05 NOTE — PROGRESS NOTES
[facility]        NAME: Mindy Truong is a 54 y.o. female  : 1970    MRN: [id number]  DATE: May 5, 2025  TIME: 11:49 AM    Assessment and Plan   Acute cystitis without hematuria [N30.00]  1. Acute cystitis without hematuria  cephalexin (KEFLEX) 500 mg capsule      2. Dysuria  POCT urine dip    Urine culture    Urine culture            Patient Instructions     Patient Instructions   Discussed urine dip results with the patient. Recommended treatment with an antibiotic. We will send the urine for culture and call if any changes need to be made.      Follow up with PCP in 3-5 days.  Proceed to  ER if symptoms worsen.    If tests are performed, our office will contact you with results only if changes need to made to the care plan discussed with you at the visit. [facility].        Chief Complaint     Chief Complaint   Patient presents with    Possible UTI     Pt c/o possible UTI, symptoms include  feeling of full bladder, dysuria and spotting of blood. Started Friday he . No OTC's Taken         History of Present Illness       Urinary Tract Infection   This is a new problem. The current episode started in the past 7 days. The problem occurs every urination. The problem has been unchanged. The quality of the pain is described as aching and burning. The pain is mild. Associated symptoms include frequency, hematuria and urgency. Pertinent negatives include no chills, flank pain or vomiting. She has tried nothing for the symptoms.       Review of Systems   Review of Systems   Constitutional:  Negative for chills and fever.   Gastrointestinal:  Negative for vomiting.   Genitourinary:  Positive for dysuria, frequency, hematuria and urgency. Negative for difficulty urinating, flank pain and pelvic pain.   Musculoskeletal:  Negative for back pain.   Skin:  Negative for color change and rash.   All other systems reviewed and are negative.        Current  Medications       Current Outpatient Medications:     albuterol (2.5 mg/3 mL) 0.083 % nebulizer solution, Take 3 mL (2.5 mg total) by nebulization every 6 (six) hours as needed for wheezing or shortness of breath, Disp: 180 mL, Rfl: 0    albuterol (PROVENTIL HFA,VENTOLIN HFA) 90 mcg/act inhaler, Inhale 2 puffs every 4 (four) hours as needed for wheezing or shortness of breath, Disp: 6.7 g, Rfl: 5    atorvastatin (LIPITOR) 20 mg tablet, Take 1 tablet (20 mg total) by mouth daily, Disp: 90 tablet, Rfl: 0    cephalexin (KEFLEX) 500 mg capsule, Take 1 capsule (500 mg total) by mouth every 8 (eight) hours for 5 days, Disp: 15 capsule, Rfl: 0    clonazePAM (KlonoPIN) 0.5 mg tablet, Take 1 tablet (0.5 mg total) by mouth daily at bedtime. May also take 1 tablet (0.5 mg total) daily as needed for anxiety., Disp: 60 tablet, Rfl: 0    cloNIDine (CATAPRES) 0.1 mg tablet, TAKE 1 TABLET (0.1 MG TOTAL) BY MOUTH DAILY AT BEDTIME, Disp: 90 tablet, Rfl: 1    estradiol (ESTRACE VAGINAL) 0.1 mg/g vaginal cream, Use pea size amount to the vaginal opening 1-2 times per week or .5gm into the vagina twice weekly, Disp: 42.5 g, Rfl: 1    levothyroxine 75 mcg tablet, Take 1 tablet (75 mcg total) by mouth daily, Disp: 90 tablet, Rfl: 1    mirtazapine (REMERON) 7.5 MG tablet, Take 1 tablet (7.5 mg total) by mouth daily at bedtime, Disp: 90 tablet, Rfl: 1    rizatriptan (MAXALT-MLT) 10 mg disintegrating tablet, Take 1 tablet (10 mg total) by mouth once as needed for migraine for up to 1 dose May repeat in 2 hours if needed, Disp: 9 tablet, Rfl: 2    Semaglutide-Weight Management (Wegovy) 2.4 MG/0.75ML, Inject 0.75 mL (2.4 mg total) under the skin once a week, Disp: 9 mL, Rfl: 0    venlafaxine (EFFEXOR-XR) 150 mg 24 hr capsule, TAKE 1 CAPSULE (150 MG TOTAL) BY MOUTH DAILY WITH 75 MG CAPSULE (225 MG TOTAL DAILY), Disp: 90 capsule, Rfl: 1    venlafaxine (EFFEXOR-XR) 75 mg 24 hr capsule, TAKE 1 CAPSULE BY MOUTH EVERY DAY, Disp: 90 capsule, Rfl:  1    Current Allergies     Allergies as of 05/05/2025 - Reviewed 05/05/2025   Allergen Reaction Noted    Lisinopril-hydrochlorothiazide Hives 07/13/2020    Other Hives 12/21/2018            The following portions of the patient's history were reviewed and updated as appropriate: allergies, current medications, past family history, past medical history, past social history, past surgical history and problem list.     Past Medical History:   Diagnosis Date    Anxiety     Asthma     Depression     Disease of thyroid gland     Dizziness     Fibroid     Forgetfulness     Hashimoto's disease     Hashimoto's disease     Headache(784.0) 2002    History of fainting as a child     Hyperlipidemia     Hypertension     Migraine 2004    Numbness and tingling     Polycystic ovary syndrome 2008    Post traumatic stress disorder 02/28/2019    Varicella        Past Surgical History:   Procedure Laterality Date    BREAST BIOPSY Left 2020    BREAST SURGERY Bilateral 2002    reduction    COLONOSCOPY      HYSTERECTOMY  2005    CO COLONOSCOPY FLX DX W/COLLJ SPEC WHEN PFRMD N/A 10/16/2018    Procedure: EGD AND COLONOSCOPY;  Surgeon: Bunny Ruvalcaba MD;  Location: [facility];  Service: Gastroenterology    CO EXCISION RADIAL HEAD Left 10/09/2020    Procedure: RADIAL HEAD IMPLANT ARTHROPLASTY ;  Surgeon: Zackary Craig MD;  Location: [facility];  Service: Orthopedics    REDUCTION MAMMAPLASTY Bilateral 1997    REDUCTION MAMMAPLASTY Bilateral 2002    REDUCTION MAMMAPLASTY Bilateral 2020    SINUS SURGERY      TOTAL ABDOMINAL HYSTERECTOMY         Family History   Problem Relation Age of Onset    Hyperlipidemia Mother     Lupus Mother     Hypertension Mother     Anxiety disorder Mother     Psychiatric Illness Mother         unknown-lost memory for six months    Depression Father     Cervical cancer Paternal Grandmother     Ovarian cancer Paternal Grandmother 66        Na    Uterine cancer Paternal Grandmother     No Known Problems Half-Sister      No Known Problems Half-Brother     No Known Problems Half-Brother     No Known Problems Half-Sister     No Known Problems Half-Sister     No Known Problems Maternal Aunt     No Known Problems Maternal Aunt     No Known Problems Maternal Aunt     No Known Problems Maternal Aunt     No Known Problems Paternal Aunt     No Known Problems Paternal Aunt     Bipolar disorder Cousin     Schizoaffective Disorder  Cousin     Schizophrenia Cousin     Self-Injury Cousin     Suicide Attempts Cousin     Psychosis Maternal Uncle         need his life    Schizoaffective Disorder  Maternal Uncle     Schizophrenia Maternal Uncle     Self-Injury Maternal Uncle     Suicide Attempts Maternal Uncle     Breast cancer Neg Hx     Colon cancer Neg Hx     Seizures Neg Hx          Medications have been verified.        Objective   /86   Pulse 89   Temp 98.3 °F (36.8 °C)   Resp 20   Wt 79.8 kg (176 lb)   SpO2 97%   BMI 32.19 kg/m²        Physical Exam     Physical Exam  Vitals and nursing note reviewed.   Constitutional:       Appearance: Normal appearance.   Cardiovascular:      Rate and Rhythm: Normal rate and regular rhythm.   Pulmonary:      Effort: Pulmonary effort is normal.      Breath sounds: Normal breath sounds.   Abdominal:      Tenderness: There is no right CVA tenderness or left CVA tenderness.   Skin:     General: Skin is warm and dry.   Neurological:      General: No focal deficit present.      Mental Status: She is alert and oriented to person, place, and time.   Psychiatric:         Mood and Affect: Mood normal.         Behavior: Behavior normal.

## 2025-05-07 ENCOUNTER — RESULTS FOLLOW-UP (OUTPATIENT)
Dept: URGENT CARE | Facility: CLINIC | Age: 55
End: 2025-05-07

## 2025-05-07 LAB — BACTERIA UR CULT: ABNORMAL

## 2025-05-08 DIAGNOSIS — N30.00 ACUTE CYSTITIS WITHOUT HEMATURIA: Primary | ICD-10-CM

## 2025-05-08 RX ORDER — SULFAMETHOXAZOLE AND TRIMETHOPRIM 800; 160 MG/1; MG/1
1 TABLET ORAL EVERY 12 HOURS SCHEDULED
Qty: 10 TABLET | Refills: 0 | Status: SHIPPED | OUTPATIENT
Start: 2025-05-08 | End: 2025-05-13

## 2025-06-10 ENCOUNTER — APPOINTMENT (OUTPATIENT)
Dept: LAB | Facility: HOSPITAL | Age: 55
End: 2025-06-10
Payer: COMMERCIAL

## 2025-06-10 ENCOUNTER — RESULTS FOLLOW-UP (OUTPATIENT)
Dept: ENDOCRINOLOGY | Facility: CLINIC | Age: 55
End: 2025-06-10

## 2025-06-10 DIAGNOSIS — R70.0 ELEVATED SED RATE: ICD-10-CM

## 2025-06-10 DIAGNOSIS — E06.3 HASHIMOTO'S DISEASE: ICD-10-CM

## 2025-06-10 DIAGNOSIS — I73.00 RAYNAUD'S DISEASE WITHOUT GANGRENE: ICD-10-CM

## 2025-06-10 DIAGNOSIS — R76.8 ANA POSITIVE: ICD-10-CM

## 2025-06-10 DIAGNOSIS — R76.8 SS-A ANTIBODY POSITIVE: ICD-10-CM

## 2025-06-10 DIAGNOSIS — M25.50 DIFFUSE ARTHRALGIA: ICD-10-CM

## 2025-06-10 LAB
25(OH)D3 SERPL-MCNC: 45.1 NG/ML (ref 30–100)
BACTERIA UR QL AUTO: ABNORMAL /HPF
BILIRUB UR QL STRIP: NEGATIVE
C3 SERPL-MCNC: 194 MG/DL (ref 87–200)
C4 SERPL-MCNC: 70 MG/DL (ref 19–52)
CLARITY UR: CLEAR
COLOR UR: YELLOW
CREAT UR-MCNC: 288.4 MG/DL
CRP SERPL QL: 6.1 MG/L
ERYTHROCYTE [SEDIMENTATION RATE] IN BLOOD: 55 MM/HOUR (ref 0–29)
EST. AVERAGE GLUCOSE BLD GHB EST-MCNC: 131 MG/DL
GLUCOSE UR STRIP-MCNC: NEGATIVE MG/DL
HBA1C MFR BLD: 6.2 %
HGB UR QL STRIP.AUTO: NEGATIVE
IGA SERPL-MCNC: 201 MG/DL (ref 66–433)
IGG SERPL-MCNC: 1086 MG/DL (ref 635–1741)
IGM SERPL-MCNC: 119 MG/DL (ref 45–281)
KETONES UR STRIP-MCNC: NEGATIVE MG/DL
LEUKOCYTE ESTERASE UR QL STRIP: NEGATIVE
MUCOUS THREADS UR QL AUTO: ABNORMAL
NITRITE UR QL STRIP: NEGATIVE
NON-SQ EPI CELLS URNS QL MICRO: ABNORMAL /HPF
PH UR STRIP.AUTO: 5.5 [PH]
PROT UR STRIP-MCNC: ABNORMAL MG/DL
PROT UR-MCNC: 13.9 MG/DL
PROT/CREAT UR: 0 MG/G{CREAT}
RBC #/AREA URNS AUTO: ABNORMAL /HPF
SP GR UR STRIP.AUTO: 1.03 (ref 1–1.03)
T4 FREE SERPL-MCNC: 0.89 NG/DL (ref 0.61–1.12)
TSH SERPL DL<=0.05 MIU/L-ACNC: 0.38 UIU/ML (ref 0.45–4.5)
UROBILINOGEN UR STRIP-ACNC: <2 MG/DL
WBC #/AREA URNS AUTO: ABNORMAL /HPF

## 2025-06-10 PROCEDURE — 83516 IMMUNOASSAY NONANTIBODY: CPT

## 2025-06-10 PROCEDURE — 83520 IMMUNOASSAY QUANT NOS NONAB: CPT

## 2025-06-10 PROCEDURE — 82595 ASSAY OF CRYOGLOBULIN: CPT

## 2025-06-10 PROCEDURE — 86235 NUCLEAR ANTIGEN ANTIBODY: CPT

## 2025-06-10 PROCEDURE — 82570 ASSAY OF URINE CREATININE: CPT

## 2025-06-10 PROCEDURE — 82784 ASSAY IGA/IGD/IGG/IGM EACH: CPT

## 2025-06-10 PROCEDURE — 84156 ASSAY OF PROTEIN URINE: CPT

## 2025-06-10 PROCEDURE — 86160 COMPLEMENT ANTIGEN: CPT

## 2025-06-10 PROCEDURE — 81001 URINALYSIS AUTO W/SCOPE: CPT

## 2025-06-10 PROCEDURE — 84165 PROTEIN E-PHORESIS SERUM: CPT

## 2025-06-10 PROCEDURE — 86037 ANCA TITER EACH ANTIBODY: CPT

## 2025-06-10 PROCEDURE — 86140 C-REACTIVE PROTEIN: CPT

## 2025-06-10 PROCEDURE — 85652 RBC SED RATE AUTOMATED: CPT

## 2025-06-11 LAB
ALBUMIN SERPL ELPH-MCNC: 4.46 G/DL (ref 3.2–5.1)
ALBUMIN SERPL ELPH-MCNC: 56.4 % (ref 48–70)
ALPHA1 GLOB SERPL ELPH-MCNC: 0.32 G/DL (ref 0.15–0.47)
ALPHA1 GLOB SERPL ELPH-MCNC: 4 % (ref 1.8–7)
ALPHA2 GLOB SERPL ELPH-MCNC: 1.12 G/DL (ref 0.42–1.04)
ALPHA2 GLOB SERPL ELPH-MCNC: 14.2 % (ref 5.9–14.9)
BETA GLOB ABNORMAL SERPL ELPH-MCNC: 0.42 G/DL (ref 0.31–0.57)
BETA1 GLOB SERPL ELPH-MCNC: 5.3 % (ref 4.7–7.7)
BETA2 GLOB SERPL ELPH-MCNC: 6.9 % (ref 3.1–7.9)
BETA2+GAMMA GLOB SERPL ELPH-MCNC: 0.55 G/DL (ref 0.2–0.58)
GAMMA GLOB ABNORMAL SERPL ELPH-MCNC: 1.04 G/DL (ref 0.4–1.66)
GAMMA GLOB SERPL ELPH-MCNC: 13.2 % (ref 6.9–22.3)
IGG/ALB SER: 1.29 {RATIO} (ref 1.1–1.8)
PROT SERPL-MCNC: 7.9 G/DL (ref 6.4–8.4)

## 2025-06-11 PROCEDURE — 84165 PROTEIN E-PHORESIS SERUM: CPT | Performed by: PATHOLOGY

## 2025-06-12 PROBLEM — M25.521 PAIN IN RIGHT ELBOW: Status: RESOLVED | Noted: 2018-12-04 | Resolved: 2025-06-12

## 2025-06-12 PROBLEM — R70.0 ELEVATED SED RATE: Status: RESOLVED | Noted: 2025-03-26 | Resolved: 2025-06-12

## 2025-06-12 PROBLEM — M25.511 PAIN IN RIGHT SHOULDER: Status: RESOLVED | Noted: 2018-12-04 | Resolved: 2025-06-12

## 2025-06-13 ENCOUNTER — OFFICE VISIT (OUTPATIENT)
Age: 55
End: 2025-06-13
Payer: COMMERCIAL

## 2025-06-13 VITALS
BODY MASS INDEX: 30.73 KG/M2 | WEIGHT: 167 LBS | RESPIRATION RATE: 16 BRPM | HEART RATE: 80 BPM | SYSTOLIC BLOOD PRESSURE: 130 MMHG | OXYGEN SATURATION: 98 % | DIASTOLIC BLOOD PRESSURE: 84 MMHG | HEIGHT: 62 IN

## 2025-06-13 DIAGNOSIS — E66.09 CLASS 1 OBESITY DUE TO EXCESS CALORIES WITH SERIOUS COMORBIDITY AND BODY MASS INDEX (BMI) OF 32.0 TO 32.9 IN ADULT: ICD-10-CM

## 2025-06-13 DIAGNOSIS — E66.811 CLASS 1 OBESITY DUE TO EXCESS CALORIES WITH SERIOUS COMORBIDITY AND BODY MASS INDEX (BMI) OF 32.0 TO 32.9 IN ADULT: ICD-10-CM

## 2025-06-13 DIAGNOSIS — R76.8 ANA POSITIVE: ICD-10-CM

## 2025-06-13 DIAGNOSIS — E06.3 HASHIMOTO'S DISEASE: Primary | ICD-10-CM

## 2025-06-13 DIAGNOSIS — F41.1 GAD (GENERALIZED ANXIETY DISORDER): ICD-10-CM

## 2025-06-13 DIAGNOSIS — R35.0 URINARY FREQUENCY: ICD-10-CM

## 2025-06-13 DIAGNOSIS — F43.10 POST TRAUMATIC STRESS DISORDER (PTSD): ICD-10-CM

## 2025-06-13 DIAGNOSIS — E78.49 OTHER HYPERLIPIDEMIA: ICD-10-CM

## 2025-06-13 DIAGNOSIS — R73.03 PRE-DIABETES: ICD-10-CM

## 2025-06-13 PROBLEM — F32.2 SEVERE MAJOR DEPRESSIVE DISORDER (HCC): Status: ACTIVE | Noted: 2025-06-13

## 2025-06-13 PROBLEM — M35.00 SICCA, UNSPECIFIED TYPE (HCC): Status: ACTIVE | Noted: 2025-06-13

## 2025-06-13 LAB
C-ANCA TITR SER IF: NORMAL TITER
ENA SS-A AB SER IA-ACNC: 16 U/ML (ref ?–10)
ENA SS-B IGG SER IA-ACNC: 1.4 U/ML (ref ?–10)
MYELOPEROXIDASE AB SER IA-ACNC: 0.2 UNITS (ref 0–0.9)
P-ANCA ATYPICAL TITR SER IF: NORMAL TITER
P-ANCA TITR SER IF: NORMAL TITER
PROTEINASE3 AB SER IA-ACNC: <0.2 UNITS (ref 0–0.9)

## 2025-06-13 PROCEDURE — 99214 OFFICE O/P EST MOD 30 MIN: CPT

## 2025-06-13 NOTE — ASSESSMENT & PLAN NOTE
LALITA 1: 80 speckled pattern.  She is followed by rheumatology.  They recommend monitoring with blood work regularly.

## 2025-06-13 NOTE — PROGRESS NOTES
Name: Mindy Truong      : 1970      MRN: [id number]  Encounter Provider: Darling Warren PA-C  Encounter Date: 2025   [facility]  :  Assessment & Plan  Hashimoto's disease  TSH is low, but T4 is normal.  Followed by endocrinology, has an appointment next week to review labs.       JUHI (generalized anxiety disorder)  Followed by psychiatry.       Pre-diabetes  A1c 6.2.  Followed by endocrinology.  Continue low sugar and carb diet.       Class 1 obesity due to excess calories with serious comorbidity and body mass index (BMI) of 32.0 to 32.9 in adult  She is currently on wegovy 2.4 mg weekly through endocrinology.        LALITA positive  LALITA 1: 80 speckled pattern.  She is followed by rheumatology.  They recommend monitoring with blood work regularly.       Other hyperlipidemia  Due for lipid panel.  Orders:    Lipid panel; Future    Urinary frequency  She has a history of recurrent UTIs and would like an order to be in her chart in case she needs it.  Orders:    UA w Reflex to Microscopic w Reflex to Culture; Future           History of Present Illness   Patient is a 54-year-old female that presents today for a follow-up.      Review of Systems   Constitutional:  Negative for chills, fatigue and fever.   HENT:  Negative for ear discharge, ear pain, postnasal drip, rhinorrhea, sinus pressure, sinus pain, sore throat, tinnitus and trouble swallowing.    Eyes:  Negative for pain, discharge and itching.   Respiratory:  Negative for cough, shortness of breath and wheezing.    Cardiovascular:  Negative for chest pain, palpitations and leg swelling.   Gastrointestinal:  Negative for abdominal pain, constipation, diarrhea, nausea and vomiting.   Endocrine: Negative for polydipsia, polyphagia and polyuria.   Genitourinary:  Negative for difficulty urinating, frequency, hematuria and urgency.   Musculoskeletal:  Negative for arthralgias, joint  "swelling and myalgias.   Skin:  Negative for color change.   Allergic/Immunologic: Negative for environmental allergies.   Neurological:  Negative for dizziness, weakness, light-headedness, numbness and headaches.   Hematological:  Negative for adenopathy.   Psychiatric/Behavioral:  Negative for decreased concentration and sleep disturbance. The patient is not nervous/anxious.        Objective   /84 (BP Location: Left arm)   Pulse 80   Resp 16   Ht 5' 2\" (1.575 m)   Wt 75.8 kg (167 lb)   SpO2 98%   BMI 30.54 kg/m²      Physical Exam  Vitals and nursing note reviewed.   Constitutional:       General: She is awake. She is not in acute distress.     Appearance: Normal appearance. She is well-developed and well-groomed. She is obese.   HENT:      Head: Normocephalic and atraumatic.      Right Ear: Hearing and external ear normal.      Left Ear: Hearing and external ear normal.      Nose: Nose normal.      Mouth/Throat:      Lips: Pink.      Mouth: Mucous membranes are moist.     Eyes:      General: Lids are normal. Vision grossly intact. Gaze aligned appropriately.      Conjunctiva/sclera: Conjunctivae normal.     Neck:      Vascular: No carotid bruit.      Trachea: Trachea and phonation normal.     Cardiovascular:      Rate and Rhythm: Normal rate and regular rhythm.      Heart sounds: Normal heart sounds, S1 normal and S2 normal. No murmur heard.     No friction rub. No gallop.   Pulmonary:      Effort: Pulmonary effort is normal. No respiratory distress.      Breath sounds: Normal breath sounds and air entry. No decreased breath sounds, wheezing, rhonchi or rales.   Abdominal:      General: Abdomen is flat and protuberant. Bowel sounds are normal.      Palpations: Abdomen is soft.      Tenderness: There is no abdominal tenderness.     Musculoskeletal:         General: No swelling.      Cervical back: Neck supple.      Right lower leg: No edema.      Left lower leg: No edema.     Skin:     General: Skin is " warm.      Capillary Refill: Capillary refill takes less than 2 seconds.     Neurological:      Mental Status: She is alert.     Psychiatric:         Attention and Perception: Attention and perception normal.         Mood and Affect: Mood and affect normal.         Speech: Speech normal.         Behavior: Behavior normal. Behavior is cooperative.         Thought Content: Thought content normal.         Cognition and Memory: Cognition and memory normal.         Judgment: Judgment normal.

## 2025-06-13 NOTE — ASSESSMENT & PLAN NOTE
BP in office is 130/84.  Currently controlled without medication.  Continue to limit salt intake to less than 2000 mg daily.  No home BPs.

## 2025-06-16 ENCOUNTER — TELEPHONE (OUTPATIENT)
Age: 55
End: 2025-06-16

## 2025-06-16 ENCOUNTER — TELEMEDICINE (OUTPATIENT)
Dept: RHEUMATOLOGY | Facility: CLINIC | Age: 55
End: 2025-06-16
Payer: COMMERCIAL

## 2025-06-16 ENCOUNTER — TELEPHONE (OUTPATIENT)
Dept: RHEUMATOLOGY | Facility: CLINIC | Age: 55
End: 2025-06-16

## 2025-06-16 ENCOUNTER — TELEMEDICINE (OUTPATIENT)
Dept: ENDOCRINOLOGY | Facility: CLINIC | Age: 55
End: 2025-06-16
Payer: COMMERCIAL

## 2025-06-16 VITALS — BODY MASS INDEX: 28 KG/M2 | HEIGHT: 63 IN | WEIGHT: 158 LBS

## 2025-06-16 DIAGNOSIS — E66.811 CLASS 1 OBESITY DUE TO EXCESS CALORIES WITH SERIOUS COMORBIDITY AND BODY MASS INDEX (BMI) OF 32.0 TO 32.9 IN ADULT: ICD-10-CM

## 2025-06-16 DIAGNOSIS — R70.0 ELEVATED SED RATE: ICD-10-CM

## 2025-06-16 DIAGNOSIS — R76.8 SS-A ANTIBODY POSITIVE: ICD-10-CM

## 2025-06-16 DIAGNOSIS — I73.00 RAYNAUD'S DISEASE WITHOUT GANGRENE: ICD-10-CM

## 2025-06-16 DIAGNOSIS — E66.09 CLASS 1 OBESITY DUE TO EXCESS CALORIES WITH SERIOUS COMORBIDITY AND BODY MASS INDEX (BMI) OF 32.0 TO 32.9 IN ADULT: ICD-10-CM

## 2025-06-16 DIAGNOSIS — M25.50 DIFFUSE ARTHRALGIA: ICD-10-CM

## 2025-06-16 DIAGNOSIS — E06.3 HASHIMOTO'S DISEASE: ICD-10-CM

## 2025-06-16 DIAGNOSIS — R73.03 PRE-DIABETES: ICD-10-CM

## 2025-06-16 DIAGNOSIS — R76.8 ANA POSITIVE: Primary | ICD-10-CM

## 2025-06-16 DIAGNOSIS — E03.9 HYPOTHYROIDISM, UNSPECIFIED TYPE: Primary | ICD-10-CM

## 2025-06-16 PROCEDURE — 99214 OFFICE O/P EST MOD 30 MIN: CPT | Performed by: INTERNAL MEDICINE

## 2025-06-16 PROCEDURE — 99214 OFFICE O/P EST MOD 30 MIN: CPT | Performed by: STUDENT IN AN ORGANIZED HEALTH CARE EDUCATION/TRAINING PROGRAM

## 2025-06-16 RX ORDER — LEVOTHYROXINE SODIUM 75 UG/1
75 TABLET ORAL DAILY
Qty: 90 TABLET | Refills: 1 | Status: SHIPPED | OUTPATIENT
Start: 2025-06-16

## 2025-06-16 RX ORDER — SEMAGLUTIDE 2.4 MG/.75ML
2.4 INJECTION, SOLUTION SUBCUTANEOUS WEEKLY
Qty: 9 ML | Refills: 1 | Status: SHIPPED | OUTPATIENT
Start: 2025-06-16

## 2025-06-16 RX ORDER — CLONAZEPAM 0.5 MG/1
TABLET ORAL
Qty: 60 TABLET | Refills: 0 | Status: SHIPPED | OUTPATIENT
Start: 2025-06-16

## 2025-06-16 NOTE — PROGRESS NOTES
Virtual Regular Visit  Name: Mindy Truong      : 1970      MRN: [id number]  Encounter Provider: Suzanne Durand MD  Encounter Date: 2025   Encounter department: [facility]  :  Assessment & Plan  LALITA positive  Ms. Truong is a 54-year-old female with history significant for Hashimoto's thyroiditis who presents for a follow up of a positive LALITA 1:80 speckled pattern and low titer SSA antibody.     - Mindy presents today for a follow up of a low titer positive LALITA that was initially detected in 2017 and has been evaluated by 2 rheumatologists thus far without concerns for a connective tissue disease.  The LALITA was thought to be secondary to her diagnosis of Hashimoto's thyroiditis.  She presents for a follow up of this as well as intermittent symptoms she has been experiencing over the past 2 years with dysphagia to both solids and liquids as well as altered taste/fearful anticipation of foods.  I updated a serological workup and in addition to the positive LALITA this shows a low titer SSA antibody as well as an elevated ESR.  It is unclear if these abnormalities are occurring in the context of her symptoms and if there may be a suggestion for Sjogren's syndrome.  At this time I do not have a clear explanation for her lab abnormalities or symptoms, but reassuringly since our last visit she mentions most of her symptoms have spontaneously improved.  In the interim she did also see ENT for a minor salivary gland lip biopsy and this was not able to include or exclude the possibility of Sjogren's syndrome.     - Overall as she is currently stable I recommend a monitoring approach and she is agreeable to this.         SS-A antibody positive         Elevated sed rate         Diffuse arthralgia  - She has been experiencing intermittent occurrences of left foot pain and stiffness which is transient.  The etiology of this is unclear and it does not seem representative of a  "neuropathy/arthritic/plantar fasciitis condition.  I recommend a monitoring approach and requested she make me aware if her symptoms become persistent or bothersome.  The bilateral hand arthralgias/stiffness will also be managed with supportive measures.  If the left hand 3rd digit stiffness which seems representative of trigger finger persists then she can establish with hand orthopedics.          Raynaud's disease without gangrene         Hashimoto's disease         LALITA positive         SS-A antibody positive         Elevated sed rate         Diffuse arthralgia         Raynaud's disease without gangrene         Hashimoto's disease               History of Present Illness     HPI    INITIAL VISIT NOTE (5/2024):  Ms. Truong is a 53-year-old female with history significant for Hashimoto's thyroiditis who presents for an evaluation of a positive LALITA 1:80 speckled pattern and altered taste/difficulty swallowing.  She is referred by Darling Warren PA-C for a rheumatology consult.     Patient reports for the past 2 years she has been experiencing intermittent issues with locking of the left side of her jaw, difficulty swallowing either solids or liquids where she feels like she needs to make an extra effort to swallow [at other times she will consume the same solid or liquid without any difficulty], as well as intermittently appreciating fear of certain foods where she will notice that the taste of her food \"shocks the body\".  She mentioned these symptoms to her endocrinologist and was advised that it would not be related to the Hashimoto's thyroiditis so she was asked to schedule a rheumatology appointment.  She reports chronic issues with periodic spasms/locking of her hands and feet.  She reports chronic dry eyes and dry mouth.  She reports symptoms consistent with Raynaud's with color changes of all of her fingers to a gray/dusky discoloration with cold exposure and is associated with pain.  She reports a history " of lupus and osteoporosis in her mother and her maternal grandmother and maternal aunts have rheumatoid arthritis.     She denies fevers, unintentional weight loss, alopecia, inflammatory eye disease, skin rash, psoriasis, photosensitivity, skin thickening/tightening, mouth/nose ulcers, swollen glands, pleuritic chest pain, shortness of breath, cough, GERD, inflammatory bowel disease, blood clots, miscarriages or focal joint pain/swelling/stiffness.     She was seen by rheumatology in 2017 [Dr. Nela Adams] and 2018 [Dr. Brittany Nichols] for an evaluation of the positive LALITA.  She was not diagnosed with a connective tissue disease and the LALITA was felt to be representative of the Hashimoto's thyroiditis.  She did have a low titer SCL 70 antibody of 1.7 without a diagnosis of scleroderma at that time.  Her extensive autoimmune serologies were otherwise normal.        6/13/2024:  Patient presents for a follow-up appointment to review her results.  This shows a positive SSA antibody of 2.3.  An ESR was elevated at 72 with a C-reactive protein of 7.9.  The remainder of the LALITA specificity, C3, C4, anti-CCP antibody, rheumatoid factor, CK, antiphospholipid antibody panel, chronic hepatitis panel, ferritin and celiac disease antibody profile were unremarkable.     She reports new symptoms today that we did not discuss at the last visit pertaining to joint pains.  This mostly affects her hands, left knee and neck.  She has had chronic neck pain but states the knee pain started recently a few days ago.  She has noticed swelling that can affect her fingers, her lower legs and feet.  She has been diagnosed with fluid retention and uses a diuretic as needed.  She experiences some degree of stiffness affecting her hands that persists throughout the day.  She has tried IcyHot patches without any benefit.  She has not tried any over-the-counter oral medications.     Otherwise she reports the symptoms that we discussed previously.  Of  note she appreciates dry mouth but not really dry eyes.     She is up-to-date with a mammogram and colonoscopy.  She had an endoscopy done at the same time as her colonoscopy which was in approximately 2019.  These were screening tests.  She no longer obtains Pap smears as she had a total hysterectomy.        3/27/2025:  Patient presents for a follow-up.  In the interim she did have an upper endoscopy done which did not show any concerning abnormalities.  She also had a minor salivary gland lip biopsy done which showed focal lymphoid focus identified with scattered areas of mixed chronic inflammation present, with focus score <1, not suggestive of Sjogren's syndrome, but with the inability to exclude this possibility.  I reviewed the x-rays of her hands and left knee which were normal.  The x-ray of the cervical spine showed mild degenerative arthritis.     She mentions since our last visit she has actually been feeling quite stable.  The altered sensation in her mouth and difficulty swallowing have actually improved spontaneously.  The bloating has also improved.  The main concern she brings up today is intermittent diffuse foot pain and stiffness that occurs without any triggering factors anytime during the day and resolves within a few minutes spontaneously.  It does not happen on a daily basis.  No other concerning joint pains, swelling or stiffness.  At times she has noticed finger and toe spasms.      6/16/2025:  Patient presents for a follow-up.  I reviewed her recent labs which shows a slight elevation in the SSA antibody of 16.  An ESR and CRP were stable but elevated at 55 and 6.1, respectively.  An antineutrophil cytoplasmic antibody, SPEP, immunoglobulin assay, C3, C4, urine analysis and urine protein creatinine ratio were unremarkable.  A cryoglobulin and myositis antibody profile are still pending.    She mentions since our last visit she has actually been feeling quite stable.  The altered sensation  in her mouth and difficulty swallowing have improved spontaneously.  The bloating has also improved.  The main concern she brings up today is intermittent diffuse left foot pain and stiffness that occurs without any triggering factors anytime during the day and resolves within a few minutes spontaneously.  It does not happen on a daily basis.  With increased activity she does experience bilateral hand achiness and stiffness and at times will notice her left hand third digit gets stuck.  No other concerning joint pains, swelling or stiffness.  She does have chronic dry eyes for which she follows with ophthalmology.  No dry mouth.        Review of Systems  Constitutional: Negative for weight change, fevers, chills, night sweats, fatigue.  ENT/Mouth: Negative for hearing changes, ear pain, nasal congestion, sinus pain, hoarseness, sore throat, rhinorrhea, swallowing difficulty.   Eyes: Negative for pain, redness, discharge, vision changes.   Cardiovascular: Negative for chest pain, SOB, palpitations.   Respiratory: Negative for cough, sputum, wheezing, dyspnea.   Gastrointestinal: Negative for nausea, vomiting, diarrhea, constipation, pain, heartburn.  Genitourinary: Negative for dysuria, urinary frequency, hematuria.   Musculoskeletal: As per HPI.  Skin: Negative for skin rash, color changes.   Neuro: Negative for weakness, numbness, tingling, loss of consciousness.   Psych: Negative for anxiety, depression.   Heme/Lymph: Negative for easy bruising, bleeding, lymphadenopathy.      Objective   There were no vitals taken for this visit.    Physical Exam  General: Well appearing, well nourished, in no distress. Oriented x 3, normal mood and affect.  Skin: Good turgor, no rash, unusual bruising or prominent lesions.  Hair: Normal texture and distribution.  HEENT:  Head: Normocephalic, atraumatic.  Eyes: Conjunctiva clear, sclera non-icteric, EOM intact.  Nose: No external lesions.  Neck: Supple.  Neurologic: Alert and  oriented. No focal neurological deficits appreciated.   Psychiatric: Normal mood and affect.     Administrative Statements   Encounter provider Suzanne Durand MD    The Patient is located at Home and in the following state in which I hold an active license [place].    The patient was identified by name and date of birth. Mindy Truong was informed that this is a telemedicine visit and that the visit is being conducted through the Epic Embedded platform. She agrees to proceed..  My office door was closed. No one else was in the room.  She acknowledged consent and understanding of privacy and security of the video platform. The patient has agreed to participate and understands they can discontinue the visit at any time.    I have spent a total time of 21 minutes in caring for this patient on the day of the visit/encounter including Diagnostic results, Patient and family education, Impressions, Documenting in the medical record, Reviewing/placing orders in the medical record (including tests, medications, and/or procedures), and Obtaining or reviewing history  , not including the time spent for establishing the audio/video connection.

## 2025-06-16 NOTE — ASSESSMENT & PLAN NOTE
Ms. Truong is a 54-year-old female with history significant for Hashimoto's thyroiditis who presents for a follow up of a positive LALITA 1:80 speckled pattern and low titer SSA antibody.     - Mindy presents today for a follow up of a low titer positive LALTIA that was initially detected in 2017 and has been evaluated by 2 rheumatologists thus far without concerns for a connective tissue disease.  The LALITA was thought to be secondary to her diagnosis of Hashimoto's thyroiditis.  She presents for a follow up of this as well as intermittent symptoms she has been experiencing over the past 2 years with dysphagia to both solids and liquids as well as altered taste/fearful anticipation of foods.  I updated a serological workup and in addition to the positive LALITA this shows a low titer SSA antibody as well as an elevated ESR.  It is unclear if these abnormalities are occurring in the context of her symptoms and if there may be a suggestion for Sjogren's syndrome.  At this time I do not have a clear explanation for her lab abnormalities or symptoms, but reassuringly since our last visit she mentions most of her symptoms have spontaneously improved.  In the interim she did also see ENT for a minor salivary gland lip biopsy and this was not able to include or exclude the possibility of Sjogren's syndrome.     - Overall as she is currently stable I recommend a monitoring approach and she is agreeable to this.

## 2025-06-16 NOTE — ASSESSMENT & PLAN NOTE
- Weight loss of approximately 15 pounds since starting Wegovy.  - Discussed continuing Wegovy if no concerns regarding symptoms.  -  she is willing to continue Wegovy, which was ordered 2.4 mg weekly.    Orders:  •  Semaglutide-Weight Management (Wegovy) 2.4 MG/0.75ML; Inject 0.75 mL (2.4 mg total) under the skin once a week

## 2025-06-16 NOTE — TELEPHONE ENCOUNTER
Patient called to request a refill for their clonazepam advised a refill was requested on 6/13/25 and is pending approval. Patient verbalized understanding and is in agreement.     Does the patient have enough for 3 days?   [] Yes   [x] No - Send as HP to POD

## 2025-06-16 NOTE — TELEPHONE ENCOUNTER
In that case can cancel the September appointment if she is out of state, and offer her a reschedule this Friday at 9 am.

## 2025-06-16 NOTE — TELEPHONE ENCOUNTER
"Received call from patient stating she saw the results come back and several are \"abnormal\" especially the Sjogren's.    She will be leaving the state (going to [place]) on 6/26/2025 and will gone for 1 (one) year. She is trying to follow up before she leaves.     Can fly back when needs to be seen. Currently has 6 month f/u scheduled 9/29/2025.    Please advise of the results and recommendations.     "

## 2025-06-16 NOTE — TELEPHONE ENCOUNTER
Refill must be reviewed and completed by the office or provider. The refill is unable to be approved or denied by the medication management team.      04/29/2025 04/29/2025 clonazePAM (Tablet) 60.0 30 0.5 MG NA GILMA WANG [facility] Commercial [place] 0 / 0 [place]   1 [id number] ** 02/12/2025 02/12/2025 clonazePAM (Tablet) 60.0 30 0.5 MG [facility] Commercial [place] 0 / 0 [place]   1 [id number] ** 10/28/2024 10/28/2024 clonazePAM (Tablet) 60.0 30 0.5 MG [facility] Commercial [place] 0 / 0 [place]

## 2025-06-16 NOTE — PROGRESS NOTES
Virtual Regular Visit  Name: Mindy Truong      : 1970      MRN: [id number]  Encounter Provider: Barbara Andrade MD  Encounter Date: 2025   Encounter department: [facility] [place]      Chief Complaint   Patient presents with   • Follow-up     Hashimoto's disease     • Virtual Regular Visit   :  Assessment & Plan  Class 1 obesity due to excess calories with serious comorbidity and body mass index (BMI) of 32.0 to 32.9 in adult  - Weight loss of approximately 15 pounds since starting Wegovy.  - Discussed continuing Wegovy if no concerns regarding symptoms.  -  she is willing to continue Wegovy, which was ordered 2.4 mg weekly.    Orders:  •  Semaglutide-Weight Management (Wegovy) 2.4 MG/0.75ML; Inject 0.75 mL (2.4 mg total) under the skin once a week    Hypothyroidism, unspecified type  - Recent TFT showed TSH of 0.375 and free T4 of 0.89.  - Discussed the need to decrease levothyroxine dosage likely due to weight loss.  - Continue current levothyroxine regimen, with 75 mcg daily and half a pill on Sundays; blood work to be conducted before next visit in 6 months.    Orders:  •  levothyroxine 75 mcg tablet; Take 1 tablet (75 mcg total) by mouth daily  •  TSH, 3rd generation with Free T4 reflex; Future    Pre-diabetes  Life style modification including regular daily exercise and balanced diet emphasized.  Continue Wegovy 2.4 mg weekly.                  Pertinent Medical History           History of Present Illness   History of Present Illness    Mindy Truong is a 54 y.o. female who presents via virtual visit for evaluation of hypothyroidism and weight management.    She has been maintaining her levothyroxine regimen at a daily dose of 75 mcg. She reports no symptoms indicative of excessive medication intake, such as palpitations or tachycardia. Her energy levels remain satisfactory. She experienced a single episode of palpitations, which she attributes to a  "particularly distressing nightmare rather than her medication.    She is currently on a weekly dose of Wegovy 2.4 mg. She experienced one episode of vomiting with food content and two subsequent episodes of vomiting with saliva-like liquid. These episodes were associated with physical activity, such as mopping the floor and pushing a lawnmower. 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 The most recent episode occurred on 06/12/2025. She occasionally experiences mild nausea between these episodes. She has lost approximately 15 pounds since starting Wegovy, with her weight decreasing from 173 to 158 pounds.    She has an upcoming appointment with a rheumatologist and plans to relocate to [place] for a year.    Review of Systems as per HPI  Medical History Reviewed by provider this encounter:     .  Medications Ordered Prior to Encounter[1]      Medical History Reviewed by provider this encounter:     .    Objective   Ht 5' 2.5\" (1.588 m) Comment: per patient  Wt 71.7 kg (158 lb) Comment: per patient  BMI 28.44 kg/m²      Body mass index is 28.44 kg/m².  Wt Readings from Last 3 Encounters:   06/16/25 71.7 kg (158 lb)   06/13/25 75.8 kg (167 lb)   05/05/25 79.8 kg (176 lb)     Physical Exam  Constitutional:       General: She is not in acute distress.     Appearance: She is not ill-appearing.   HENT:      Head: Normocephalic and atraumatic.   Pulmonary:      Effort: Pulmonary effort is normal. No respiratory distress.     Neurological:      Mental Status: She is oriented to person, place, and time.       Physical Exam  Vital Signs: Weight is 158 pounds.    Results  Labs:   - TSH: low  - Vitamin D: 45 ng/mL  - A1c: 6.2%  Labs: I have reviewed pertinent labs including:   Lab Results   Component Value Date    HGBA1C 6.2 (H) 06/10/2025    HGBA1C 6.4 (H) 10/18/2024    HGBA1C 6.1 (H) 06/07/2024      Lab Results   Component Value Date    ZCK0SXLMMBFU 0.375 (L) " 06/10/2025      Lab Results   Component Value Date    FREET4 0.89 06/10/2025      Radiology Results Review: I have reviewed the following images/report studies in PACS: US thyroid  Result Date: 3/5/2024  Impression     No nodule meets current ACR criteria for requiring biopsy or follow-up ultrasounds.         Reference: ACR Thyroid Imaging, Reporting and Data System (TI-RADS): White Paper of the ACR TI-RADS Committee. J AM Mary Lou Radiol 2017;14:587-595. (additional recommendations based on American Thyroid Association 2015 guidelines.)             Resident: RICARDO Adams I, the attending radiologist, have reviewed the images and agree with the final report above.     Workstation performed: [id number]          There are no Patient Instructions on file for this visit.    Discussed with the patient and all questioned fully answered. She will call me if any problems arise.        Administrative Statements   Encounter provider Barbara Andrade MD    The Patient is located at Home and in the following state in which I hold an active license [place].    The patient was identified by name and date of birth. Mindy Truong was informed that this is a telemedicine visit and that the visit is being conducted through the Epic Embedded platform. She agrees to proceed..  My office door was closed. No one else was in the room.  She acknowledged consent and understanding of privacy and security of the video platform. The patient has agreed to participate and understands they can discontinue the visit at any time.             [1]  Current Outpatient Medications on File Prior to Visit   Medication Sig Dispense Refill   • albuterol (2.5 mg/3 mL) 0.083 % nebulizer solution Take 3 mL (2.5 mg total) by nebulization every 6 (six) hours as needed for wheezing or shortness of breath 180 mL 0   • albuterol (PROVENTIL HFA,VENTOLIN HFA) 90 mcg/act inhaler Inhale 2 puffs every 4 (four) hours as needed for wheezing or shortness of breath 6.7 g 5   •  cloNIDine (CATAPRES) 0.1 mg tablet TAKE 1 TABLET (0.1 MG TOTAL) BY MOUTH DAILY AT BEDTIME 90 tablet 1   • estradiol (ESTRACE VAGINAL) 0.1 mg/g vaginal cream Use pea size amount to the vaginal opening 1-2 times per week or .5gm into the vagina twice weekly 42.5 g 1   • mirtazapine (REMERON) 7.5 MG tablet Take 1 tablet (7.5 mg total) by mouth daily at bedtime 90 tablet 1   • rizatriptan (MAXALT-MLT) 10 mg disintegrating tablet Take 1 tablet (10 mg total) by mouth once as needed for migraine for up to 1 dose May repeat in 2 hours if needed 9 tablet 2   • venlafaxine (EFFEXOR-XR) 150 mg 24 hr capsule TAKE 1 CAPSULE (150 MG TOTAL) BY MOUTH DAILY WITH 75 MG CAPSULE (225 MG TOTAL DAILY) 90 capsule 1   • venlafaxine (EFFEXOR-XR) 75 mg 24 hr capsule TAKE 1 CAPSULE BY MOUTH EVERY DAY 90 capsule 1   • [DISCONTINUED] clonazePAM (KlonoPIN) 0.5 mg tablet Take 1 tablet (0.5 mg total) by mouth daily at bedtime. May also take 1 tablet (0.5 mg total) daily as needed for anxiety. 60 tablet 0   • [DISCONTINUED] levothyroxine 75 mcg tablet Take 1 tablet (75 mcg total) by mouth daily 90 tablet 1   • [DISCONTINUED] Semaglutide-Weight Management (Wegovy) 2.4 MG/0.75ML Inject 0.75 mL (2.4 mg total) under the skin once a week 9 mL 0   • atorvastatin (LIPITOR) 20 mg tablet Take 1 tablet (20 mg total) by mouth daily 90 tablet 0   • clonazePAM (KlonoPIN) 0.5 mg tablet TAKE 1 TABLET BY MOUTH DAILY AT BEDTIME. MAY ALSO TAKE 1 TABLET DAILY AS NEEDED FOR ANXIETY. 60 tablet 0     No current facility-administered medications on file prior to visit.

## 2025-06-17 LAB — CRYOGLOB SER QL 1D COLD INC: NORMAL

## 2025-06-17 NOTE — ASSESSMENT & PLAN NOTE
Life style modification including regular daily exercise and balanced diet emphasized.  Continue Wegovy 2.4 mg weekly.

## 2025-06-19 ENCOUNTER — TELEPHONE (OUTPATIENT)
Dept: PSYCHIATRY | Facility: CLINIC | Age: 55
End: 2025-06-19

## 2025-06-21 LAB
EJ AB SER QL: NEGATIVE
ENA JO1 AB SER IA-ACNC: <20 UNITS
ENA PM/SCL AB SER-ACNC: <20 UNITS
ENA SS-A 52KD IGG SER IA-ACNC: <20 UNITS
FIBRILLARIN AB SER QL: NEGATIVE
KU AB SER QL: NEGATIVE
MDA5 AB SER LINE BLOT-ACNC: <20 UNITS
MI2 AB SER QL: NEGATIVE
MJ AB SER LINE BLOT-ACNC: <20 UNITS
OJ AB SER QL: NEGATIVE
PL12 AB SER QL: NEGATIVE
PL7 AB SER QL: NEGATIVE
SAE1 IGG SER QL LINE BLOT: <20 UNITS
SRP AB SERPL QL: NEGATIVE
TIF1-GAMMA AB SER LINE BLOT-ACNC: <20 UNITS
U1 SNRNP AB SER IA-ACNC: <20 UNITS
U2 SNRNP AB SER QL: NEGATIVE

## 2025-06-27 DIAGNOSIS — E78.49 OTHER HYPERLIPIDEMIA: ICD-10-CM

## 2025-06-27 RX ORDER — ATORVASTATIN CALCIUM 20 MG/1
20 TABLET, FILM COATED ORAL DAILY
Qty: 90 TABLET | Refills: 1 | Status: SHIPPED | OUTPATIENT
Start: 2025-06-27

## 2025-07-15 ENCOUNTER — TELEPHONE (OUTPATIENT)
Age: 55
End: 2025-07-15

## 2025-07-15 DIAGNOSIS — F43.10 POST TRAUMATIC STRESS DISORDER (PTSD): ICD-10-CM

## 2025-07-15 DIAGNOSIS — F41.1 GAD (GENERALIZED ANXIETY DISORDER): ICD-10-CM

## 2025-07-15 RX ORDER — CLONAZEPAM 0.5 MG/1
0.5 TABLET ORAL 2 TIMES DAILY PRN
Qty: 60 TABLET | Refills: 0 | Status: SHIPPED | OUTPATIENT
Start: 2025-07-15 | End: 2025-08-14

## 2025-07-21 ENCOUNTER — TELEPHONE (OUTPATIENT)
Dept: ENDOCRINOLOGY | Facility: CLINIC | Age: 55
End: 2025-07-21

## 2025-07-23 ENCOUNTER — DOCUMENTATION (OUTPATIENT)
Dept: PSYCHIATRY | Facility: CLINIC | Age: 55
End: 2025-07-23

## 2025-07-23 DIAGNOSIS — F43.10 POST TRAUMATIC STRESS DISORDER (PTSD): ICD-10-CM

## 2025-07-23 DIAGNOSIS — F33.42 MAJOR DEPRESSIVE DISORDER, RECURRENT, IN FULL REMISSION (HCC): ICD-10-CM

## 2025-07-23 DIAGNOSIS — F41.1 GAD (GENERALIZED ANXIETY DISORDER): Primary | ICD-10-CM

## 2025-07-23 NOTE — PROGRESS NOTES
Psychotherapy Discharge Summary    Preferred Name: Mindy Truong  YOB: 1970    Admission date to psychotherapy: 5/2/2024    Referred by: self    Presenting Problem: Anxiety, depression, and PTSD    Course of treatment included : medication management and individual therapy     Progress/Outcome of Treatment Goals (brief summary of course of treatment): Mindy achieved her goal to work through past work experiences that contributed to anger. She developed healthier ways to re-direct her anger and sadness, and demonstrated the ability to maintain her progress. She also began engaging in healthier self-care practices and reported feeling happier and confident in her ability to cope.     Treatment Complications (if any): N/A    Treatment Progress: excellent    Current [facility] Psychiatric Provider: Garima Rea,     Martin Medications include: Effexor XR, Clonidine, Remeron    Discharge Date: 7/23/2025    Discharge Diagnosis:   1. JUHI (generalized anxiety disorder)        2. Major depressive disorder, recurrent, in full remission (HCC)        3. Post traumatic stress disorder (PTSD)            Criteria for Discharge: Mindy will be living out of the state for the next year.     Patient is cleared to return to [facility] LCS[facility] for continued treatment.    Rationale: Great rapport with Therapist.    Aftercare recommendations include (include specific referral names and phone numbers, if appropriate): N/A    Prognosis: excellent

## 2025-07-24 NOTE — TELEPHONE ENCOUNTER
PA for (Wegovy) 2.4 MG  APPROVED     Date(s) approved 7/24/25-7/24/26    Case #    Patient advised by          [x]MyChart Message  []Phone call   []LMOM  []L/M to call office as no active Communication consent on file  []Unable to leave detailed message as VM not approved on Communication consent       Pharmacy advised by    [x]Fax  []Phone call  []Secure Chat    Specialty Pharmacy    []      Approval letter scanned into Media Yes

## 2025-07-24 NOTE — TELEPHONE ENCOUNTER
PA for (Wegovy) 2.4 MG SUBMITTED to [facility]    via    [x]CMM-KEY: [id number]  [x]PA sent as URGENT    All office notes, labs and other pertaining documents and studies sent. Clinical questions answered. Awaiting determination from insurance company.     Turnaround time for your insurance to make a decision on your Prior Authorization can take 7-21 business days.

## 2025-07-28 ENCOUNTER — TELEPHONE (OUTPATIENT)
Dept: PSYCHIATRY | Facility: CLINIC | Age: 55
End: 2025-07-28

## 2025-07-28 DIAGNOSIS — E66.811 CLASS 1 OBESITY DUE TO EXCESS CALORIES WITH SERIOUS COMORBIDITY AND BODY MASS INDEX (BMI) OF 32.0 TO 32.9 IN ADULT: ICD-10-CM

## 2025-07-28 DIAGNOSIS — E66.09 CLASS 1 OBESITY DUE TO EXCESS CALORIES WITH SERIOUS COMORBIDITY AND BODY MASS INDEX (BMI) OF 32.0 TO 32.9 IN ADULT: ICD-10-CM

## 2025-07-30 RX ORDER — SEMAGLUTIDE 2.4 MG/.75ML
2.4 INJECTION, SOLUTION SUBCUTANEOUS WEEKLY
Qty: 9 ML | Refills: 0 | Status: SHIPPED | OUTPATIENT
Start: 2025-07-30

## 2025-08-05 ENCOUNTER — TELEPHONE (OUTPATIENT)
Age: 55
End: 2025-08-05

## 2025-08-22 DIAGNOSIS — F33.1 MAJOR DEPRESSIVE DISORDER, RECURRENT, MODERATE (HCC): ICD-10-CM

## 2025-08-22 DIAGNOSIS — F43.10 POST TRAUMATIC STRESS DISORDER (PTSD): ICD-10-CM

## 2025-08-22 RX ORDER — VENLAFAXINE HYDROCHLORIDE 75 MG/1
75 CAPSULE, EXTENDED RELEASE ORAL DAILY
Qty: 90 CAPSULE | Refills: 1 | Status: SHIPPED | OUTPATIENT
Start: 2025-08-22

## 2025-08-22 RX ORDER — VENLAFAXINE HYDROCHLORIDE 150 MG/1
150 CAPSULE, EXTENDED RELEASE ORAL DAILY
Qty: 90 CAPSULE | Refills: 1 | OUTPATIENT
Start: 2025-08-22

## (undated) DEVICE — STRETCH BANDAGE: Brand: CURITY

## (undated) DEVICE — Device

## (undated) DEVICE — DRAPE C-ARM X-RAY

## (undated) DEVICE — HEWSON SUTURE RETRIEVER: Brand: HEWSON SUTURE RETRIEVER

## (undated) DEVICE — CURITY NON-ADHERENT STRIPS: Brand: CURITY

## (undated) DEVICE — GLOVE SRG BIOGEL 8

## (undated) DEVICE — GLOVE SRG BIOGEL 6.5

## (undated) DEVICE — LIGHT HANDLE COVER SLEEVE DISP BLUE STELLAR

## (undated) DEVICE — VIOLET BRAIDED (POLYGLACTIN 910), SYNTHETIC ABSORBABLE SUTURE: Brand: COATED VICRYL

## (undated) DEVICE — DRAPE EQUIPMENT RF WAND

## (undated) DEVICE — IMPERVIOUS STOCKINETTE: Brand: DEROYAL

## (undated) DEVICE — STERILE BETHLEHEM PLASTIC HAND: Brand: CARDINAL HEALTH

## (undated) DEVICE — SUT ETHIBOND 0 CT-1 30 IN X424H

## (undated) DEVICE — ACE WRAP 4 IN STERILE

## (undated) DEVICE — GAUZE SPONGES,16 PLY: Brand: CURITY

## (undated) DEVICE — COBAN 4 IN STERILE

## (undated) DEVICE — GLOVE INDICATOR PI UNDERGLOVE SZ 6.5 BLUE

## (undated) DEVICE — SPONGE SCRUB 4 PCT CHLORHEXIDINE

## (undated) DEVICE — PROXIMATE PLUS MD MULTI-DIRECTIONAL RELEASE SKIN STAPLERS CONTAINS 35 STAINLESS STEEL STAPLES APPROXIMATE CLOSED DIMENSIONS: 6.9MM X 3.9MM WIDE: Brand: PROXIMATE

## (undated) DEVICE — SUT ETHILON 4-0 PS-2 18 IN 1667H

## (undated) DEVICE — INTENDED FOR TISSUE SEPARATION, AND OTHER PROCEDURES THAT REQUIRE A SHARP SURGICAL BLADE TO PUNCTURE OR CUT.: Brand: BARD-PARKER ® CARBON RIB-BACK BLADES

## (undated) DEVICE — ACE WRAP 4 IN UNSTERILE

## (undated) DEVICE — SCD SEQUENTIAL COMPRESSION COMFORT SLEEVE MEDIUM KNEE LENGTH: Brand: KENDALL SCD

## (undated) DEVICE — SPLINT ORTHO-GLASS 5 IN X 15 FT